# Patient Record
Sex: FEMALE | Race: OTHER | HISPANIC OR LATINO | Employment: OTHER | URBAN - METROPOLITAN AREA
[De-identification: names, ages, dates, MRNs, and addresses within clinical notes are randomized per-mention and may not be internally consistent; named-entity substitution may affect disease eponyms.]

---

## 2018-01-03 ENCOUNTER — HOSPITAL ENCOUNTER (INPATIENT)
Facility: MEDICAL CENTER | Age: 53
LOS: 7 days | DRG: 091 | End: 2018-01-11
Attending: EMERGENCY MEDICINE | Admitting: INTERNAL MEDICINE
Payer: COMMERCIAL

## 2018-01-03 ENCOUNTER — RESOLUTE PROFESSIONAL BILLING HOSPITAL PROF FEE (OUTPATIENT)
Dept: HOSPITALIST | Facility: MEDICAL CENTER | Age: 53
End: 2018-01-03
Payer: COMMERCIAL

## 2018-01-03 DIAGNOSIS — E86.0 DEHYDRATION: ICD-10-CM

## 2018-01-03 DIAGNOSIS — R74.8 ELEVATED CK: ICD-10-CM

## 2018-01-03 DIAGNOSIS — G25.82 STIFF PERSON SYNDROME WITH POSITIVE GLUTAMIC ACID DECARBOXYLASE (GAD) ANTIBODY: ICD-10-CM

## 2018-01-03 DIAGNOSIS — R76.0 STIFF PERSON SYNDROME WITH POSITIVE GLUTAMIC ACID DECARBOXYLASE (GAD) ANTIBODY: ICD-10-CM

## 2018-01-03 DIAGNOSIS — R53.81 DEBILITY: ICD-10-CM

## 2018-01-03 DIAGNOSIS — M79.604 PAIN OF RIGHT LOWER EXTREMITY: ICD-10-CM

## 2018-01-03 DIAGNOSIS — E87.6 HYPOKALEMIA: ICD-10-CM

## 2018-01-03 PROBLEM — G24.8 DYSTONIA OF EXTREMITY: Status: ACTIVE | Noted: 2018-01-03

## 2018-01-03 PROBLEM — D72.829 LEUKOCYTOSIS: Status: ACTIVE | Noted: 2018-01-03

## 2018-01-03 LAB
ALBUMIN SERPL BCP-MCNC: 4 G/DL (ref 3.2–4.9)
ALBUMIN/GLOB SERPL: 1.3 G/DL
ALP SERPL-CCNC: 162 U/L (ref 30–99)
ALT SERPL-CCNC: 112 U/L (ref 2–50)
AMPHET UR QL SCN: NEGATIVE
ANION GAP SERPL CALC-SCNC: 13 MMOL/L (ref 0–11.9)
APPEARANCE UR: CLEAR
APTT PPP: 27.5 SEC (ref 24.7–36)
AST SERPL-CCNC: 35 U/L (ref 12–45)
BACTERIA #/AREA URNS HPF: NEGATIVE /HPF
BARBITURATES UR QL SCN: NEGATIVE
BASOPHILS # BLD AUTO: 0.4 % (ref 0–1.8)
BASOPHILS # BLD: 0.06 K/UL (ref 0–0.12)
BENZODIAZ UR QL SCN: POSITIVE
BILIRUB SERPL-MCNC: 0.7 MG/DL (ref 0.1–1.5)
BILIRUB UR QL STRIP.AUTO: NEGATIVE
BUN SERPL-MCNC: 11 MG/DL (ref 8–22)
BZE UR QL SCN: NEGATIVE
CALCIUM SERPL-MCNC: 9.4 MG/DL (ref 8.5–10.5)
CANNABINOIDS UR QL SCN: NEGATIVE
CHLORIDE SERPL-SCNC: 105 MMOL/L (ref 96–112)
CK SERPL-CCNC: 231 U/L (ref 0–154)
CO2 SERPL-SCNC: 25 MMOL/L (ref 20–33)
COLOR UR: YELLOW
CREAT SERPL-MCNC: 0.54 MG/DL (ref 0.5–1.4)
CRP SERPL HS-MCNC: 2.04 MG/DL (ref 0–0.75)
DEPRECATED D DIMER PPP IA-ACNC: 1135 NG/ML(D-DU)
EKG IMPRESSION: NORMAL
EOSINOPHIL # BLD AUTO: 0.05 K/UL (ref 0–0.51)
EOSINOPHIL NFR BLD: 0.3 % (ref 0–6.9)
EPI CELLS #/AREA URNS HPF: ABNORMAL /HPF
ERYTHROCYTE [DISTWIDTH] IN BLOOD BY AUTOMATED COUNT: 38.9 FL (ref 35.9–50)
ERYTHROCYTE [SEDIMENTATION RATE] IN BLOOD BY WESTERGREN METHOD: 41 MM/HOUR (ref 0–30)
EST. AVERAGE GLUCOSE BLD GHB EST-MCNC: 192 MG/DL
FERRITIN SERPL-MCNC: 54.3 NG/ML (ref 10–291)
FOLATE SERPL-MCNC: 17.6 NG/ML
GFR SERPL CREATININE-BSD FRML MDRD: >60 ML/MIN/1.73 M 2
GLOBULIN SER CALC-MCNC: 3.2 G/DL (ref 1.9–3.5)
GLUCOSE BLD-MCNC: 126 MG/DL (ref 65–99)
GLUCOSE BLD-MCNC: 153 MG/DL (ref 65–99)
GLUCOSE BLD-MCNC: 97 MG/DL (ref 65–99)
GLUCOSE SERPL-MCNC: 128 MG/DL (ref 65–99)
GLUCOSE UR STRIP.AUTO-MCNC: NEGATIVE MG/DL
HBA1C MFR BLD: 8.3 % (ref 0–5.6)
HCT VFR BLD AUTO: 39.4 % (ref 37–47)
HGB BLD-MCNC: 13.8 G/DL (ref 12–16)
HYALINE CASTS #/AREA URNS LPF: ABNORMAL /LPF
IMM GRANULOCYTES # BLD AUTO: 0.07 K/UL (ref 0–0.11)
IMM GRANULOCYTES NFR BLD AUTO: 0.5 % (ref 0–0.9)
INR PPP: 0.95 (ref 0.87–1.13)
KETONES UR STRIP.AUTO-MCNC: NEGATIVE MG/DL
LEUKOCYTE ESTERASE UR QL STRIP.AUTO: ABNORMAL
LYMPHOCYTES # BLD AUTO: 3.49 K/UL (ref 1–4.8)
LYMPHOCYTES NFR BLD: 23.4 % (ref 22–41)
MAGNESIUM SERPL-MCNC: 1.9 MG/DL (ref 1.5–2.5)
MCH RBC QN AUTO: 31.3 PG (ref 27–33)
MCHC RBC AUTO-ENTMCNC: 35 G/DL (ref 33.6–35)
MCV RBC AUTO: 89.3 FL (ref 81.4–97.8)
METHADONE UR QL SCN: NEGATIVE
MICRO URNS: ABNORMAL
MONOCYTES # BLD AUTO: 0.62 K/UL (ref 0–0.85)
MONOCYTES NFR BLD AUTO: 4.2 % (ref 0–13.4)
NEUTROPHILS # BLD AUTO: 10.61 K/UL (ref 2–7.15)
NEUTROPHILS NFR BLD: 71.2 % (ref 44–72)
NITRITE UR QL STRIP.AUTO: NEGATIVE
NRBC # BLD AUTO: 0 K/UL
NRBC BLD-RTO: 0 /100 WBC
OPIATES UR QL SCN: POSITIVE
OXYCODONE UR QL SCN: NEGATIVE
PCP UR QL SCN: NEGATIVE
PH UR STRIP.AUTO: 6.5 [PH]
PHOSPHATE SERPL-MCNC: 3.3 MG/DL (ref 2.5–4.5)
PLATELET # BLD AUTO: 346 K/UL (ref 164–446)
PMV BLD AUTO: 13.1 FL (ref 9–12.9)
POTASSIUM SERPL-SCNC: 3.2 MMOL/L (ref 3.6–5.5)
PROPOXYPH UR QL SCN: NEGATIVE
PROT SERPL-MCNC: 7.2 G/DL (ref 6–8.2)
PROT UR QL STRIP: NEGATIVE MG/DL
PROTHROMBIN TIME: 12.4 SEC (ref 12–14.6)
RBC # BLD AUTO: 4.41 M/UL (ref 4.2–5.4)
RBC # URNS HPF: ABNORMAL /HPF
RBC UR QL AUTO: NEGATIVE
SODIUM SERPL-SCNC: 143 MMOL/L (ref 135–145)
SP GR UR STRIP.AUTO: 1.01
T4 FREE SERPL-MCNC: 1.36 NG/DL (ref 0.53–1.43)
TSH SERPL DL<=0.005 MIU/L-ACNC: 3.65 UIU/ML (ref 0.38–5.33)
UROBILINOGEN UR STRIP.AUTO-MCNC: 1 MG/DL
VIT B12 SERPL-MCNC: >1500 PG/ML (ref 211–911)
WBC # BLD AUTO: 14.9 K/UL (ref 4.8–10.8)
WBC #/AREA URNS HPF: ABNORMAL /HPF

## 2018-01-03 PROCEDURE — 93010 ELECTROCARDIOGRAM REPORT: CPT | Performed by: INTERNAL MEDICINE

## 2018-01-03 PROCEDURE — 85730 THROMBOPLASTIN TIME PARTIAL: CPT

## 2018-01-03 PROCEDURE — 85379 FIBRIN DEGRADATION QUANT: CPT

## 2018-01-03 PROCEDURE — 85025 COMPLETE CBC W/AUTO DIFF WBC: CPT

## 2018-01-03 PROCEDURE — 85652 RBC SED RATE AUTOMATED: CPT

## 2018-01-03 PROCEDURE — 700101 HCHG RX REV CODE 250: Performed by: STUDENT IN AN ORGANIZED HEALTH CARE EDUCATION/TRAINING PROGRAM

## 2018-01-03 PROCEDURE — 84439 ASSAY OF FREE THYROXINE: CPT

## 2018-01-03 PROCEDURE — 93971 EXTREMITY STUDY: CPT

## 2018-01-03 PROCEDURE — 700105 HCHG RX REV CODE 258: Performed by: STUDENT IN AN ORGANIZED HEALTH CARE EDUCATION/TRAINING PROGRAM

## 2018-01-03 PROCEDURE — 83036 HEMOGLOBIN GLYCOSYLATED A1C: CPT

## 2018-01-03 PROCEDURE — 36415 COLL VENOUS BLD VENIPUNCTURE: CPT

## 2018-01-03 PROCEDURE — 93005 ELECTROCARDIOGRAM TRACING: CPT | Performed by: STUDENT IN AN ORGANIZED HEALTH CARE EDUCATION/TRAINING PROGRAM

## 2018-01-03 PROCEDURE — 700102 HCHG RX REV CODE 250 W/ 637 OVERRIDE(OP): Performed by: STUDENT IN AN ORGANIZED HEALTH CARE EDUCATION/TRAINING PROGRAM

## 2018-01-03 PROCEDURE — 86140 C-REACTIVE PROTEIN: CPT

## 2018-01-03 PROCEDURE — 96375 TX/PRO/DX INJ NEW DRUG ADDON: CPT

## 2018-01-03 PROCEDURE — 94760 N-INVAS EAR/PLS OXIMETRY 1: CPT

## 2018-01-03 PROCEDURE — A9270 NON-COVERED ITEM OR SERVICE: HCPCS | Performed by: EMERGENCY MEDICINE

## 2018-01-03 PROCEDURE — 83735 ASSAY OF MAGNESIUM: CPT

## 2018-01-03 PROCEDURE — 700105 HCHG RX REV CODE 258: Performed by: EMERGENCY MEDICINE

## 2018-01-03 PROCEDURE — 82746 ASSAY OF FOLIC ACID SERUM: CPT

## 2018-01-03 PROCEDURE — 84443 ASSAY THYROID STIM HORMONE: CPT

## 2018-01-03 PROCEDURE — 700102 HCHG RX REV CODE 250 W/ 637 OVERRIDE(OP): Performed by: EMERGENCY MEDICINE

## 2018-01-03 PROCEDURE — 700102 HCHG RX REV CODE 250 W/ 637 OVERRIDE(OP): Performed by: PSYCHIATRY & NEUROLOGY

## 2018-01-03 PROCEDURE — 82550 ASSAY OF CK (CPK): CPT

## 2018-01-03 PROCEDURE — 99285 EMERGENCY DEPT VISIT HI MDM: CPT

## 2018-01-03 PROCEDURE — 85610 PROTHROMBIN TIME: CPT

## 2018-01-03 PROCEDURE — 700111 HCHG RX REV CODE 636 W/ 250 OVERRIDE (IP): Performed by: STUDENT IN AN ORGANIZED HEALTH CARE EDUCATION/TRAINING PROGRAM

## 2018-01-03 PROCEDURE — 82607 VITAMIN B-12: CPT

## 2018-01-03 PROCEDURE — 82962 GLUCOSE BLOOD TEST: CPT | Mod: 91

## 2018-01-03 PROCEDURE — 80307 DRUG TEST PRSMV CHEM ANLYZR: CPT

## 2018-01-03 PROCEDURE — A9270 NON-COVERED ITEM OR SERVICE: HCPCS | Performed by: PSYCHIATRY & NEUROLOGY

## 2018-01-03 PROCEDURE — G0378 HOSPITAL OBSERVATION PER HR: HCPCS

## 2018-01-03 PROCEDURE — 96372 THER/PROPH/DIAG INJ SC/IM: CPT

## 2018-01-03 PROCEDURE — 99220 PR INITIAL OBSERVATION CARE,LEVL III: CPT | Mod: GC | Performed by: INTERNAL MEDICINE

## 2018-01-03 PROCEDURE — 700111 HCHG RX REV CODE 636 W/ 250 OVERRIDE (IP): Performed by: EMERGENCY MEDICINE

## 2018-01-03 PROCEDURE — 82728 ASSAY OF FERRITIN: CPT

## 2018-01-03 PROCEDURE — 81001 URINALYSIS AUTO W/SCOPE: CPT

## 2018-01-03 PROCEDURE — 84100 ASSAY OF PHOSPHORUS: CPT

## 2018-01-03 PROCEDURE — A9270 NON-COVERED ITEM OR SERVICE: HCPCS | Performed by: STUDENT IN AN ORGANIZED HEALTH CARE EDUCATION/TRAINING PROGRAM

## 2018-01-03 PROCEDURE — 96361 HYDRATE IV INFUSION ADD-ON: CPT

## 2018-01-03 PROCEDURE — 80053 COMPREHEN METABOLIC PANEL: CPT

## 2018-01-03 PROCEDURE — 96374 THER/PROPH/DIAG INJ IV PUSH: CPT

## 2018-01-03 PROCEDURE — 96376 TX/PRO/DX INJ SAME DRUG ADON: CPT

## 2018-01-03 RX ORDER — GABAPENTIN 300 MG/1
600 CAPSULE ORAL 3 TIMES DAILY
Status: DISCONTINUED | OUTPATIENT
Start: 2018-01-03 | End: 2018-01-06

## 2018-01-03 RX ORDER — MORPHINE SULFATE 4 MG/ML
2 INJECTION, SOLUTION INTRAMUSCULAR; INTRAVENOUS EVERY 4 HOURS PRN
Status: DISCONTINUED | OUTPATIENT
Start: 2018-01-03 | End: 2018-01-04

## 2018-01-03 RX ORDER — SODIUM CHLORIDE 9 MG/ML
1000 INJECTION, SOLUTION INTRAVENOUS ONCE
Status: COMPLETED | OUTPATIENT
Start: 2018-01-03 | End: 2018-01-03

## 2018-01-03 RX ORDER — SODIUM CHLORIDE AND POTASSIUM CHLORIDE 150; 900 MG/100ML; MG/100ML
INJECTION, SOLUTION INTRAVENOUS CONTINUOUS
Status: DISCONTINUED | OUTPATIENT
Start: 2018-01-03 | End: 2018-01-06

## 2018-01-03 RX ORDER — HYDROMORPHONE HYDROCHLORIDE 2 MG/ML
.5-1 INJECTION, SOLUTION INTRAMUSCULAR; INTRAVENOUS; SUBCUTANEOUS
Status: COMPLETED | OUTPATIENT
Start: 2018-01-03 | End: 2018-01-03

## 2018-01-03 RX ORDER — POTASSIUM CHLORIDE 20 MEQ/1
40 TABLET, EXTENDED RELEASE ORAL ONCE
Status: COMPLETED | OUTPATIENT
Start: 2018-01-03 | End: 2018-01-03

## 2018-01-03 RX ORDER — AMOXICILLIN 250 MG
2 CAPSULE ORAL 2 TIMES DAILY
Status: DISCONTINUED | OUTPATIENT
Start: 2018-01-03 | End: 2018-01-07 | Stop reason: ALTCHOICE

## 2018-01-03 RX ORDER — LORAZEPAM 2 MG/ML
1-2 INJECTION INTRAMUSCULAR
Status: DISCONTINUED | OUTPATIENT
Start: 2018-01-03 | End: 2018-01-05

## 2018-01-03 RX ORDER — GLIMEPIRIDE 4 MG/1
4 TABLET ORAL EVERY MORNING
COMMUNITY
End: 2018-01-23

## 2018-01-03 RX ORDER — GABAPENTIN 300 MG/1
600 CAPSULE ORAL 3 TIMES DAILY
Status: ON HOLD | COMMUNITY
End: 2018-01-11

## 2018-01-03 RX ORDER — METHOCARBAMOL 750 MG/1
1500 TABLET, FILM COATED ORAL EVERY 6 HOURS PRN
Status: DISCONTINUED | OUTPATIENT
Start: 2018-01-03 | End: 2018-01-04

## 2018-01-03 RX ORDER — DEXTROSE MONOHYDRATE 25 G/50ML
25 INJECTION, SOLUTION INTRAVENOUS
Status: DISCONTINUED | OUTPATIENT
Start: 2018-01-03 | End: 2018-01-11 | Stop reason: HOSPADM

## 2018-01-03 RX ORDER — DIAZEPAM 5 MG/1
10 TABLET ORAL EVERY 8 HOURS
Status: DISCONTINUED | OUTPATIENT
Start: 2018-01-03 | End: 2018-01-05

## 2018-01-03 RX ORDER — POLYETHYLENE GLYCOL 3350 17 G/17G
1 POWDER, FOR SOLUTION ORAL
Status: DISCONTINUED | OUTPATIENT
Start: 2018-01-03 | End: 2018-01-11 | Stop reason: HOSPADM

## 2018-01-03 RX ORDER — INSULIN GLARGINE 100 [IU]/ML
5 INJECTION, SOLUTION SUBCUTANEOUS EVERY EVENING
Status: DISCONTINUED | OUTPATIENT
Start: 2018-01-03 | End: 2018-01-11 | Stop reason: HOSPADM

## 2018-01-03 RX ORDER — BISACODYL 10 MG
10 SUPPOSITORY, RECTAL RECTAL
Status: DISCONTINUED | OUTPATIENT
Start: 2018-01-03 | End: 2018-01-11 | Stop reason: HOSPADM

## 2018-01-03 RX ORDER — ACETAMINOPHEN 325 MG/1
650 TABLET ORAL EVERY 6 HOURS PRN
Status: DISCONTINUED | OUTPATIENT
Start: 2018-01-03 | End: 2018-01-11 | Stop reason: HOSPADM

## 2018-01-03 RX ORDER — PREDNISONE 10 MG/1
10 TABLET ORAL DAILY
Status: ON HOLD | COMMUNITY
Start: 2018-01-01 | End: 2018-01-11

## 2018-01-03 RX ORDER — METHOCARBAMOL 750 MG/1
1500 TABLET, FILM COATED ORAL EVERY 6 HOURS PRN
Status: ON HOLD | COMMUNITY
End: 2018-01-11

## 2018-01-03 RX ORDER — HYDROXYZINE 50 MG/1
50 TABLET, FILM COATED ORAL NIGHTLY PRN
Status: DISCONTINUED | OUTPATIENT
Start: 2018-01-03 | End: 2018-01-11 | Stop reason: HOSPADM

## 2018-01-03 RX ADMIN — MORPHINE SULFATE 2 MG: 4 INJECTION INTRAVENOUS at 19:20

## 2018-01-03 RX ADMIN — GABAPENTIN 600 MG: 300 CAPSULE ORAL at 21:34

## 2018-01-03 RX ADMIN — POTASSIUM CHLORIDE 40 MEQ: 1500 TABLET, EXTENDED RELEASE ORAL at 11:15

## 2018-01-03 RX ADMIN — GABAPENTIN 600 MG: 300 CAPSULE ORAL at 15:12

## 2018-01-03 RX ADMIN — SODIUM CHLORIDE 1000 ML: 9 INJECTION, SOLUTION INTRAVENOUS at 12:45

## 2018-01-03 RX ADMIN — HYDROMORPHONE HYDROCHLORIDE 1 MG: 2 INJECTION INTRAMUSCULAR; INTRAVENOUS; SUBCUTANEOUS at 15:09

## 2018-01-03 RX ADMIN — METHOCARBAMOL 1500 MG: 750 TABLET ORAL at 15:09

## 2018-01-03 RX ADMIN — DIAZEPAM 10 MG: 5 TABLET ORAL at 21:34

## 2018-01-03 RX ADMIN — POTASSIUM CHLORIDE AND SODIUM CHLORIDE: 900; 150 INJECTION, SOLUTION INTRAVENOUS at 19:05

## 2018-01-03 RX ADMIN — LORAZEPAM 2 MG: 2 INJECTION INTRAMUSCULAR; INTRAVENOUS at 19:42

## 2018-01-03 RX ADMIN — LORAZEPAM 2 MG: 2 INJECTION INTRAMUSCULAR; INTRAVENOUS at 10:40

## 2018-01-03 RX ADMIN — SODIUM CHLORIDE 1000 ML: 9 INJECTION, SOLUTION INTRAVENOUS at 10:00

## 2018-01-03 RX ADMIN — HYDROMORPHONE HYDROCHLORIDE 1 MG: 2 INJECTION INTRAMUSCULAR; INTRAVENOUS; SUBCUTANEOUS at 10:40

## 2018-01-03 RX ADMIN — LORAZEPAM 2 MG: 2 INJECTION INTRAMUSCULAR; INTRAVENOUS at 15:12

## 2018-01-03 RX ADMIN — HYDROMORPHONE HYDROCHLORIDE 1 MG: 2 INJECTION INTRAMUSCULAR; INTRAVENOUS; SUBCUTANEOUS at 11:02

## 2018-01-03 RX ADMIN — INSULIN GLARGINE 5 UNITS: 100 INJECTION, SOLUTION SUBCUTANEOUS at 21:40

## 2018-01-03 RX ADMIN — ENOXAPARIN SODIUM 40 MG: 100 INJECTION SUBCUTANEOUS at 15:13

## 2018-01-03 ASSESSMENT — ENCOUNTER SYMPTOMS
SENSORY CHANGE: 0
FALLS: 0
FEVER: 0
CARDIOVASCULAR NEGATIVE: 1
CONSTIPATION: 1
CHILLS: 0
BLURRED VISION: 0
RESPIRATORY NEGATIVE: 1
BRUISES/BLEEDS EASILY: 0
NAUSEA: 0
TINGLING: 0
HEADACHES: 0
DIZZINESS: 0
ABDOMINAL PAIN: 0
VOMITING: 0
SORE THROAT: 0
DIARRHEA: 0
MYALGIAS: 1
COUGH: 0
EYES NEGATIVE: 1
SHORTNESS OF BREATH: 0
DOUBLE VISION: 0
HEARTBURN: 0
TREMORS: 0
WEAKNESS: 1

## 2018-01-03 ASSESSMENT — COPD QUESTIONNAIRES
COPD SCREENING SCORE: 2
DO YOU EVER COUGH UP ANY MUCUS OR PHLEGM?: YES, A FEW DAYS A WEEK OR MONTH
DURING THE PAST 4 WEEKS HOW MUCH DID YOU FEEL SHORT OF BREATH: NONE/LITTLE OF THE TIME
HAVE YOU SMOKED AT LEAST 100 CIGARETTES IN YOUR ENTIRE LIFE: NO/DON'T KNOW

## 2018-01-03 ASSESSMENT — LIFESTYLE VARIABLES
DO YOU DRINK ALCOHOL: NO
EVER_SMOKED: NEVER

## 2018-01-03 ASSESSMENT — PAIN SCALES - GENERAL
PAINLEVEL_OUTOF10: 2
PAINLEVEL_OUTOF10: 0
PAINLEVEL_OUTOF10: 5
PAINLEVEL_OUTOF10: 10
PAINLEVEL_OUTOF10: 5
PAINLEVEL_OUTOF10: 8
PAINLEVEL_OUTOF10: 10

## 2018-01-03 NOTE — H&P
Internal Medicine Admitting History and Physical    Note Author: Paola Parish M.D.       Name Linden-Depintor, Vicky Ca     1965   Age/Sex 52 y.o. female   MRN [id number]   Code Status Full      After 5PM or if no immediate response to page, please call for cross-coverage  Attending/Team:Bahman /Constantino  See Patient List for primary contact information  Call [phone] to page    1st Call - Day Intern (R1):   Jem 2nd Call - Day Sr. Resident (R2/R3):   Loyda       Chief Complaint:  Acute on chronic right leg pain    HPI:  Patient is a 52-year-old female with a past medical history of diabetes and chronic right leg pain, comes in for acute on chronic right leg pain and contraction. Patient states symptoms began about 3 years ago with pain in the right hip and contraction of the entire right leg, symptoms have been intermittent since then but have worsened around 10/2017. States she was admitted to [place] for 3 weeks and had a full neurological workup but was told they do not know what caused her symptoms. She followed up with neurology as an outpatient after discharge and was not diagnosed with anything specific.   Patient states the right leg is not always contracted and usually becomes contracted when the pain is more severe than usual, it stays contracted after the pain resolves for a few hours. States the pain is more severe than childbirth when it's at its worse but can't specify it's quality. She denies any back pain, hip pain or gluteal pain and states pain begins from the foot up to the upper thigh. Patient states for the past year she is been needing to use a walker, she mostly lays in bed and is unable to even sit down. States she has a small ulcer in the gluteal cleft which is being followed by her primary. Most recent travel was to [place] 2017, she had similar like contractions twice.     Current symptoms started , patient went to [place] and was and was admitted for further  workup, x-ray of the right foot was done to rule out charcot foot as she is a diabetic, results negative and she was discharged on  with a diagnosis of myofacial pain syndrome and no resolution of symptoms. She denies bowel or urinary incontinence, last bowel movement was 4 days ago, last urinated today with no problems.    Review of Systems   Constitutional: Negative for chills and fever.   HENT: Negative.  Negative for hearing loss and sore throat.    Eyes: Negative.  Negative for blurred vision and double vision.   Respiratory: Negative.  Negative for cough and shortness of breath.    Cardiovascular: Negative.  Negative for chest pain and leg swelling.   Gastrointestinal: Positive for constipation. Negative for abdominal pain, diarrhea, heartburn, nausea and vomiting.   Genitourinary: Negative.  Negative for dysuria, frequency, hematuria and urgency.   Musculoskeletal: Positive for joint pain and myalgias. Negative for falls.   Skin: Negative.  Negative for rash.   Neurological: Positive for weakness. Negative for dizziness, tingling, tremors, sensory change and headaches.   Endo/Heme/Allergies: Negative.  Does not bruise/bleed easily.             Past Medical History:   Past Medical History:   Diagnosis Date   • Diabetes (CMS-HCC)        Past Surgical History:  Past Surgical History:   Procedure Laterality Date   • APPENDECTOMY     • CHOLECYSTECTOMY     • PB  DELIVERY ONLY         Current Outpatient Medications:  Home Medications     Reviewed by Miguelito Avendaño (Pharmacy Tech) on 18 at 1222  Med List Status: Complete   Medication Last Dose Status   gabapentin (NEURONTIN) 300 MG Cap 2018 Active   glimepiride (AMARYL) 4 MG Tab 2018 Active   hydrOXYzine (ATARAX) 50 MG Tab 2018 Active   insulin glargine (BASAGLAR KWIKPEN) 100 UNIT/ML Solution Pen-injector injection Unknown Active   methocarbamol (ROBAXIN) 750 MG Tab 1/3/2018 Active   predniSONE (DELTASONE) 10 MG Tab 2018  "Active                Medication Allergy/Sensitivities:  No Known Allergies      Family History:  History reviewed. No pertinent family history.    Social History:  Social History     Social History   • Marital status:      Spouse name: N/A   • Number of children: N/A   • Years of education: N/A     Occupational History   • Not on file.     Social History Main Topics   • Smoking status: Never Smoker   • Smokeless tobacco: Never Used   • Alcohol use No   • Drug use: No   • Sexual activity: Not on file     Other Topics Concern   • Not on file     Social History Narrative    ** Merged History Encounter **          Living situation: At home with family  PCP : Pcp Unknown      Physical Exam     Vitals:    01/03/18 1130 01/03/18 1230 01/03/18 1329 01/03/18 1330   BP:       Pulse: 72 73  60   Resp:   18    Temp:       SpO2: 98% 98%  98%   Weight:       Height:         Body mass index is 20.8 kg/m².  /80   Pulse 60   Temp 36.9 °C (98.4 °F)   Resp 18   Ht 1.651 m (5' 5\")   Wt 56.7 kg (125 lb)   SpO2 98%   BMI 20.80 kg/m²   O2 therapy: Pulse Oximetry: 98 %, O2 (LPM): 2    Physical Exam   Constitutional: She is oriented to person, place, and time and well-developed, well-nourished, and in no distress. No distress.   HENT:   Head: Normocephalic and atraumatic.   Mouth/Throat: Mucous membranes are dry.   Eyes: Conjunctivae and EOM are normal. Pupils are equal, round, and reactive to light.   Neck: Normal range of motion. Neck supple.   Cardiovascular: Normal rate, regular rhythm and normal heart sounds.    No murmur heard.  Pulmonary/Chest: Effort normal and breath sounds normal. No respiratory distress. She has no wheezes. She has no rales.   Abdominal: Soft. Bowel sounds are normal. She exhibits no distension. There is no tenderness. There is no rebound and no guarding.   Genitourinary: Rectal exam shows no external hemorrhoid, no internal hemorrhoid, no fissure, no laceration, no mass, no tenderness and " guaiac negative stool.   Genitourinary Comments: Good rectal tone, no ulcer seen   Musculoskeletal: She exhibits no edema or tenderness.   Lymphadenopathy:     She has no cervical adenopathy.   Neurological: She is alert and oriented to person, place, and time. She has normal sensation. No cranial nerve deficit.   Strength 5/5 in the upper extremities, 2/5 in the right leg, left leg 5/5 extension, 4 out of 5 flexion. Feet are contracted bilaterally R>L, right foot inverted. Dystonia of entire right leg and left leg below the knee. Patellar reflexes 2 out of 4 on the left, unable to examine on the right. Babinski negative bilaterally   Skin: Skin is warm and dry. She is not diaphoretic. No erythema.             Data Review       Old Records Request:   Completed  Current Records review and summary: Completed    Lab Data Review:  Recent Results (from the past 24 hour(s))   CBC WITH DIFFERENTIAL    Collection Time: 01/03/18  9:08 AM   Result Value Ref Range    WBC 14.9 (H) 4.8 - 10.8 K/uL    RBC 4.41 4.20 - 5.40 M/uL    Hemoglobin 13.8 12.0 - 16.0 g/dL    Hematocrit 39.4 37.0 - 47.0 %    MCV 89.3 81.4 - 97.8 fL    MCH 31.3 27.0 - 33.0 pg    MCHC 35.0 33.6 - 35.0 g/dL    RDW 38.9 35.9 - 50.0 fL    Platelet Count 346 164 - 446 K/uL    MPV 13.1 (H) 9.0 - 12.9 fL    Neutrophils-Polys 71.20 44.00 - 72.00 %    Lymphocytes 23.40 22.00 - 41.00 %    Monocytes 4.20 0.00 - 13.40 %    Eosinophils 0.30 0.00 - 6.90 %    Basophils 0.40 0.00 - 1.80 %    Immature Granulocytes 0.50 0.00 - 0.90 %    Nucleated RBC 0.00 /100 WBC    Neutrophils (Absolute) 10.61 (H) 2.00 - 7.15 K/uL    Lymphs (Absolute) 3.49 1.00 - 4.80 K/uL    Monos (Absolute) 0.62 0.00 - 0.85 K/uL    Eos (Absolute) 0.05 0.00 - 0.51 K/uL    Baso (Absolute) 0.06 0.00 - 0.12 K/uL    Immature Granulocytes (abs) 0.07 0.00 - 0.11 K/uL    NRBC (Absolute) 0.00 K/uL   COMP METABOLIC PANEL    Collection Time: 01/03/18  9:08 AM   Result Value Ref Range    Sodium 143 135 - 145 mmol/L       Potassium 3.2 (L) 3.6 - 5.5 mmol/L    Chloride 105 96 - 112 mmol/L    Co2 25 20 - 33 mmol/L    Anion Gap 13.0 (H) 0.0 - 11.9    Glucose 128 (H) 65 - 99 mg/dL    Bun 11 8 - 22 mg/dL    Creatinine 0.54 0.50 - 1.40 mg/dL    Calcium 9.4 8.5 - 10.5 mg/dL    AST(SGOT) 35 12 - 45 U/L    ALT(SGPT) 112 (H) 2 - 50 U/L    Alkaline Phosphatase 162 (H) 30 - 99 U/L    Total Bilirubin 0.7 0.1 - 1.5 mg/dL    Albumin 4.0 3.2 - 4.9 g/dL    Total Protein 7.2 6.0 - 8.2 g/dL    Globulin 3.2 1.9 - 3.5 g/dL    A-G Ratio 1.3 g/dL   APTT    Collection Time: 01/03/18  9:08 AM   Result Value Ref Range    APTT 27.5 24.7 - 36.0 sec   PROTHROMBIN TIME    Collection Time: 01/03/18  9:08 AM   Result Value Ref Range    PT 12.4 12.0 - 14.6 sec    INR 0.95 0.87 - 1.13   CREATINE KINASE    Collection Time: 01/03/18  9:08 AM   Result Value Ref Range    CPK Total 231 (H) 0 - 154 U/L   TSH    Collection Time: 01/03/18  9:08 AM   Result Value Ref Range    TSH 3.650 0.380 - 5.330 uIU/mL   FREE THYROXINE    Collection Time: 01/03/18  9:08 AM   Result Value Ref Range    Free T-4 1.36 0.53 - 1.43 ng/dL   ESTIMATED GFR    Collection Time: 01/03/18  9:08 AM   Result Value Ref Range    GFR If African American >60 >60 mL/min/1.73 m 2    GFR If Non African American >60 >60 mL/min/1.73 m 2   MAGNESIUM    Collection Time: 01/03/18  9:08 AM   Result Value Ref Range    Magnesium 1.9 1.5 - 2.5 mg/dL   PHOSPHORUS    Collection Time: 01/03/18  9:08 AM   Result Value Ref Range    Phosphorus 3.3 2.5 - 4.5 mg/dL   CRP QUANTITIVE (NON-CARDIAC)    Collection Time: 01/03/18  9:08 AM   Result Value Ref Range    Stat C-Reactive Protein 2.04 (H) 0.00 - 0.75 mg/dL   D-DIMER    Collection Time: 01/03/18  9:08 AM   Result Value Ref Range    D-Dimer Screen 1135 (H) <250 ng/mL(D-DU)   ACCU-CHEK GLUCOSE    Collection Time: 01/03/18 12:24 PM   Result Value Ref Range    Glucose - Accu-Ck 97 65 - 99 mg/dL       Imaging/Procedures Review:    ndependant Imaging Review: Completed  No  "orders to display       EKG:   Ordered will review when done.           Assessment/Plan     * Dystonia of extremity- (present on admission)   Assessment & Plan    - Acute on chronic right leg pain and dystonia. Symptoms have been intermittent for the past 3 years and has been this severe in the past. Has had extensive neurologic workup at outside facility  - Dystonia is in the right and left legs and feet, R>L  - will consult neurology and manage pain        Right leg pain- (present on admission)   Assessment & Plan    See \"dystonia of extremity\"        Leukocytosis- (present on admission)   Assessment & Plan    - WBC of 14.9, no bands. Afebrile, hemodynamically stable, nontoxic appearing  - Very dehydrated could be secondary to concentration  - will continue to monitor        Type 2 diabetes mellitus untreated, with ketoacidosis - (present on admission)   Assessment & Plan    - Continue home medications, sliding scale insulin, hypoglycemia protocol  - HbA1c ordered            Anticipated Hospital stay:  >2 midnights        Quality Measures    Reviewed items::  EKG reviewed, Labs reviewed, Radiology images reviewed and Medications reviewed  Olguin catheter::  No Olguin  DVT prophylaxis pharmacological::  Enoxaparin (Lovenox)  Ulcer Prophylaxis::  No    "

## 2018-01-03 NOTE — ASSESSMENT & PLAN NOTE
- on admission WBC of 14.9, no bands. Afebrile, hemodynamically stable, nontoxic appearing.   - Very dehydrated could be secondary to concentration vs reactive to pain  - resolved

## 2018-01-03 NOTE — ED PROVIDER NOTES
ED Provider Note    Scribed for William Rojas M.D. by Gay Hooper. 1/3/2018  9:14 AM    Primary care provider: Pcp Pt states none  Means of arrival: Ambulance  History obtained from: Patient  History limited by: None    CHIEF COMPLAINT  Chief Complaint   Patient presents with   • Leg Pain       HPI  Vicky Cheatham-Lindsey is a 52 y.o. female who presents to the Emergency Department after being brought in by ambulance from home for right lower extremity onset 6 days ago. The patient reports chronic bilateral lower extremity pain and cramping for the last 4 years, but states the pain significantly worsened in the last 6 days, rating the pain 10/10 in severity. She does not take any medications for pain relief. She reports associated minimal back pain, but denies any fever, nausea, vomiting, or chest pain. She received Fentanyl 200, Ketamine, and Versed by EMS prior to arrival because she was crying and screaming in pain. Per nurse's notes, the patient has had similar symptoms previously and is normally treated at [facility], but could not receive treatment there this morning due to [facility] divert. The patient has past history of Diabetes.     REVIEW OF SYSTEMS  Pertinent positives include right lower extremity pain and back pain. Pertinent negatives include no fever, nausea, vomiting, or chest pain.  All other systems reviewed and negative.  C.     PAST MEDICAL HISTORY   has a past medical history of Diabetes (CMS-HCC).    SURGICAL HISTORY   has a past surgical history that includes cholecystectomy; appendectomy; and  delivery only.    SOCIAL HISTORY  Social History   Substance Use Topics   • Smoking status: Never Smoker   • Smokeless tobacco: Never Used   • Alcohol use No      History   Drug Use No       FAMILY HISTORY  History reviewed. No pertinent family history.    CURRENT MEDICATIONS    Current Facility-Administered Medications:   •  lorazepam (ATIVAN) injection 1-2 mg, 1-2 mg, Intravenous, Q30 MIN  "PRN, William Rojas M.D., 2 mg at 01/03/18 1512  •  morphine (pf) 4 mg/ml injection 2 mg, 2 mg, Intravenous, Q4HRS PRN, Paola Parish M.D.  •  senna-docusate (PERICOLACE or SENOKOT S) 8.6-50 MG per tablet 2 Tab, 2 Tab, Oral, BID **AND** polyethylene glycol/lytes (MIRALAX) PACKET 1 Packet, 1 Packet, Oral, QDAY PRN **AND** magnesium hydroxide (MILK OF MAGNESIA) suspension 30 mL, 30 mL, Oral, QDAY PRN **AND** bisacodyl (DULCOLAX) suppository 10 mg, 10 mg, Rectal, QDAY PRN, Madhavi Scales M.D.  •  Respiratory Care per Protocol, , Nebulization, Continuous RT, Madhavi Scales M.D.  •  0.9 % NaCl with KCl 20 mEq infusion, , Intravenous, Continuous, Madhavi Scales M.D.  •  enoxaparin (LOVENOX) inj 40 mg, 40 mg, Subcutaneous, DAILY, Madhavi Scales M.D., 40 mg at 01/03/18 1513  •  acetaminophen (TYLENOL) tablet 650 mg, 650 mg, Oral, Q6HRS PRN, Madhavi Scales M.D.  •  gabapentin (NEURONTIN) capsule 600 mg, 600 mg, Oral, TID, Madhavi Scales M.D., 600 mg at 01/03/18 1512  •  hydrOXYzine HCl (ATARAX) tablet 50 mg, 50 mg, Oral, HS PRN, Madhavi Scales M.D.  •  methocarbamol (ROBAXIN) tablet 1,500 mg, 1,500 mg, Oral, Q6HRS PRN, Madhavi Scales M.D., 1,500 mg at 01/03/18 1509  •  insulin regular (HUMULIN R) injection 2-9 Units, 2-9 Units, Subcutaneous, 4X/DAY ACHS **AND** Accu-Chek ACHS, , , Q AC AND BEDTIME(S) **AND** NOTIFY MD and PharmD, , , Once **AND** glucose 4 g chewable tablet 16 g, 16 g, Oral, Q15 MIN PRN **AND** dextrose 50% (D50W) injection 25 mL, 25 mL, Intravenous, Q15 MIN PRN, jad Scales M.D.  •  insulin glargine (LANTUS) injection 5 Units, 5 Units, Subcutaneous, Q EVENING, we COLE Scales M.D.     ALLERGIES  No Known Allergies    PHYSICAL EXAM  VITAL SIGNS: /80   Pulse (!) 112   Temp 36.9 °C (98.4 °F)   Resp 20   Ht 1.651 m (5' 5\")   Wt 63.5 kg (140 lb)   SpO2 95%   BMI 23.30 kg/m²     Constitutional: Well developed, Well nourished, moderate to severe distress, Non-toxic appearance.   HENT: Poor dentition. Normocephalic, " Atraumatic, Bilateral external ears normal, Oropharynx severely dry, No oral exudates.   Eyes: PERRLA, EOMI, Conjunctiva normal, No discharge.   Neck: No tenderness, Supple, No stridor.   Lymphatic: No lymphadenopathy noted.   Cardiovascular: Tachycardic, Normal rhythm.   Thorax & Lungs: Clear to auscultation bilaterally, No respiratory distress, No wheezing, No crackles.   Abdomen: Soft, No tenderness, No masses, No pulsatile masses.   Skin: Warm, Dry, No erythema, No rash.   Extremities: Complete flexion and rigidity of the right lower leg with foot inversion that seems to be intentional. Unable to bend or range right lower extremity.    Musculoskeletal: Intact distal pulses  Neurologic: Awake, alert. Moves all other extremities spontaneously. Sensation normal in right lower extremity.   Psychiatric: Anxious and hyperventilating.     LABS  Labs Reviewed   CBC WITH DIFFERENTIAL - Abnormal; Notable for the following:        Result Value    WBC 14.9 (*)     MPV 13.1 (*)     Neutrophils (Absolute) 10.61 (*)     All other components within normal limits    Narrative:     Indicate which anticoagulants the patient is on:->NONE   COMP METABOLIC PANEL - Abnormal; Notable for the following:     Potassium 3.2 (*)     Anion Gap 13.0 (*)     Glucose 128 (*)     ALT(SGPT) 112 (*)     Alkaline Phosphatase 162 (*)     All other components within normal limits    Narrative:     Indicate which anticoagulants the patient is on:->NONE   CREATINE KINASE - Abnormal; Notable for the following:     CPK Total 231 (*)     All other components within normal limits    Narrative:     Indicate which anticoagulants the patient is on:->NONE   WESTERGREN SED RATE - Abnormal; Notable for the following:     Sed Rate Westergren 41 (*)     All other components within normal limits   CRP QUANTITIVE (NON-CARDIAC) - Abnormal; Notable for the following:     Stat C-Reactive Protein 2.04 (*)     All other components within normal limits   VITAMIN B12 -  Abnormal; Notable for the following:     Vitamin B12 -True Cobalamin >1500 (*)     All other components within normal limits   D-DIMER - Abnormal; Notable for the following:     D-Dimer Screen 1135 (*)     All other components within normal limits   APTT    Narrative:     Indicate which anticoagulants the patient is on:->NONE   PROTHROMBIN TIME    Narrative:     Indicate which anticoagulants the patient is on:->NONE   TSH    Narrative:     Indicate which anticoagulants the patient is on:->NONE   FREE THYROXINE    Narrative:     Indicate which anticoagulants the patient is on:->NONE   ESTIMATED GFR    Narrative:     Indicate which anticoagulants the patient is on:->NONE   MAGNESIUM   PHOSPHORUS   FERRITIN   FOLATE   HEMOGLOBIN A1C   URINE DRUG SCREEN   URINALYSIS   DIAGNOSTIC ALCOHOL   ACCU-CHEK GLUCOSE   All labs reviewed by me.    COURSE & MEDICAL DECISION MAKING  Pertinent Labs & Imaging studies reviewed. 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 Diaz. The patient was admitted to [facility] ED on March 2016 for sepsis and pyelonephritis. She had a neurologic consult for bilateral lower extremity weakness and had a fairly benign examination by Dr. Pena.     9:23 AM - Patient seen and examined at bedside. Patient will be treated with Dilaudid 0.5-1mg, Ativan 1-2mg, and normal saline 1L infusion for dehydration indicated by very dry mucous membranes. Ordered TSH, free thyroxine, prothrombin time, creatinine kinase, CBC, CMP, and APTT to evaluate her symptoms. The differential diagnoses include but are not limited to:Muscle spasms, lumbar radiculopathy, conversion disorder    10:41 AM Patient was reevaluated at bedside. She is now calm and quiet, but continues to have foot inversion.     10:46 AM Patient was reevaluated  at bedside. Patient's family member was at bedside and was able to provide more history regarding the patient: She was admitted for almost 3 weeks in October 2017 for similar symptoms at [place] and received pain management before being discharged home. She was seen again in November 2017 and discharged after pain management. She was admitted again on 12/9/2017-1/1/2018 for similar symptoms and wad discharged home after pain management. She was given referral for Neurology consult, which she attended, and was told nothing was wrong. Discussed lab results with the family memebrs and informed them of the results that are shown above. She will be admitted for further medical interventions, which the family members and patient understand and agree with.     10:48 AM Paged [facility] Internal medicine.     10:52 PM Reviewed the patient's lab results, as shown above. She will be treated with Kdur 40mEq tablet.     11:05 AM I discussed the patient's case and the above findings with Dr. Scales ([facility] medicine) who agreed to admit the patient.     Decision Making:  Patient is coming in a sharp severe pain in her right lower extremity with diffuse muscle cramping and spasm. The patient does not appear to have much atrophy. The patient will not relax her right lower extremity. I tried multiple medicines however the patient continues to have spasm in the right lower leg, discussed the case with the residents for admission    DISPOSITION:  Patient will be admitted to [facility] in guarded condition.     FINAL IMPRESSION  1. Pain of right lower extremity    2. Dehydration    3. Hypokalemia    4. Elevated CK       Gay BECERRA (Siobhan), am scribing for, and in the presence of, William Rojas M.D..    Electronically signed by: Gay Hooper (Siobhan), 1/3/2018    IWilliam M.D. personally performed the services described in this documentation, as scribed by Gay Hooper in my presence, and it is both accurate  and complete.    The note accurately reflects work and decisions made by me.  William Rojas  1/3/2018  3:35 PM

## 2018-01-03 NOTE — ED NOTES
Pt  Arrived from home via EMS screaming and crying in pain due to bilateral lower extremity pain 10/10.  Pt has had previous episodes and treated at [facility]

## 2018-01-03 NOTE — ED NOTES
Pt with admitting physician at this time, pt AAOx4 appears sleepy, denies pain. Pt follows commands

## 2018-01-03 NOTE — ASSESSMENT & PLAN NOTE
- Acute on chronic right leg pain and dystonia. Symptoms have been intermittent for the past 3 years and has been this severe in the past. Has had extensive neurologic workup at outside facility  - Dystonia is in the right and left legs and feet, R>>L.   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 Also quick response to valium & baclofen.   - TSH, free T4, Ferrtin normal  - CRP 2.04, B12>1500.  - Continue Baclofen, Valium, IVIG (through 1/11)  - follow neuro recom.   - PT/OT following; recs for Home Health with PT 2x/week

## 2018-01-03 NOTE — ED NOTES
Med rec updated and complete  Allergies reviewed  Pt not able to tell me her medications.  Called [facility] @ [phone] and [facility] @ [phone].  No antibiotics in the last 30 days.  Went back asked family if these medications were right.  Pts family was not sure when she used her BASAGLAR last.  Per pharmacy has pt take BASALGLAR 5 units every evening and increase 1 unit if needed.

## 2018-01-03 NOTE — ED NOTES
Pt screaming on gurney, eyes closed, family at bedside, pt medicated for continued pain, primary RN updated.

## 2018-01-03 NOTE — ASSESSMENT & PLAN NOTE
- Continue home medications, sliding scale insulin, hypoglycemia protocol  - HbA1c 8.3%.  - C-peptide normal

## 2018-01-04 LAB
ALBUMIN SERPL BCP-MCNC: 3.4 G/DL (ref 3.2–4.9)
ALBUMIN/GLOB SERPL: 1.3 G/DL
ALP SERPL-CCNC: 110 U/L (ref 30–99)
ALT SERPL-CCNC: 68 U/L (ref 2–50)
ANION GAP SERPL CALC-SCNC: 6 MMOL/L (ref 0–11.9)
AST SERPL-CCNC: 18 U/L (ref 12–45)
BASOPHILS # BLD AUTO: 0.4 % (ref 0–1.8)
BASOPHILS # BLD: 0.04 K/UL (ref 0–0.12)
BILIRUB SERPL-MCNC: 0.4 MG/DL (ref 0.1–1.5)
BUN SERPL-MCNC: 9 MG/DL (ref 8–22)
CALCIUM SERPL-MCNC: 8.4 MG/DL (ref 8.5–10.5)
CHLORIDE SERPL-SCNC: 107 MMOL/L (ref 96–112)
CO2 SERPL-SCNC: 25 MMOL/L (ref 20–33)
CREAT SERPL-MCNC: 0.46 MG/DL (ref 0.5–1.4)
EOSINOPHIL # BLD AUTO: 0.06 K/UL (ref 0–0.51)
EOSINOPHIL NFR BLD: 0.6 % (ref 0–6.9)
ERYTHROCYTE [DISTWIDTH] IN BLOOD BY AUTOMATED COUNT: 41.6 FL (ref 35.9–50)
ETHANOL BLD-MCNC: 0.01 G/DL
GFR SERPL CREATININE-BSD FRML MDRD: >60 ML/MIN/1.73 M 2
GLOBULIN SER CALC-MCNC: 2.6 G/DL (ref 1.9–3.5)
GLUCOSE BLD-MCNC: 137 MG/DL (ref 65–99)
GLUCOSE BLD-MCNC: 147 MG/DL (ref 65–99)
GLUCOSE BLD-MCNC: 154 MG/DL (ref 65–99)
GLUCOSE BLD-MCNC: 87 MG/DL (ref 65–99)
GLUCOSE SERPL-MCNC: 92 MG/DL (ref 65–99)
HCT VFR BLD AUTO: 33.9 % (ref 37–47)
HGB BLD-MCNC: 11.4 G/DL (ref 12–16)
IMM GRANULOCYTES # BLD AUTO: 0.03 K/UL (ref 0–0.11)
IMM GRANULOCYTES NFR BLD AUTO: 0.3 % (ref 0–0.9)
LYMPHOCYTES # BLD AUTO: 1.98 K/UL (ref 1–4.8)
LYMPHOCYTES NFR BLD: 19 % (ref 22–41)
MAGNESIUM SERPL-MCNC: 1.6 MG/DL (ref 1.5–2.5)
MCH RBC QN AUTO: 31.1 PG (ref 27–33)
MCHC RBC AUTO-ENTMCNC: 33.6 G/DL (ref 33.6–35)
MCV RBC AUTO: 92.6 FL (ref 81.4–97.8)
MONOCYTES # BLD AUTO: 0.4 K/UL (ref 0–0.85)
MONOCYTES NFR BLD AUTO: 3.8 % (ref 0–13.4)
NEUTROPHILS # BLD AUTO: 7.92 K/UL (ref 2–7.15)
NEUTROPHILS NFR BLD: 75.9 % (ref 44–72)
NRBC # BLD AUTO: 0 K/UL
NRBC BLD-RTO: 0 /100 WBC
PLATELET # BLD AUTO: 252 K/UL (ref 164–446)
PMV BLD AUTO: 12.3 FL (ref 9–12.9)
POTASSIUM SERPL-SCNC: 4 MMOL/L (ref 3.6–5.5)
PROT SERPL-MCNC: 6 G/DL (ref 6–8.2)
RBC # BLD AUTO: 3.66 M/UL (ref 4.2–5.4)
SODIUM SERPL-SCNC: 138 MMOL/L (ref 135–145)
WBC # BLD AUTO: 10.4 K/UL (ref 4.8–10.8)

## 2018-01-04 PROCEDURE — 700102 HCHG RX REV CODE 250 W/ 637 OVERRIDE(OP): Performed by: PSYCHIATRY & NEUROLOGY

## 2018-01-04 PROCEDURE — A9270 NON-COVERED ITEM OR SERVICE: HCPCS | Performed by: PSYCHIATRY & NEUROLOGY

## 2018-01-04 PROCEDURE — 83516 IMMUNOASSAY NONANTIBODY: CPT

## 2018-01-04 PROCEDURE — 99232 SBSQ HOSP IP/OBS MODERATE 35: CPT | Mod: GC | Performed by: INTERNAL MEDICINE

## 2018-01-04 PROCEDURE — G0378 HOSPITAL OBSERVATION PER HR: HCPCS

## 2018-01-04 PROCEDURE — 302152 K-PAD 12X17: Performed by: INTERNAL MEDICINE

## 2018-01-04 PROCEDURE — 82962 GLUCOSE BLOOD TEST: CPT | Mod: 91

## 2018-01-04 PROCEDURE — 85025 COMPLETE CBC W/AUTO DIFF WBC: CPT

## 2018-01-04 PROCEDURE — 83735 ASSAY OF MAGNESIUM: CPT

## 2018-01-04 PROCEDURE — 302131 K PAD MOTOR: Performed by: INTERNAL MEDICINE

## 2018-01-04 PROCEDURE — 770006 HCHG ROOM/CARE - MED/SURG/GYN SEMI*

## 2018-01-04 PROCEDURE — 700101 HCHG RX REV CODE 250: Performed by: STUDENT IN AN ORGANIZED HEALTH CARE EDUCATION/TRAINING PROGRAM

## 2018-01-04 PROCEDURE — 96372 THER/PROPH/DIAG INJ SC/IM: CPT

## 2018-01-04 PROCEDURE — 700111 HCHG RX REV CODE 636 W/ 250 OVERRIDE (IP): Performed by: STUDENT IN AN ORGANIZED HEALTH CARE EDUCATION/TRAINING PROGRAM

## 2018-01-04 PROCEDURE — 96366 THER/PROPH/DIAG IV INF ADDON: CPT

## 2018-01-04 PROCEDURE — 96376 TX/PRO/DX INJ SAME DRUG ADON: CPT

## 2018-01-04 PROCEDURE — 700102 HCHG RX REV CODE 250 W/ 637 OVERRIDE(OP): Performed by: STUDENT IN AN ORGANIZED HEALTH CARE EDUCATION/TRAINING PROGRAM

## 2018-01-04 PROCEDURE — 80307 DRUG TEST PRSMV CHEM ANLYZR: CPT

## 2018-01-04 PROCEDURE — 96365 THER/PROPH/DIAG IV INF INIT: CPT

## 2018-01-04 PROCEDURE — A9270 NON-COVERED ITEM OR SERVICE: HCPCS | Performed by: STUDENT IN AN ORGANIZED HEALTH CARE EDUCATION/TRAINING PROGRAM

## 2018-01-04 PROCEDURE — 80053 COMPREHEN METABOLIC PANEL: CPT

## 2018-01-04 RX ORDER — THIAMINE MONONITRATE (VIT B1) 100 MG
100 TABLET ORAL DAILY
Status: DISCONTINUED | OUTPATIENT
Start: 2018-01-04 | End: 2018-01-11 | Stop reason: HOSPADM

## 2018-01-04 RX ORDER — BACLOFEN 10 MG/1
10 TABLET ORAL EVERY 8 HOURS
Status: DISCONTINUED | OUTPATIENT
Start: 2018-01-04 | End: 2018-01-05

## 2018-01-04 RX ORDER — BACLOFEN 10 MG/1
10 TABLET ORAL 3 TIMES DAILY
Status: DISCONTINUED | OUTPATIENT
Start: 2018-01-04 | End: 2018-01-04

## 2018-01-04 RX ORDER — MAGNESIUM SULFATE HEPTAHYDRATE 40 MG/ML
4 INJECTION, SOLUTION INTRAVENOUS ONCE
Status: COMPLETED | OUTPATIENT
Start: 2018-01-04 | End: 2018-01-04

## 2018-01-04 RX ADMIN — GABAPENTIN 600 MG: 300 CAPSULE ORAL at 09:56

## 2018-01-04 RX ADMIN — MAGNESIUM SULFATE IN WATER 4 G: 40 INJECTION, SOLUTION INTRAVENOUS at 09:56

## 2018-01-04 RX ADMIN — METHOCARBAMOL 1500 MG: 750 TABLET ORAL at 09:58

## 2018-01-04 RX ADMIN — INSULIN GLARGINE 5 UNITS: 100 INJECTION, SOLUTION SUBCUTANEOUS at 21:42

## 2018-01-04 RX ADMIN — GABAPENTIN 600 MG: 300 CAPSULE ORAL at 21:39

## 2018-01-04 RX ADMIN — GABAPENTIN 600 MG: 300 CAPSULE ORAL at 16:17

## 2018-01-04 RX ADMIN — BACLOFEN 10 MG: 10 TABLET ORAL at 21:39

## 2018-01-04 RX ADMIN — STANDARDIZED SENNA CONCENTRATE AND DOCUSATE SODIUM 2 TABLET: 8.6; 5 TABLET, FILM COATED ORAL at 09:56

## 2018-01-04 RX ADMIN — POTASSIUM CHLORIDE AND SODIUM CHLORIDE: 900; 150 INJECTION, SOLUTION INTRAVENOUS at 05:17

## 2018-01-04 RX ADMIN — ACETAMINOPHEN 650 MG: 325 TABLET, FILM COATED ORAL at 16:17

## 2018-01-04 RX ADMIN — MORPHINE SULFATE 2 MG: 4 INJECTION INTRAVENOUS at 03:51

## 2018-01-04 RX ADMIN — BACLOFEN 10 MG: 10 TABLET ORAL at 16:17

## 2018-01-04 RX ADMIN — METHOCARBAMOL 1500 MG: 750 TABLET ORAL at 03:50

## 2018-01-04 RX ADMIN — DIAZEPAM 10 MG: 5 TABLET ORAL at 06:06

## 2018-01-04 RX ADMIN — STANDARDIZED SENNA CONCENTRATE AND DOCUSATE SODIUM 2 TABLET: 8.6; 5 TABLET, FILM COATED ORAL at 21:40

## 2018-01-04 RX ADMIN — Medication 100 MG: at 09:56

## 2018-01-04 RX ADMIN — DIAZEPAM 10 MG: 5 TABLET ORAL at 13:37

## 2018-01-04 RX ADMIN — ENOXAPARIN SODIUM 40 MG: 100 INJECTION SUBCUTANEOUS at 09:56

## 2018-01-04 RX ADMIN — DIAZEPAM 10 MG: 5 TABLET ORAL at 21:39

## 2018-01-04 ASSESSMENT — ENCOUNTER SYMPTOMS
PALPITATIONS: 0
DOUBLE VISION: 0
ABDOMINAL PAIN: 0
PHOTOPHOBIA: 0
NAUSEA: 0
DIARRHEA: 0
GASTROINTESTINAL NEGATIVE: 1
FEVER: 0
COUGH: 0
CONSTIPATION: 0
WEAKNESS: 1
RESPIRATORY NEGATIVE: 1
BLURRED VISION: 1
EYE PAIN: 0
SHORTNESS OF BREATH: 0
CARDIOVASCULAR NEGATIVE: 1
BLURRED VISION: 0
CLAUDICATION: 0
EYES NEGATIVE: 1
ORTHOPNEA: 0
BACK PAIN: 1
DIZZINESS: 0
SEIZURES: 0
HEMOPTYSIS: 0
COUGH: 1
TINGLING: 0
WEAKNESS: 0
HEARTBURN: 0
CONSTIPATION: 1
WEIGHT LOSS: 0
SORE THROAT: 1
TREMORS: 0
CHILLS: 0
BRUISES/BLEEDS EASILY: 0
HEADACHES: 0
DIAPHORESIS: 0
SORE THROAT: 0
SPUTUM PRODUCTION: 0
VOMITING: 0
MYALGIAS: 1

## 2018-01-04 ASSESSMENT — PATIENT HEALTH QUESTIONNAIRE - PHQ9
SUM OF ALL RESPONSES TO PHQ9 QUESTIONS 1 AND 2: 2
2. FEELING DOWN, DEPRESSED, IRRITABLE, OR HOPELESS: SEVERAL DAYS
SUM OF ALL RESPONSES TO PHQ QUESTIONS 1-9: 9
8. MOVING OR SPEAKING SO SLOWLY THAT OTHER PEOPLE COULD HAVE NOTICED. OR THE OPPOSITE, BEING SO FIGETY OR RESTLESS THAT YOU HAVE BEEN MOVING AROUND A LOT MORE THAN USUAL: NOT AT ALL
5. POOR APPETITE OR OVEREATING: MORE THAN HALF THE DAYS
1. LITTLE INTEREST OR PLEASURE IN DOING THINGS: SEVERAL DAYS
7. TROUBLE CONCENTRATING ON THINGS, SUCH AS READING THE NEWSPAPER OR WATCHING TELEVISION: SEVERAL DAYS
4. FEELING TIRED OR HAVING LITTLE ENERGY: MORE THAN HALF THE DAYS
6. FEELING BAD ABOUT YOURSELF - OR THAT YOU ARE A FAILURE OR HAVE LET YOURSELF OR YOUR FAMILY DOWN: SEVERAL DAYS
3. TROUBLE FALLING OR STAYING ASLEEP OR SLEEPING TOO MUCH: SEVERAL DAYS
9. THOUGHTS THAT YOU WOULD BE BETTER OFF DEAD, OR OF HURTING YOURSELF: NOT AT ALL

## 2018-01-04 ASSESSMENT — LIFESTYLE VARIABLES
EVER_SMOKED: NEVER
DO YOU DRINK ALCOHOL: NO
ALCOHOL_USE: NO

## 2018-01-04 NOTE — NON-PROVIDER
Internal Medicine Interval Note  Note Author: Michael V. Padua, Student     Name Linden-Depintor, Vicky Ca     1965   Age/Sex 52 y.o. female   MRN [id number]   Code Status FULL     After 5PM or if no immediate response to page, please call for cross-coverage  Attending/Team: Bahman/Hector See Patient List for primary contact information  Call [phone] to page    1st Call - Day Intern (R1):   Jem 2nd Call - Day Sr. Resident (R2/R3):   Loyda         Reason for interval visit  (Principal Problem)   Dystonia of extremity    Interval Problem Daily Status Update  (24 hours)   Consulted neurology and will follow recommendations.  Ddx include stiff person syndrome vs dystonia vs tetanus.  Patient was given diazepam 10 mg.  Per the patient, diazepam helped with the pain and stiffness of right leg.  Patient's pain and dystonia improving.  Pain was at 1/10 this morning vs 10/10 on admission.  Patient slept well last night.  Patient complains of right leg being tired because she is unable to flex her leg.   D-dimer of 1135 - duplex scan was negative for DVT      Review of Systems   Constitutional: Negative for chills, diaphoresis, fever, malaise/fatigue and weight loss.   HENT: Positive for sore throat. Negative for congestion.    Eyes: Positive for blurred vision (left eye). Negative for double vision, photophobia and pain.   Respiratory: Positive for cough. Negative for hemoptysis and shortness of breath.    Cardiovascular: Negative for chest pain, palpitations and leg swelling.   Gastrointestinal: Positive for constipation. Negative for abdominal pain, diarrhea and vomiting.   Genitourinary: Negative for dysuria, frequency, hematuria and urgency.   Musculoskeletal: Positive for back pain, joint pain and myalgias.   Skin: Negative for itching and rash.   Neurological: Negative for dizziness, seizures, weakness and headaches.       Consultants/Specialty  Neurology    Disposition  Remain inpatient for severe  right leg pain and dystonia.    Quality Measures    Olguin catheter::  No Olguin  DVT prophylaxis pharmacological::  Enoxaparin (Lovenox)  DVT prophylaxis - mechanical:  SCDs          Physical Exam       Vitals:    01/04/18 0004 01/04/18 0406 01/04/18 0721 01/04/18 1201   BP: 100/64 126/73 109/72 106/66   Pulse: 61 75 73 87   Resp: 18 16 18 18   Temp: 36.2 °C (97.1 °F) 36.8 °C (98.3 °F) 36.7 °C (98 °F) 37.5 °C (99.5 °F)   SpO2: 99% 98% 97% 92%   Weight:       Height:         Body mass index is 20.8 kg/m².    Oxygen Therapy:  Pulse Oximetry: 92 %, O2 (LPM): 2, O2 Delivery: None (Room Air)    Physical Exam   Constitutional: She is oriented to person, place, and time and well-developed, well-nourished, and in no distress. No distress.   HENT:   Head: Normocephalic and atraumatic.   Mouth/Throat: No oropharyngeal exudate.   Oropharynx dry   Eyes: Conjunctivae and EOM are normal. Pupils are equal, round, and reactive to light. Right eye exhibits no discharge. Left eye exhibits no discharge. No scleral icterus.   Neck: Normal range of motion. No tracheal deviation present. No thyromegaly present.   Cardiovascular: Normal rate, regular rhythm and normal heart sounds.  Exam reveals no gallop and no friction rub.    No murmur heard.  Pulmonary/Chest: Effort normal and breath sounds normal. No respiratory distress. She has no wheezes. She has no rales.   Abdominal: Soft. Bowel sounds are normal. She exhibits no distension and no mass. There is tenderness (lower abdomen ). There is no rebound and no guarding.   Musculoskeletal:   Right leg: strength improved since admission to 3/5 from 2/5. Right foot remains inverted and dorsiflexed, but is less rigid today than admission.   Left leg: strength 4/5.  Patient able to flex at the knee. Left foot remains inverted and dorsiflexed.    Upper extremities 5/5 strength.    Lymphadenopathy:     She has no cervical adenopathy.   Neurological: She is alert and oriented to person, place, and  time. No cranial nerve deficit.   Right leg: Unable to elicit patellar reflex due to rigidity of leg  Left leg: Patellar reflex normal.  Bicep reflex intact bilaterally.   Increased tone in right leg   Skin: Skin is warm and dry. No rash noted. She is not diaphoretic. No erythema.       Lab Data Review:   Sed rate: 41  CRP: 2.04  D-dimer: 1135  CPK: 231  WBC: 10.4  Duplex scan negative for DVT      1/4/2018  1:10 PM    Recent Labs      01/03/18   0908  01/04/18   0523   SODIUM  143  138   POTASSIUM  3.2*  4.0   CHLORIDE  105  107   CO2  25  25   BUN  11  9   CREATININE  0.54  0.46*   MAGNESIUM  1.9  1.6   PHOSPHORUS  3.3   --    CALCIUM  9.4  8.4*       Recent Labs      01/03/18   0908  01/04/18   0523   ALTSGPT  112*  68*   ASTSGOT  35  18   ALKPHOSPHAT  162*  110*   TBILIRUBIN  0.7  0.4   GLUCOSE  128*  92       Recent Labs      01/03/18   0908  01/04/18   0523   RBC  4.41  3.66*   HEMOGLOBIN  13.8  11.4*   HEMATOCRIT  39.4  33.9*   PLATELETCT  346  252   PROTHROMBTM  12.4   --    APTT  27.5   --    INR  0.95   --    FERRITIN  54.3   --        Recent Labs      01/03/18   0908  01/04/18   0523   WBC  14.9*  10.4   NEUTSPOLYS  71.20  75.90*   LYMPHOCYTES  23.40  19.00*   MONOCYTES  4.20  3.80   EOSINOPHILS  0.30  0.60   BASOPHILS  0.40  0.40   ASTSGOT  35  18   ALTSGPT  112*  68*   ALKPHOSPHAT  162*  110*   TBILIRUBIN  0.7  0.4           Assessment/Plan   #Right leg pain and dystonia  Acute on chronic right leg pain and dystonia.   Per Neurology: ddx includes stiff person syndrome vs primary dystonia vs tetanus.  A trial of diazepam 10 mg was started.  Per the patient, treatment helped with the pain and stiffness.  Will continue with neuro's recommendation.   Symptoms began 3 years ago and was localized to her right hip.  Pain has been intermittent, but has progressively worsened within the past year.    Dystonia associated with pain began about a year ago  Previous inpatient and outpatient neurologic workup was  negative  Patient was hospitalized previously for the same symptoms  Patellar reflex: 2/4 on left, none on right due to contraction.   Contraction of right leg from hip to toe, and left leg below the knee.   Negative Babinski bilaterally.  CPK of 231   Sed Rate of 41  CRP of 2.04  D-Dimer of 1135 - duplex scan negative for DVT     Plan:   Follow neurology's recommendation - continue diazepam 10 mg q8  Pain management   Consider PT/OT     #Leukocytosis - resolved  (1/4/18) WBC of 10.4   (1/3/18) WBC of 14.9   Remains afebrile - unlikely infectious   Hemodynamically stable  Oral mucosa still mildly dry - patient dehydrated  Plan: Continue to monitor     #DM2 with ketoacidosis  POC glucose checks:   Glucose of 128 (1/3/18)  Anion gap of 13.0  Continue home medications, sliding scale insulin, hypoglycemia protocol  Assessment & plan notes cannot be loaded without a specified hospital service.

## 2018-01-04 NOTE — PROGRESS NOTES
Report received, assumed patient care.  Pt A&OX4.  Kinyarwanda speaking only.  Upon arrival pt screaming in pain.  Pain medication given, see MAR.  Assessment completed.  Call light within reach, personal belongings available, bed in lowest position, pt calling for assistance.  Pt reports pain, will reassess soon.  Communication board updated, POC discussed. VSS.  No additional needs at this time.

## 2018-01-04 NOTE — PROGRESS NOTES
Neurology Progress Note        Subjective:  The patient was seen in conjunction with her daughter today who provided interpretation.  Patient is feeling slightly better.  She thinks the diazepam may be making things better.  Her right ankle has some slight movement in it today which is an improvement.    Objective:  Vitals:  Vitals:    01/04/18 0004 01/04/18 0406 01/04/18 0721 01/04/18 1201   BP: 100/64 126/73 109/72 106/66   Pulse: 61 75 73 87   Resp: 18 16 18 18   Temp: 36.2 °C (97.1 °F) 36.8 °C (98.3 °F) 36.7 °C (98 °F) 37.5 °C (99.5 °F)   SpO2: 99% 98% 97% 92%   Weight:       Height:         GENERAL:  Lying in the hospital bed moaning and cyring in discomfort.  MENTAL STATUS:  Awake, alert, oriented times 3.  Speech is fluent, comprehension is intact.    CRANIAL NERVES:  PERRL, EOMI with no nystagmus, face is symmetric, facial sensation is intact, tongue is in the midline, palate is symmetric.  MOTOR:  5/5 in the upper extremities.  The right lower extremity is extremely rigid, unable to passively flex the knee, the foot is fixed in a dorsiflexed and inverted position.  The toes are also essentially fixed by the rigidity.  The left foot is similar but able to get some passive range of motion, moving the foot seems to trigger more of a spasm.  There is movement in the proximal left leg and her left knee is able to be passively flexed.  REFLEXES:  3+ in the arms, unable to illicit in the legs due to the severe rigidity.  Toes are mute.  SENSATION:  Intact to light touch and vibration in the feet.  COORDINATION:  Normal finger to nose bilaterally  GAIT:  Deferred    Recent Labs      01/03/18   0908  01/04/18   0523   WBC  14.9*  10.4   RBC  4.41  3.66*   HEMOGLOBIN  13.8  11.4*   HEMATOCRIT  39.4  33.9*   MCV  89.3  92.6   MCH  31.3  31.1   RDW  38.9  41.6   PLATELETCT  346  252   MPV  13.1*  12.3   NEUTSPOLYS  71.20  75.90*   LYMPHOCYTES  23.40  19.00*   MONOCYTES  4.20  3.80   EOSINOPHILS  0.30  0.60   BASOPHILS   0.40  0.40     Recent Labs      01/03/18   0908  01/04/18   0523   SODIUM  143  138   POTASSIUM  3.2*  4.0   CHLORIDE  105  107   CO2  25  25   GLUCOSE  128*  92   BUN  11  9   CPKTOTAL  231*   --          Impression:  Mrs. Crooks is a 52 year old woman who has had progressive leg weakness and spasm/rigidity develop over 1-2 years.  Her exam does not fit into a classic description of any diagnosis.  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  Stiff-person syndrome usually has more axial involvement although she does state it started in her proximal legs and I have not seen it be this painful in the past. Some type of dystonia is certainly possible but usually they do not cause so much pain or such fixed deficits. Functional neurological disorder could be considered as well, however, the degree of rigidity and fixed posture of her feet that she maintains would be difficult to ascribe to a functional disorder.    1.  Continue diazepam 10mg tid, consider addition of baclofen  2.  Await Anti-JACKIE antibodies.

## 2018-01-04 NOTE — CONSULTS
Neurology Consultation        Referring Physician:  Dr. Madhavi Scales    Referral Reason:  Leg pain, spasms    HPI:  Mrs. Crooks is a 52 year old woman who is hard to get a history from due to her pain and moaning.  She was recently hospitalized for 2 weeks at [facility] without a definite diagnosis.  She has been having leg spasms and pain for the last 2-3 years initially starting in the right leg and now spreading to the left leg.  This is very painful for her.  Her feet have become fixed and contracted.  She is unable to walk due to the rigidity in her legs.  She denies any difficulty in her arms and denies any difficulty with bowel or bladder control.  She reports it started in her hips or butt muscles.  She has a neuro consult from 2/2016 in our system which describes her neuro exam as normal except for brisk reflexes.  No family history of neurological disorders.    Past Medical History:  Active Ambulatory Problems     Diagnosis Date Noted   • Sepsis secondary to UTI (CMS-HCC) 02/06/2015   • Bacteremia due to Escherichia coli 02/09/2015   • Hypokalemia 02/09/2015   • Hypophosphatemia 02/09/2015   • Hyperglycemia 02/09/2015   • Pyelonephritis 02/29/2016   • Pain in lower back 02/29/2016   • Severe sepsis with septic shock (CMS-HCC) 02/29/2016   • Lower extremity weakness 02/29/2016   • Type 2 diabetes mellitus untreated, with ketoacidosis  02/29/2016   • GERD (gastroesophageal reflux disease) 02/29/2016     Resolved Ambulatory Problems     Diagnosis Date Noted   • No Resolved Ambulatory Problems     Past Medical History:   Diagnosis Date   • Diabetes (CMS-HCC)        Medications:  See MAR    Allergies:  No Known Allergies    Social History:  Social History     Social History   • Marital status:      Spouse name: N/A   • Number of children: N/A   • Years of education: N/A     Occupational History   • Not on file.     Social History Main Topics   • Smoking status: Never Smoker   • Smokeless  tobacco: Never Used   • Alcohol use No   • Drug use: No   • Sexual activity: Not on file     Other Topics Concern   • Not on file     Social History Narrative    ** Merged History Encounter **            Family History:  History reviewed. No pertinent family history.    ROS:  All others negative except for what was mentioned in the HPI.    Physical Exam:  Vitals:   Vitals:    01/03/18 1230 01/03/18 1329 01/03/18 1330 01/03/18 1604   BP:       Pulse: 73  60 64   Resp:  18  14   Temp:    36.5 °C (97.7 °F)   SpO2: 98%  98% 97%   Weight:       Height:         GENERAL:  Lying in the hospital bed moaning and cyring in discomfort.  MENTAL STATUS:  Awake, alert, oriented times 3.  Speech is fluent, comprehension is intact.  Swedish interpretation was provided by nursing.  CRANIAL NERVES:  PERRL, EOMI with no nystagmus, face is symmetric, facial sensation is intact, tongue is in the midline, palate is symmetric.  MOTOR:  5/5 in the upper extremities.  The right lower extremity is extremely rigid, unable to passively flex the knee, the foot is fixed in a dorsiflexed and inverted position.  The toes are also essentially fixed by the rigidity.  The left foot is similar.  There is movement in the proximal left leg and her left knee is able to be passively flexed.  REFLEXES:  3+ in the arms, unable to illicit in the legs due to the severe rigidity.  Toes are mute.  SENSATION:  Intact to light touch and vibration in the feet.  COORDINATION:  Normal finger to nose bilaterally  GAIT:  Deferred    Sed rate and CRP mildly elevated, WBC elevated.     Impression:  Mrs. Crooks is a 52 year old woman who has had progressive leg weakness and spasm/rigidity develop over 1-2 years.  Sever al possibilities come to mind.  Stiff-person syndrome is in the differential, although there is usually more axial involvement although she does state it started in her proximal legs.  Some type of dystonia is certainly possible but usually they do  not cause so much pain or such fixed deficits.  Tetanus would seem highly unlikely given the time course of symptoms.      Recommendations:  1.  Trial of diazepam 10mg tid which can be diagnostic in relieving the symptoms of stiff person syndrome.  2.  Get complete records from [facility]  3.  Send anti-JACKIE antibody  4.  Monitor for therapeutic response to diazepam, if no response then consider medication trial aimed at treating dystonia.

## 2018-01-04 NOTE — PROGRESS NOTES
Received report from Mounika DICK. Assume Pt care. Pt in pain crying, pain on BLE. Medicated per MAR. Will monitor

## 2018-01-04 NOTE — SENIOR ADMIT NOTE
Cc: Acute on chronic B/L LE with stiffness    HPI:   52 year old female with history of chronic B/L LE weakness, stiffness, difficulty walking mostly bed-bound or occasionally walk with walker, type 2 DM who usually goes to [facility] for this recurrent issue, was sent to [facility] ED today from [facility] as a diversion due to no available bed. Patient speaks only Luxembourger. History was taken through Dr. Parish who speaks Luxembourger and patient's daughter.      Patient has been having this issue for the past couple of years. Admitted to [facility] in October for 3 weeks, had an extensive work up including MRI, without a definitive diagnosis. She again presented to [facility] in November for the recurrent issues. Last time hospitalized in [facility] in 12/29/17 - 1/1/18. Her pain never get better, despite worsen up to the point that she is screaming in pain today which prompted this visit.  Her pain are from the groin to foot R > L, worsen with movement, relieve by rest, her right LE is very stiff like a wood, left LE less stiff, can bend somewhat. She was given IV fentanyl by EMS, IV dilaudid 3 mg along with ativan in ED.       PE:   General: discomfort appearance, crying in pain.  Resp: CTAB.  CVS: regular S1S2, no MRG.  Abd: soft, NTND, BS+.  Neuro: RLE stiff like a wood, severe painful with any movement. Not able to assess motor. But intact sensory.  LLE: less stiff, 5/5 motor, intact sensation. UE: 5/5 B/L UE, intact sensation.   CN I-XII grossly intact.       Assessment and Plan:     - Dystonia B/L LE R > L of unclear etiology   - B/L LE pain secondary to muscle spasm / stiffness  - Type 2 DM  - Leukocytosis       - consult neuro, follow neuro recom.   - ESR, CRP, B12, folate, TSH.   - UA, Urine drug screen.  - monitor electrolytes and replete as needed.  - request full [facility] record.        Code status : Full  DVT prophylaxis: sc lovenox.   DVT prophylaxis :

## 2018-01-04 NOTE — THERAPY
"OT order received. Inadvertently \"completed\" order. Per RN, pt presenting with high pain levels, not appropriate to assess at this time. Pt also undergoing further neuro work-up. Will continue to monitor and complete OT eval as indicated.  "

## 2018-01-04 NOTE — PROGRESS NOTES
Report received, assumed patient care.  Pt A&OX4.  Family at bedside.  Pt is German speaking only.  Assessment completed.  Call light within reach, personal belongings available, bed in lowest position, pt calling for assistance.  Pt reports no pain at this time.  Communication board updated, POC discussed.   VSS.  No additional needs at this time.

## 2018-01-04 NOTE — PROGRESS NOTES
Seen Pt, AOx 4. On good mood now. Pain level is 2/10 on her Rt leg; Pt last  BM is 01/01, BM sounds hypoactive refuse BM protocol for now, pt is tired and want to try tomorrow. Fluids NS w 20  ml/hr. Plan of care discussed includes safety, pain control, labs, PT/OT ordered and pt understands. Will continue to monitor

## 2018-01-05 ENCOUNTER — APPOINTMENT (OUTPATIENT)
Dept: RADIOLOGY | Facility: MEDICAL CENTER | Age: 53
DRG: 091 | End: 2018-01-05
Attending: STUDENT IN AN ORGANIZED HEALTH CARE EDUCATION/TRAINING PROGRAM
Payer: COMMERCIAL

## 2018-01-05 LAB
GAD65 AB SER IA-ACNC: >250 IU/ML (ref 0–5)
GLUCOSE BLD-MCNC: 112 MG/DL (ref 65–99)
GLUCOSE BLD-MCNC: 116 MG/DL (ref 65–99)
GLUCOSE BLD-MCNC: 134 MG/DL (ref 65–99)
GLUCOSE BLD-MCNC: 141 MG/DL (ref 65–99)

## 2018-01-05 PROCEDURE — 700102 HCHG RX REV CODE 250 W/ 637 OVERRIDE(OP): Performed by: STUDENT IN AN ORGANIZED HEALTH CARE EDUCATION/TRAINING PROGRAM

## 2018-01-05 PROCEDURE — 700102 HCHG RX REV CODE 250 W/ 637 OVERRIDE(OP): Performed by: PSYCHIATRY & NEUROLOGY

## 2018-01-05 PROCEDURE — A9270 NON-COVERED ITEM OR SERVICE: HCPCS | Performed by: STUDENT IN AN ORGANIZED HEALTH CARE EDUCATION/TRAINING PROGRAM

## 2018-01-05 PROCEDURE — 770006 HCHG ROOM/CARE - MED/SURG/GYN SEMI*

## 2018-01-05 PROCEDURE — 82962 GLUCOSE BLOOD TEST: CPT | Mod: 91

## 2018-01-05 PROCEDURE — 74019 RADEX ABDOMEN 2 VIEWS: CPT

## 2018-01-05 PROCEDURE — 96372 THER/PROPH/DIAG INJ SC/IM: CPT

## 2018-01-05 PROCEDURE — 700111 HCHG RX REV CODE 636 W/ 250 OVERRIDE (IP): Performed by: EMERGENCY MEDICINE

## 2018-01-05 PROCEDURE — A9270 NON-COVERED ITEM OR SERVICE: HCPCS | Performed by: PSYCHIATRY & NEUROLOGY

## 2018-01-05 PROCEDURE — 700101 HCHG RX REV CODE 250: Performed by: STUDENT IN AN ORGANIZED HEALTH CARE EDUCATION/TRAINING PROGRAM

## 2018-01-05 PROCEDURE — 96376 TX/PRO/DX INJ SAME DRUG ADON: CPT

## 2018-01-05 PROCEDURE — 302151 K-PAD 14X20: Performed by: INTERNAL MEDICINE

## 2018-01-05 PROCEDURE — 700111 HCHG RX REV CODE 636 W/ 250 OVERRIDE (IP): Performed by: STUDENT IN AN ORGANIZED HEALTH CARE EDUCATION/TRAINING PROGRAM

## 2018-01-05 PROCEDURE — 99232 SBSQ HOSP IP/OBS MODERATE 35: CPT | Mod: GC | Performed by: INTERNAL MEDICINE

## 2018-01-05 RX ORDER — NAPROXEN 500 MG/1
500 TABLET ORAL 2 TIMES DAILY
Status: DISCONTINUED | OUTPATIENT
Start: 2018-01-05 | End: 2018-01-05

## 2018-01-05 RX ORDER — FAMOTIDINE 20 MG/1
20 TABLET, FILM COATED ORAL 2 TIMES DAILY
Status: DISCONTINUED | OUTPATIENT
Start: 2018-01-05 | End: 2018-01-11 | Stop reason: HOSPADM

## 2018-01-05 RX ORDER — LACTULOSE 20 G/30ML
30 SOLUTION ORAL 2 TIMES DAILY
Status: DISCONTINUED | OUTPATIENT
Start: 2018-01-05 | End: 2018-01-06

## 2018-01-05 RX ORDER — FAMOTIDINE 20 MG/1
20 TABLET, FILM COATED ORAL 2 TIMES DAILY
Status: DISCONTINUED | OUTPATIENT
Start: 2018-01-05 | End: 2018-01-05

## 2018-01-05 RX ORDER — NAPROXEN 500 MG/1
375 TABLET ORAL 2 TIMES DAILY
Status: DISCONTINUED | OUTPATIENT
Start: 2018-01-05 | End: 2018-01-11 | Stop reason: HOSPADM

## 2018-01-05 RX ADMIN — POTASSIUM CHLORIDE AND SODIUM CHLORIDE: 900; 150 INJECTION, SOLUTION INTRAVENOUS at 11:37

## 2018-01-05 RX ADMIN — ACETAMINOPHEN 650 MG: 325 TABLET, FILM COATED ORAL at 20:15

## 2018-01-05 RX ADMIN — DIAZEPAM 10 MG: 5 TABLET ORAL at 14:13

## 2018-01-05 RX ADMIN — FAMOTIDINE 20 MG: 20 TABLET, FILM COATED ORAL at 20:15

## 2018-01-05 RX ADMIN — GABAPENTIN 600 MG: 300 CAPSULE ORAL at 08:03

## 2018-01-05 RX ADMIN — GABAPENTIN 600 MG: 300 CAPSULE ORAL at 14:13

## 2018-01-05 RX ADMIN — POTASSIUM CHLORIDE AND SODIUM CHLORIDE: 900; 150 INJECTION, SOLUTION INTRAVENOUS at 00:11

## 2018-01-05 RX ADMIN — BACLOFEN 10 MG: 10 TABLET ORAL at 14:15

## 2018-01-05 RX ADMIN — BACLOFEN 10 MG: 10 TABLET ORAL at 06:17

## 2018-01-05 RX ADMIN — ENOXAPARIN SODIUM 40 MG: 100 INJECTION SUBCUTANEOUS at 08:12

## 2018-01-05 RX ADMIN — NAPROXEN 375 MG: 375 TABLET ORAL at 18:28

## 2018-01-05 RX ADMIN — POTASSIUM CHLORIDE AND SODIUM CHLORIDE: 900; 150 INJECTION, SOLUTION INTRAVENOUS at 20:13

## 2018-01-05 RX ADMIN — DIAZEPAM 10 MG: 5 TABLET ORAL at 06:17

## 2018-01-05 RX ADMIN — HYDROXYZINE HYDROCHLORIDE 50 MG: 50 TABLET, FILM COATED ORAL at 18:29

## 2018-01-05 RX ADMIN — LORAZEPAM 2 MG: 2 INJECTION INTRAMUSCULAR; INTRAVENOUS at 15:39

## 2018-01-05 RX ADMIN — LORAZEPAM 2 MG: 2 INJECTION INTRAMUSCULAR; INTRAVENOUS at 08:03

## 2018-01-05 RX ADMIN — GABAPENTIN 600 MG: 300 CAPSULE ORAL at 20:15

## 2018-01-05 RX ADMIN — LORAZEPAM 2 MG: 2 INJECTION INTRAMUSCULAR; INTRAVENOUS at 11:35

## 2018-01-05 RX ADMIN — LORAZEPAM 2 MG: 2 INJECTION INTRAMUSCULAR; INTRAVENOUS at 04:14

## 2018-01-05 RX ADMIN — FAMOTIDINE 20 MG: 20 TABLET, FILM COATED ORAL at 18:29

## 2018-01-05 RX ADMIN — Medication 100 MG: at 08:12

## 2018-01-05 ASSESSMENT — ENCOUNTER SYMPTOMS
SHORTNESS OF BREATH: 0
HEARTBURN: 0
FEVER: 0
ABDOMINAL PAIN: 0
BRUISES/BLEEDS EASILY: 0
HEMOPTYSIS: 0
EYES NEGATIVE: 1
CHILLS: 0
NAUSEA: 0
DIARRHEA: 0
CLAUDICATION: 0
SORE THROAT: 0
WEAKNESS: 1
CONSTIPATION: 0
SPUTUM PRODUCTION: 0
CARDIOVASCULAR NEGATIVE: 1
TINGLING: 0
GASTROINTESTINAL NEGATIVE: 1
VOMITING: 0
TREMORS: 0
ORTHOPNEA: 0
COUGH: 0
PALPITATIONS: 0
DIZZINESS: 0
MYALGIAS: 1
BLURRED VISION: 0
RESPIRATORY NEGATIVE: 1
HEADACHES: 0

## 2018-01-05 ASSESSMENT — PAIN SCALES - GENERAL
PAINLEVEL_OUTOF10: 10
PAINLEVEL_OUTOF10: 9

## 2018-01-05 NOTE — PROGRESS NOTES
Internal Medicine Interval Note  Note Author: Paola Parish M.D.     Name Linden-Depintor, Vicky Ca     1965   Age/Sex 52 y.o. female   MRN [id number]   Code Status Full     After 5PM or if no immediate response to page, please call for cross-coverage  Attending/Team: Bahman/aleks See Patient List for primary contact information  Call [phone] to page    1st Call - Day Intern (R1):   Jem 2nd Call - Day Sr. 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 Will add baclofen.      Review of Systems   Constitutional: Negative for chills and fever.   HENT: Negative.  Negative for hearing loss and sore throat.    Eyes: Negative.  Negative for blurred vision.   Respiratory: Negative.  Negative for cough, hemoptysis, sputum production and shortness of breath.    Cardiovascular: Negative.  Negative for chest pain, palpitations, orthopnea, claudication and leg swelling.   Gastrointestinal: Negative.  Negative for abdominal pain, constipation, diarrhea, heartburn, nausea and vomiting.   Genitourinary: Negative.  Negative for dysuria, hematuria and urgency.   Musculoskeletal: Positive for myalgias.        R leg pain   Skin: Negative.  Negative for rash.   Neurological: Positive for weakness. Negative for dizziness, tingling, tremors and headaches.   Endo/Heme/Allergies: Negative.  Does not bruise/bleed easily.       Consultants/Specialty  Neurolgoy    Disposition  inpt    Quality Measures    Reviewed items::  EKG reviewed, Labs reviewed, Medications reviewed and Radiology images reviewed  Olguin catheter::  No Olguin  DVT prophylaxis pharmacological::  Enoxaparin (Lovenox)  Ulcer Prophylaxis::  No          Physical Exam       Vitals:    18 0406 18 0721 18 1201 18 1650   BP: 126/73  109/72 106/66 (!) 99/70   Pulse: 75 73 87 (!) 104   Resp: 16 18 18 16   Temp: 36.8 °C (98.3 °F) 36.7 °C (98 °F) 37.5 °C (99.5 °F) 37.3 °C (99.1 °F)   SpO2: 98% 97% 92% 93%   Weight:       Height:         Body mass index is 20.8 kg/m².    Oxygen Therapy:  Pulse Oximetry: 93 %, O2 (LPM): 0, O2 Delivery: None (Room Air)    Physical Exam   Constitutional: She is oriented to person, place, and time. She appears distressed.   HENT:   Head: Normocephalic and atraumatic.   Mouth/Throat: Oropharynx is clear and moist. No oropharyngeal exudate.   Eyes: Conjunctivae and EOM are normal. Pupils are equal, round, and reactive to light.   Neck: Normal range of motion. Neck supple.   Cardiovascular: Normal rate, regular rhythm and normal heart sounds.    No murmur heard.  Pulmonary/Chest: Effort normal and breath sounds normal. No respiratory distress. She has no wheezes.   Abdominal: Soft. Bowel sounds are normal. She exhibits no distension. There is no tenderness. There is no rebound and no guarding.   Musculoskeletal: Normal range of motion. She exhibits no edema or tenderness.   Lymphadenopathy:     She has no cervical adenopathy.   Neurological: She is alert and oriented to person, place, and time. No cranial nerve deficit.   Strength 5/5 in the upper extremities, 3/5 in the right leg, left leg 5/5 extension, 4/5 flexion. Feet are contracted bilaterally R>L, right foot inverted (improved). Dystonia of entire right leg and left leg below the knee. Patellar reflexes 2/4 on the left, unable to examine on the right. Babinski negative bilaterally.   Skin: Skin is warm. She is not diaphoretic. No erythema.         Lab Data Review:         1/4/2018  5:07 PM    Recent Labs      01/03/18   0908  01/04/18   0523   SODIUM  143  138   POTASSIUM  3.2*  4.0   CHLORIDE  105  107   CO2  25  25   BUN  11  9   CREATININE  0.54  0.46*   MAGNESIUM  1.9  1.6   PHOSPHORUS  3.3   --    CALCIUM  9.4  8.4*       Recent Labs      01/03/18   0908   "01/04/18   0523   ALTSGPT  112*  68*   ASTSGOT  35  18   ALKPHOSPHAT  162*  110*   TBILIRUBIN  0.7  0.4   GLUCOSE  128*  92       Recent Labs      01/03/18   0908  01/04/18   0523   RBC  4.41  3.66*   HEMOGLOBIN  13.8  11.4*   HEMATOCRIT  39.4  33.9*   PLATELETCT  346  252   PROTHROMBTM  12.4   --    APTT  27.5   --    INR  0.95   --    FERRITIN  54.3   --        Recent Labs      01/03/18   0908  01/04/18   0523   WBC  14.9*  10.4   NEUTSPOLYS  71.20  75.90*   LYMPHOCYTES  23.40  19.00*   MONOCYTES  4.20  3.80   EOSINOPHILS  0.30  0.60   BASOPHILS  0.40  0.40   ASTSGOT  35  18   ALTSGPT  112*  68*   ALKPHOSPHAT  162*  110*   TBILIRUBIN  0.7  0.4           Assessment/Plan     * Dystonia of extremity- (present on admission)   Assessment & Plan    - Acute on chronic right leg pain and dystonia. Symptoms have been intermittent for the past 3 years and has been this severe in the past. 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 Symptoms improved, pendign anti-JACKIE ab  - will start on baclofen         Right leg pain- (present on admission)   Assessment & Plan    See \"dystonia of extremity\"        Leukocytosis- (present on admission)   Assessment & Plan    - WBC of 14.9, no bands. Afebrile, hemodynamically stable, nontoxic appearing  - Very dehydrated could be secondary to concentration  - will continue to monitor        Type 2 diabetes mellitus untreated, with ketoacidosis - (present on admission)   Assessment & Plan    - Continue home medications, sliding scale insulin, hypoglycemia protocol  - HbA1c 8.3%  - will need to start on DM meds on d/c            "

## 2018-01-05 NOTE — PROGRESS NOTES
Pt c/o pain  And muscle spasms in right leg, medicated per MAR. Pt requesting to see / .  contacted.

## 2018-01-05 NOTE — PROGRESS NOTES
Patient is resting with eyes closed, rise and fall of chest observed. No signs of distress noted. IV fluids running per orders.

## 2018-01-05 NOTE — NON-PROVIDER
Internal Medicine Interval Note  Note Author: Michael V. Padua, Student     Name Linden-Depintor, Vicky Ca     1965   Age/Sex 52 y.o. female   MRN [id number]   Code Status FULL     After 5PM or if no immediate response to page, please call for cross-coverage  Attending/Team: Bahman/Constantino See Patient List for primary contact information  Call [phone] to page    1st Call - Day Intern (R1):   Jem 2nd Call - Day Sr. Resident (R2/R3):   Loyda         Reason for interval visit  (Principal Problem)   Dystonia of extremity  Severe right leg pain   Constipation    Interval Problem Daily Status Update  (24 hours)   Continue following neurology's recommendations  Patient was in significant pain this AM.  Rated pain at 8/10.  Patient is complaining of fatigue in her right leg due to inability to move it in different positions.  Patient states that it takes about 5 hours for the medication to reduce her pain and stiffness in her right leg     Patient's pain is unchanged, but dystonia seems to be improving with medication  Records from 2017 [facility] visit were obtained.  MRI at that time showed L5-S1 broad based disk protrusion with no central canal stenosis, as well as a 1 cm lesion in L1 vertebral body which is believed to be a hemangioma.  Patient was given gabapentin, baclofen, dilaudid, and morphine for pain control  Awaiting Anti-JACKIE antibody test  Patient still has not had a BM     ROS  Constitutional: Negative for chills, diaphoresis, fever, malaise/fatigue and weight loss.   HENT: Positive for sore throat. Negative for congestion.    Eyes: Positive for blurred vision (left eye). Negative for double vision, photophobia and pain.   Respiratory: Negative for cough, hemoptysis and shortness of breath.    Cardiovascular: Negative for chest pain, palpitations and leg swelling.   Gastrointestinal: Positive for constipation. Negative for abdominal pain, diarrhea and vomiting.   Genitourinary:  Negative for dysuria, frequency, hematuria and urgency.   Musculoskeletal: Positive for back pain, joint pain and myalgias.   Skin: Negative for itching and rash.   Neurological: Negative for dizziness, seizures, weakness and headaches.     Consultants/Specialty  Neurology    Disposition  Remain inpatient for severe right leg pain and dystonia.    Quality Measures    Reviewed items::  Labs reviewed and Medications reviewed  Olguin catheter::  No Olguin  DVT prophylaxis pharmacological::  Enoxaparin (Lovenox)  DVT prophylaxis - mechanical:  SCDs  Ulcer Prophylaxis::  No          Physical Exam       Vitals:    01/05/18 0356 01/05/18 0414 01/05/18 0829 01/05/18 1310   BP: 105/67 105/67 110/78 112/68   Pulse: 92  89 84   Resp: 18  16 16   Temp: 36.4 °C (97.5 °F)  37.1 °C (98.7 °F) 36.7 °C (98.1 °F)   SpO2: 95%  95% 96%   Weight:       Height:         Body mass index is 20.8 kg/m².    Oxygen Therapy:  Pulse Oximetry: 96 %, O2 (LPM): 0, O2 Delivery: None (Room Air)    Physical Exam  Constitutional: She is oriented to person, place, and time and well-developed, well-nourished, and in distress.  Patient was crying and grimacing at times.   HENT:   Head: Normocephalic and atraumatic.   Mouth/Throat: No oropharyngeal exudate.   Oropharynx dry   Eyes: Conjunctivae and EOM are normal. Pupils are equal, round, and reactive to light. Right eye exhibits no discharge. Left eye exhibits no discharge. No scleral icterus.   Neck: Normal range of motion. No tracheal deviation present. No thyromegaly present.   Cardiovascular: Normal rate, regular rhythm and normal heart sounds.  Exam reveals no gallop and no friction rub.    No murmur heard.  Pulmonary/Chest: Effort normal and breath sounds normal. No respiratory distress. She has no wheezes. She has no rales.   Abdominal: Soft. Bowel sounds are normal. She exhibits no distension and no mass. There is no rebound and no guarding.   Musculoskeletal:   Right leg: strength 3/5. Right foot  remains inverted and dorsiflexed, but is less rigid. Tender to touch and movement.  Minimal flexion at the knee with severe pain with movement.  Left leg: strength 4/5.  Patient able to flex at the knee. Left foot remains inverted and dorsiflexed.    Upper extremities 5/5 strength.    Lymphadenopathy:     She has no cervical adenopathy.   Neurological: She is alert and oriented to person, place, and time. No cranial nerve deficit.   Right leg: Unable to elicit patellar reflex due to rigidity of leg  Left leg: Patellar reflex normal.  Increased tone in right leg, but improving.   Skin: Skin is warm and dry. No rash noted. She is not diaphoretic. No erythema.     Lab Data Review:         1/5/2018  2:04 PM    Recent Labs      01/03/18   0908  01/04/18   0523   SODIUM  143  138   POTASSIUM  3.2*  4.0   CHLORIDE  105  107   CO2  25  25   BUN  11  9   CREATININE  0.54  0.46*   MAGNESIUM  1.9  1.6   PHOSPHORUS  3.3   --    CALCIUM  9.4  8.4*       Recent Labs      01/03/18   0908  01/04/18   0523   ALTSGPT  112*  68*   ASTSGOT  35  18   ALKPHOSPHAT  162*  110*   TBILIRUBIN  0.7  0.4   GLUCOSE  128*  92       Recent Labs      01/03/18   0908  01/04/18   0523   RBC  4.41  3.66*   HEMOGLOBIN  13.8  11.4*   HEMATOCRIT  39.4  33.9*   PLATELETCT  346  252   PROTHROMBTM  12.4   --    APTT  27.5   --    INR  0.95   --    FERRITIN  54.3   --        Recent Labs      01/03/18   0908  01/04/18   0523   WBC  14.9*  10.4   NEUTSPOLYS  71.20  75.90*   LYMPHOCYTES  23.40  19.00*   MONOCYTES  4.20  3.80   EOSINOPHILS  0.30  0.60   BASOPHILS  0.40  0.40   ASTSGOT  35  18   ALTSGPT  112*  68*   ALKPHOSPHAT  162*  110*   TBILIRUBIN  0.7  0.4           Assessment/Plan   #Right leg pain and dystonia  Acute on chronic right leg pain and dystonia.   Per Neurology: ddx includes stiff person syndrome vs primary dystonia vs tetanus. Patient was placed on diazepam 10 mg and baclofen 10 mg.     Will continue with neuro's recommendation.     Symptoms  began 3 years ago and was localized to her right hip.  Pain has been intermittent, but has progressively worsened within the past year.    Dystonia associated with pain began about a year ago  Patient was hospitalized previously for the same symptoms  Previous inpatient and outpatient neurologic workup was negative  Records from October 2017 at [place]: MRI showed L5-S1 broad based disk protrusion with no central canal stenosis, as well as a 1 cm lesion in L1 vertebral body which is believed to be a hemangioma.  Patient was given gabapentin, baclofen, dilaudid, and morphine for pain control  Patellar reflex: 2/4 on left, none on right due to contraction.   Contraction of right leg from hip to toe, and left leg below the knee.   Negative Babinski bilaterally.  CPK of 231   Sed Rate of 41  CRP of 2.04  D-Dimer of 1135 - duplex scan negative for DVT     Plan:   Start Naproxen 500 mg bid.  Follow neurology's recommendation - continue diazepam 10 mg q8h, baclofen 10 mg q8h.  Consider PT/OT  Consider anticonvulsant, IVIG, and rituximab  Waiting on Anti-JACKIE antibody test     #Constipation  Patient has not had a BM since admission  Patient denies abdominal pain, nausea, vomiting  Polypharmacy may be contributing to constipation    Plan:  Bladder scan   Start lactulose   KUB to look for stool impaction    #DM2 - poorly controlled  POC glucose checks:   Glucose of 128 (1/3/18)  HbA1C: 8.3%  Anion gap of 13.0    Plan:   Continue home medications, sliding scale insulin, hypoglycemia protocol  Continue gabapentin    #Leukocytosis - resolved  (1/4/18) WBC of 10.4   (1/3/18) WBC of 14.9   Remains afebrile - unlikely infectious   Hemodynamically stable  Oral mucosa still mildly dry - patient dehydrated  Plan: Continue to monitor

## 2018-01-05 NOTE — PROGRESS NOTES
Patient VS stable. States she is starting to feel the pain come back observed grimacing. Medicated with ativan per orders. IV fluids running per MAR.

## 2018-01-05 NOTE — PROGRESS NOTES
Assumed care at 1900.  Report received from Mounika DICK. Assessment complete, plan of care discussed and  understood.  Pt reports 7/10 pain to right leg. IV fluids running per orders. Used bed pan.  Pt educated on call light pt verbalizes understanding.  Pt denies any additional needs at this time.  Call light and phone within reach. Will continue to monitor.

## 2018-01-05 NOTE — NON-PROVIDER
ID: Vicky Crooks is a 53 YO female     CC:  Chronic right lower extremity spasms and pain worsening over past 3 years.     HPI:   Approximately three years ago, the patient experienced pain and sustained contraction of the right hip and lower extremity. Since that time, her symptoms have been intermittent but began worsening in October of 2017. She spent three weeks at [place] where she had a neurological workup but received no specific diagnosis. She was discharged, and then followed up with neurology outpatient.     Ms Crooks states that the LE is not always contracted; rather, it contracts when the pain becomes more severe than usual ans stays contracted for several hours after resolution of pain. She states that the pain usually originates in the hip and progresses to the upper thigh. Her worsening symptoms have required her to ambulate with a walker for the past year, and to spend most of her time in bed. She states she is unable to sit in a chair.     This current worsening of symptoms started 12/29/17, which prompted the patient to present to [place] where she was admitted for workup. X-ray ruled out charcot foot, as she is diabetic. Patient was d/c'ed on 01/01/18 with a dx of myofacial pain syndrome and no resolution of symptoms. She states she has been unable to bend her knee for about a month.    Patient states that the pain precedes the spasms, and that the spasms remain constant once they begin.      (+) weakness, travel to [place] Jan 2017, constipation (now resolved)  (-) bowel/ urinary incontinence; back, hip, other limb or gluteal pain     Past 24:   Patient states that she was able to sleep through the night. However, per patient and daughter, her pain and spasms resumed around 4:00 this morning. She received ativan at that time. At 6:00, she received ativan and baclofen. She was receiving ativan and gabapentin as of the time of the encounter. She states that the  medications take the pain away.         PMH:  DM II  • Sepsis secondary to UTI (CMS-HCC) 2015   • Bacteremia due to Escherichia coli 2015   • Hypokalemia 2015   • Hypophosphatemia 2015   • Hyperglycemia 2015   • Pyelonephritis 2016   • Pain in lower back 2016   • Severe sepsis with septic shock (CMS-HCC) 2016   • Lower extremity weakness 2016   • Type 2 diabetes mellitus untreated, with ketoacidosis  2016   • GERD (gastroesophageal reflux disease) 2016     Appendectomy  Cholecystectomy    delivery    Home meds:  gabapentin (NEURONTIN) 300 MG Cap 2018 Active   glimepiride (AMARYL) 4 MG Tab 2018 Active   hydrOXYzine (ATARAX) 50 MG Tab 2018 Active   insulin glargine (BASAGLAR KWIKPEN) 100 UNIT/ML Solution Pen-injector injection Unknown Active   methocarbamol (ROBAXIN) 750 MG Tab 1/3/2018 Active   predniSONE (DELTASONE) 10 MG Tab 2018 Active     NKDA       FMH:  Non contributory      Soc Hx:   Lives at home with family.      ROS:  Constitutional: Negative for chills and fever.   HENT: Negative.  Negative for hearing loss and sore throat.    Eyes: Negative.  Negative for blurred vision and double vision.   Respiratory: Negative.  Negative for cough and shortness of breath.    Cardiovascular: Negative.  Negative for chest pain and leg swelling.   Gastrointestinal: Positive for constipation. Negative for abdominal pain, diarrhea, heartburn, nausea and vomiting.   Genitourinary: Negative.  Negative for dysuria, frequency, hematuria and urgency.   Musculoskeletal: Positive for joint pain and myalgias. Negative for falls.   Skin: Negative.  Negative for rash.   Neurological: Positive for weakness. Negative for dizziness, tingling, tremors, sensory change and headaches.   Endo/Heme/Allergies: Negative.  Does not bruise/bleed easily.      Physical Exam     Vitals:  T 36.4 (36.2-37.5)  HR 92  RR 18  /67 (92//72)  O2 95%  ORA      EXAM    MSE:  AA&Ox3     CN:  PERRL b/l, EOMI, no nystagmus  Facial sensation and strength intact b/l  Hearing intact b/l  Palatal elevation intact and equal b/l  Sternocleidomastoid strength and shoulder shrug intact b/l  Tongue protrudes equally b/l     Sensation:  Intact on UE, LE b/l     Strength:  5/5 in UE and LLE; unable to cooperate d/t pain in RLE     Reflexes:  2+ UE and LE b/l      Cerebellum:  Performed finger-to-nose test without problems B/L     Gait:  Patient unable to cooperate    LABS  Comp Metabolic Panel (CMP) [[id number]] (Abnormal) Collected: 01/04/18 0523   Order Status: Completed Specimen: Blood Updated: 01/04/18 0642    Sodium 138 mmol/L     Potassium 4.0 mmol/L     Chloride 107 mmol/L     Co2 25 mmol/L     Anion Gap 6.0    Glucose 92 mg/dL     Bun 9 mg/dL     Creatinine 0.46 (L) mg/dL     Calcium 8.4 (L) mg/dL     AST(SGOT) 18 U/L     ALT(SGPT) 68 (H) U/L     Alkaline Phosphatase 110 (H) U/L     Total Bilirubin 0.4 mg/dL     Albumin 3.4 g/dL     Total Protein 6.0 g/dL     Globulin 2.6 g/dL     A-G Ratio 1.3 g/dL    MAGNESIUM [[id number]] Collected: 01/04/18 0523   Order Status: Completed Specimen: Blood Updated: 01/04/18 0642    Magnesium 1.6 mg/dL    DIAGNOSTIC ALCOHOL [[id number]] (Abnormal) Collected: 01/04/18 0523   Order Status: Completed Updated: 01/04/18 0642    Diagnostic Alcohol 0.01 (H) g/dL    ESTIMATED GFR [[id number]] Collected: 01/04/18 0523   Order Status: Completed Updated: 01/04/18 0642    GFR If African American >60 mL/min/1.73 m 2     GFR If Non African American >60 mL/min/1.73 m 2    CBC with Differential [[id number]] (Abnormal) Collected: 01/04/18 0523   Order Status: Completed Specimen: Blood Updated: 01/04/18 0543    WBC 10.4 K/uL     RBC 3.66 (L) M/uL     Hemoglobin 11.4 (L) g/dL     Hematocrit 33.9 (L) %     MCV 92.6 fL     MCH 31.1 pg     MCHC 33.6 g/dL     RDW 41.6 fL     Platelet Count 252 K/uL     MPV 12.3 fL     Neutrophils-Polys 75.90 (H) %     Lymphocytes  19.00 (L) %     Monocytes 3.80 %     Eosinophils 0.60 %     Basophils 0.40 %     Immature Granulocytes 0.30 %     Nucleated RBC 0.00 /100 WBC     Neutrophils (Absolute) 7.92 (H) K/uL     Lymphs (Absolute) 1.98 K/uL     Monos (Absolute) 0.40 K/uL     Eos (Absolute) 0.06 K/uL     Baso (Absolute) 0.04 K/uL     Immature Granulocytes (abs) 0.03 K/uL     NRBC (Absolute) 0.00 K/uL    JACKIE-65 [[id number]] Collected: 01/04/18 0523   Order Status: Completed Updated: 01/04/18 0534   Narrative:     Please list the order exactly as written by  physician:->Glutamic acid decarboxylase antibody (Anti-JACKIE  antibody)     Hgb A1c 8.3    IMAGING/ RESULTS  No results this visit    Assessment/ Plan:  Ms Cheatham-Depintor is a 51 YO woman with progressive right LE pain and spasm X3 years concerning for stiff limb syndrome vs stiff person syndrome vs tetanus vs dystonia of other etiology (e.g. Paraneoplastic hypertonic syndrome).     #Leg spasm  - Trial of diazepam 10 mg TID seems to have helped minimally, if at all  - Awaiting anti-JACKIE Ab results; if positive, consider more complete autoimmune workup to evaluate for concurrent e.g. Thyroiditis (TSH),    - If patient continues to improve on diazepam and/ or anti-JACKIE Ab results are positive, consider intra-thecal baclofen, as well as possibly IVIG, plasmapheresis   - Refer to PT/ OT with muscular biofeedback, exercise and stretching.   - Current medications: Baclofen 10 mg q8 hours PO, diazepam 10 mg q 8 PO, gabapentin 600  TID PO, thiamine 100 qD PO, tylenol 650 mg q6 PRN, lorazepam (ativan) 1-2 mg q 30 min IV PRN  - Consider psych consult

## 2018-01-05 NOTE — PROGRESS NOTES
Bedside report received 0650. POC discussed with pt and daughter at bedside; Refused use of , mother wants daughter to provide translation; Pt crying, c/o RLE pain, medicated per MAR; Pt crying and anxious, difficult to communicate with due to crying; Heat applied; all questions answered at this time.

## 2018-01-05 NOTE — PROGRESS NOTES
Internal Medicine Interval Note  Note Author: Paola Parish M.D.     Name Linden-Depintor, Vicky Ca     1965   Age/Sex 52 y.o. female   MRN [id number]   Code Status Full     After 5PM or if no immediate response to page, please call for cross-coverage  Attending/Team: Bahman/aleks See Patient List for primary contact information  Call [phone] to page    1st Call - Day Intern (R1):   Jem 2nd Call - Day Sr. Resident (R2/R3):   Loyda         Reason for interval visit  (Principal Problem)   Dystonia of extremity    Interval Problem Daily Status Update  (24 hours)   Dystonia continues to improve in both legs and feet, however patient is complaining of more pain now. She has uncontrolled diabetes, pain may be neuropathic in origin, she is already on gabapentin. Will start patient on naproxen.      Review of Systems   Constitutional: Negative for chills and fever.   HENT: Negative.  Negative for hearing loss and sore throat.    Eyes: Negative.  Negative for blurred vision.   Respiratory: Negative.  Negative for cough, hemoptysis, sputum production and shortness of breath.    Cardiovascular: Negative.  Negative for chest pain, palpitations, orthopnea, claudication and leg swelling.   Gastrointestinal: Negative.  Negative for abdominal pain, constipation, diarrhea, heartburn, nausea and vomiting.   Genitourinary: Negative.  Negative for dysuria, hematuria and urgency.   Musculoskeletal: Positive for myalgias.        R leg pain   Skin: Negative.  Negative for rash.   Neurological: Positive for weakness. Negative for dizziness, tingling, tremors and headaches.   Endo/Heme/Allergies: Negative.  Does not bruise/bleed easily.       Consultants/Specialty  Neurolgoy    Disposition  inpt    Quality Measures    Reviewed items::  EKG reviewed, Labs reviewed, Medications reviewed and Radiology images reviewed  Olguin catheter::  No Olguin  DVT prophylaxis pharmacological::  Enoxaparin (Lovenox)  Ulcer Prophylaxis::   No          Physical Exam       Vitals:    01/05/18 0356 01/05/18 0414 01/05/18 0829 01/05/18 1310   BP: 105/67 105/67 110/78 112/68   Pulse: 92  89 84   Resp: 18  16 16   Temp: 36.4 °C (97.5 °F)  37.1 °C (98.7 °F) 36.7 °C (98.1 °F)   SpO2: 95%  95% 96%   Weight:       Height:         Body mass index is 20.8 kg/m².    Oxygen Therapy:  Pulse Oximetry: 96 %, O2 (LPM): 0, O2 Delivery: None (Room Air)    Physical Exam   Constitutional: She is oriented to person, place, and time. She appears distressed.   HENT:   Head: Normocephalic and atraumatic.   Mouth/Throat: Oropharynx is clear and moist. No oropharyngeal exudate.   Eyes: Conjunctivae and EOM are normal. Pupils are equal, round, and reactive to light.   Neck: Normal range of motion. Neck supple.   Cardiovascular: Normal rate, regular rhythm and normal heart sounds.    No murmur heard.  Pulmonary/Chest: Effort normal and breath sounds normal. No respiratory distress. She has no wheezes.   Abdominal: Soft. Bowel sounds are normal. She exhibits no distension. There is no tenderness. There is no rebound and no guarding.   Musculoskeletal: Normal range of motion. She exhibits no edema or tenderness.   Lymphadenopathy:     She has no cervical adenopathy.   Neurological: She is alert and oriented to person, place, and time. No cranial nerve deficit.   Strength 5/5 in the upper extremities, 3/5 in the right leg, left leg 5/5. Feet are contracted bilaterally R>L, right foot inverted (improving). Dystonia of entire right leg and left leg below the knee. Patellar reflexes 2/4 on the left, unable to examine on the right. Babinski negative bilaterally.   Skin: Skin is warm. She is not diaphoretic. No erythema.         Lab Data Review:         1/4/2018  5:07 PM    Recent Labs      01/03/18   0908  01/04/18   0523   SODIUM  143  138   POTASSIUM  3.2*  4.0   CHLORIDE  105  107   CO2  25  25   BUN  11  9   CREATININE  0.54  0.46*   MAGNESIUM  1.9  1.6   PHOSPHORUS  3.3   --   "  CALCIUM  9.4  8.4*       Recent Labs      01/03/18   0908  01/04/18   0523   ALTSGPT  112*  68*   ASTSGOT  35  18   ALKPHOSPHAT  162*  110*   TBILIRUBIN  0.7  0.4   GLUCOSE  128*  92       Recent Labs      01/03/18   0908  01/04/18   0523   RBC  4.41  3.66*   HEMOGLOBIN  13.8  11.4*   HEMATOCRIT  39.4  33.9*   PLATELETCT  346  252   PROTHROMBTM  12.4   --    APTT  27.5   --    INR  0.95   --    FERRITIN  54.3   --        Recent Labs      01/03/18   0908  01/04/18   0523   WBC  14.9*  10.4   NEUTSPOLYS  71.20  75.90*   LYMPHOCYTES  23.40  19.00*   MONOCYTES  4.20  3.80   EOSINOPHILS  0.30  0.60   BASOPHILS  0.40  0.40   ASTSGOT  35  18   ALTSGPT  112*  68*   ALKPHOSPHAT  162*  110*   TBILIRUBIN  0.7  0.4           Assessment/Plan     * Dystonia of extremity- (present on admission)   Assessment & Plan    - Acute on chronic right leg pain and dystonia. Symptoms have been intermittent for the past 3 years and has been this severe in the past. 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 Symptoms improved, pendign anti-JACKIE ab  - will start on baclofen         Right leg pain- (present on admission)   Assessment & Plan    - See \"dystonia of extremity\"  - She continues to have pain will start on naproxen, CRP elevated indicating possible anti-inflammatory component        Leukocytosis- (present on admission)   Assessment & Plan    - WBC of 14.9, no bands. Afebrile, hemodynamically stable, nontoxic appearing  - Very dehydrated could be secondary to concentration  - will continue to monitor        Type 2 diabetes mellitus untreated, with ketoacidosis - (present on admission)   Assessment & Plan    - Continue home medications, sliding scale insulin, hypoglycemia protocol  - HbA1c 8.3%  - will need to increase DM meds outpt            "

## 2018-01-06 PROBLEM — E83.42 HYPOMAGNESEMIA: Status: ACTIVE | Noted: 2018-01-06

## 2018-01-06 LAB
ALBUMIN SERPL BCP-MCNC: 3.3 G/DL (ref 3.2–4.9)
ALBUMIN/GLOB SERPL: 1.1 G/DL
ALP SERPL-CCNC: 100 U/L (ref 30–99)
ALT SERPL-CCNC: 31 U/L (ref 2–50)
ANION GAP SERPL CALC-SCNC: 10 MMOL/L (ref 0–11.9)
AST SERPL-CCNC: 12 U/L (ref 12–45)
BILIRUB SERPL-MCNC: 0.5 MG/DL (ref 0.1–1.5)
BUN SERPL-MCNC: 10 MG/DL (ref 8–22)
CALCIUM SERPL-MCNC: 8.9 MG/DL (ref 8.5–10.5)
CHLORIDE SERPL-SCNC: 114 MMOL/L (ref 96–112)
CK SERPL-CCNC: 50 U/L (ref 0–154)
CO2 SERPL-SCNC: 20 MMOL/L (ref 20–33)
CREAT SERPL-MCNC: 0.53 MG/DL (ref 0.5–1.4)
GFR SERPL CREATININE-BSD FRML MDRD: >60 ML/MIN/1.73 M 2
GLOBULIN SER CALC-MCNC: 2.9 G/DL (ref 1.9–3.5)
GLUCOSE BLD-MCNC: 100 MG/DL (ref 65–99)
GLUCOSE BLD-MCNC: 113 MG/DL (ref 65–99)
GLUCOSE BLD-MCNC: 119 MG/DL (ref 65–99)
GLUCOSE BLD-MCNC: 123 MG/DL (ref 65–99)
GLUCOSE SERPL-MCNC: 101 MG/DL (ref 65–99)
MAGNESIUM SERPL-MCNC: 1.9 MG/DL (ref 1.5–2.5)
POTASSIUM SERPL-SCNC: 4.4 MMOL/L (ref 3.6–5.5)
PROT SERPL-MCNC: 6.2 G/DL (ref 6–8.2)
SODIUM SERPL-SCNC: 144 MMOL/L (ref 135–145)

## 2018-01-06 PROCEDURE — 36415 COLL VENOUS BLD VENIPUNCTURE: CPT

## 2018-01-06 PROCEDURE — 700102 HCHG RX REV CODE 250 W/ 637 OVERRIDE(OP): Performed by: STUDENT IN AN ORGANIZED HEALTH CARE EDUCATION/TRAINING PROGRAM

## 2018-01-06 PROCEDURE — 80053 COMPREHEN METABOLIC PANEL: CPT

## 2018-01-06 PROCEDURE — 83735 ASSAY OF MAGNESIUM: CPT

## 2018-01-06 PROCEDURE — 770006 HCHG ROOM/CARE - MED/SURG/GYN SEMI*

## 2018-01-06 PROCEDURE — 99232 SBSQ HOSP IP/OBS MODERATE 35: CPT | Mod: GC | Performed by: INTERNAL MEDICINE

## 2018-01-06 PROCEDURE — 96372 THER/PROPH/DIAG INJ SC/IM: CPT

## 2018-01-06 PROCEDURE — A9270 NON-COVERED ITEM OR SERVICE: HCPCS | Performed by: STUDENT IN AN ORGANIZED HEALTH CARE EDUCATION/TRAINING PROGRAM

## 2018-01-06 PROCEDURE — 84681 ASSAY OF C-PEPTIDE: CPT

## 2018-01-06 PROCEDURE — 82550 ASSAY OF CK (CPK): CPT

## 2018-01-06 PROCEDURE — 700101 HCHG RX REV CODE 250: Performed by: STUDENT IN AN ORGANIZED HEALTH CARE EDUCATION/TRAINING PROGRAM

## 2018-01-06 PROCEDURE — 700111 HCHG RX REV CODE 636 W/ 250 OVERRIDE (IP): Performed by: STUDENT IN AN ORGANIZED HEALTH CARE EDUCATION/TRAINING PROGRAM

## 2018-01-06 PROCEDURE — 82962 GLUCOSE BLOOD TEST: CPT | Mod: 91

## 2018-01-06 RX ORDER — BACLOFEN 10 MG/1
5 TABLET ORAL EVERY 8 HOURS
Status: DISCONTINUED | OUTPATIENT
Start: 2018-01-06 | End: 2018-01-06

## 2018-01-06 RX ORDER — BACLOFEN 10 MG/1
10 TABLET ORAL EVERY 8 HOURS
Status: DISCONTINUED | OUTPATIENT
Start: 2018-01-06 | End: 2018-01-11 | Stop reason: HOSPADM

## 2018-01-06 RX ORDER — DIAZEPAM 5 MG/1
10 TABLET ORAL EVERY 8 HOURS
Status: DISCONTINUED | OUTPATIENT
Start: 2018-01-06 | End: 2018-01-11 | Stop reason: HOSPADM

## 2018-01-06 RX ORDER — DIAZEPAM 5 MG/1
5 TABLET ORAL EVERY 8 HOURS
Status: DISCONTINUED | OUTPATIENT
Start: 2018-01-06 | End: 2018-01-06

## 2018-01-06 RX ORDER — LACTULOSE 20 G/30ML
30 SOLUTION ORAL 2 TIMES DAILY PRN
Status: DISCONTINUED | OUTPATIENT
Start: 2018-01-06 | End: 2018-01-11 | Stop reason: HOSPADM

## 2018-01-06 RX ADMIN — DIAZEPAM 5 MG: 5 TABLET ORAL at 08:00

## 2018-01-06 RX ADMIN — NAPROXEN 375 MG: 375 TABLET ORAL at 09:53

## 2018-01-06 RX ADMIN — Medication 100 MG: at 08:00

## 2018-01-06 RX ADMIN — BACLOFEN 10 MG: 10 TABLET ORAL at 16:53

## 2018-01-06 RX ADMIN — ENOXAPARIN SODIUM 40 MG: 100 INJECTION SUBCUTANEOUS at 09:57

## 2018-01-06 RX ADMIN — FAMOTIDINE 20 MG: 20 TABLET, FILM COATED ORAL at 19:32

## 2018-01-06 RX ADMIN — ACETAMINOPHEN 650 MG: 325 TABLET, FILM COATED ORAL at 19:31

## 2018-01-06 RX ADMIN — DIAZEPAM 10 MG: 5 TABLET ORAL at 22:19

## 2018-01-06 RX ADMIN — BACLOFEN 5 MG: 10 TABLET ORAL at 09:53

## 2018-01-06 RX ADMIN — STANDARDIZED SENNA CONCENTRATE AND DOCUSATE SODIUM 2 TABLET: 8.6; 5 TABLET, FILM COATED ORAL at 19:40

## 2018-01-06 RX ADMIN — NAPROXEN 375 MG: 375 TABLET ORAL at 19:32

## 2018-01-06 RX ADMIN — POTASSIUM CHLORIDE AND SODIUM CHLORIDE: 900; 150 INJECTION, SOLUTION INTRAVENOUS at 06:05

## 2018-01-06 RX ADMIN — FAMOTIDINE 20 MG: 20 TABLET, FILM COATED ORAL at 08:00

## 2018-01-06 RX ADMIN — INSULIN GLARGINE 5 UNITS: 100 INJECTION, SOLUTION SUBCUTANEOUS at 20:26

## 2018-01-06 RX ADMIN — ACETAMINOPHEN 650 MG: 325 TABLET, FILM COATED ORAL at 06:06

## 2018-01-06 RX ADMIN — DIAZEPAM 10 MG: 5 TABLET ORAL at 16:53

## 2018-01-06 ASSESSMENT — ENCOUNTER SYMPTOMS
DIAPHORESIS: 0
COUGH: 0
DIARRHEA: 0
HEADACHES: 0
CONSTIPATION: 0
SENSORY CHANGE: 0
VOMITING: 0
SHORTNESS OF BREATH: 0
MYALGIAS: 1
FEVER: 0
PALPITATIONS: 0
BLURRED VISION: 0
FOCAL WEAKNESS: 1
SORE THROAT: 0
NAUSEA: 0
WEAKNESS: 0
EYE REDNESS: 0
DEPRESSION: 0
CHILLS: 0
EYE PAIN: 0
DIZZINESS: 0
BRUISES/BLEEDS EASILY: 0
ABDOMINAL PAIN: 0

## 2018-01-06 ASSESSMENT — LIFESTYLE VARIABLES: DO YOU DRINK ALCOHOL: NO

## 2018-01-06 ASSESSMENT — PAIN SCALES - GENERAL
PAINLEVEL_OUTOF10: 2
PAINLEVEL_OUTOF10: 4
PAINLEVEL_OUTOF10: 2

## 2018-01-06 NOTE — PROGRESS NOTES
Pt observed sleeping at this time, respirations are easy/even. Pt open eyes to strong touch/shake and is able to simple follow commands, continues to be drowsy-MD aware. Will continue to monitor.

## 2018-01-06 NOTE — NON-PROVIDER
Internal Medicine Interval Note  Note Author: Michael V. Padua, Student     Name Linden-Depintor, Vicky Ca     1965   Age/Sex 52 y.o. female   MRN [id number]   Code Status FULL     After 5PM or if no immediate response to page, please call for cross-coverage  Attending/Team: Bahman/Constantino See Patient List for primary contact information  Call [phone] to page    1st Call - Day Intern (R1):   Jem 2nd Call - Day Sr. 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  Will continue to follow neurology's recommendations.    Patient's pain is currently at 6/10.  Patient states her pain and stiffness are improving.  Patient states she is now able to flex her right leg and is less rigid.  Patient can move her toes and flex at the knee and hip with the right leg.  Patient denies any abdominal pain or any new pain.  Patient states she had a BM this AM.  Patient slept well last night    KUB showed increased small bowel and colonic gas content.        Review of Systems   Constitutional: Negative for chills, diaphoresis and fever.   HENT: Negative for congestion and sore throat.    Eyes: Negative for blurred vision, pain and redness.   Respiratory: Negative for cough and shortness of breath.    Cardiovascular: Negative for chest pain, palpitations and leg swelling.   Gastrointestinal: Negative for abdominal pain, constipation, diarrhea, nausea and vomiting.   Genitourinary: Negative for dysuria, frequency, hematuria and urgency.   Musculoskeletal: Positive for joint pain (right leg pain) and myalgias.   Neurological: Positive for focal weakness (lower extremities (R>L)). Negative for dizziness, sensory change, weakness and headaches.       Consultants/Specialty  Neurology    Disposition  Remain inpatient  for severe right leg pain and dystonia    Quality Measures    Reviewed items::  Labs reviewed, Medications reviewed and Radiology images reviewed  Olguin catheter::  No Olguin  DVT prophylaxis pharmacological::  Enoxaparin (Lovenox)      Physical Exam       Vitals:    01/05/18 2357 01/06/18 0052 01/06/18 0324 01/06/18 0752   BP: (!) 91/53 104/62 (!) 96/60 (!) 99/68   Pulse:   69 82   Resp:   16 15   Temp:   36.1 °C (97 °F) 36.4 °C (97.6 °F)   SpO2:   99% 97%   Weight:       Height:         Body mass index is 20.8 kg/m².    Oxygen Therapy:  Pulse Oximetry: 97 %, O2 (LPM): 0, O2 Delivery: None (Room Air)    Physical Exam   Constitutional: She is oriented to person, place, and time and well-developed, well-nourished, and in no distress. No distress.   HENT:   Head: Normocephalic and atraumatic.   Mouth/Throat: No oropharyngeal exudate.   Eyes: EOM are normal. Pupils are equal, round, and reactive to light. Right eye exhibits no discharge. Left eye exhibits no discharge. No scleral icterus.   Neck: Normal range of motion. Neck supple. No tracheal deviation present. No thyromegaly present.   Cardiovascular: Normal rate, regular rhythm and normal heart sounds.  Exam reveals no gallop and no friction rub.    No murmur heard.  Pulmonary/Chest: Effort normal and breath sounds normal. No respiratory distress. She has no wheezes. She has no rales.   Abdominal: Soft. Bowel sounds are normal. She exhibits no distension and no mass. There is no tenderness. There is no rebound and no guarding.   Musculoskeletal: She exhibits no edema.   Right leg: dystonia is improved - patient able to flex right leg at the knee and move her toes.  Right leg remains tender to touch, worse near the hip today.  Strength is 3/5.  Left leg: unchanged - patient able to flex left leg and move toes.  Strength is 4/5.     Lymphadenopathy:     She has no cervical adenopathy.   Neurological: She is alert and oriented to person, place, and time. No cranial  nerve deficit.   Right leg: tone is improved - decreasing tone.  Left leg: tone unchanged from previous.       Skin: Skin is warm and dry. No rash noted. She is not diaphoretic. No erythema.          Lab Data Review:         1/6/2018  8:36 AM    Recent Labs      01/03/18 0908 01/04/18 0523 01/06/18   0320   SODIUM  143  138  144   POTASSIUM  3.2*  4.0  4.4   CHLORIDE  105  107  114*   CO2  25  25  20   BUN  11  9  10   CREATININE  0.54  0.46*  0.53   MAGNESIUM  1.9  1.6  1.9   PHOSPHORUS  3.3   --    --    CALCIUM  9.4  8.4*  8.9       Recent Labs      01/03/18 0908 01/04/18   0523 01/06/18   0320   ALTSGPT  112*  68*  31   ASTSGOT  35  18  12   ALKPHOSPHAT  162*  110*  100*   TBILIRUBIN  0.7  0.4  0.5   GLUCOSE  128*  92  101*       Recent Labs      01/03/18 0908 01/04/18 0523   RBC  4.41  3.66*   HEMOGLOBIN  13.8  11.4*   HEMATOCRIT  39.4  33.9*   PLATELETCT  346  252   PROTHROMBTM  12.4   --    APTT  27.5   --    INR  0.95   --    FERRITIN  54.3   --        Recent Labs      01/03/18 0908 01/04/18 0523 01/06/18   0320   WBC  14.9*  10.4   --    NEUTSPOLYS  71.20  75.90*   --    LYMPHOCYTES  23.40  19.00*   --    MONOCYTES  4.20  3.80   --    EOSINOPHILS  0.30  0.60   --    BASOPHILS  0.40  0.40   --    ASTSGOT  35  18  12   ALTSGPT  112*  68*  31   ALKPHOSPHAT  162*  110*  100*   TBILIRUBIN  0.7  0.4  0.5           Assessment/Plan   #Right leg pain and dystonia secondary to Stiff Person Syndrome  Acute on chronic right leg pain and dystonia.   Anti-JACKIE: 250 supports dx of stiff person syndrome  Patient was placed on diazepam 10 mg, baclofen 10 mg, and naproxen 375mg bid.     Will continue with neuro's recommendation.      Symptoms began 3 years ago and was localized to her right hip.  Pain has been intermittent, but has progressively worsened within the past year.    Dystonia associated with pain began about a year ago  Patient was hospitalized previously for the same symptoms  Previous  inpatient and outpatient neurologic workup was negative.    Records from October 2017 at [facility]: MRI showed L5-S1 broad based disk protrusion with no central canal stenosis, as well as a 1 cm lesion in L1 vertebral body which is believed to be a hemangioma.  Patient was given gabapentin, baclofen, dilaudid, and morphine for pain control  Patellar reflex: 2/4 on left, none on right due to contraction.   Contraction of right leg from hip to toe, and left leg below the knee.   Negative Babinski bilaterally.  CPK of 231   Sed Rate of 41  CRP of 2.04  D-Dimer of 1135 - duplex scan negative for DVT     Plan:   Follow neurology's recommendation    Continue diazepam 10 mg q8h, baclofen 10 mg q8h, naproxen 375 bid.  Consider PT/OT  Consider anticonvulsant, IVIG, and rituximab  IS     #Constipation - resolved  KUB showed mild increase of small bowel and colonic gas and degenerative disck change of the lumbar.   Patient had a BM in the AM  Patient denies abdominal pain, nausea, vomiting  Polypharmacy may be contributing to constipation     Plan:  Bladder scan   Continue lactulose        #DM2 - poorly controlled  POC glucose checks:   HbA1C: 8.3%  Anion gap of 13.0     Plan:   Continue home medications, sliding scale insulin, hypoglycemia protocol  Continue gabapentin     #Leukocytosis - resolved  (1/4/18) WBC of 10.4   (1/3/18) WBC of 14.9   Remains afebrile - unlikely infectious   Hemodynamically stable  Oral mucosa still mildly dry - patient dehydrated

## 2018-01-06 NOTE — PROGRESS NOTES
Neurology Progress Note        Subjective:  Patient continues to be quite impacted by the spasms and pain in her legs. She continues to be tearful and crying. Requiring as needed medications on top of the baclofen and diazepam.    Objective:  Vitals:  Vitals:    01/05/18 0356 01/05/18 0414 01/05/18 0829 01/05/18 1310   BP: 105/67 105/67 110/78 112/68   Pulse: 92  89 84   Resp: 18  16 16   Temp: 36.4 °C (97.5 °F)  37.1 °C (98.7 °F) 36.7 °C (98.1 °F)   SpO2: 95%  95% 96%   Weight:       Height:         GENERAL:  Lying in the hospital bed moaning and cyring in discomfort.  MENTAL STATUS:  Awake, alert, oriented times 3.  Speech is fluent, comprehension is intact.    CRANIAL NERVES:  PERRL, EOMI with no nystagmus, face is symmetric, facial sensation is intact, tongue is in the midline, palate is symmetric.  MOTOR:  5/5 in the upper extremities.  The right lower extremity is extremely rigid, unable to passively flex the knee, the foot is fixed in a dorsiflexed and inverted position.  The toes are also essentially fixed by the rigidity.  The left foot is similar but able to get some passive range of motion, moving the foot seems to trigger more of a spasm.  There is movement in the proximal left leg and her left knee is able to be passively flexed.  She is to be more inconsistency in the fixed posture of her feet today. Touching her feet seem to lead to spasms.  GAIT:  Deferred    Recent Labs      01/03/18   0908  01/04/18   0523   WBC  14.9*  10.4   RBC  4.41  3.66*   HEMOGLOBIN  13.8  11.4*   HEMATOCRIT  39.4  33.9*   MCV  89.3  92.6   MCH  31.3  31.1   RDW  38.9  41.6   PLATELETCT  346  252   MPV  13.1*  12.3   NEUTSPOLYS  71.20  75.90*   LYMPHOCYTES  23.40  19.00*   MONOCYTES  4.20  3.80   EOSINOPHILS  0.30  0.60   BASOPHILS  0.40  0.40     Recent Labs      01/03/18   0908  01/04/18   0523   SODIUM  143  138   POTASSIUM  3.2*  4.0   CHLORIDE  105  107   CO2  25  25   GLUCOSE  128*  92   BUN  11  9   CPKTOTAL  231*    --          Impression:  Mrs. Crooks is a 52 year old woman who has had progressive leg weakness and spasm/rigidity develop over 1-2 years.  Her exam does not fit into a classic description of any diagnosis.  However, I think Stiff-person syndrome is the most likely diagnosis at this point.  Stiff-person syndrome usually has more axial involvement although she does state it started in her proximal legs. Some type of dystonia is certainly possible but usually they do not cause so much pain or such fixed deficits. Functional neurological disorder could be considered as well, however, the degree of rigidity and fixed posture of her feet that she maintains would be difficult to ascribe to a functional disorder.     1.  Continue diazepam 10mg tid and baclofen 10 mg 3 times a day  2.  Await Anti-JACKIE antibodies.  3.  If this is indeed stiff person syndrome sometimes he may require super high doses of benzodiazepines to control their symptoms. I would be hesitant to titrate up further on her benzodiazepines without positive antibody however.  4. I will continue to follow.

## 2018-01-06 NOTE — PROGRESS NOTES
Internal Medicine Interval Note  Note Author: Madhavi Scales M.D.     Name Linden-Depintor, Vicky Ca     1965   Age/Sex 52 y.o. female   MRN [id number]   Code Status Full      After 5PM or if no immediate response to page, please call for cross-coverage  Attending/Team: Dr. Ruggiero / Constantino  See Patient List for primary contact information  Call [phone] to page    1st Call - Day Intern (R1):   Dr. Parish  2nd Call - Day Sr. Resident (R2/R3):   Dr. Scales          Reason for interval visit  (Principal Problem)   Dystonia of extremity    Interval Problem Daily Status Update  (24 hours)   Patient's pain and stiffness improve significantly.   Valium & Baclofen was discontinued last night given that patient was drowsy. Restarted them this morning as he anti-JACKIE is significantly high which reflects stiff-person syndrome and she will probably need to titrate up the dosage in that case. Will await for neuro recom on this.  D/C gabapentin which can give patient drowsiness too.       Review of Systems   Constitutional: Negative for chills and fever.   HENT: Negative for sore throat.    Eyes: Negative for blurred vision.   Respiratory: Negative for cough.    Cardiovascular: Negative for chest pain.   Gastrointestinal: Negative for abdominal pain, constipation, nausea and vomiting.   Genitourinary: Negative for dysuria.   Musculoskeletal:        Pain of right LE muscle improving compare to yesterday.    Skin: Negative for rash.   Neurological: Negative for dizziness.   Endo/Heme/Allergies: Does not bruise/bleed easily.   Psychiatric/Behavioral: Negative for depression.       Consultants/Specialty  Neuro - Dr. Dover       Disposition  Inpatient       Quality Measures    Reviewed items::  Labs reviewed and Medications reviewed  Olguin catheter::  No Olguin  DVT prophylaxis pharmacological::  Enoxaparin (Lovenox)  Ulcer Prophylaxis::  Not indicated          Physical Exam       Vitals:    18 0052 18 0324 18  0752 01/06/18 1158   BP: 104/62 (!) 96/60 (!) 99/68 101/65   Pulse:  69 82 73   Resp:  16 15 18   Temp:  36.1 °C (97 °F) 36.4 °C (97.6 °F) 36.3 °C (97.4 °F)   SpO2:  99% 97% 96%   Weight:       Height:         Body mass index is 20.8 kg/m².    Oxygen Therapy:  Pulse Oximetry: 96 %, O2 (LPM): 0, O2 Delivery: None (Room Air)    Physical Exam   Constitutional: She is oriented to person, place, and time. No distress.   HENT:   Head: Normocephalic and atraumatic.   Eyes: EOM are normal. Pupils are equal, round, and reactive to light.   Neck: Neck supple.   Cardiovascular: Normal rate and regular rhythm.  Exam reveals no gallop and no friction rub.    No murmur heard.  Pulmonary/Chest: Effort normal and breath sounds normal. No respiratory distress.   Abdominal: Soft. Bowel sounds are normal. She exhibits no distension. There is no tenderness.   Musculoskeletal: She exhibits no edema.   Restricted ROM of right LE but significantly improve compared to yesterday. Hypertonia improving.    Neurological: She is alert and oriented to person, place, and time.   Skin: She is not diaphoretic.   Nursing note and vitals reviewed.        Lab Data Review:       Recent Labs      01/04/18 0523 01/06/18   0320   SODIUM  138  144   POTASSIUM  4.0  4.4   CHLORIDE  107  114*   CO2  25  20   BUN  9  10   CREATININE  0.46*  0.53   MAGNESIUM  1.6  1.9   CALCIUM  8.4*  8.9       Recent Labs      01/04/18   0523  01/06/18   0320   ALTSGPT  68*  31   ASTSGOT  18  12   ALKPHOSPHAT  110*  100*   TBILIRUBIN  0.4  0.5   GLUCOSE  92  101*       Recent Labs      01/04/18   0523   RBC  3.66*   HEMOGLOBIN  11.4*   HEMATOCRIT  33.9*   PLATELETCT  252       Recent Labs      01/04/18   0523  01/06/18   0320   WBC  10.4   --    NEUTSPOLYS  75.90*   --    LYMPHOCYTES  19.00*   --    MONOCYTES  3.80   --    EOSINOPHILS  0.60   --    BASOPHILS  0.40   --    ASTSGOT  18  12   ALTSGPT  68*  31   ALKPHOSPHAT  110*  100*   TBILIRUBIN  0.4  0.5                      Assessment/Plan     * Dystonia of extremity- (present on admission)   Assessment & Plan    - Acute on chronic right leg pain and dystonia. Symptoms have been intermittent for the past 3 years and has been this severe in the past. Has had extensive neurologic workup at outside facility  - Dystonia is in the right and left legs and feet, R>L.   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 Also quick response to valium & baclofen.   - TSH, free T4, Ferrtin normal  - CRP 2.04, B12>1500.  - continue valium 10 mg tid and baclofen 10 mg tid.  - follow neuro recom.   - re-consult PT/OT.         Right leg pain- (present on admission)   Assessment & Plan    - from dystonia, intermittent.   - responding to valium & baclofen.   - continue same treatment.         Leukocytosis- (present on admission)   Assessment & Plan    - WBC of 14.9, no bands. Afebrile, hemodynamically stable, nontoxic appearing.   - Very dehydrated could be secondary to concentration vs reactive to pain.   - trended down to wnl.   - CTM.         Hypomagnesemia- (present on admission)   Assessment & Plan    - s/p repletion with IV magnesium sulfate.   - repeat labs and replete as needed.         Type 2 diabetes mellitus untreated, with ketoacidosis - (present on admission)   Assessment & Plan    - Continue home medications, sliding scale insulin, hypoglycemia protocol  - HbA1c 8.3%.  - c-peptide ordered to check for GUERRERO.

## 2018-01-06 NOTE — PROGRESS NOTES
Bedside RN-RN report done at 1920. Incorrect fluids running noted by this RN. fluids stopped, MD notified with no new orders received, midas form completed and IV fluids administered per order.

## 2018-01-07 PROBLEM — G25.82 STIFF PERSON SYNDROME WITH POSITIVE GLUTAMIC ACID DECARBOXYLASE (GAD) ANTIBODY: Status: ACTIVE | Noted: 2018-01-03

## 2018-01-07 PROBLEM — E55.9 VITAMIN D DEFICIENCY: Status: ACTIVE | Noted: 2018-01-07

## 2018-01-07 PROBLEM — R76.0 STIFF PERSON SYNDROME WITH POSITIVE GLUTAMIC ACID DECARBOXYLASE (GAD) ANTIBODY: Status: ACTIVE | Noted: 2018-01-03

## 2018-01-07 LAB
25(OH)D3 SERPL-MCNC: 9 NG/ML (ref 30–100)
ALBUMIN SERPL BCP-MCNC: 3.5 G/DL (ref 3.2–4.9)
ALBUMIN/GLOB SERPL: 1.1 G/DL
ALP SERPL-CCNC: 100 U/L (ref 30–99)
ALT SERPL-CCNC: 27 U/L (ref 2–50)
ANION GAP SERPL CALC-SCNC: 11 MMOL/L (ref 0–11.9)
AST SERPL-CCNC: 12 U/L (ref 12–45)
BASOPHILS # BLD AUTO: 0.6 % (ref 0–1.8)
BASOPHILS # BLD: 0.05 K/UL (ref 0–0.12)
BILIRUB SERPL-MCNC: 0.4 MG/DL (ref 0.1–1.5)
BUN SERPL-MCNC: 13 MG/DL (ref 8–22)
CALCIUM SERPL-MCNC: 9.3 MG/DL (ref 8.5–10.5)
CHLORIDE SERPL-SCNC: 108 MMOL/L (ref 96–112)
CO2 SERPL-SCNC: 22 MMOL/L (ref 20–33)
CREAT SERPL-MCNC: 0.39 MG/DL (ref 0.5–1.4)
EOSINOPHIL # BLD AUTO: 0.14 K/UL (ref 0–0.51)
EOSINOPHIL NFR BLD: 1.8 % (ref 0–6.9)
ERYTHROCYTE [DISTWIDTH] IN BLOOD BY AUTOMATED COUNT: 41.5 FL (ref 35.9–50)
GFR SERPL CREATININE-BSD FRML MDRD: >60 ML/MIN/1.73 M 2
GLOBULIN SER CALC-MCNC: 3.2 G/DL (ref 1.9–3.5)
GLUCOSE BLD-MCNC: 106 MG/DL (ref 65–99)
GLUCOSE BLD-MCNC: 108 MG/DL (ref 65–99)
GLUCOSE BLD-MCNC: 141 MG/DL (ref 65–99)
GLUCOSE BLD-MCNC: 87 MG/DL (ref 65–99)
GLUCOSE SERPL-MCNC: 113 MG/DL (ref 65–99)
HCT VFR BLD AUTO: 36.3 % (ref 37–47)
HGB BLD-MCNC: 12.5 G/DL (ref 12–16)
IMM GRANULOCYTES # BLD AUTO: 0.02 K/UL (ref 0–0.11)
IMM GRANULOCYTES NFR BLD AUTO: 0.3 % (ref 0–0.9)
LYMPHOCYTES # BLD AUTO: 2.05 K/UL (ref 1–4.8)
LYMPHOCYTES NFR BLD: 26.3 % (ref 22–41)
MAGNESIUM SERPL-MCNC: 1.7 MG/DL (ref 1.5–2.5)
MCH RBC QN AUTO: 31.6 PG (ref 27–33)
MCHC RBC AUTO-ENTMCNC: 34.4 G/DL (ref 33.6–35)
MCV RBC AUTO: 91.7 FL (ref 81.4–97.8)
MONOCYTES # BLD AUTO: 0.41 K/UL (ref 0–0.85)
MONOCYTES NFR BLD AUTO: 5.3 % (ref 0–13.4)
NEUTROPHILS # BLD AUTO: 5.13 K/UL (ref 2–7.15)
NEUTROPHILS NFR BLD: 65.7 % (ref 44–72)
NRBC # BLD AUTO: 0 K/UL
NRBC BLD-RTO: 0 /100 WBC
PLATELET # BLD AUTO: 343 K/UL (ref 164–446)
PMV BLD AUTO: 11.6 FL (ref 9–12.9)
POTASSIUM SERPL-SCNC: 3.7 MMOL/L (ref 3.6–5.5)
PROT SERPL-MCNC: 6.7 G/DL (ref 6–8.2)
RBC # BLD AUTO: 3.96 M/UL (ref 4.2–5.4)
SODIUM SERPL-SCNC: 141 MMOL/L (ref 135–145)
WBC # BLD AUTO: 7.8 K/UL (ref 4.8–10.8)

## 2018-01-07 PROCEDURE — 82306 VITAMIN D 25 HYDROXY: CPT

## 2018-01-07 PROCEDURE — 30233S1 TRANSFUSION OF NONAUTOLOGOUS GLOBULIN INTO PERIPHERAL VEIN, PERCUTANEOUS APPROACH: ICD-10-PCS | Performed by: PSYCHIATRY & NEUROLOGY

## 2018-01-07 PROCEDURE — 700111 HCHG RX REV CODE 636 W/ 250 OVERRIDE (IP): Performed by: STUDENT IN AN ORGANIZED HEALTH CARE EDUCATION/TRAINING PROGRAM

## 2018-01-07 PROCEDURE — 99233 SBSQ HOSP IP/OBS HIGH 50: CPT | Mod: GC | Performed by: INTERNAL MEDICINE

## 2018-01-07 PROCEDURE — 80053 COMPREHEN METABOLIC PANEL: CPT

## 2018-01-07 PROCEDURE — 85025 COMPLETE CBC W/AUTO DIFF WBC: CPT

## 2018-01-07 PROCEDURE — 770006 HCHG ROOM/CARE - MED/SURG/GYN SEMI*

## 2018-01-07 PROCEDURE — 36415 COLL VENOUS BLD VENIPUNCTURE: CPT

## 2018-01-07 PROCEDURE — 700102 HCHG RX REV CODE 250 W/ 637 OVERRIDE(OP): Performed by: STUDENT IN AN ORGANIZED HEALTH CARE EDUCATION/TRAINING PROGRAM

## 2018-01-07 PROCEDURE — 83735 ASSAY OF MAGNESIUM: CPT

## 2018-01-07 PROCEDURE — 700111 HCHG RX REV CODE 636 W/ 250 OVERRIDE (IP): Performed by: PSYCHIATRY & NEUROLOGY

## 2018-01-07 PROCEDURE — A9270 NON-COVERED ITEM OR SERVICE: HCPCS | Performed by: STUDENT IN AN ORGANIZED HEALTH CARE EDUCATION/TRAINING PROGRAM

## 2018-01-07 PROCEDURE — 82962 GLUCOSE BLOOD TEST: CPT | Mod: 91

## 2018-01-07 RX ORDER — MAGNESIUM SULFATE HEPTAHYDRATE 40 MG/ML
2 INJECTION, SOLUTION INTRAVENOUS ONCE
Status: ACTIVE | OUTPATIENT
Start: 2018-01-07 | End: 2018-01-08

## 2018-01-07 RX ORDER — POLYVINYL ALCOHOL 14 MG/ML
2 SOLUTION/ DROPS OPHTHALMIC 4 TIMES DAILY
Status: DISCONTINUED | OUTPATIENT
Start: 2018-01-07 | End: 2018-01-11 | Stop reason: HOSPADM

## 2018-01-07 RX ORDER — IMMUNE GLOBULIN 100 MG/ML
20 SOLUTION INTRAVENOUS DAILY
Status: COMPLETED | OUTPATIENT
Start: 2018-01-07 | End: 2018-01-11

## 2018-01-07 RX ORDER — ERGOCALCIFEROL 1.25 MG/1
50000 CAPSULE ORAL
Status: DISCONTINUED | OUTPATIENT
Start: 2018-01-07 | End: 2018-01-11 | Stop reason: HOSPADM

## 2018-01-07 RX ADMIN — INSULIN GLARGINE 5 UNITS: 100 INJECTION, SOLUTION SUBCUTANEOUS at 21:00

## 2018-01-07 RX ADMIN — NAPROXEN 375 MG: 375 TABLET ORAL at 22:05

## 2018-01-07 RX ADMIN — FAMOTIDINE 20 MG: 20 TABLET, FILM COATED ORAL at 08:48

## 2018-01-07 RX ADMIN — Medication 100 MG: at 08:45

## 2018-01-07 RX ADMIN — BACLOFEN 10 MG: 10 TABLET ORAL at 18:01

## 2018-01-07 RX ADMIN — NAPROXEN 375 MG: 375 TABLET ORAL at 08:47

## 2018-01-07 RX ADMIN — FAMOTIDINE 20 MG: 20 TABLET, FILM COATED ORAL at 22:05

## 2018-01-07 RX ADMIN — ERGOCALCIFEROL 50000 UNITS: 1.25 CAPSULE ORAL at 08:45

## 2018-01-07 RX ADMIN — DIAZEPAM 10 MG: 5 TABLET ORAL at 22:04

## 2018-01-07 RX ADMIN — STANDARDIZED SENNA CONCENTRATE AND DOCUSATE SODIUM 2 TABLET: 8.6; 5 TABLET, FILM COATED ORAL at 08:46

## 2018-01-07 RX ADMIN — BACLOFEN 10 MG: 10 TABLET ORAL at 01:01

## 2018-01-07 RX ADMIN — POLYVINYL ALCOHOL 2 DROP: 14 SOLUTION/ DROPS OPHTHALMIC at 17:32

## 2018-01-07 RX ADMIN — DIAZEPAM 10 MG: 5 TABLET ORAL at 06:19

## 2018-01-07 RX ADMIN — ENOXAPARIN SODIUM 40 MG: 100 INJECTION SUBCUTANEOUS at 08:52

## 2018-01-07 RX ADMIN — POLYVINYL ALCOHOL 2 DROP: 14 SOLUTION/ DROPS OPHTHALMIC at 21:54

## 2018-01-07 RX ADMIN — IMMUNE GLOBULIN 20 G: 100 SOLUTION INTRAVENOUS at 18:01

## 2018-01-07 RX ADMIN — BACLOFEN 10 MG: 10 TABLET ORAL at 11:14

## 2018-01-07 RX ADMIN — ACETAMINOPHEN 650 MG: 325 TABLET, FILM COATED ORAL at 01:01

## 2018-01-07 RX ADMIN — DIAZEPAM 10 MG: 5 TABLET ORAL at 14:43

## 2018-01-07 RX ADMIN — POLYVINYL ALCOHOL 2 DROP: 14 SOLUTION/ DROPS OPHTHALMIC at 14:45

## 2018-01-07 ASSESSMENT — ENCOUNTER SYMPTOMS
SORE THROAT: 0
COUGH: 0
VOMITING: 0
BRUISES/BLEEDS EASILY: 0
FEVER: 0
DIZZINESS: 0
NAUSEA: 0
CONSTIPATION: 0
BLURRED VISION: 0
DEPRESSION: 0
ABDOMINAL PAIN: 0
CHILLS: 0

## 2018-01-07 ASSESSMENT — PAIN SCALES - GENERAL
PAINLEVEL_OUTOF10: 2

## 2018-01-07 NOTE — PROGRESS NOTES
Pt transferred to [facility]-   Hungarian speaking only but daughter is here to translate   Skin intact   Right leg stiff with minimal discomfort at this time   Oriented to room  Call light within reach and pt using appropriately

## 2018-01-07 NOTE — PROGRESS NOTES
I have personally evaluated and examined this patient and agree with the findings as documented in the resident note except as documented in this attending note.  I am actively involved in the patient's care.       Complex medicine case  Appreciated Neuro help,FU rec  continuous pulse ox  Low threshold for ABG,EKG,Trop and cxr  Discussed with Pt and  family and answered all questions of Pt and family at bed side  Discussed with RN    Resident note to follow

## 2018-01-07 NOTE — PROGRESS NOTES
Received report from night shift nurse, patient continue c/o legs spasm, medicated with routine meds. Iv infiltrated unable to hang mag iv this am.

## 2018-01-07 NOTE — PROGRESS NOTES
Neurology Progress Note        Subjective:  Patient appears much more comfortable this morning. She is seen with her daughter at the bedside. Her medications had to be held last night due to oversedation have been resumed this morning. She reports the pain is much less intense. Her daughter states it has been at least 4 years since she has walked normally.    Objective:  Vitals:  Vitals:    01/06/18 0324 01/06/18 0752 01/06/18 1158 01/06/18 1625   BP: (!) 96/60 (!) 99/68 101/65 114/67   Pulse: 69 82 73 69   Resp: 16 15 18 16   Temp: 36.1 °C (97 °F) 36.4 °C (97.6 °F) 36.3 °C (97.4 °F) 36.5 °C (97.7 °F)   SpO2: 99% 97% 96% 98%   Weight:       Height:         GENERAL:Asleep but arousable  MENTAL STATUS:  Awake, alert, oriented times 3.  Speech is fluent, comprehension is intact.    CRANIAL NERVES:  PERRL, EOMI with no nystagmus, face is symmetric  MOTOR:  5/5 in the upper extremities.  The right lower extremity is extremely rigid, was able to get some very minor passive flexion at the knee today, the foot is does have some passive movement today as well but sudden movements will trigger a spasm. The toes are also essentially fixed by the rigidity.  The left foot is similar but able to get some passive range of motion, moving the foot seems to trigger more of a spasm.  There is movement in the proximal left leg and her left knee is able to be passively flexed.  GAIT:  Deferred    Recent Labs      01/04/18   0523   WBC  10.4   RBC  3.66*   HEMOGLOBIN  11.4*   HEMATOCRIT  33.9*   MCV  92.6   MCH  31.1   RDW  41.6   PLATELETCT  252   MPV  12.3   NEUTSPOLYS  75.90*   LYMPHOCYTES  19.00*   MONOCYTES  3.80   EOSINOPHILS  0.60   BASOPHILS  0.40     Recent Labs      01/04/18   0523  01/06/18   0320   SODIUM  138  144   POTASSIUM  4.0  4.4   CHLORIDE  107  114*   CO2  25  20   GLUCOSE  92  101*   BUN  9  10   CPKTOTAL   --   50         Impression: Mrs. Cheatham-Depintor is a 52 year old woman who has had progressive leg weakness and  spasm/rigidity develop over several years.  Her exam does not fit into a classic description, however, I think Stiff-person syndrome is the most likely diagnosis at this point especially later than elevated anti-cleo antibodies, would like stiffness in the right leg, and touch motions increasing spasms.  Stiff-person syndrome usually has more axial involvement although she does state it started in her proximal legs.        Recommendations:  1. I will continue her doses at 10 mg 3 times a day of both Valium and baclofen given the sedation last night. This will be titrated up slowly for relief. There are some studies to suggest benefit of IVIG in stiff person syndrome. This certainly could be considered in her case given the severe nature of her rigidity. I would favor giving her 24 hours on the symptomatic medications first.  2. I will follow-up tomorrow.

## 2018-01-07 NOTE — PROGRESS NOTES
Neurology Progress Note        Subjective:  Mrs. Crooks continues to have slow improvement in her symptoms, she is having some irritation and itching of her left eye.  Her legs are still stiff, but improving.    Objective:    Vitals:    01/06/18 1925 01/06/18 2116 01/07/18 0500 01/07/18 0710   BP: 106/70 107/71 106/69 113/68   Pulse: 69 67 69 76   Resp: 20 18 18 16   Temp: 36.5 °C (97.7 °F) 36.8 °C (98.3 °F) 36.7 °C (98 °F) 36.1 °C (96.9 °F)   SpO2: 97% 97% 96% 98%   Weight:       Height:            GENERAL:Asleep but arousable  MENTAL STATUS:  Awake, alert, oriented times 3.  Speech is fluent, comprehension is intact.    CRANIAL NERVES:  PERRL, EOMI with no nystagmus, face is symmetric  MOTOR:  5/5 in the upper extremities.  The right lower extremity is extremely rigid, was able to get some more passive flexion at the knee today compared to yesterday, the foot is does have some passive movement today as well but sudden movements will trigger a spasm. The toes are also essentially fixed by the rigidity.  The left foot is similar but able to get some passive range of motion, moving the foot seems to trigger more of a spasm.  There is movement in the proximal left leg and her left knee is able to be passively flexed.  Achilles are very tight bilaterally preventing foot dorsiflexion.  GAIT:  Deferred    Recent Labs      01/07/18   0305   WBC  7.8   RBC  3.96*   HEMOGLOBIN  12.5   HEMATOCRIT  36.3*   MCV  91.7   MCH  31.6   RDW  41.5   PLATELETCT  343   MPV  11.6   NEUTSPOLYS  65.70   LYMPHOCYTES  26.30   MONOCYTES  5.30   EOSINOPHILS  1.80   BASOPHILS  0.60     Recent Labs      01/06/18   0320  01/07/18   0304   SODIUM  144  141   POTASSIUM  4.4  3.7   CHLORIDE  114*  108   CO2  20  22   GLUCOSE  101*  113*   BUN  10  13   CPKTOTAL  50   --        Impression: Mrs. Crooks is a 52 year old woman who has had progressive leg weakness and spasm/rigidity develop over several years.  Her exam does not fit into a  classic description, however, I think Stiff-person syndrome is the most likely diagnosis at this point especially now that she has extremely elevated anti-cleo antibodies, wood-like stiffness in the right leg, and touch and motion increase the spasms.  Stiff-person syndrome usually has more axial involvement although she does state it started in her proximal legs.     Recommendations:  1. I will continue her doses at 10 mg 3 times a day of both Valium and baclofen, it may take her awhile to develop tolerance to the sedating side effects.  2.  Given the severity of her syndrome I would favor treating her with IVIG as well.  I discussed this with the patient and the daughter and they seem agreeable to this.  I would dose it at 0.4 grams/kg daily for 5 days.  3.  Dr. Frausto takes over the neurology consult service tomorrow.

## 2018-01-07 NOTE — PROGRESS NOTES
Pt stood at bedside with one assist with walker. Pt c/o pain to RLE, rx given per MAR. Pt c/o dry eye to left eye, saline drop applied. Pt denies any SOB/CP, incentive spirometer exercises done at this time. Oral cares provided, bed assignment obtained, pt to transfer to [facility]. Pt and daughter updated on POC.

## 2018-01-07 NOTE — PROGRESS NOTES
Pt transferred to [facility] by wheelchair with transport tech and pt's daughter. All belongings sent with patient.

## 2018-01-08 LAB
C PEPTIDE SERPL-MCNC: 1 NG/ML (ref 0.8–3.5)
GLUCOSE BLD-MCNC: 115 MG/DL (ref 65–99)
GLUCOSE BLD-MCNC: 124 MG/DL (ref 65–99)
GLUCOSE BLD-MCNC: 168 MG/DL (ref 65–99)
GLUCOSE BLD-MCNC: 96 MG/DL (ref 65–99)

## 2018-01-08 PROCEDURE — 700102 HCHG RX REV CODE 250 W/ 637 OVERRIDE(OP): Performed by: STUDENT IN AN ORGANIZED HEALTH CARE EDUCATION/TRAINING PROGRAM

## 2018-01-08 PROCEDURE — G8978 MOBILITY CURRENT STATUS: HCPCS | Mod: CJ

## 2018-01-08 PROCEDURE — A9270 NON-COVERED ITEM OR SERVICE: HCPCS | Performed by: STUDENT IN AN ORGANIZED HEALTH CARE EDUCATION/TRAINING PROGRAM

## 2018-01-08 PROCEDURE — 82962 GLUCOSE BLOOD TEST: CPT | Mod: 91

## 2018-01-08 PROCEDURE — 97162 PT EVAL MOD COMPLEX 30 MIN: CPT

## 2018-01-08 PROCEDURE — 99232 SBSQ HOSP IP/OBS MODERATE 35: CPT | Mod: GC | Performed by: INTERNAL MEDICINE

## 2018-01-08 PROCEDURE — 700111 HCHG RX REV CODE 636 W/ 250 OVERRIDE (IP): Performed by: STUDENT IN AN ORGANIZED HEALTH CARE EDUCATION/TRAINING PROGRAM

## 2018-01-08 PROCEDURE — G8979 MOBILITY GOAL STATUS: HCPCS | Mod: CI

## 2018-01-08 PROCEDURE — 770006 HCHG ROOM/CARE - MED/SURG/GYN SEMI*

## 2018-01-08 PROCEDURE — 700111 HCHG RX REV CODE 636 W/ 250 OVERRIDE (IP): Performed by: PSYCHIATRY & NEUROLOGY

## 2018-01-08 RX ADMIN — DIAZEPAM 10 MG: 5 TABLET ORAL at 13:00

## 2018-01-08 RX ADMIN — FAMOTIDINE 20 MG: 20 TABLET, FILM COATED ORAL at 08:38

## 2018-01-08 RX ADMIN — BACLOFEN 10 MG: 10 TABLET ORAL at 08:37

## 2018-01-08 RX ADMIN — INSULIN HUMAN 3 UNITS: 100 INJECTION, SOLUTION PARENTERAL at 20:02

## 2018-01-08 RX ADMIN — INSULIN HUMAN 2 UNITS: 100 INJECTION, SOLUTION PARENTERAL at 12:56

## 2018-01-08 RX ADMIN — POLYVINYL ALCOHOL 2 DROP: 14 SOLUTION/ DROPS OPHTHALMIC at 17:17

## 2018-01-08 RX ADMIN — GUAIFENESIN 100 MG: 100 SOLUTION ORAL at 02:04

## 2018-01-08 RX ADMIN — INSULIN GLARGINE 5 UNITS: 100 INJECTION, SOLUTION SUBCUTANEOUS at 20:02

## 2018-01-08 RX ADMIN — NAPROXEN 375 MG: 375 TABLET ORAL at 08:38

## 2018-01-08 RX ADMIN — DIAZEPAM 10 MG: 5 TABLET ORAL at 20:01

## 2018-01-08 RX ADMIN — NAPROXEN 375 MG: 375 TABLET ORAL at 19:57

## 2018-01-08 RX ADMIN — BACLOFEN 10 MG: 10 TABLET ORAL at 01:39

## 2018-01-08 RX ADMIN — Medication 100 MG: at 08:38

## 2018-01-08 RX ADMIN — POLYVINYL ALCOHOL 2 DROP: 14 SOLUTION/ DROPS OPHTHALMIC at 08:39

## 2018-01-08 RX ADMIN — ENOXAPARIN SODIUM 40 MG: 100 INJECTION SUBCUTANEOUS at 08:38

## 2018-01-08 RX ADMIN — POLYVINYL ALCOHOL 2 DROP: 14 SOLUTION/ DROPS OPHTHALMIC at 12:59

## 2018-01-08 RX ADMIN — IMMUNE GLOBULIN 20 G: 100 SOLUTION INTRAVENOUS at 08:37

## 2018-01-08 RX ADMIN — FAMOTIDINE 20 MG: 20 TABLET, FILM COATED ORAL at 20:00

## 2018-01-08 RX ADMIN — DIAZEPAM 10 MG: 5 TABLET ORAL at 05:44

## 2018-01-08 RX ADMIN — BACLOFEN 10 MG: 10 TABLET ORAL at 17:17

## 2018-01-08 ASSESSMENT — PAIN SCALES - GENERAL
PAINLEVEL_OUTOF10: 0
PAINLEVEL_OUTOF10: 4

## 2018-01-08 ASSESSMENT — COGNITIVE AND FUNCTIONAL STATUS - GENERAL
MOBILITY SCORE: 15
MOVING TO AND FROM BED TO CHAIR: A LOT
MOVING FROM LYING ON BACK TO SITTING ON SIDE OF FLAT BED: A LITTLE
CLIMB 3 TO 5 STEPS WITH RAILING: A LOT
TURNING FROM BACK TO SIDE WHILE IN FLAT BAD: A LITTLE
SUGGESTED CMS G CODE MODIFIER MOBILITY: CK
STANDING UP FROM CHAIR USING ARMS: A LOT
WALKING IN HOSPITAL ROOM: A LITTLE

## 2018-01-08 ASSESSMENT — ENCOUNTER SYMPTOMS
DIZZINESS: 0
CONSTIPATION: 0
ABDOMINAL PAIN: 0
BRUISES/BLEEDS EASILY: 0
FEVER: 0
NAUSEA: 0
VOMITING: 0
COUGH: 0
DEPRESSION: 0
BLURRED VISION: 0
SORE THROAT: 0
CHILLS: 0

## 2018-01-08 ASSESSMENT — GAIT ASSESSMENTS
ASSISTIVE DEVICE: FRONT WHEEL WALKER
DISTANCE (FEET): 30
GAIT LEVEL OF ASSIST: CONTACT GUARD ASSIST

## 2018-01-08 NOTE — NON-PROVIDER
Internal Medicine Interval Note  Note Author: Michael V. Padua, Student     Name Linden-Depintor, Vicky Ca     1965   Age/Sex 52 y.o. female   MRN [id number]   Code Status FULL     After 5PM or if no immediate response to page, please call for cross-coverage  Attending/Team: Bahman/Constantino See Patient List for primary contact information  Call [phone] to page    1st Call - Day Intern (R1):   Mark  2nd Call - Day Sr. 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  Patient states her pain and stiffness has improved.  Patient is able to flex her right leg at the hip and knee, and is now able to ambulate.  She states her pain is still at about 6/10.  She is tolerating IVIG treatment and denies any adverse reactions to the medication.  Patient is having regular BMs and is sleeping well through the night.      ROS  Constitutional: Negative for chills, diaphoresis and fever.   HENT: Negative for congestion and sore throat.    Eyes: Negative for blurred vision, pain and redness.   Respiratory: Negative for cough and shortness of breath.    Cardiovascular: Negative for chest pain, palpitations and leg swelling.   Gastrointestinal: Negative for abdominal pain, constipation, diarrhea, nausea and vomiting.   Genitourinary: Negative for dysuria, frequency, hematuria and urgency.   Musculoskeletal: Positive for joint pain (right leg pain) and myalgias.  Symptoms are improving.   Neurological: Positive for focal weakness (lower extremities (R>L)). Negative for dizziness, sensory change, weakness and headaches.    Consultants/Specialty  Neurology    Disposition  Inpatient for IVIG treatment     Quality Measures    Reviewed items::  Labs reviewed  Olguin catheter::  No Olguin  DVT prophylaxis  pharmacological::  Enoxaparin (Lovenox)  DVT prophylaxis - mechanical:  SCDs  Ulcer Prophylaxis::  No          Physical Exam       Vitals:    01/07/18 1700 01/07/18 1900 01/08/18 0218 01/08/18 0727   BP: 102/61 112/67 100/65 (!) 96/66   Pulse: 69 (!) 55 65 63   Resp: 16 18 14 14   Temp: 36.4 °C (97.5 °F) 36.8 °C (98.2 °F) 36.4 °C (97.5 °F) 36.4 °C (97.6 °F)   SpO2: 98% 94% 95% 97%   Weight:       Height:         Body mass index is 20.8 kg/m².    Oxygen Therapy:  Pulse Oximetry: 97 %, O2 (LPM): 0, O2 Delivery: None (Room Air)    Physical Exam  Constitutional: She is oriented to person, place, and time and well-developed, well-nourished, and in no distress.   HENT:   Head: Normocephalic and atraumatic.   Mouth/Throat: No oropharyngeal exudate.   Eyes: EOM are normal.  Right eye exhibits no discharge. Left eye exhibits no discharge. No scleral icterus.   Neck: Normal range of motion. Neck supple. No tracheal deviation present. No thyromegaly present.   Cardiovascular: Normal rate, regular rhythm and normal heart sounds.  Exam reveals no gallop and no friction rub.    No murmur heard.  Pulmonary/Chest: Effort normal and breath sounds normal. No respiratory distress. She has no wheezes. She has no rales.   Abdominal: Soft. Bowel sounds are normal. She exhibits no distension and no mass. There is no tenderness. There is no rebound and no guarding.   Musculoskeletal: She exhibits no edema.   Right leg: dystonia and pain markedly improved - patient able to flex right leg at the hip, knee and move her toes. Strength 4/5.  Left leg: unchanged - patient able to flex left leg and move toes.  Strength 4/5.     Lymphadenopathy:   She has no cervical adenopathy.   Neurological: She is alert and oriented to person, place, and time. No cranial nerve deficit. Patellar reflex 2+ bilaterally.   Right leg: tone is improved - decreasing in tone.  Left leg: normal tone.    Skin: Skin is warm and dry. No rash noted. She is not diaphoretic.  No erythema.     Lab Data Review:         1/8/2018  1:30 PM    Recent Labs      01/06/18 0320  01/07/18   0304   SODIUM  144  141   POTASSIUM  4.4  3.7   CHLORIDE  114*  108   CO2  20  22   BUN  10  13   CREATININE  0.53  0.39*   MAGNESIUM  1.9  1.7   CALCIUM  8.9  9.3       Recent Labs      01/06/18 0320  01/07/18   0304   ALTSGPT  31  27   ASTSGOT  12  12   ALKPHOSPHAT  100*  100*   TBILIRUBIN  0.5  0.4   GLUCOSE  101*  113*       Recent Labs      01/07/18   0305   RBC  3.96*   HEMOGLOBIN  12.5   HEMATOCRIT  36.3*   PLATELETCT  343       Recent Labs      01/06/18 0320 01/07/18   0304  01/07/18   0305   WBC   --    --   7.8   NEUTSPOLYS   --    --   65.70   LYMPHOCYTES   --    --   26.30   MONOCYTES   --    --   5.30   EOSINOPHILS   --    --   1.80   BASOPHILS   --    --   0.60   ASTSGOT  12  12   --    ALTSGPT  31  27   --    ALKPHOSPHAT  100*  100*   --    TBILIRUBIN  0.5  0.4   --            Assessment/Plan   #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  Patient is able to flex her right leg at the knee and hip.    Anti-JACKIE: 250 supports dx of stiff person syndrome  Continue diazepam 10 mg and baclofen 10 mg tid.   Continue IVIG 0.4 g/kg for a total of 5 days    Will continue with neuro's recommendation.      Symptoms began 3 years ago and was localized to her right hip.  Pain has been intermittent, but has progressively worsened within the past year.    Dystonia associated with pain began about a year ago  Patient was hospitalized previously for the same symptoms  Previous inpatient and outpatient neurologic workup was negative.    Records from October 2017 at [facility]: MRI showed L5-S1 broad based disk protrusion with no central canal stenosis, as well as a 1 cm lesion in L1 vertebral body which is believed to be a hemangioma.  Patient was given gabapentin, baclofen, dilaudid, and morphine for  pain control  Patellar reflex: 2/4 on left, none on right due to contraction.   Contraction of right leg from hip to toe, and left leg below the knee.   Negative Babinski bilaterally.  CPK of 231   Sed Rate of 41  CRP of 2.04  D-Dimer of 1135 - duplex scan negative for DVT     Plan:   Follow neurology's recommendation    Continue diazepam 10 mg and baclofen 10 mg tid  Continue IVIG - monitor for any adverse reactions to IVIG  Consider PT/OT - encourage patient to walk      #Vitamin D Deficiency  Vitamin D @ 9 - given 95782 units of Vitamin D  Plan: CTM    #Constipation - resolved  KUB showed mild increase of small bowel and colonic gas and degenerative disck change of the lumbar.   Patient having regular BMs  Patient denies abdominal pain, nausea, vomiting  Polypharmacy may be contributing to constipation     Plan:  Bladder scan   Continue lactulose to achieve 2 BMs/day     #DM2 - poorly controlled  POC glucose checks:   HbA1C: 8.3%  Anion gap of 13.0     Plan:   Continue home medications, sliding scale insulin, hypoglycemia protocol  Continue gabapentin     #Leukocytosis - resolved  (1/4/18) WBC of 10.4   (1/3/18) WBC of 14.9   Remains afebrile - unlikely infectious   Hemodynamically stable

## 2018-01-08 NOTE — THERAPY
"Physical Therapy Evaluation completed.   Bed Mobility:     Transfers: Sit to Stand: Minimal Assist  Gait: Level Of Assist: Contact Guard Assist with Front-Wheel Walker       Plan of Care: Will benefit from Physical Therapy 2 times per week  Discharge Recommendations: Equipment: Will Continue to Assess for Equipment Needs. Post-acute therapy Discharge to a transitional care facility for continued skilled therapy services. and Discharge to home with outpatient or home health for additional skilled therapy services.    See \"Rehab Therapy-Acute\" Patient Summary Report for complete documentation.     Pt. presented to PT for primary risk reduction for LOB/falling. Pt. presented with imparied balance, impaired gait, and decreased activity tolerance. Pt. was able to demonstrate Mod A for bed mobility, and CGA for ambulation w/FWW. Pt. presents with baseline equinovarus gait pattern with decreased heel strike, and decreased julianne. Dtr reports pt. is near baseline with gait mechanics at this time. Pt. educated on stretching in bed and during standing against wall. Pt. will benefit from continued skilled PT while in house. Anticipate pt. to d/c home given she is provided the social support from her dtr and family. However, if pt. is unable to recieve social support will recommend post acute rehab prior to medical d/c to home given current objective findings, co-morbidites, age, PLOF, enviornmental barries and limited social support.   "

## 2018-01-08 NOTE — PROGRESS NOTES
Assessment completed. Pt reports cramping discomfort in leg worse on R side, medicated with scheduled baclofen and naproxen. Daughter at bedside translates for pt, pt does understand and speak a little English but mostly relies on translation services for education and details on POC/treatments. She is calm, cooperative and pleasant, A&Ox4, no s/s acute distress noted. Tolerating IG infusions well. Replaced IV, previous was infiltrated, pt was anxious about new insertion due to poor venous access and many needle sticks for blood and IV access. Reassured pt and explained procedure, daughter at bedside comforted pt. Tolerated well. Bed low with call light in reach. Pt is reporting improved mobility and strength, got up to BSC with 1 person and then ambulated with PT with SBA and FWW. Bed low with call light in reach. BA in use for safety monitoring. Family usually at bedside.

## 2018-01-08 NOTE — CARE PLAN
Problem: Safety  Goal: Will remain free from falls  Bed alarm on, fall precautions in place, hourly rounding in place     Problem: Pain Management  Goal: Pain level will decrease to patient's comfort goal  Assess patients pain, reposition as needed, offer as much un disturbed rest as possible

## 2018-01-08 NOTE — CARE PLAN
Problem: Knowledge Deficit  Goal: Knowledge of disease process/condition, treatment plan, diagnostic tests, and medications will improve    Intervention: Explain information regarding disease process/condition, treatment plan, diagnostic tests, and medications and document in education  Educated pt on POC for the day, medications, treatments, and prescribed regimens by MD. Verbalizes understanding and is compliant with POC.      Problem: Pain Management  Goal: Pain level will decrease to patient's comfort goal    Intervention: Follow pain managment plan developed in collaboration with patient and Interdisciplinary Team  Medicated for pain with baclofen and scheduled naproxen. Pt reports improved comfort with this regimen.

## 2018-01-08 NOTE — PROGRESS NOTES
Internal Medicine Interval Note  Note Author: Paola Parish M.D.     Name Linden-Depintor, Vicky Ca     1965   Age/Sex 52 y.o. female   MRN [id number]   Code Status Full      After 5PM or if no immediate response to page, please call for cross-coverage  Attending/Team: Dr. Ruggiero / Constantino  See Patient List for primary contact information  Call [phone] to page    1st Call - Day Intern (R1):   Dr. Parish  2nd Call - Day Sr. Resident (R2/R3):   Dr. 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  Elevated anti-JACKIE indicating likely stiff person syndrome. Per neuro, pt started on IV IG.  Vitamin D low, started on vitamin D.      Review of Systems   Constitutional: Negative for chills and fever.   HENT: Negative for sore throat.    Eyes: Negative for blurred vision.   Respiratory: Negative for cough.    Cardiovascular: Negative for chest pain.   Gastrointestinal: Negative for abdominal pain, constipation, nausea and vomiting.   Genitourinary: Negative for dysuria.   Musculoskeletal:        Pain of right LE muscle improving compare to yesterday.    Skin: Negative for rash.   Neurological: Negative for dizziness.   Endo/Heme/Allergies: Does not bruise/bleed easily.   Psychiatric/Behavioral: Negative for depression.       Consultants/Specialty  Neuro - Dr. Dover       Disposition  Inpatient       Quality Measures    Reviewed items::  Labs reviewed and Medications reviewed  Olguin catheter::  No Olguin  DVT prophylaxis pharmacological::  Enoxaparin (Lovenox)  Ulcer Prophylaxis::  Not indicated          Physical Exam       Vitals:    18 1925 18 2116 18 0500 18 0710   BP: 106/70 107/71 106/69 113/68   Pulse: 69 67 69 76   Resp:  16   Temp: 36.5 °C (97.7 °F)  36.8 °C (98.3 °F) 36.7 °C (98 °F) 36.1 °C (96.9 °F)   SpO2: 97% 97% 96% 98%   Weight:       Height:         Body mass index is 20.8 kg/m².    Oxygen Therapy:  Pulse Oximetry: 98 %, O2 (LPM): 0, O2 Delivery: None (Room Air)    Physical Exam   Constitutional: She is oriented to person, place, and time. No distress.   HENT:   Head: Normocephalic and atraumatic.   Eyes: EOM are normal. Pupils are equal, round, and reactive to light.   Neck: Normal range of motion. Neck supple.   Cardiovascular: Normal rate and regular rhythm.  Exam reveals no gallop and no friction rub.    No murmur heard.  Pulmonary/Chest: Effort normal and breath sounds normal. No respiratory distress. She has no wheezes.   Abdominal: Soft. Bowel sounds are normal. She exhibits no distension. There is no tenderness. There is no rebound and no guarding.   Musculoskeletal: She exhibits no edema.   Restricted ROM of right LE but significantly improve compared to yesterday. Hypertonia improving.    Neurological: She is alert and oriented to person, place, and time.   Skin: She is not diaphoretic.   Nursing note and vitals reviewed.        Lab Data Review:       Recent Labs      01/06/18   0320  01/07/18   0304   SODIUM  144  141   POTASSIUM  4.4  3.7   CHLORIDE  114*  108   CO2  20  22   BUN  10  13   CREATININE  0.53  0.39*   MAGNESIUM  1.9  1.7   CALCIUM  8.9  9.3       Recent Labs      01/06/18   0320  01/07/18   0304   ALTSGPT  31  27   ASTSGOT  12  12   ALKPHOSPHAT  100*  100*   TBILIRUBIN  0.5  0.4   GLUCOSE  101*  113*       Recent Labs      01/07/18   0305   RBC  3.96*   HEMOGLOBIN  12.5   HEMATOCRIT  36.3*   PLATELETCT  343       Recent Labs      01/06/18   0320  01/07/18   0304  01/07/18   0305   WBC   --    --   7.8   NEUTSPOLYS   --    --   65.70   LYMPHOCYTES   --    --   26.30   MONOCYTES   --    --   5.30   EOSINOPHILS   --    --   1.80   BASOPHILS   --    --   0.60   ASTSGOT  12  12   --    ALTSGPT  31  27   --    ALKPHOSPHAT  100*  100*    --    TBILIRUBIN  0.5  0.4   --            Assessment/Plan     * Likely 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 Symptoms have been intermittent for the past 3 years and has been this severe in the past. Has had extensive neurologic workup at outside facility  - Dystonia is in the right and left legs and feet, R>L.   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 Also quick response to valium & baclofen.   - TSH, free T4, Ferrtin normal  - CRP 2.04, B12>1500.  - continue valium 10 mg tid and baclofen 10 mg tid.  - started on IV IG per neuro recs  - follow neuro recom.   - re-consult PT/OT.         Right leg pain- (present on admission)   Assessment & Plan    - from dystonia, intermittent.   - responding to valium & baclofen.   - continue same treatment.         Leukocytosis- (present on admission)   Assessment & Plan    - resolved  - on admission WBC of 14.9, no bands. Afebrile, hemodynamically stable, nontoxic appearing.   - Very dehydrated could be secondary to concentration vs reactive to pain.         Hypomagnesemia- (present on admission)   Assessment & Plan    - s/p repletion with IV magnesium sulfate.   - repeat labs and replete as needed.         Type 2 diabetes mellitus untreated, with ketoacidosis - (present on admission)   Assessment & Plan    - Continue home medications, sliding scale insulin, hypoglycemia protocol  - HbA1c 8.3%.  - c-peptide ordered to check for GUERRERO.

## 2018-01-08 NOTE — PROGRESS NOTES
Neurology Progress Note        Subjective:  Doing better today with less spasm in her feet. She walked with physical therapy.      Objective:    Vitals:    01/07/18 1700 01/07/18 1900 01/08/18 0218 01/08/18 0727   BP: 102/61 112/67 100/65 (!) 96/66   Pulse: 69 (!) 55 65 63   Resp: 16 18 14 14   Temp: 36.4 °C (97.5 °F) 36.8 °C (98.2 °F) 36.4 °C (97.5 °F) 36.4 °C (97.6 °F)   SpO2: 98% 94% 95% 97%   Weight:       Height:            GENERAL:Asleep but arousable  MENTAL STATUS:  Awake, alert, oriented times 3.  Speech is fluent, comprehension is intact.    CRANIAL NERVES:  PERRL, EOMI with no nystagmus, face is symmetric  MOTOR:  5/5 in the upper extremities.  The right lower extremity is extremely rigid, was able to get some more passive flexion at the knee today compared to yesterday, the foot is does have some passive movement today as well but sudden movements will trigger a spasm. The toes are also essentially fixed by the rigidity.  The left foot is similar but able to get some passive range of motion, moving the foot seems to trigger more of a spasm.  There is movement in the proximal left leg and her left knee is able to be passively flexed.  Achilles are very tight bilaterally preventing foot dorsiflexion.  GAIT:  Deferred    Recent Labs      01/07/18   0305   WBC  7.8   RBC  3.96*   HEMOGLOBIN  12.5   HEMATOCRIT  36.3*   MCV  91.7   MCH  31.6   RDW  41.5   PLATELETCT  343   MPV  11.6   NEUTSPOLYS  65.70   LYMPHOCYTES  26.30   MONOCYTES  5.30   EOSINOPHILS  1.80   BASOPHILS  0.60     Recent Labs      01/06/18   0320  01/07/18   0304   SODIUM  144  141   POTASSIUM  4.4  3.7   CHLORIDE  114*  108   CO2  20  22   GLUCOSE  101*  113*   BUN  10  13   CPKTOTAL  50   --        Impression/Plan:  52 year old woman who has had progressive leg weakness and spasm/rigidity, possible, Stiff-person syndrome especially with  elevated anti-cleo antibodies, wood-like stiffness in the right leg, and touch and motion increase the  spasms.  Stiff-person syndrome usually has more axial involvement although she does state it started in her proximal legs.   Continue with both Valium and baclofen, it may take her awhile to develop tolerance to the sedating side effects.  Continue IVIG, 0.4 g per KG for 5 days.

## 2018-01-09 LAB
GLUCOSE BLD-MCNC: 104 MG/DL (ref 65–99)
GLUCOSE BLD-MCNC: 134 MG/DL (ref 65–99)
GLUCOSE BLD-MCNC: 141 MG/DL (ref 65–99)
GLUCOSE BLD-MCNC: 221 MG/DL (ref 65–99)

## 2018-01-09 PROCEDURE — 770006 HCHG ROOM/CARE - MED/SURG/GYN SEMI*

## 2018-01-09 PROCEDURE — A9270 NON-COVERED ITEM OR SERVICE: HCPCS | Performed by: STUDENT IN AN ORGANIZED HEALTH CARE EDUCATION/TRAINING PROGRAM

## 2018-01-09 PROCEDURE — 700102 HCHG RX REV CODE 250 W/ 637 OVERRIDE(OP): Performed by: STUDENT IN AN ORGANIZED HEALTH CARE EDUCATION/TRAINING PROGRAM

## 2018-01-09 PROCEDURE — 99232 SBSQ HOSP IP/OBS MODERATE 35: CPT | Mod: GC | Performed by: INTERNAL MEDICINE

## 2018-01-09 PROCEDURE — 700111 HCHG RX REV CODE 636 W/ 250 OVERRIDE (IP): Performed by: PSYCHIATRY & NEUROLOGY

## 2018-01-09 PROCEDURE — G8989 SELF CARE D/C STATUS: HCPCS | Mod: CJ

## 2018-01-09 PROCEDURE — G8987 SELF CARE CURRENT STATUS: HCPCS | Mod: CJ

## 2018-01-09 PROCEDURE — 82962 GLUCOSE BLOOD TEST: CPT | Mod: 91

## 2018-01-09 PROCEDURE — G8988 SELF CARE GOAL STATUS: HCPCS | Mod: CJ

## 2018-01-09 PROCEDURE — 97165 OT EVAL LOW COMPLEX 30 MIN: CPT

## 2018-01-09 PROCEDURE — 700111 HCHG RX REV CODE 636 W/ 250 OVERRIDE (IP): Performed by: STUDENT IN AN ORGANIZED HEALTH CARE EDUCATION/TRAINING PROGRAM

## 2018-01-09 RX ADMIN — INSULIN GLARGINE 5 UNITS: 100 INJECTION, SOLUTION SUBCUTANEOUS at 20:53

## 2018-01-09 RX ADMIN — POLYVINYL ALCOHOL 2 DROP: 14 SOLUTION/ DROPS OPHTHALMIC at 13:39

## 2018-01-09 RX ADMIN — NAPROXEN 375 MG: 375 TABLET ORAL at 20:53

## 2018-01-09 RX ADMIN — BACLOFEN 10 MG: 10 TABLET ORAL at 02:00

## 2018-01-09 RX ADMIN — NAPROXEN 375 MG: 375 TABLET ORAL at 08:38

## 2018-01-09 RX ADMIN — FAMOTIDINE 20 MG: 20 TABLET, FILM COATED ORAL at 20:53

## 2018-01-09 RX ADMIN — POLYVINYL ALCOHOL 2 DROP: 14 SOLUTION/ DROPS OPHTHALMIC at 20:52

## 2018-01-09 RX ADMIN — POLYVINYL ALCOHOL 2 DROP: 14 SOLUTION/ DROPS OPHTHALMIC at 17:24

## 2018-01-09 RX ADMIN — BACLOFEN 10 MG: 10 TABLET ORAL at 08:46

## 2018-01-09 RX ADMIN — IMMUNE GLOBULIN 20 G: 100 SOLUTION INTRAVENOUS at 08:37

## 2018-01-09 RX ADMIN — GUAIFENESIN 100 MG: 100 SOLUTION ORAL at 07:18

## 2018-01-09 RX ADMIN — FAMOTIDINE 20 MG: 20 TABLET, FILM COATED ORAL at 08:38

## 2018-01-09 RX ADMIN — ENOXAPARIN SODIUM 40 MG: 100 INJECTION SUBCUTANEOUS at 08:38

## 2018-01-09 RX ADMIN — Medication 100 MG: at 08:39

## 2018-01-09 RX ADMIN — DIAZEPAM 10 MG: 5 TABLET ORAL at 04:55

## 2018-01-09 RX ADMIN — DIAZEPAM 10 MG: 5 TABLET ORAL at 20:52

## 2018-01-09 RX ADMIN — POLYVINYL ALCOHOL 2 DROP: 14 SOLUTION/ DROPS OPHTHALMIC at 08:45

## 2018-01-09 RX ADMIN — BACLOFEN 10 MG: 10 TABLET ORAL at 17:25

## 2018-01-09 RX ADMIN — DIAZEPAM 10 MG: 5 TABLET ORAL at 13:39

## 2018-01-09 ASSESSMENT — ENCOUNTER SYMPTOMS
SORE THROAT: 1
DIZZINESS: 0
EYE DISCHARGE: 0
ABDOMINAL PAIN: 0
HEARTBURN: 0
HEADACHES: 0
BLURRED VISION: 0
DIARRHEA: 0
WEIGHT LOSS: 0
SHORTNESS OF BREATH: 0
EYE REDNESS: 0
SORE THROAT: 0
VOMITING: 0
BRUISES/BLEEDS EASILY: 0
EYE PAIN: 0
WEAKNESS: 0
CHILLS: 0
FEVER: 0
PALPITATIONS: 0
DEPRESSION: 0
NAUSEA: 1
TINGLING: 1
DIAPHORESIS: 0
HEMOPTYSIS: 0
NAUSEA: 0
COUGH: 0
COUGH: 1
CONSTIPATION: 0

## 2018-01-09 ASSESSMENT — COGNITIVE AND FUNCTIONAL STATUS - GENERAL
DAILY ACTIVITIY SCORE: 19
PERSONAL GROOMING: A LITTLE
HELP NEEDED FOR BATHING: A LITTLE
TOILETING: A LITTLE
SUGGESTED CMS G CODE MODIFIER DAILY ACTIVITY: CK
DRESSING REGULAR UPPER BODY CLOTHING: A LITTLE
DRESSING REGULAR LOWER BODY CLOTHING: A LITTLE

## 2018-01-09 ASSESSMENT — ACTIVITIES OF DAILY LIVING (ADL): TOILETING: REQUIRES ASSIST

## 2018-01-09 ASSESSMENT — PAIN SCALES - GENERAL
PAINLEVEL_OUTOF10: 0
PAINLEVEL_OUTOF10: 1

## 2018-01-09 NOTE — THERAPY
"Occupational Therapy Evaluation completed.   Functional Status:  CGA/SBA with ADLs and txfs, per DTR pt currently doing better then her baseline.  Plan of Care: Patient with no further skilled OT needs in the acute care setting at this time  Discharge Recommendations: Post-acute therapy Currently anticipate no further skilled therapy needs once patient is discharged from the inpatient setting.    See \"Rehab Therapy-Acute\" Patient Summary Report for complete documentation.    "

## 2018-01-09 NOTE — FACE TO FACE
Face to Face Supporting Documentation - Home Health    The encounter with this patient was in whole or in part the primary reason for home health admission.    Date of encounter:   Patient:                    MRN:                       YOB: 2017  Vicky Cheatham-Depintor  [id number]  1965     Home health to see patient for:  Home health aide    Skilled need for:  Exacerbation of Chronic Disease State Stiff Persons Syndrome    Skilled nursing interventions to include:  Comment: Mobility, Pain Managment    Homebound status evidenced by:  Need the aid of supportive devices such as crutches, canes, wheelchairs or walkers, Require the use of special transportation or Needs the assistance of another person in order to leave the home. Leaving home requires a considerable and taxing effort. There is a normal inability to leave the home.    Community Physician to provide follow up care: Pcp Unknown     Optional Interventions? No      I certify the face to face encounter for this home health care referral meets the CMS requirements and the encounter/clinical assessment with the patient was, in whole, or in part, for the medical condition(s) listed above, which is the primary reason for home health care. Based on my clinical findings: the service(s) are medically necessary, support the need for home health care, and the homebound criteria are met.  I certify that this patient has had a face to face encounter by myself.  Dash Flores M.D. - RAMÓNI: [phone]

## 2018-01-09 NOTE — CARE PLAN
Problem: Safety  Goal: Will remain free from injury    Intervention: Provide assistance with mobility  Pt educated on use of call light. Daughter at bedside. Alarms in place.       Problem: Mobility  Goal: Risk for activity intolerance will decrease    Intervention: Encourage patient to increase activity level in collaboration with Interdisciplinary Team  Pt assisted to the bathroom. Pt ambulated from bed to bathroom and back to bed. Alarms in place. Pt participating with therapy.

## 2018-01-09 NOTE — DISCHARGE PLANNING
Clinical Note per Chart Review:     DX: Stiff person syndrome     IVIG per Neurology- Anticipated to remain inpatient until IVIG completed (01/11)    Dc Plan: PT/OT evals- PT Anticipates pt to d/c home given she is provided the social support from her dtr and family. OT-Pt with no further skilled OT needs in the acute care setting at this time and anticipate no further skilled therapy needs once patient is discharged from the inpatient setting. Anticipate dc to home. SW to continue with DC Planning needs as appropriate.

## 2018-01-09 NOTE — NON-PROVIDER
Internal Medicine Interval Note  Note Author: Michael V. Padua, Student     Name Linden-Depintor, Vicky Ca     1965   Age/Sex 52 y.o. female   MRN [id number]   Code Status FULL     After 5PM or if no immediate response to page, please call for cross-coverage  Attending/Team: Bahman/Constantino See Patient List for primary contact information  Call [phone] to page    1st Call - Day Intern (R1):   Mark 2nd Call - Day Sr. 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  She states her right leg pain is currently at 2/10 and that she has greater flexibility of her right leg at the knee.  She is ambulating and working with PT.  She was complaining of cough, throat irritation, and nausea yesterday afternoon, but states that it is improved with robutissin.  She is complaining of burning/tingling sensation on her dorsal right foot.  Patient is tolerating IVIG treatment and denies any adverse reactions to IVIG.  Patient is having regular BMs and denies any abdominal pain. Will continue baclofen and diazepam.  POC glucose at 104-227.    Review of Systems   Constitutional: Negative for chills, diaphoresis, fever, malaise/fatigue and weight loss.   HENT: Positive for sore throat.    Eyes: Negative for blurred vision, pain, discharge and redness.   Respiratory: Positive for cough. Negative for hemoptysis and shortness of breath.    Cardiovascular: Negative for chest pain, palpitations and leg swelling.   Gastrointestinal: Positive for nausea. Negative for abdominal pain, constipation, diarrhea, heartburn and vomiting.   Genitourinary: Negative for dysuria, frequency and urgency.   Musculoskeletal: Positive for joint pain.   Skin: Negative for itching and rash.   Neurological: Positive for tingling (right foot). Negative for  dizziness, weakness and headaches.       Consultants/Specialty  Neurology    Disposition  Inpatient for IVIG treatment x 5 days    Quality Measures    Reviewed items::  Labs reviewed and Medications reviewed  Olguin catheter::  No Olguin  DVT prophylaxis pharmacological::  Enoxaparin (Lovenox)  DVT prophylaxis - mechanical:  SCDs  Ulcer Prophylaxis::  No          Physical Exam       Vitals:    01/08/18 1600 01/08/18 2000 01/09/18 0400 01/09/18 0740   BP: (!) 94/65 111/72 (!) 93/60 136/72   Pulse: 80 81 72 64   Resp: 15 17 16 16   Temp: 36.3 °C (97.4 °F) 36.5 °C (97.7 °F) 36.3 °C (97.4 °F) 36.6 °C (97.9 °F)   SpO2: 96% 97% 97% 95%   Weight:       Height:         Body mass index is 20.8 kg/m².    Oxygen Therapy:  Pulse Oximetry: 95 %, O2 (LPM): 0, O2 Delivery: None (Room Air)    Physical Exam   Constitutional: She is oriented to person, place, and time. No distress.   HENT:   Head: Normocephalic and atraumatic.   Eyes: Conjunctivae are normal. Pupils are equal, round, and reactive to light. Right eye exhibits no discharge. Left eye exhibits no discharge. No scleral icterus.   Neck: Normal range of motion.   Cardiovascular: Normal rate, regular rhythm and normal heart sounds.  Exam reveals no gallop and no friction rub.    No murmur heard.  Pulmonary/Chest: Effort normal and breath sounds normal. No respiratory distress. She has no wheezes. She has no rales.   Abdominal: Soft. Bowel sounds are normal. She exhibits no distension. There is no tenderness.   Musculoskeletal:   Improved from previous- upper extremity 5/5 strength.  RLE 4/5 strength on flexion, 3/5 strength on extension. LLE 5/5 strength.  LLE full range of motion.  RLE limited range of motion.   Lymphadenopathy:     She has no cervical adenopathy.   Neurological: She is alert and oriented to person, place, and time. No cranial nerve deficit.   2+ patellar reflex bilaterally.  Negative babinski. Sensations intact.   Skin: Skin is warm and dry. She is not  diaphoretic. No erythema.         Lab Data Review:         1/9/2018  1:08 PM    Recent Labs      01/07/18   0304   SODIUM  141   POTASSIUM  3.7   CHLORIDE  108   CO2  22   BUN  13   CREATININE  0.39*   MAGNESIUM  1.7   CALCIUM  9.3       Recent Labs      01/07/18   0304   ALTSGPT  27   ASTSGOT  12   ALKPHOSPHAT  100*   TBILIRUBIN  0.4   GLUCOSE  113*       Recent Labs      01/07/18   0305   RBC  3.96*   HEMOGLOBIN  12.5   HEMATOCRIT  36.3*   PLATELETCT  343       Recent Labs      01/07/18   0304  01/07/18   0305   WBC   --   7.8   NEUTSPOLYS   --   65.70   LYMPHOCYTES   --   26.30   MONOCYTES   --   5.30   EOSINOPHILS   --   1.80   BASOPHILS   --   0.60   ASTSGOT  12   --    ALTSGPT  27   --    ALKPHOSPHAT  100*   --    TBILIRUBIN  0.4   --            Assessment/Plan   #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  Patient is able to flex her right leg at the knee and hip.    Anti-JACKIE: 250 supports dx of stiff person syndrome  Continue diazepam 10 mg and baclofen 10 mg tid.   Continue IVIG 0.4 g/kg for a total of 5 days    Will continue with neuro's recommendation.      Symptoms began 3 years ago and was localized to her right hip.  Pain has been intermittent, but has progressively worsened within the past year.    Dystonia associated with pain began about a year ago  Patient was hospitalized previously for the same symptoms  Previous inpatient and outpatient neurologic workup was negative.    Records from October 2017 at [facility]: MRI showed L5-S1 broad based disk protrusion with no central canal stenosis, as well as a 1 cm lesion in L1 vertebral body which is believed to be a hemangioma.  Patient was given gabapentin, baclofen, dilaudid, and morphine for pain control  Patellar reflex: 2/4 on left, none on right due to contraction.   Contraction of right leg from hip to toe, and left leg below the knee.      Negative Babinski bilaterally.  CPK of 231   Sed Rate of 41  CRP of 2.04  D-Dimer of 1135 - duplex scan negative for DVT     Plan:   Follow neurology's recommendation    Continue diazepam 10 mg and baclofen 10 mg tid  Continue IVIG - on day #2 - continue to monitor for any adverse reactions to IVIG  Consider PT/OT - encourage patient to walk      #Vitamin D Deficiency  Vitamin D @ 9 - given 03123 units of Vitamin D  Plan: CTM     #Constipation - resolved  (1/5/2018) KUB showed mild increase of small bowel and colonic gas and degenerative disck change of the lumbar.   Patient having regular BMs  Patient denies abdominal pain, vomiting  Polypharmacy may be contributing to constipation     Plan:  Bladder scan   Continue lactulose to achieve 2 BMs/day     #DM2 - poorly controlled  POC glucose checks elevated  HbA1C: 8.3%     Plan:   Continue home medications, sliding scale insulin, hypoglycemia protocol  Continue gabapentin     #Leukocytosis - resolved  (1/4/18) WBC of 10.4   (1/3/18) WBC of 14.9   Remains afebrile - unlikely infectious   Hemodynamically stable

## 2018-01-09 NOTE — PROGRESS NOTES
Pt aox4. Ghanaian speaking only. PEREZ with generalized weakness. Right leg 03/5 strength. Ambulated with PT this am with FWW. Denies SOB or pain. Daughter at bedside. Call light within reach.

## 2018-01-09 NOTE — CARE PLAN
Problem: Bowel/Gastric:  Goal: Normal bowel function is maintained or improved  Frequent toileting offered. Bed pan offered     Problem: Skin Integrity  Goal: Risk for impaired skin integrity will decrease  Encourage frequent changes in body position to reduce risk of pressure ulcer.

## 2018-01-09 NOTE — PROGRESS NOTES
Neurology Progress Note        Subjective:  No new complaints, she feels much better. Day 3/5 of IVIG.      Objective:    Vitals:    01/08/18 1600 01/08/18 2000 01/09/18 0400 01/09/18 0740   BP: (!) 94/65 111/72 (!) 93/60 136/72   Pulse: 80 81 72 64   Resp: 15 17 16 16   Temp: 36.3 °C (97.4 °F) 36.5 °C (97.7 °F) 36.3 °C (97.4 °F) 36.6 °C (97.9 °F)   SpO2: 96% 97% 97% 95%   Weight:       Height:            GENERAL:Asleep but arousable  MENTAL STATUS:  Awake, alert, oriented times 3.  Speech is fluent, comprehension is intact.    CRANIAL NERVES:  PERRL, EOMI with no nystagmus, face is symmetric  MOTOR:  5/5 in the upper extremities.  The right lower extremity is extremely rigid, was able to get some more passive flexion at the knee today compared to yesterday, the foot is does have some passive movement today as well but sudden movements will trigger a spasm. The toes are also essentially fixed by the rigidity.  The left foot is similar but able to get some passive range of motion, moving the foot seems to trigger more of a spasm.  There is movement in the proximal left leg and her left knee is able to be passively flexed.  Achilles are very tight bilaterally preventing foot dorsiflexion.  GAIT:  Deferred    Recent Labs      01/07/18   0305   WBC  7.8   RBC  3.96*   HEMOGLOBIN  12.5   HEMATOCRIT  36.3*   MCV  91.7   MCH  31.6   RDW  41.5   PLATELETCT  343   MPV  11.6   NEUTSPOLYS  65.70   LYMPHOCYTES  26.30   MONOCYTES  5.30   EOSINOPHILS  1.80   BASOPHILS  0.60     Recent Labs      01/07/18   0304   SODIUM  141   POTASSIUM  3.7   CHLORIDE  108   CO2  22   GLUCOSE  113*   BUN  13       Impression/Plan:  52 year old woman who has had progressive leg weakness and spasm/rigidity, possible, Stiff-person syndrome especially with  elevated anti-cleo antibodies, wood-like stiffness in the right leg, and touch and motion increase the spasms.  Stiff-person syndrome usually has more axial involvement although she does state it  started in her proximal legs.   Continue with both Valium and baclofen.  Continue IVIG, 0.4 g per KG for 5 days. Day 3/5.

## 2018-01-09 NOTE — PROGRESS NOTES
Late entry 2000: Received report and assumed care at 1900. Pt. AAO x 4, Polish speaking only, family at bedside. Pt. states she was able to get out of bed today with assistance but tired quickly. Educated on increasing activity as tolerated. States she is feeling much better than she was at admission. Denies pain at this time. IV SL but still working properly. Fall precautions in place. Encouraged to call with any needs. Bed alarm on, bed locked and in low position, call light within reach, hourly rounding in place.

## 2018-01-09 NOTE — PROGRESS NOTES
Internal Medicine Interval Note  Note Author: Dash Flores M.D.     Name Linden-Depintor, Vicky Ca     1965   Age/Sex 52 y.o. female   MRN [id number]   Code Status Full      After 5PM or if no immediate response to page, please call for cross-coverage  Attending/Team: Dr. Ruggiero / Constantino  See Patient List for primary contact information  Call [phone] to page    1st Call - Day Intern (R1):   Dr. Flores 2nd Call - Day Sr. Resident (R2/R3):   Dr. 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   HENT: Negative for sore throat.    Eyes: Negative for blurred vision.   Respiratory: Negative for cough.    Cardiovascular: Negative for chest pain.   Gastrointestinal: Negative for abdominal pain, constipation, nausea and vomiting.   Genitourinary: Negative for dysuria.   Musculoskeletal:        Pain of right LE muscle improving compare to yesterday.    Skin: Negative for rash.   Neurological: Negative for dizziness.   Endo/Heme/Allergies: Does not bruise/bleed easily.   Psychiatric/Behavioral: Negative for depression.       Consultants/Specialty  Neurology       Disposition  Inpatient       Quality Measures    Reviewed items::  Labs reviewed and Medications reviewed  Olguin catheter::  No Olguin  DVT prophylaxis pharmacological::  Enoxaparin (Lovenox)  Ulcer Prophylaxis::  Not indicated          Physical Exam       Vitals:    18 1900 18 0218 18 0727 18 1600   BP: 112/67 100/65 (!) 96/66 (!) 94/65   Pulse: (!) 55 65 63 80   Resp: 18 14 14 15   Temp: 36.8 °C  (98.2 °F) 36.4 °C (97.5 °F) 36.4 °C (97.6 °F) 36.3 °C (97.4 °F)   SpO2: 94% 95% 97% 96%   Weight:       Height:         Body mass index is 20.8 kg/m².    Oxygen Therapy:  Pulse Oximetry: 96 %, O2 (LPM): 0, O2 Delivery: None (Room Air)    Physical Exam   Constitutional: She is oriented to person, place, and time. No distress.   HENT:   Head: Normocephalic and atraumatic.   Eyes: EOM are normal. Pupils are equal, round, and reactive to light.   Neck: Normal range of motion. Neck supple.   Cardiovascular: Normal rate and regular rhythm.  Exam reveals no gallop and no friction rub.    No murmur heard.  Pulmonary/Chest: Effort normal and breath sounds normal. No respiratory distress. She has no wheezes.   Abdominal: Soft. Bowel sounds are normal. She exhibits no distension. There is no tenderness. There is no rebound and no guarding.   Musculoskeletal: She exhibits no edema.   Restricted ROM of right LE but significantly improved compared to yesterday. Dystonia improving.    Neurological: She is alert and oriented to person, place, and time.   Skin: She is not diaphoretic.   Nursing note and vitals reviewed.        Lab Data Review:       Recent Labs      01/06/18   0320  01/07/18   0304   SODIUM  144  141   POTASSIUM  4.4  3.7   CHLORIDE  114*  108   CO2  20  22   BUN  10  13   CREATININE  0.53  0.39*   MAGNESIUM  1.9  1.7   CALCIUM  8.9  9.3       Recent Labs      01/06/18   0320  01/07/18   0304   ALTSGPT  31  27   ASTSGOT  12  12   ALKPHOSPHAT  100*  100*   TBILIRUBIN  0.5  0.4   GLUCOSE  101*  113*       Recent Labs      01/07/18   0305   RBC  3.96*   HEMOGLOBIN  12.5   HEMATOCRIT  36.3*   PLATELETCT  343       Recent Labs      01/06/18   0320  01/07/18   0304  01/07/18   0305   WBC   --    --   7.8   NEUTSPOLYS   --    --   65.70   LYMPHOCYTES   --    --   26.30   MONOCYTES   --    --   5.30   EOSINOPHILS   --    --   1.80   BASOPHILS   --    --   0.60   ASTSGOT  12  12   --    ALTSGPT  31  27   --    ALKPHOSPHAT   100*  100*   --    TBILIRUBIN  0.5  0.4   --            Assessment/Plan     * Likely 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 Symptoms have been intermittent for the past 3 years and has been this severe in the past. Has had extensive neurologic workup at outside facility  - Dystonia is in the right and left legs and feet, R>L.   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 Also quick response to valium & baclofen.   - TSH, free T4, Ferrtin normal  - CRP 2.04, B12>1500.  - continue valium 10 mg tid and baclofen 10 mg tid.  - started on IV IG per neuro recs  - follow neuro recom.   - re-consult PT/OT.         Right leg pain- (present on admission)   Assessment & Plan    - from dystonia, intermittent.   - responding to valium & baclofen  - should improve with addition of IVIG  - continue same treatment.         Leukocytosis- (present on admission)   Assessment & Plan    - resolved  - on admission WBC of 14.9, no bands. Afebrile, hemodynamically stable, nontoxic appearing.   - Very dehydrated could be secondary to concentration vs reactive to pain.         Vitamin D deficiency   Assessment & Plan    - Level of 9  - Vitamin D at 50,000 units Q7days        Hypomagnesemia- (present on admission)   Assessment & Plan    - s/p repletion with IV magnesium sulfate.   - repeat labs and replete as needed.         Type 2 diabetes mellitus untreated, with ketoacidosis - (present on admission)   Assessment & Plan    - Continue home medications, sliding scale insulin, hypoglycemia protocol  - HbA1c 8.3%.  - c-peptide ordered to check for GUERRERO.

## 2018-01-10 LAB
GLUCOSE BLD-MCNC: 120 MG/DL (ref 65–99)
GLUCOSE BLD-MCNC: 123 MG/DL (ref 65–99)
GLUCOSE BLD-MCNC: 208 MG/DL (ref 65–99)
GLUCOSE BLD-MCNC: 99 MG/DL (ref 65–99)

## 2018-01-10 PROCEDURE — 700111 HCHG RX REV CODE 636 W/ 250 OVERRIDE (IP): Performed by: PSYCHIATRY & NEUROLOGY

## 2018-01-10 PROCEDURE — 82962 GLUCOSE BLOOD TEST: CPT

## 2018-01-10 PROCEDURE — 99231 SBSQ HOSP IP/OBS SF/LOW 25: CPT | Mod: GC | Performed by: INTERNAL MEDICINE

## 2018-01-10 PROCEDURE — A9270 NON-COVERED ITEM OR SERVICE: HCPCS | Performed by: STUDENT IN AN ORGANIZED HEALTH CARE EDUCATION/TRAINING PROGRAM

## 2018-01-10 PROCEDURE — 700111 HCHG RX REV CODE 636 W/ 250 OVERRIDE (IP): Performed by: STUDENT IN AN ORGANIZED HEALTH CARE EDUCATION/TRAINING PROGRAM

## 2018-01-10 PROCEDURE — 700102 HCHG RX REV CODE 250 W/ 637 OVERRIDE(OP): Performed by: STUDENT IN AN ORGANIZED HEALTH CARE EDUCATION/TRAINING PROGRAM

## 2018-01-10 PROCEDURE — 770006 HCHG ROOM/CARE - MED/SURG/GYN SEMI*

## 2018-01-10 RX ADMIN — BACLOFEN 10 MG: 10 TABLET ORAL at 02:22

## 2018-01-10 RX ADMIN — DIAZEPAM 10 MG: 5 TABLET ORAL at 05:55

## 2018-01-10 RX ADMIN — NAPROXEN 375 MG: 375 TABLET ORAL at 08:53

## 2018-01-10 RX ADMIN — INSULIN HUMAN 3 UNITS: 100 INJECTION, SOLUTION PARENTERAL at 11:50

## 2018-01-10 RX ADMIN — ACETAMINOPHEN 650 MG: 325 TABLET, FILM COATED ORAL at 07:31

## 2018-01-10 RX ADMIN — POLYVINYL ALCOHOL 2 DROP: 14 SOLUTION/ DROPS OPHTHALMIC at 17:33

## 2018-01-10 RX ADMIN — FAMOTIDINE 20 MG: 20 TABLET, FILM COATED ORAL at 20:44

## 2018-01-10 RX ADMIN — BACLOFEN 10 MG: 10 TABLET ORAL at 17:33

## 2018-01-10 RX ADMIN — POLYVINYL ALCOHOL 2 DROP: 14 SOLUTION/ DROPS OPHTHALMIC at 13:24

## 2018-01-10 RX ADMIN — NAPROXEN 375 MG: 375 TABLET ORAL at 20:45

## 2018-01-10 RX ADMIN — DIAZEPAM 10 MG: 5 TABLET ORAL at 13:24

## 2018-01-10 RX ADMIN — INSULIN GLARGINE 5 UNITS: 100 INJECTION, SOLUTION SUBCUTANEOUS at 20:54

## 2018-01-10 RX ADMIN — POLYVINYL ALCOHOL 2 DROP: 14 SOLUTION/ DROPS OPHTHALMIC at 08:53

## 2018-01-10 RX ADMIN — DIAZEPAM 10 MG: 5 TABLET ORAL at 20:44

## 2018-01-10 RX ADMIN — IMMUNE GLOBULIN 20 G: 100 SOLUTION INTRAVENOUS at 08:52

## 2018-01-10 RX ADMIN — FAMOTIDINE 20 MG: 20 TABLET, FILM COATED ORAL at 08:53

## 2018-01-10 RX ADMIN — Medication 100 MG: at 08:53

## 2018-01-10 RX ADMIN — BACLOFEN 10 MG: 10 TABLET ORAL at 08:53

## 2018-01-10 ASSESSMENT — ENCOUNTER SYMPTOMS
DIAPHORESIS: 0
DEPRESSION: 0
TINGLING: 0
SORE THROAT: 0
VOMITING: 0
SENSORY CHANGE: 0
NAUSEA: 0
SHORTNESS OF BREATH: 0
BLURRED VISION: 0
CONSTIPATION: 0
HEADACHES: 1
EYE REDNESS: 0
FEVER: 0
ABDOMINAL PAIN: 0
HEADACHES: 0
CHILLS: 0
COUGH: 0
PALPITATIONS: 0
DIARRHEA: 0
TREMORS: 0
DIZZINESS: 0
BRUISES/BLEEDS EASILY: 0
MYALGIAS: 1

## 2018-01-10 ASSESSMENT — PAIN SCALES - GENERAL
PAINLEVEL_OUTOF10: 4
PAINLEVEL_OUTOF10: 1
PAINLEVEL_OUTOF10: 1

## 2018-01-10 NOTE — PROGRESS NOTES
Internal Medicine Interval Note  Note Author: Dash Flores M.D.     Name Linden-Depintor, Vicky Ca     1965   Age/Sex 52 y.o. female   MRN [id number]   Code Status Full      After 5PM or if no immediate response to page, please call for cross-coverage  Attending/Team: Dr. Ruggiero / Constantino  See Patient List for primary contact information  Call [phone] to page    1st Call - Day Intern (R1):   Dr. Flores 2nd Call - Day Sr. Resident (R2/R3):   Dr. 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   HENT: Negative for sore throat.    Eyes: Negative for blurred vision.   Respiratory: Negative for cough.    Cardiovascular: Negative for chest pain.   Gastrointestinal: Negative for abdominal pain, constipation, nausea and vomiting.   Genitourinary: Negative for dysuria.   Musculoskeletal:        Pain of dorsum of right foot   Skin: Negative for rash.   Neurological: Negative for dizziness.   Endo/Heme/Allergies: Does not bruise/bleed easily.   Psychiatric/Behavioral: Negative for depression.       Consultants/Specialty  Neurology       Disposition  Inpatient       Quality Measures    Reviewed items::  Labs reviewed and Medications reviewed  Olguin catheter::  No Olguin  DVT prophylaxis pharmacological::  Enoxaparin (Lovenox)  Ulcer Prophylaxis::  Not indicated          Physical Exam       Vitals:    18 2000 18 0400 18 0740 18 1535   BP: 111/72 (!) 93/60 136/72 101/63   Pulse: 81 72 64 69   Resp: 17 16 16 18   Temp: 36.5  °C (97.7 °F) 36.3 °C (97.4 °F) 36.6 °C (97.9 °F) 36.3 °C (97.3 °F)   SpO2: 97% 97% 95% 95%   Weight:       Height:         Body mass index is 20.8 kg/m².    Oxygen Therapy:  Pulse Oximetry: 95 %, O2 (LPM): 0, O2 Delivery: None (Room Air)    Physical Exam   Constitutional: She is oriented to person, place, and time. No distress.   HENT:   Head: Normocephalic and atraumatic.   Eyes: EOM are normal. Pupils are equal, round, and reactive to light.   Neck: Normal range of motion. Neck supple.   Cardiovascular: Normal rate and regular rhythm.  Exam reveals no gallop and no friction rub.    No murmur heard.  Pulmonary/Chest: Effort normal and breath sounds normal. No respiratory distress. She has no wheezes.   Abdominal: Soft. Bowel sounds are normal. She exhibits no distension. There is no tenderness. There is no rebound and no guarding.   Musculoskeletal: She exhibits no edema.   Restricted ROM of right LE but significantly improved compared to yesterday. Dystonia improving. Able to bend knee without assistance   Neurological: She is alert and oriented to person, place, and time.   Skin: She is not diaphoretic.   Nursing note and vitals reviewed.        Lab Data Review:       Recent Labs      01/07/18   0304   SODIUM  141   POTASSIUM  3.7   CHLORIDE  108   CO2  22   BUN  13   CREATININE  0.39*   MAGNESIUM  1.7   CALCIUM  9.3       Recent Labs      01/07/18   0304   ALTSGPT  27   ASTSGOT  12   ALKPHOSPHAT  100*   TBILIRUBIN  0.4   GLUCOSE  113*       Recent Labs      01/07/18   0305   RBC  3.96*   HEMOGLOBIN  12.5   HEMATOCRIT  36.3*   PLATELETCT  343       Recent Labs      01/07/18   0304  01/07/18   0305   WBC   --   7.8   NEUTSPOLYS   --   65.70   LYMPHOCYTES   --   26.30   MONOCYTES   --   5.30   EOSINOPHILS   --   1.80   BASOPHILS   --   0.60   ASTSGOT  12   --    ALTSGPT  27   --    ALKPHOSPHAT  100*   --    TBILIRUBIN  0.4   --            Assessment/Plan     * Stiff person syndrome with positive glutamic acid  decarboxylase (JACKIE) antibody- (present on admission)   Assessment & Plan    - Acute on chronic right leg pain and dystonia. Symptoms have been intermittent for the past 3 years and has been this severe in the past. Has had extensive neurologic workup at outside facility  - Dystonia is in the right and left legs and feet, R>>L.   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 Also quick response to valium & baclofen.   - TSH, free T4, Ferrtin normal  - CRP 2.04, B12>1500.  - Continue Baclofen, Valium, IVIG (through 1/11)  - follow neuro recom.   - PT/OT following; recs for Home Health with PT 2x/week        Right leg pain- (present on admission)   Assessment & Plan    - from dystonia, intermittent.   - responding to valium & baclofen  - should improve with addition of IVIG  - continue same treatment.         Leukocytosis- (present on admission)   Assessment & Plan    - on admission WBC of 14.9, no bands. Afebrile, hemodynamically stable, nontoxic appearing.   - Very dehydrated could be secondary to concentration vs reactive to pain  - resolved        Vitamin D deficiency   Assessment & Plan    - Level of 9  - Vitamin D at 50,000 units Q7days        Hypomagnesemia- (present on admission)   Assessment & Plan    - s/p repletion with IV magnesium sulfate.   - repeat labs and replete as needed.         Type 2 diabetes mellitus untreated, with ketoacidosis - (present on admission)   Assessment & Plan    - Continue home medications, sliding scale insulin, hypoglycemia protocol  - HbA1c 8.3%.  - C-peptide normal

## 2018-01-10 NOTE — PROGRESS NOTES
Neurology Progress Note        Subjective:  Complaint of coughing. She feels much better as far as her spasm is concerned. Day 4/5 of IVIG.      Objective:    Vitals:    01/09/18 1535 01/09/18 1925 01/10/18 0350 01/10/18 0725   BP: 101/63 100/63 109/64 (!) 99/63   Pulse: 69 70 67 74   Resp: 18 18 18 16   Temp: 36.3 °C (97.3 °F) 36.4 °C (97.6 °F) 36.6 °C (97.8 °F) 36.5 °C (97.7 °F)   SpO2: 95% 93% 97% 98%   Weight:       Height:            GENERAL:Asleep but arousable  MENTAL STATUS:  Awake, alert, oriented times 3.  Speech is fluent, comprehension is intact.    CRANIAL NERVES:  PERRL, EOMI with no nystagmus, face is symmetric  MOTOR:  5/5 in the upper extremities.  The right lower extremity is extremely rigid, was able to get some more passive flexion at the knee today compared to yesterday, the foot is does have some passive movement today as well but sudden movements will trigger a spasm. The toes are also essentially fixed by the rigidity.  The left foot is similar but able to get some passive range of motion, moving the foot seems to trigger more of a spasm.  There is movement in the proximal left leg and her left knee is able to be passively flexed.  Achilles are very tight bilaterally preventing foot dorsiflexion.  GAIT:  Deferred    No results for input(s): WBC, RBC, HEMOGLOBIN, HEMATOCRIT, MCV, MCH, RDW, PLATELETCT, MPV, NEUTSPOLYS, LYMPHOCYTES, MONOCYTES, EOSINOPHILS, BASOPHILS, RBCMORPHOLO in the last 72 hours.  No results for input(s): SODIUM, POTASSIUM, CHLORIDE, CO2, GLUCOSE, BUN, CPKTOTAL in the last 72 hours.    Impression/Plan:  52 year old woman who has had progressive leg weakness and spasm/rigidity, possible, Stiff-person syndrome especially with  elevated anti-cleo antibodies, wood-like stiffness in the right leg, and touch and motion increase the spasms.  Stiff-person syndrome usually has more axial involvement although she does state it started in her proximal legs.   Continue with both Valium  and baclofen.  Continue IVIG, 0.4 g per KG for 5 days. Day 4/5.  Okay to DC tomorrow after fifth toes of IVIG to help follow up with neurology as an outpatient.  I will sign off, please call me if there is any question.

## 2018-01-10 NOTE — NON-PROVIDER
Internal Medicine Interval Note  Note Author: Michael V. Padua, Student     Name Linden-Depintor, Vicky Ca     1965   Age/Sex 52 y.o. female   MRN [id number]   Code Status FULL     After 5PM or if no immediate response to page, please call for cross-coverage  Attending/Team: Bahman/Constantino See Patient List for primary contact information  Call [phone] to page    1st Call - Day Intern (R1):   Mark 2nd Call - Day Sr. 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  Patient slept well last night.  Patient's only complaint this morning was drowsiness, likely due to diazepam. Patient is eager to go home.    Patient is on day 4/ of IVIG treatment.      Review of Systems   Constitutional: Negative for chills, diaphoresis, fever and malaise/fatigue.   HENT: Negative for congestion and sore throat.    Eyes: Negative for blurred vision and redness.   Respiratory: Negative for cough and shortness of breath.    Cardiovascular: Negative for chest pain, palpitations and leg swelling.   Gastrointestinal: Negative for abdominal pain, constipation, diarrhea, nausea and vomiting.   Genitourinary: Negative for dysuria, frequency and urgency.   Musculoskeletal:        Mild right leg pain   Skin: Negative for itching and rash.   Neurological: Negative for dizziness, tingling, tremors, sensory change and headaches.       Consultants/Specialty  Neurology    Disposition  Inpatient until completion of IVIG - on day 45    Quality Measures    Reviewed items::  Medications reviewed  Olguin catheter::  No Olguin  DVT prophylaxis pharmacological::  Enoxaparin (Lovenox)  DVT prophylaxis - mechanical:  SCDs  Ulcer Prophylaxis::  No          Physical Exam       Vitals:    18 0740 18 1535  01/09/18 1925 01/10/18 0350   BP: 136/72 101/63 100/63 109/64   Pulse: 64 69 70 67   Resp: 16 18 18 18   Temp: 36.6 °C (97.9 °F) 36.3 °C (97.3 °F) 36.4 °C (97.6 °F) 36.6 °C (97.8 °F)   SpO2: 95% 95% 93% 97%   Weight:       Height:         Body mass index is 20.8 kg/m².    Oxygen Therapy:  Pulse Oximetry: 97 %, O2 (LPM): 0, O2 Delivery: None (Room Air)    Physical Exam   Constitutional: She is oriented to person, place, and time and well-developed, well-nourished, and in no distress. No distress.   HENT:   Head: Normocephalic and atraumatic.   Eyes: Conjunctivae are normal. Pupils are equal, round, and reactive to light. No scleral icterus.   Neck: Normal range of motion.   Cardiovascular: Normal rate, regular rhythm and normal heart sounds.  Exam reveals no gallop and no friction rub.    No murmur heard.  Pulmonary/Chest: Effort normal and breath sounds normal. No respiratory distress. She has no wheezes. She has no rales.   Abdominal: Soft. She exhibits no distension. There is no tenderness.   Musculoskeletal: She exhibits no edema.   Left: 5/5 strength. Full range of movement.   Right: continues to improve - 4/5 strength on extension, 5/5 on flexion. Range of movement improved.  Right foot remains in a dorsiflexed and inverted position, but significantly improved since admission.   Neurological: She is alert and oriented to person, place, and time. She has normal reflexes. No cranial nerve deficit.   Skin: Skin is warm and dry. No rash noted. She is not diaphoretic. No erythema.         Lab Data Review:         1/10/2018  7:15 AM    No results for input(s): SODIUM, POTASSIUM, CHLORIDE, CO2, BUN, CREATININE, MAGNESIUM, PHOSPHORUS, CALCIUM in the last 72 hours.    No results for input(s): ALTSGPT, ASTSGOT, ALKPHOSPHAT, TBILIRUBIN, DBILIRUBIN, GAMMAGT, AMYLASE, LIPASE, ALB, PREALBUMIN, GLUCOSE in the last 72 hours.    No results for input(s): RBC, HEMOGLOBIN, HEMATOCRIT, PLATELETCT, PROTHROMBTM, APTT, INR, IRON,  FERRITIN, TOTIRONBC in the last 72 hours.              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  Patient is able to flex her right leg at the knee and hip.    Anti-JACKIE: 250 supports dx of stiff person syndrome  Will continue with neuro's recommendation.      Symptoms began 3 years ago and was localized to her right hip.  Pain has been intermittent, but has progressively worsened within the past year.    Dystonia associated with pain began about a year ago  Patient was hospitalized previously for the same symptoms  Previous inpatient and outpatient neurologic workup was negative.    Records from October 2017 at [place] [facility]: MRI showed L5-S1 broad based disk protrusion with no central canal stenosis, as well as a 1 cm lesion in L1 vertebral body which is believed to be a hemangioma.  Patient was given gabapentin, baclofen, dilaudid, and morphine for pain control  Patellar reflex: 2/4 on left, none on right due to contraction.   Contraction of right leg from hip to toe, and left leg below the knee.   Negative Babinski bilaterally.  CPK of 231   Sed Rate of 41  CRP of 2.04  D-Dimer of 1135 - duplex scan negative for DVT     Plan:   Follow neurology's recommendation    Continue diazepam 10 mg and baclofen 10 mg tid  Continue IVIG - on day 4/5 - continue to monitor for any adverse reactions to IVIG - will be discharged tomorrow after final IVIG treatment  Continue PT/OT - encourage patient to walk      #Vitamin D Deficiency  (1/6/2018) Vitamin D @ 9 - given 20705 units of Vitamin D  Plan: CTM     #Constipation - resolved  (1/5/2018) KUB showed mild increase of small bowel and colonic gas and degenerative disck change of the lumbar.   Patient having regular BMs  Patient denies abdominal pain, vomiting  Polypharmacy may be contributing to constipation     Plan:  Continue bowel protocol to achieve  2 BMs/day     #DM2 - poorly controlled  POC glucose checks elevated  HbA1C: 8.3%     Plan:   Continue home medications, sliding scale insulin, hypoglycemia protocol  Continue gabapentin     #Leukocytosis - resolved  (1/4/18) WBC of 10.4   (1/3/18) WBC of 14.9   Remains afebrile - unlikely infectious   Hemodynamically stable

## 2018-01-10 NOTE — DISCHARGE SUMMARY
Internal Medicine Discharge Summary  Note Author: Dash Flores M.D.       Admit Date:  1/3/2018       Discharge Date:   1/11/2018    Service:   R Internal Medicine White Team  Attending Physician(s):   Dr. Ruggiero       Senior Resident(s):   Dr. Scales  Ben Resident(s):   Dr. 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 *      Hospital Summary (Brief Narrative):       Ms. Crooks presented to [facility] with complaints of intermittent, progressive RLE pain lasting 6 days similar to previous episodes she had had over the last 6 years.  She stated that her symptoms started with pain in the right hip followed by full contraction of the entire right leg (Of note: Patient had a similar presentation to [facility] in 10/2017 where she underwent evaluation and diagnosed with Myofacial Pain Syndrome).  On presentation, Patient was hemodynamically stable, extremely anxious and hyperventilating.  RLE ROM was severely limited with the limb fixed in complete flexion with pipe-like rigidity throughout.  Right foot was plantar flexed and inverted.  Laboratory studies showed leukocytosis to 14.9.  Neurology was was consulted and ultimately diagnosed her with likely Stiff Person Syndrome after which she was started on Valium 10mg daily and Baclofen 10mg daily.  Diagnosis was confirmed once JACKIE antibodies returned elevated >250 after which a 5 day course of IVIG was started.  Patient showed tremendous improvement with improved ROM and reduction in  pain.  She tolerated the therapies without issue.  She was seen and evaluated by PT/OT with recommendations for Home Health with additional skilled therapies.  Upon completion of IVIG, Patient was determined suitable for discharge.  At time of discharge, Patient has showed large clinical improvement, she is ambulating, tolerating therapies, hemodynamically stable, and amenable to discharge.  Mood is stable.    Primary Team Recommendations:  Patient is to continue on Baclofen and Valium at current doses and follow up with Neurology as outpatient.  Appointment for follow up at [facility] has been made for the patient.  However, as that appointment is not until 05/2018, she is to follow up with her PCP to have per prescriptions refilled.  She is free to follow with her [facility]rologist in addition to [facility] (as she has an upcoming appointment at [facility] in 01/2018).      Patient /Hospital Summary (Details -- Problem Oriented) :          Right leg pain   Assessment & Plan    - from dystonia, intermittent.   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 Symptoms have been intermittent for the past 3 years and has been this severe in the past. Has had extensive neurologic workup at outside facility  - Dystonia is in the right and left legs and feet, R>>L.   - TSH, free T4, Ferrtin normal  - CRP 2.04, B12>1500.  - anti-JACKIE antibody significantly elevated  - Findings c/w Stiff Person Syndrome   - Good response to Baclofen, Valium, IVIG  - IVIG x5 days completed  - Continue Baclofen, Valium  - Follow up with [facility]  - PT/OT: recs for Home Health with PT 2x/week        Leukocytosis   Assessment & Plan    - on admission WBC of 14.9, no bands. Afebrile, hemodynamically stable,  nontoxic appearing.   - Very dehydrated could be secondary to concentration vs reactive to pain  - resolved        Vitamin D deficiency   Assessment & Plan    - Level of 9  - Vitamin D at 50,000 units Q7days        Hypomagnesemia   Assessment & Plan    - s/p repletion with IV magnesium sulfate.         Type 2 diabetes mellitus untreated, with ketoacidosis    Assessment & Plan    - Continue home medications, sliding scale insulin, hypoglycemia protocol  - HbA1c 8.3%.  - C-peptide normal  - Continue outpatient follow up with PCP            Consultants:     Neurology (Dr. Vin Dover, Dr. Benedict Frausto)    Procedures:        None    Imaging/ Testing:      [id number]   Final Result      1.  Mildly increased bowel gas without definite obstruction.   2.  No evidence for bowel perforation.      LE VENOUS DUPLEX (DVT)   Final Result            Discharge Medications:         Medication Reconciliation: Completed       Medication List      START taking these medications      Instructions   artificial tears 1.4 % Soln   Place 2 Drops in both eyes 4 times a day.  Dose:  2 Drop     baclofen 10 MG Tabs  Commonly known as:  LIORESAL   Take 1 Tab by mouth every 8 hours for 30 days.  Dose:  10 mg     diazepam 10 MG tablet  Commonly known as:  VALIUM   Take 1 Tab by mouth every 8 hours for 30 days.  Dose:  10 mg     famotidine 20 MG Tabs  Commonly known as:  PEPCID   Take 1 Tab by mouth 2 Times a Day for 30 days.  Dose:  20 mg     naproxen 375 MG Tabs  Commonly known as:  NAPROSYN   Take 1 Tab by mouth 2 times a day, with meals for 30 days.  Dose:  375 mg     vitamin D (Ergocalciferol) 14525 units Caps capsule  Start taking on:  1/14/2018  Commonly known as:  DRISDOL   Take 1 Cap by mouth every 7 days for 30 days.  Dose:  58994 Units        CONTINUE taking these medications      Instructions   BASAGLAR KWIKPEN 100 UNIT/ML Sopn injection  Generic drug:  insulin glargine   Inject 5 Units as instructed every evening.  Dose:   5 Units     glimepiride 4 MG Tabs  Commonly known as:  AMARYL   Take 4 mg by mouth every morning.  Dose:  4 mg        STOP taking these medications    gabapentin 300 MG Caps  Commonly known as:  NEURONTIN     hydrOXYzine HCl 50 MG Tabs  Commonly known as:  ATARAX     methocarbamol 750 MG Tabs  Commonly known as:  ROBAXIN     predniSONE 10 MG Tabs  Commonly known as:  DELTASONE             Prognosis:   Good    Disposition:   Home with Home Health    Diet:   Diabetic    Activity:   As Tolerated, per Home PT/OT evaluation    Instructions:      As above  The patient was instructed to return to the ER in the event of worsening symptoms. I have counseled the patient on the importance of compliance and the patient has agreed to proceed with all medical recommendations and follow up plan indicated above.   The patient understands that all medications come with benefits and risks. Risks may include permanent injury or death and these risks can be minimized with close reassessment and monitoring.        Primary Care Provider:    Pcp Unknown  Discharge summary faxed to primary care provider:  Deferred  Copy of discharge summary given to the patient: Completed      Follow up appointment details :      1. [facility] (Dr. Alexander Pena) - May 11, 2018 9:00am, please check in at 8:45am  2. PCP within 2-3 weeks - Patient to call for appointment    Pending Studies:        none    Time spent on discharge day patient visit, preparing discharge paperwork and arranging for patient follow up.    Summary of follow up issues:   As above     Discharge Time (Minutes) :    70  Hospital Course Type:  Inpatient Stay >2 midnights      Condition on Discharge    ______________________________________________________________________    Interval history/exam for day of discharge:    - No acute overnight events  - Ambulating, tolerating therapy  - Extremity pain well controlled, though complaining of mild headache, relieved with  Naproxen  - Amenable to discharge    Vitals:    01/10/18 1945 01/11/18 0335 01/11/18 0800 01/11/18 1351   BP: (!) 98/62 (!) 96/62 (!) 98/58 120/58   Pulse: 75 62 64 (!) 57   Resp: 18 18 18 18   Temp: 36.5 °C (97.7 °F) 36.1 °C (97 °F) 36.2 °C (97.1 °F) 36.4 °C (97.5 °F)   SpO2: 97% 96% 97% 97%   Weight:       Height:         Weight/BMI: Body mass index is 20.8 kg/m².  Pulse Oximetry: 97 %, O2 (LPM): 0, O2 Delivery: None (Room Air)    General: petite  CVS: RRR, no MRG  PULM: CTABL, no wheezing, crackles, rhonchi  EXT: no tenderness, able to bend right knee without difficulty, no dystonia noted, right foot plantar flexed with slight inversion.  RLE passive ROM limited but flexible, 2+ pulses    Most Recent Labs:    Lab Results   Component Value Date/Time    WBC 7.8 01/07/2018 03:05 AM    RBC 3.96 (L) 01/07/2018 03:05 AM    HEMOGLOBIN 12.5 01/07/2018 03:05 AM    HEMATOCRIT 36.3 (L) 01/07/2018 03:05 AM    MCV 91.7 01/07/2018 03:05 AM    MCH 31.6 01/07/2018 03:05 AM    MCHC 34.4 01/07/2018 03:05 AM    MPV 11.6 01/07/2018 03:05 AM    NEUTSPOLYS 65.70 01/07/2018 03:05 AM    LYMPHOCYTES 26.30 01/07/2018 03:05 AM    MONOCYTES 5.30 01/07/2018 03:05 AM    EOSINOPHILS 1.80 01/07/2018 03:05 AM    BASOPHILS 0.60 01/07/2018 03:05 AM    ANISOCYTOSIS 2+ 02/11/2015 02:12 AM      Lab Results   Component Value Date/Time    SODIUM 141 01/07/2018 03:04 AM    POTASSIUM 3.7 01/07/2018 03:04 AM    CHLORIDE 108 01/07/2018 03:04 AM    CO2 22 01/07/2018 03:04 AM    GLUCOSE 113 (H) 01/07/2018 03:04 AM    BUN 13 01/07/2018 03:04 AM    CREATININE 0.39 (L) 01/07/2018 03:04 AM      Lab Results   Component Value Date/Time    ALTSGPT 27 01/07/2018 03:04 AM    ASTSGOT 12 01/07/2018 03:04 AM    ALKPHOSPHAT 100 (H) 01/07/2018 03:04 AM    TBILIRUBIN 0.4 01/07/2018 03:04 AM    LIPASE 21 01/28/2015 10:31 PM    ALBUMIN 3.5 01/07/2018 03:04 AM    GLOBULIN 3.2 01/07/2018 03:04 AM    INR 0.95 01/03/2018 09:08 AM     Lab Results   Component Value Date/Time     PROTHROMBTM 12.4 01/03/2018 09:08 AM    INR 0.95 01/03/2018 09:08 AM

## 2018-01-10 NOTE — CARE PLAN
Problem: Safety  Goal: Will remain free from falls  Outcome: PROGRESSING AS EXPECTED  Bed alarm on and pt educated on using call light. Pt using call light appropriately. Bedside table and call light within reach, treaded slipper socks on, and hourly rounding in place.     Problem: Infection  Goal: Will remain free from infection  Outcome: PROGRESSING AS EXPECTED  Encouraging pt and family to use good hand hygiene. RN using proper PPE before and after exiting room, and before and after patient care.

## 2018-01-10 NOTE — CARE PLAN
Problem: Safety  Goal: Will remain free from injury    Intervention: Provide assistance with mobility  Alarms in place. Daughter at bedside. Pt and daughter calls for assistance.       Problem: Pain Management  Goal: Pain level will decrease to patient's comfort goal    Intervention: Follow pain managment plan developed in collaboration with patient and Interdisciplinary Team  Pt complaints of head ache. Medicated pt with tylenol as ordered.

## 2018-01-10 NOTE — PROGRESS NOTES
Pt aox4. Mexican speaking only. PEREZ with generalized weakness. Right leg strength same as yesterday (03/05). Denies SOB. Complaints of headache 04/10 pain. Daughter at bedside. Call light within reach. POC discussed. Alarms in place.

## 2018-01-10 NOTE — PROGRESS NOTES
Received report and assumed patient care at 1900. Patient is A&Ox4 this evening. Reporting no pain. Family at bedside. Night time medications provided. All needs met at this time. Patients bed alarm is on, bed is locked and in lowest position, call light and bedside table are within reach, treaded slipper socks are on, and hourly rounding in place.

## 2018-01-11 VITALS
DIASTOLIC BLOOD PRESSURE: 58 MMHG | OXYGEN SATURATION: 97 % | SYSTOLIC BLOOD PRESSURE: 120 MMHG | TEMPERATURE: 97.5 F | BODY MASS INDEX: 20.83 KG/M2 | HEIGHT: 65 IN | WEIGHT: 125 LBS | HEART RATE: 57 BPM | RESPIRATION RATE: 18 BRPM

## 2018-01-11 LAB
GLUCOSE BLD-MCNC: 201 MG/DL (ref 65–99)
GLUCOSE BLD-MCNC: 81 MG/DL (ref 65–99)
GLUCOSE BLD-MCNC: 92 MG/DL (ref 65–99)

## 2018-01-11 PROCEDURE — A9270 NON-COVERED ITEM OR SERVICE: HCPCS | Performed by: STUDENT IN AN ORGANIZED HEALTH CARE EDUCATION/TRAINING PROGRAM

## 2018-01-11 PROCEDURE — G8979 MOBILITY GOAL STATUS: HCPCS | Mod: CI

## 2018-01-11 PROCEDURE — 700102 HCHG RX REV CODE 250 W/ 637 OVERRIDE(OP): Performed by: STUDENT IN AN ORGANIZED HEALTH CARE EDUCATION/TRAINING PROGRAM

## 2018-01-11 PROCEDURE — 700111 HCHG RX REV CODE 636 W/ 250 OVERRIDE (IP): Performed by: PSYCHIATRY & NEUROLOGY

## 2018-01-11 PROCEDURE — 82962 GLUCOSE BLOOD TEST: CPT

## 2018-01-11 PROCEDURE — 99238 HOSP IP/OBS DSCHRG MGMT 30/<: CPT | Mod: GC | Performed by: INTERNAL MEDICINE

## 2018-01-11 PROCEDURE — 97116 GAIT TRAINING THERAPY: CPT

## 2018-01-11 PROCEDURE — G8980 MOBILITY D/C STATUS: HCPCS | Mod: CI

## 2018-01-11 PROCEDURE — 97530 THERAPEUTIC ACTIVITIES: CPT

## 2018-01-11 PROCEDURE — 700111 HCHG RX REV CODE 636 W/ 250 OVERRIDE (IP): Performed by: STUDENT IN AN ORGANIZED HEALTH CARE EDUCATION/TRAINING PROGRAM

## 2018-01-11 RX ORDER — POLYVINYL ALCOHOL 14 MG/ML
2 SOLUTION/ DROPS OPHTHALMIC 4 TIMES DAILY
Qty: 1 BOTTLE | Refills: 3 | Status: SHIPPED | OUTPATIENT
Start: 2018-01-11 | End: 2019-03-05

## 2018-01-11 RX ORDER — DIAZEPAM 10 MG/1
10 TABLET ORAL EVERY 8 HOURS
Qty: 90 TAB | Refills: 0 | Status: SHIPPED | OUTPATIENT
Start: 2018-01-11 | End: 2018-02-06 | Stop reason: SDUPTHER

## 2018-01-11 RX ORDER — ERGOCALCIFEROL 1.25 MG/1
50000 CAPSULE ORAL
Qty: 4 CAP | Refills: 0 | Status: SHIPPED | OUTPATIENT
Start: 2018-01-14 | End: 2018-02-13

## 2018-01-11 RX ORDER — POLYVINYL ALCOHOL 14 MG/ML
2 SOLUTION/ DROPS OPHTHALMIC 4 TIMES DAILY
Qty: 1 BOTTLE | Refills: 3 | Status: SHIPPED | OUTPATIENT
Start: 2018-01-11 | End: 2018-01-11

## 2018-01-11 RX ORDER — FAMOTIDINE 20 MG/1
20 TABLET, FILM COATED ORAL 2 TIMES DAILY
Qty: 60 TAB | Refills: 0 | Status: SHIPPED | OUTPATIENT
Start: 2018-01-11 | End: 2018-01-11

## 2018-01-11 RX ORDER — DIAZEPAM 10 MG/1
10 TABLET ORAL EVERY 8 HOURS
Qty: 90 TAB | Refills: 0 | Status: SHIPPED | OUTPATIENT
Start: 2018-01-11 | End: 2018-01-11

## 2018-01-11 RX ORDER — ERGOCALCIFEROL 1.25 MG/1
50000 CAPSULE ORAL
Qty: 4 CAP | Refills: 0 | Status: SHIPPED | OUTPATIENT
Start: 2018-01-14 | End: 2018-01-11

## 2018-01-11 RX ORDER — FAMOTIDINE 20 MG/1
20 TABLET, FILM COATED ORAL 2 TIMES DAILY
Qty: 60 TAB | Refills: 0 | Status: SHIPPED | OUTPATIENT
Start: 2018-01-11 | End: 2018-02-10

## 2018-01-11 RX ORDER — NAPROXEN 375 MG/1
375 TABLET ORAL 2 TIMES DAILY WITH MEALS
Qty: 60 TAB | Refills: 0 | Status: SHIPPED | OUTPATIENT
Start: 2018-01-11 | End: 2018-02-10

## 2018-01-11 RX ORDER — BACLOFEN 10 MG/1
10 TABLET ORAL EVERY 8 HOURS
Qty: 90 TAB | Refills: 0 | Status: SHIPPED | OUTPATIENT
Start: 2018-01-11 | End: 2018-01-23 | Stop reason: SDUPTHER

## 2018-01-11 RX ORDER — BACLOFEN 10 MG/1
10 TABLET ORAL EVERY 8 HOURS
Qty: 90 TAB | Refills: 0 | Status: SHIPPED | OUTPATIENT
Start: 2018-01-11 | End: 2018-01-11

## 2018-01-11 RX ORDER — NAPROXEN 375 MG/1
375 TABLET ORAL 2 TIMES DAILY WITH MEALS
Qty: 60 TAB | Refills: 0 | Status: SHIPPED | OUTPATIENT
Start: 2018-01-11 | End: 2018-01-11

## 2018-01-11 RX ADMIN — IMMUNE GLOBULIN 20 G: 100 SOLUTION INTRAVENOUS at 09:36

## 2018-01-11 RX ADMIN — DIAZEPAM 10 MG: 5 TABLET ORAL at 06:10

## 2018-01-11 RX ADMIN — NAPROXEN 375 MG: 375 TABLET ORAL at 09:33

## 2018-01-11 RX ADMIN — POLYETHYLENE GLYCOL 3350 1 PACKET: 17 POWDER, FOR SOLUTION ORAL at 14:35

## 2018-01-11 RX ADMIN — Medication 100 MG: at 09:33

## 2018-01-11 RX ADMIN — FAMOTIDINE 20 MG: 20 TABLET, FILM COATED ORAL at 09:33

## 2018-01-11 RX ADMIN — ACETAMINOPHEN 650 MG: 325 TABLET, FILM COATED ORAL at 00:26

## 2018-01-11 RX ADMIN — DIAZEPAM 10 MG: 5 TABLET ORAL at 14:26

## 2018-01-11 RX ADMIN — BACLOFEN 10 MG: 10 TABLET ORAL at 09:32

## 2018-01-11 RX ADMIN — BACLOFEN 10 MG: 10 TABLET ORAL at 02:20

## 2018-01-11 RX ADMIN — POLYVINYL ALCOHOL 2 DROP: 14 SOLUTION/ DROPS OPHTHALMIC at 12:09

## 2018-01-11 RX ADMIN — INSULIN HUMAN 3 UNITS: 100 INJECTION, SOLUTION PARENTERAL at 12:07

## 2018-01-11 ASSESSMENT — COGNITIVE AND FUNCTIONAL STATUS - GENERAL
WALKING IN HOSPITAL ROOM: A LITTLE
MOVING FROM LYING ON BACK TO SITTING ON SIDE OF FLAT BED: A LITTLE
MOVING TO AND FROM BED TO CHAIR: A LITTLE
SUGGESTED CMS G CODE MODIFIER MOBILITY: CK
TURNING FROM BACK TO SIDE WHILE IN FLAT BAD: A LITTLE
STANDING UP FROM CHAIR USING ARMS: A LITTLE
CLIMB 3 TO 5 STEPS WITH RAILING: TOTAL
MOBILITY SCORE: 16

## 2018-01-11 ASSESSMENT — PAIN SCALES - GENERAL
PAINLEVEL_OUTOF10: 0
PAINLEVEL_OUTOF10: 6
PAINLEVEL_OUTOF10: 0

## 2018-01-11 ASSESSMENT — GAIT ASSESSMENTS
GAIT LEVEL OF ASSIST: STAND BY ASSIST
DISTANCE (FEET): 200
ASSISTIVE DEVICE: FRONT WHEEL WALKER

## 2018-01-11 NOTE — PROGRESS NOTES
I have personally evaluated and examined this patient and agree with the findings as documented in the resident note except as documented in this attending note.  I am actively involved in the patient's care.       Appreciated neuro help,FU Rec  Discussed the risks and benefits of meds with Pt with the help of family   DC today,FU with PCP and Neuro as an out pt    Please see resident DC Summary for complete details

## 2018-01-11 NOTE — CARE PLAN
Problem: Communication  Goal: The ability to communicate needs accurately and effectively will improve  Outcome: PROGRESSING AS EXPECTED  Received in report pt discharging after infusion today, will discuss with MD.     Problem: Safety  Goal: Will remain free from injury  Outcome: PROGRESSING AS EXPECTED  Pt ambulated with walker, pt one person assist with walker.

## 2018-01-11 NOTE — PROGRESS NOTES
Assumed care of pt at 0730. A/Ox4, discussed plan of care. Pt on room air, tolerating diet, up with walker. Daughter at bedside. All needs met at this time. Bed in lowest position, treaded socks on, personal belongings and call light within reach, instructed to call for any assistance.

## 2018-01-11 NOTE — CARE PLAN
Problem: Safety  Goal: Will remain free from falls  Outcome: PROGRESSING AS EXPECTED  Fall risk precautions in place. Bed alarm on, bed in lowest position, call light within reach, treaded slipper socks on pt, and appropriate communication signs on door.     Problem: Infection  Goal: Will remain free from infection  Outcome: PROGRESSING AS EXPECTED  Promote personal hygiene and hand washing to help prevent infection. Perform hand washing before and after entering pt's room.

## 2018-01-11 NOTE — DISCHARGE INSTRUCTIONS
Discharge Instructions    Discharged to home by car with relative. Discharged via wheelchair, hospital escort: Refused.  Special equipment needed: Not Applicable    Be sure to schedule a follow-up appointment with your primary care doctor or any specialists as instructed.     Discharge Plan:   Influenza Vaccine Indication: Not indicated: Previously immunized this influenza season and > 8 years of age (November)    I understand that a diet low in cholesterol, fat, and sodium is recommended for good health. Unless I have been given specific instructions below for another diet, I accept this instruction as my diet prescription.   Other diet: Diabetic    Special Instructions: None    · Is patient discharged on Warfarin / Coumadin?   No     · Is patient Post Blood Transfusion?  No    Depression / Suicide Risk    As you are discharged from this [facility], it is important to learn how to keep safe from harming yourself.    Recognize the warning signs:  · Abrupt changes in personality, positive or negative- including increase in energy   · Giving away possessions  · Change in eating patterns- significant weight changes-  positive or negative  · Change in sleeping patterns- unable to sleep or sleeping all the time   · Unwillingness or inability to communicate  · Depression  · Unusual sadness, discouragement and loneliness  · Talk of wanting to die  · Neglect of personal appearance   · Rebelliousness- reckless behavior  · Withdrawal from people/activities they love  · Confusion- inability to concentrate     If you or a loved one observes any of these behaviors or has concerns about self-harm, here's what you can do:  · Talk about it- your feelings and reasons for harming yourself  · Remove any means that you might use to hurt yourself (examples: pills, rope, extension cords, firearm)  · Get professional help from the community (Mental Health, Substance Abuse, psychological counseling)  · Do not be alone:Call your  Safe Contact- someone whom you trust who will be there for you.  · Call your local CRISIS HOTLINE 743-6152 or 690-639-7527  · Call your local Children's Mobile Crisis Response Team [place] (746) 722-2674 or www.kabuku  · Call the toll free National Suicide Prevention Hotlines   · National Suicide Prevention Lifeline 458-212-VTNJ (1215)  · [employer] 800-SUICIDE (223-3119)    Distonía  (Dystonia)  La distonía es georgi enfermedad que causa la contracción de los músculos sin aviso previo (espasmos musculares). Puede dificultar la realización de las tareas cotidianas. Hay diferentes formas de distonía. La enfermedad puede afectar solo georgi parte del cuerpo, o kami zonas más grandes. La distonía afecta a las personas de diferentes formas. En algunas personas, es leve y desaparece con el tiempo, mientras que en otras puede requerir tratamiento. Si kami no hay georgi kale para la distonía, se puede controlar la enfermedad con tratamiento.  CAUSAS   Entre las causas de la distonía, se incluyen las siguientes:  · Factores genéticos. [place] significa que thompson heredado los genes que lo ponen en riesgo de tener distonía.  · Georgi anomalía en la región del cerebro que controla los movimientos (núcleos basales).  La distonía también puede ser adquirida (secundaria). Si tiene distonía secundaria, desarrolló la enfermedad después de:  · Lesión cerebral.  · Infección.  · Georgi reacción a las drogas.  Es posible que la causa de la distonía no se conozca (distonía idiopática).   SIGNOS Y SÍNTOMAS  Los signos y los síntomas de distonía pueden depender del tipo de enfermedad que se padezca. Entre los signos y los síntomas más frecuentes, se incluyen los siguientes:  · Sacudidas o espasmos musculares alrededor de los ojos (blefaroespasmo).  · Calambres o arrastre de los pies.  · Tirones del carmen hacia un lado (tortícolis).  · Espasmos musculares faciales.  · Espasmos laríngeos.  · Temblores.  · Posiciones extrañas y  dolorosas.  · Calambres musculares después del uso de los músculos.  DIAGNÓSTICO   El médico diagnosticará la distonía en función de los síntomas y la historia clínica. También le realizará un examen físico. También se le pueden realizar:   · Un análisis de christiana para detectar la presencia de los genes que causan distonía.  · Estudios por imágenes del cerebro para descartar otras causas para los síntomas.  No hay estudios que puedan diagnosticar otras causas de distonía.  TRATAMIENTO   No hay tratamientos que puedan curar o prevenir la distonía. El tratamiento para controlar la distonía puede incluir lo siguiente:   · Inyectar los músculos afectados con georgi sustancia química (botulina) que inhibe los espasmos musculares. Vivian tratamiento puede inhibir los espasmos robert algunos días o algunos meses.  · Medicamentos para relajar los músculos.  INSTRUCCIONES PARA EL CUIDADO EN EL HOGAR  · Es posible que el médico le sugiera que kami fisioterapia para mejorar la fuerza muscular. Siga haciendo los ejercicios de fisioterapia en grijalva casa angela se lo haya indicado el fisioterapeuta.  · Asegúrese de tener un buen sistema de apoyo. Informe al médico si tiene problemas de estrés o ansiedad.  · Concurra a todas las visitas de control angela se lo haya indicado el médico. [place] es importante.  · [place] los medicamentos solamente angela se lo haya indicado el médico.  SOLICITE ATENCIÓN MÉDICA SI:  · La enfermedad está cambiando o empeora.  · Necesita más apoyo en grijalva hogar.     Esta información no tiene angela fin reemplazar el consejo del médico. Asegúrese de hacerle al médico cualquier pregunta que zelalem.     Document Released: 04/05/2010 Document Revised: 01/08/2016  Elsevier Interactive Patient Education ©2016 Elsevier Inc.    Dystonia  Dystonia is a condition that makes your muscles contract without warning (muscle spasms). It can make doing everyday tasks hard. There are different forms of dystonia. The condition can affect just  one part of your body, or it can affect larger areas of your body. Dystonia affects people in different ways. In some people, it is mild and goes away over time, while others may need treatment. Although there is no cure for dystonia, you can manage the condition with treatment.  CAUSES   Dystonia may be caused by:  · Genetics. This means you inherited the genes that cause you to be at risk for dystonia.  · An abnormality in the part of your brain that controls movement (basal ganglia).  Dystonia may also be acquired. If you have acquired dystonia, you developed the condition after:  · Brain injury.  · Infection.  · Drug reaction.  The cause of dystonia may also not be known (idiopathic dystonia).   SIGNS AND SYMPTOMS  Signs and symptoms of dystonia can depend on which type of the condition you have. Common signs and symptoms include:  · Muscle twitches or spasms around your eyes (blepharospasm).  · Foot cramping or dragging.  · Pulling of your neck to one side (torticollis).  · Muscles spasms of the face.  · Spasms of the voice box.  · Tremors.  · Awkward and painful positions.  · Muscle cramping after muscle use.  DIAGNOSIS   Your health care provider can diagnose dystonia based on your symptoms and medical history. Your health care provider will also do a physical exam. You may also have:   · A blood test to check for genes that cause dystonia.  · Brain imaging tests to rule out other causes of your symptoms.  There are no tests that can diagnose other causes of dystonia.  TREATMENT   There are no treatments that can cure or prevent dystonia. Treatment to manage dystonia may include:   · Injecting the affected muscles with a chemical (botulinum) that blocks muscle spasms. This treatment can block spasms for a few days to a few months.  · Medicines to relax muscles.  HOME CARE INSTRUCTIONS  · Physical therapy to improve muscle strength and movement may be suggested by your health care provider. Continue your  physical therapy exercises at home as instructed by your physical therapist.  · Make sure you have a good support system. Let your health care provider know if you are struggling with stress or anxiety.  · Keep all follow-up visits as directed by your health care provider. This is important.  · Take medicines only as directed by your health care provider.  SEEK MEDICAL CARE IF:  · Your condition is changing or getting worse.  · You need more support at home.     This information is not intended to replace advice given to you by your health care provider. Make sure you discuss any questions you have with your health care provider.     Document Released: 12/08/2003 Document Revised: 01/08/2016 Document Reviewed: 02/11/2015  Elsevier Interactive Patient Education ©2016 Elsevier Inc.

## 2018-01-11 NOTE — PROGRESS NOTES
Received bedside report from day shift RN. Pt is alert and oriented x4, denies pain at this time. Family is at bedside. Pt is Portuguese speaking only. Bed alarm on, call light within reach, bed in lowest position, and treaded slipper socks on pt.

## 2018-01-11 NOTE — PROGRESS NOTES
Internal Medicine Interval Note  Note Author: Dash Flores M.D.     Name Linden-Depintor, Vicky Ca     1965   Age/Sex 52 y.o. female   MRN [id number]   Code Status Full      After 5PM or if no immediate response to page, please call for cross-coverage  Attending/Team: Dr. Ruggiero / Constantino  See Patient List for primary contact information  Call [phone] to page    1st Call - Day Intern (R1):   Dr. Flores 2nd Call - Day Sr. Resident (R2/R3):   Dr. 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   HENT: Negative for sore throat.    Eyes: Negative for blurred vision.   Respiratory: Negative for cough.    Cardiovascular: Negative for chest pain.   Gastrointestinal: Negative for abdominal pain, constipation, nausea and vomiting.   Genitourinary: Negative for dysuria.   Musculoskeletal: Positive for myalgias (mild pain along left toes).   Skin: Negative for rash.   Neurological: Positive for headaches. Negative for dizziness.   Endo/Heme/Allergies: Does not bruise/bleed easily.   Psychiatric/Behavioral: Negative for depression.       Consultants/Specialty  Neurology       Disposition  Inpatient       Quality Measures    Reviewed items::  Labs reviewed and Medications reviewed  Olguin catheter::  No Olguin  DVT prophylaxis pharmacological::  Enoxaparin (Lovenox)  Ulcer Prophylaxis::  Not indicated          Physical Exam       Vitals:    18 1925 01/10/18 0350 01/10/18 0725 01/10/18 1520   BP: 100/63 109/64 (!) 99/63 (!) 97/73   Pulse: 70 67 74 73   Resp: 18 18 16 16   Temp: 36.4 °C (97.6 °F) 36.6 °C (97.8 °F) 36.5 °C (97.7 °F)  36.6 °C (97.9 °F)   SpO2: 93% 97% 98% 96%   Weight:       Height:         Body mass index is 20.8 kg/m².    Oxygen Therapy:  Pulse Oximetry: 96 %, O2 (LPM): 0, O2 Delivery: None (Room Air)    Physical Exam   Constitutional: She is oriented to person, place, and time. No distress.   HENT:   Head: Normocephalic and atraumatic.   Eyes: EOM are normal. Pupils are equal, round, and reactive to light.   Neck: Normal range of motion. Neck supple.   Cardiovascular: Normal rate and regular rhythm.  Exam reveals no gallop and no friction rub.    No murmur heard.  Pulmonary/Chest: Effort normal and breath sounds normal. No respiratory distress. She has no wheezes.   Abdominal: Soft. Bowel sounds are normal. She exhibits no distension. There is no tenderness. There is no rebound and no guarding.   Musculoskeletal: She exhibits no edema.   Dystonia improving. Able to bend knee without assistance   Neurological: She is alert and oriented to person, place, and time.   Skin: She is not diaphoretic.   Nursing note and vitals reviewed.        Lab Data Review:       No results for input(s): SODIUM, POTASSIUM, CHLORIDE, CO2, BUN, CREATININE, MAGNESIUM, PHOSPHORUS, CALCIUM in the last 72 hours.    No results for input(s): ALTSGPT, ASTSGOT, ALKPHOSPHAT, TBILIRUBIN, DBILIRUBIN, GAMMAGT, AMYLASE, LIPASE, ALB, PREALBUMIN, GLUCOSE in the last 72 hours.    No results for input(s): RBC, HEMOGLOBIN, HEMATOCRIT, PLATELETCT, PROTHROMBTM, APTT, INR, IRON, FERRITIN, TOTIRONBC in the last 72 hours.              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 Symptoms have been intermittent for the past 3 years and has been this severe in the past. Has had extensive neurologic workup at outside facility  - Dystonia is in the right and left legs and feet, R>>L.   - anti-JACKIE antibody significantly elevated,  reflecting stiff-person syndrome being the most likely etiology. Also quick response to valium & baclofen.   - TSH, free T4, Ferrtin normal  - CRP 2.04, B12>1500.  - Continue Baclofen, Valium, IVIG (through 1/11)  - follow neuro recom.   - PT/OT following; recs for Home Health with PT 2x/week        Right leg pain- (present on admission)   Assessment & Plan    - from dystonia, intermittent.   - responding to valium & baclofen  - Improving with addition of IVIG  - continue same treatment.         Leukocytosis- (present on admission)   Assessment & Plan    - on admission WBC of 14.9, no bands. Afebrile, hemodynamically stable, nontoxic appearing.   - Very dehydrated could be secondary to concentration vs reactive to pain  - resolved        Vitamin D deficiency   Assessment & Plan    - Level of 9  - Vitamin D at 50,000 units Q7days        Hypomagnesemia- (present on admission)   Assessment & Plan    - s/p repletion with IV magnesium sulfate.   - repeat labs and replete as needed.         Type 2 diabetes mellitus untreated, with ketoacidosis - (present on admission)   Assessment & Plan    - Continue home medications, sliding scale insulin, hypoglycemia protocol  - HbA1c 8.3%.  - C-peptide normal

## 2018-01-11 NOTE — THERAPY
"Physical Therapy Treatment completed.   Bed Mobility:  Supine to Sit: Stand by Assist  Transfers: Sit to Stand: Stand by Assist  Gait: Level Of Assist: Stand by Assist with Front-Wheel Walker       Plan of Care: Patient with no further skilled PT needs in the acute care setting at this time  Discharge Recommendations: Equipment: Front-Wheel Walker. Post-acute therapy Discharge to home with outpatient or home health for additional skilled therapy services.     See \"Rehab Therapy-Acute\" Patient Summary Report for complete documentation.     Pt. is progressing as expected and demonstrating improved functional mobility, balance, activity tolerance, and strength. Pt. was able to ambulate w/FWW w/SBA demonstrating near baseline gait mechanics as per dtr. Dtr reports ambulating has become easier for the pt. and is able to tolerate longer distances. Pt. attempted to climb up/down 1 step, however, was unable to do so as the pt. was afraid of falling and did not feel steady. Pt. and dtr report they have no concerns of going up 1-2 steps at home as the pt's son typically will lift the pt. into the house when needed and is always available to do so. Pt. is in no need of acute skilled PT at this time. Anticipate pt. to d/c home soon with home health once medically clear.     "

## 2018-01-11 NOTE — PROGRESS NOTES
Received discharge orders. Removed IV. Went over discharge instructions with pt. All questions answered, patient feels safe to discharge. Patient went home with daughter. MD went over controlled substance form with pt, signed consent in chart. All belongings gathered and patient dressed.

## 2018-01-16 ENCOUNTER — OFFICE VISIT (OUTPATIENT)
Dept: MEDICAL GROUP | Facility: MEDICAL CENTER | Age: 53
End: 2018-01-16
Attending: FAMILY MEDICINE
Payer: COMMERCIAL

## 2018-01-16 VITALS
SYSTOLIC BLOOD PRESSURE: 96 MMHG | DIASTOLIC BLOOD PRESSURE: 68 MMHG | HEART RATE: 116 BPM | OXYGEN SATURATION: 97 % | RESPIRATION RATE: 12 BRPM

## 2018-01-16 DIAGNOSIS — R76.0 STIFF PERSON SYNDROME WITH POSITIVE GLUTAMIC ACID DECARBOXYLASE (GAD) ANTIBODY: ICD-10-CM

## 2018-01-16 DIAGNOSIS — E11.10 TYPE 2 DIABETES MELLITUS WITH KETOACIDOSIS WITHOUT COMA, UNSPECIFIED LONG TERM INSULIN USE STATUS: ICD-10-CM

## 2018-01-16 DIAGNOSIS — K21.9 GASTROESOPHAGEAL REFLUX DISEASE WITHOUT ESOPHAGITIS: ICD-10-CM

## 2018-01-16 DIAGNOSIS — E11.65 CONTROLLED TYPE 2 DIABETES MELLITUS WITH HYPERGLYCEMIA, WITH LONG-TERM CURRENT USE OF INSULIN (HCC): ICD-10-CM

## 2018-01-16 DIAGNOSIS — G25.82 STIFF PERSON SYNDROME WITH POSITIVE GLUTAMIC ACID DECARBOXYLASE (GAD) ANTIBODY: ICD-10-CM

## 2018-01-16 DIAGNOSIS — Z79.4 CONTROLLED TYPE 2 DIABETES MELLITUS WITH HYPERGLYCEMIA, WITH LONG-TERM CURRENT USE OF INSULIN (HCC): ICD-10-CM

## 2018-01-16 PROCEDURE — 96372 THER/PROPH/DIAG INJ SC/IM: CPT | Performed by: FAMILY MEDICINE

## 2018-01-16 PROCEDURE — 99204 OFFICE O/P NEW MOD 45 MIN: CPT | Performed by: FAMILY MEDICINE

## 2018-01-16 RX ORDER — DIAZEPAM 10 MG/1
10 TABLET ORAL EVERY 8 HOURS
Qty: 90 TAB | Refills: 0 | Status: CANCELLED | OUTPATIENT
Start: 2018-01-16 | End: 2018-02-15

## 2018-01-16 RX ORDER — BACLOFEN 10 MG/1
10 TABLET ORAL 3 TIMES DAILY
Qty: 90 TAB | Refills: 6 | Status: SHIPPED | OUTPATIENT
Start: 2018-01-16 | End: 2018-06-15

## 2018-01-16 RX ORDER — NAPROXEN 500 MG/1
500 TABLET ORAL 2 TIMES DAILY WITH MEALS
Qty: 60 TAB | Refills: 6 | Status: SHIPPED | OUTPATIENT
Start: 2018-01-16 | End: 2018-08-30

## 2018-01-16 RX ORDER — OMEPRAZOLE 20 MG/1
20 CAPSULE, DELAYED RELEASE ORAL DAILY
Qty: 30 CAP | Refills: 6 | Status: SHIPPED | OUTPATIENT
Start: 2018-01-16 | End: 2019-03-05

## 2018-01-16 NOTE — ASSESSMENT & PLAN NOTE
Patient presents today to establish care. Patient speaks predominant Bruneian and translation is provided through her daughter-in-law and son. She has over one year history of repeated episodes of prolonged spasms affecting her right lower extremity. This greatly inhibits her ability to walk and she spends a lot of time laying in bed. She has been hospitalized at [facility] previously do not have those records. She was recently hospitalized at [facility] and released on 1/11/18 with a diagnosis of stiff person syndrome with positive JACKIE antibodies. At the time of discharge she was given prescriptions for diazepam (filled), baclofen (not filled? Formulary), and famotidine also apparently not filled. She had some leftover gabapentin which ran out 2 days ago and patient has had much more immobility and spasm and pain diffusely in her right lower extremity. She has still continue taking the Valium. No recent fever, dysuria.

## 2018-01-17 NOTE — ASSESSMENT & PLAN NOTE
Patient reports that her morning blood sugars before breakfast are currently running about 120, before dinner 140-150. Patient is taking 1001 g of metformin twice daily and rarely is needing to use a sliding scale of insulin. She denies any recent low blood sugars characterized by headache or diaphoresis. Recent A1c was recorded at 8.3.

## 2018-01-17 NOTE — PROGRESS NOTES
Chief Complaint   Patient presents with   • Establish Care       HISTORY OF PRESENT ILLNESS: Patient is a 52 y.o. 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 Patient speaks predominant German and translation is provided through her daughter-in-law and son. She has over one year history of repeated episodes of prolonged spasms affecting her right lower extremity. This greatly inhibits her ability to walk and she spends a lot of time laying in bed. She has been hospitalized at [facility] previously do not have those records. She was recently hospitalized at [facility] and released on 1/11/18 with a diagnosis of stiff person syndrome with positive JACKIE antibodies. At the time of discharge she was given prescriptions for diazepam (filled), baclofen (not filled? Formulary), and famotidine also apparently not filled. She had some leftover gabapentin which ran out 2 days ago and patient has had much more immobility and spasm and pain diffusely in her right lower extremity. She has still continue taking the Valium. No recent fever, dysuria.    Type 2 diabetes mellitus untreated, with ketoacidosis   Patient reports that her morning blood sugars before breakfast are currently running about 120, before dinner 140-150. Patient is taking 1001 g of metformin twice daily and rarely is needing to use a sliding scale of insulin. She denies any recent low blood sugars characterized by headache or diaphoresis. Recent A1c was recorded at 8.3.    GERD (gastroesophageal reflux disease)  Patient reports that recently she has had epigastric pain along with some substernal burning. She apparently was unable to get a prescription for Pepcid filled after her hospital discharge last week. She typically is taking  Naprosyn which helps with her leg pains and stiffness. Denies any current black or bloody stools. 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 01/03/2018     Priority: High   • Right leg pain 01/03/2018     Priority: High   • Pyelonephritis 02/29/2016     Priority: High   • Severe sepsis with septic shock (CMS-HCC) 02/29/2016     Priority: High   • Leukocytosis 01/03/2018     Priority: Medium   • Pain in lower back 02/29/2016     Priority: Medium   • Lower extremity weakness 02/29/2016     Priority: Medium   • Vitamin D deficiency 01/07/2018     Priority: Low   • Hypomagnesemia 01/06/2018     Priority: Low   • Type 2 diabetes mellitus untreated, with ketoacidosis  02/29/2016     Priority: Low   • GERD (gastroesophageal reflux disease) 02/29/2016     Priority: Low   • Controlled type 2 diabetes mellitus with hyperglycemia, with long-term current use of insulin (CMS-HCC) 01/16/2018   • Bacteremia due to Escherichia coli 02/09/2015   • Hypokalemia 02/09/2015   • Hypophosphatemia 02/09/2015   • Hyperglycemia 02/09/2015   • Sepsis secondary to UTI (CMS-HCC) 02/06/2015   Social history-, not working Allergies:Patient has no known allergies.    Current Outpatient Prescriptions   Medication Sig Dispense Refill   • baclofen (LIORESAL) 10 MG Tab Take 1 Tab by mouth 3 times a day. 90 Tab 6   • naproxen (NAPROSYN) 500 MG Tab Take 1 Tab by mouth 2 times a day, with meals. 60 Tab 6   • omeprazole (PRILOSEC) 20 MG delayed-release capsule Take 1 Cap by mouth every day. 30 Cap 6   • naproxen (NAPROSYN) 375 MG Tab Take 1 Tab by mouth 2 times a day, with meals for 30 days. 60 Tab 0   • diazepam (VALIUM) 10 MG tablet Take 1 Tab by mouth every 8 hours for 30 days. 90 Tab 0   • artificial tears 1.4 % Solution Place 2 Drops in both eyes 4 times a day. 1 Bottle 3   • baclofen (LIORESAL) 10 MG Tab Take 1 Tab by mouth every  8 hours for 30 days. 90 Tab 0   • famotidine (PEPCID) 20 MG Tab Take 1 Tab by mouth 2 Times a Day for 30 days. 60 Tab 0   • vitamin D, Ergocalciferol, (DRISDOL) 69893 units Cap capsule Take 1 Cap by mouth every 7 days for 30 days. 4 Cap 0   • insulin glargine (BASAGLAR KWIKPEN) 100 UNIT/ML Solution Pen-injector injection Inject 5 Units as instructed every evening.     • glimepiride (AMARYL) 4 MG Tab Take 4 mg by mouth every morning.       No current facility-administered medications for this visit.        Social History   Substance Use Topics   • Smoking status: Never Smoker   • Smokeless tobacco: Never Used   • Alcohol use No       Family History   Problem Relation Age of Onset   • Diabetes Father    • Stroke Brother    • Cancer Neg Hx    • Heart Disease Neg Hx        ROS:  Review of Systems   Constitutional: Negative for fever, chills, weight loss and malaise/fatigue.   Eyes: Negative for blurred vision.   Respiratory: Negative for cough, sputum production, shortness of breath and wheezing.    Cardiovascular: Negative for chest pain, palpitations, orthopnea and leg swelling.   Gastrointestinal: Negative for heartburn, nausea, vomiting and abdominal pain.   Musculoskeletal: Positive for myalgias and stiffness diffusely in her right lower extremity  Endo/Heme/Allergies: Does not bruise/bleed easily.     Psychiatry- negative for depression, anxiety          Exam:  Blood pressure (!) 96/68, pulse (!) 116, resp. rate 12, SpO2 97 %.  General: Thin female who actually was carried in by her son and laid down for her entire visit  Head is grossly normal.  Neck: Supple without JVD or bruit. Thyroid is not enlarged. Trachea is midline.  Pulmonary: Clear to ausculation .  Normal effort. No rales, ronchi, or wheezing.  Cardiovascular: Regular rate and rhythm without murmur.  Abdomen-Abdomen is soft, normal bowel sounds, no masses, guarding, ororganomegaly, or tenderness.  Lower extremities- neg for pretibial edema, redness.  Diffuse tenderness over her right thigh calf and foot. Light touch is preserved bilaterally.      Please note that this dictation was created using voice recognition software. I have made every reasonable attempt to correct obvious errors, but I expect that there are errors of grammar and possibly content that I did not discover before finalizing the note.    Assessment/Plan:  1. 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 Has had recurrence of immobilizing pain and stiffness in her right leg past 2 days without any baclofen. Also has been recently out of Naprosyn as well. Baclofen appears to be necessary to control symptoms and keep patient from being rehospitalized.    2. Controlled type 2 diabetes mellitus with hyperglycemia, with long-term current use of insulin (CMS-[facility])     3. Gastroesophageal reflux disease without esophagitis     4. Type 2 diabetes mellitus with ketoacidosis without coma, unspecified long term insulin use status        Plan: 1. Rx baclofen 10 mg 3 times a day  2. Rx Naprosyn 500 mg twice a day with food-GI precautions reviewed  3. Rx omeprazole 20 mg by mouth daily  4. Recheck 2 weeks

## 2018-01-17 NOTE — ASSESSMENT & PLAN NOTE
Patient reports that recently she has had epigastric pain along with some substernal burning. She apparently was unable to get a prescription for Pepcid filled after her hospital discharge last week. She typically is taking Naprosyn which helps with her leg pains and stiffness. Denies any current black or bloody stools. No recent emesis

## 2018-01-23 ENCOUNTER — OFFICE VISIT (OUTPATIENT)
Dept: MEDICAL GROUP | Facility: MEDICAL CENTER | Age: 53
End: 2018-01-23
Attending: FAMILY MEDICINE
Payer: COMMERCIAL

## 2018-01-23 VITALS
OXYGEN SATURATION: 96 % | HEIGHT: 63 IN | WEIGHT: 90 LBS | TEMPERATURE: 97.1 F | SYSTOLIC BLOOD PRESSURE: 104 MMHG | DIASTOLIC BLOOD PRESSURE: 68 MMHG | BODY MASS INDEX: 15.95 KG/M2 | RESPIRATION RATE: 16 BRPM | HEART RATE: 92 BPM

## 2018-01-23 DIAGNOSIS — R76.0 STIFF PERSON SYNDROME WITH POSITIVE GLUTAMIC ACID DECARBOXYLASE (GAD) ANTIBODY: ICD-10-CM

## 2018-01-23 DIAGNOSIS — E11.65 CONTROLLED TYPE 2 DIABETES MELLITUS WITH HYPERGLYCEMIA, WITH LONG-TERM CURRENT USE OF INSULIN (HCC): ICD-10-CM

## 2018-01-23 DIAGNOSIS — M24.571 ANKLE CONTRACTURE, RIGHT: ICD-10-CM

## 2018-01-23 DIAGNOSIS — F33.41 RECURRENT MAJOR DEPRESSIVE DISORDER, IN PARTIAL REMISSION (HCC): ICD-10-CM

## 2018-01-23 DIAGNOSIS — Z12.39 SCREENING FOR MALIGNANT NEOPLASM OF BREAST: ICD-10-CM

## 2018-01-23 DIAGNOSIS — G25.82 STIFF PERSON SYNDROME WITH POSITIVE GLUTAMIC ACID DECARBOXYLASE (GAD) ANTIBODY: ICD-10-CM

## 2018-01-23 DIAGNOSIS — Z79.4 CONTROLLED TYPE 2 DIABETES MELLITUS WITH HYPERGLYCEMIA, WITH LONG-TERM CURRENT USE OF INSULIN (HCC): ICD-10-CM

## 2018-01-23 PROCEDURE — 99213 OFFICE O/P EST LOW 20 MIN: CPT | Performed by: FAMILY MEDICINE

## 2018-01-23 PROCEDURE — 99214 OFFICE O/P EST MOD 30 MIN: CPT | Performed by: FAMILY MEDICINE

## 2018-01-23 RX ORDER — BACLOFEN 10 MG/1
10 TABLET ORAL EVERY 8 HOURS
Qty: 90 TAB | Refills: 6 | Status: SHIPPED | OUTPATIENT
Start: 2018-01-23 | End: 2018-02-22

## 2018-01-23 ASSESSMENT — PAIN SCALES - GENERAL: PAINLEVEL: 3=SLIGHT PAIN

## 2018-01-23 NOTE — ASSESSMENT & PLAN NOTE
Patient reports she is taking once a day a oral antidepressant medication but does not recall the name. She reports that mood has been significantly improved since she has been on this medication, since early November 2017. Currently denies any suicidal ideation, confusion. Currently previous tearfulness has cleared.

## 2018-01-23 NOTE — ASSESSMENT & PLAN NOTE
Patient reports her mobility and flexibility have significantly improved since our last visit since getting back on the baclofen, along with continued use of the 10 mg diazepam. Patient reports that she is more mobile and does some housekeeping chores about 1 time per week rest of the time due to weakness and stiffness she is much more sedentary. Denies any difficulty swallowing or breathing.

## 2018-01-23 NOTE — ASSESSMENT & PLAN NOTE
Patient reports she has recently just been doing bedtime sugar checks as requested by her previous provider at the [facility] clinic. She reports nighttime sugars are running about 120. She is injecting 5 units of basic Jhonatan and has been holding Amaryl. Current weight is 40 pounds below what she weighed in 2015. She reports about 2 years ago she felt sad and did not eat as much. She denies any current diarrhea or symptomatically blood sugars. Recent A1c obtained in [facility] earlier this month was 8.3

## 2018-01-23 NOTE — PROGRESS NOTES
No chief complaint on file.      HISTORY OF PRESENT ILLNESS: Patient is a 52 y.o. 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 Patient reports that she is more mobile and does some housekeeping chores about 1 time per week rest of the time due to weakness and stiffness she is much more sedentary. Denies any difficulty swallowing or breathing.    Controlled type 2 diabetes mellitus with hyperglycemia, with long-term current use of insulin (CMS-HCC)  Patient reports she has recently just been doing bedtime sugar checks as requested by her previous provider at the [facility] clinic. She reports nighttime sugars are running about 120. She is injecting 5 units of basic Jhonatan and has been holding Amaryl. Current weight is 40 pounds below what she weighed in 2015. She reports about 2 years ago she felt sad and did not eat as much. She denies any current diarrhea or symptomatically blood sugars. Recent A1c obtained in [facility] earlier this month was 8.3    Recurrent major depressive disorder, in partial remission (CMS-HCC)  Patient reports she is taking once a day a oral antidepressant medication but does not recall the name. She reports that mood has been significantly improved since she has been on this medication, since early November 2017. Currently denies any suicidal ideation, confusion. Currently previous tearfulness has cleared.     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 01/03/2018     Priority: High   • Right leg pain 01/03/2018      Priority: High   • Pyelonephritis 02/29/2016     Priority: High   • Severe sepsis with septic shock (CMS-HCC) 02/29/2016     Priority: High   • Leukocytosis 01/03/2018     Priority: Medium   • Pain in lower back 02/29/2016     Priority: Medium   • Lower extremity weakness 02/29/2016     Priority: Medium   • Vitamin D deficiency 01/07/2018     Priority: Low   • Hypomagnesemia 01/06/2018     Priority: Low   • Type 2 diabetes mellitus untreated, with ketoacidosis  02/29/2016     Priority: Low   • GERD (gastroesophageal reflux disease) 02/29/2016     Priority: Low   • Recurrent major depressive disorder, in partial remission (CMS-HCC) 01/23/2018   • Ankle contracture, right 01/23/2018   • Controlled type 2 diabetes mellitus with hyperglycemia, with long-term current use of insulin (CMS-HCC) 01/16/2018   • Bacteremia due to Escherichia coli 02/09/2015   • Hypokalemia 02/09/2015   • Hypophosphatemia 02/09/2015   • Hyperglycemia 02/09/2015   • Sepsis secondary to UTI (CMS-HCC) 02/06/2015       Allergies:Patient has no known allergies.    Current Outpatient Prescriptions   Medication Sig Dispense Refill   • baclofen (LIORESAL) 10 MG Tab Take 1 Tab by mouth 3 times a day. 90 Tab 6   • naproxen (NAPROSYN) 500 MG Tab Take 1 Tab by mouth 2 times a day, with meals. 60 Tab 6   • omeprazole (PRILOSEC) 20 MG delayed-release capsule Take 1 Cap by mouth every day. 30 Cap 6   • naproxen (NAPROSYN) 375 MG Tab Take 1 Tab by mouth 2 times a day, with meals for 30 days. 60 Tab 0   • diazepam (VALIUM) 10 MG tablet Take 1 Tab by mouth every 8 hours for 30 days. 90 Tab 0   • artificial tears 1.4 % Solution Place 2 Drops in both eyes 4 times a day. 1 Bottle 3   • baclofen (LIORESAL) 10 MG Tab Take 1 Tab by mouth every 8 hours for 30 days. 90 Tab 0   • famotidine (PEPCID) 20 MG Tab Take 1 Tab by mouth 2 Times a Day for 30 days. 60 Tab 0   • vitamin D, Ergocalciferol, (DRISDOL) 71842 units Cap capsule Take 1 Cap by mouth every 7 days for 30  "days. 4 Cap 0   • insulin glargine (BASAGLAR KWIKPEN) 100 UNIT/ML Solution Pen-injector injection Inject 5 Units as instructed every evening.       No current facility-administered medications for this visit.        Social History   Substance Use Topics   • Smoking status: Never Smoker   • Smokeless tobacco: Never Used   • Alcohol use No       Family History   Problem Relation Age of Onset   • Diabetes Father    • Stroke Brother    • Cancer Neg Hx    • Heart Disease Neg Hx        ROS:  Review of Systems   Constitutional: Negative for fever, chills, weight loss and malaise/fatigue.   Eyes: Negative for blurred vision.   Respiratory: Negative for cough, sputum production, shortness of breath and wheezing.    Cardiovascular: Negative for chest pain, palpitations, orthopnea and leg swelling.   Gastrointestinal: Negative for heartburn, nausea, vomiting and abdominal pain.   Endo/Heme/Allergies: Does not bruise/bleed easily.               Exam:  Blood pressure 104/68, pulse 92, temperature 36.2 °C (97.1 °F), resp. rate 16, height 1.6 m (5' 2.99\"), weight 40.8 kg (90 lb), SpO2 96 %.  General:  Well nourished, well developed female in NAD  Head is grossly normal.  Neck: Supple without JVD or bruit. Thyroid is not enlarged. Trachea is midline.  Pulmonary: Clear to ausculation .  Normal effort. No rales, ronchi, or wheezing.  Cardiovascular: Regular rate and rhythm without murmur.  Abdomen-Abdomen is soft, normal bowel sounds, no masses, guarding, ororganomegaly, or tenderness.  Lower extremities- neg for pretibial edema, redness, tenderness.   Feet-with shoes and socks removed, feet show intact skin without redness, increased warmth, fissures, ulceration, swelling. Monofilament testing 10/10 bilaterally. Marked contracture of the right ankle noted in medial plantar flexion      Please note that this dictation was created using voice recognition software. I have made every reasonable attempt to correct obvious errors, but I " expect that there are errors of grammar and possibly content that I did not discover before finalizing the note.    Assessment/Plan:  1. 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 Ankle contracture, right     3. Controlled type 2 diabetes mellitus with hyperglycemia, with long-term current use of insulin (CMS-[facility])     4. Recurrent major depressive disorder, in partial remission (CMS-HCC)        Plan: 1. PT referral to assist with stiffness and right leg and right ankle  2. Patient will alternate checking before breakfast and before dinner sugars-revisit in 2 weeks  3. Patient will notify us of this by next visit with her current antidepressant medication is-plan on continuing  4.

## 2018-02-06 ENCOUNTER — OFFICE VISIT (OUTPATIENT)
Dept: MEDICAL GROUP | Facility: MEDICAL CENTER | Age: 53
End: 2018-02-06
Attending: FAMILY MEDICINE
Payer: COMMERCIAL

## 2018-02-06 VITALS
BODY MASS INDEX: 16.12 KG/M2 | RESPIRATION RATE: 20 BRPM | WEIGHT: 91 LBS | HEIGHT: 63 IN | HEART RATE: 110 BPM | DIASTOLIC BLOOD PRESSURE: 60 MMHG | SYSTOLIC BLOOD PRESSURE: 124 MMHG | OXYGEN SATURATION: 95 % | TEMPERATURE: 97.1 F

## 2018-02-06 DIAGNOSIS — E11.65 CONTROLLED TYPE 2 DIABETES MELLITUS WITH HYPERGLYCEMIA, WITH LONG-TERM CURRENT USE OF INSULIN (HCC): ICD-10-CM

## 2018-02-06 DIAGNOSIS — G25.82 STIFF PERSON SYNDROME WITH POSITIVE GLUTAMIC ACID DECARBOXYLASE (GAD) ANTIBODY: ICD-10-CM

## 2018-02-06 DIAGNOSIS — F33.41 RECURRENT MAJOR DEPRESSIVE DISORDER, IN PARTIAL REMISSION (HCC): ICD-10-CM

## 2018-02-06 DIAGNOSIS — Z12.39 SCREENING FOR MALIGNANT NEOPLASM OF BREAST: ICD-10-CM

## 2018-02-06 DIAGNOSIS — F32.4 MAJOR DEPRESSIVE DISORDER WITH SINGLE EPISODE, IN PARTIAL REMISSION (HCC): ICD-10-CM

## 2018-02-06 DIAGNOSIS — Z79.4 CONTROLLED TYPE 2 DIABETES MELLITUS WITH HYPERGLYCEMIA, WITH LONG-TERM CURRENT USE OF INSULIN (HCC): ICD-10-CM

## 2018-02-06 DIAGNOSIS — R76.0 STIFF PERSON SYNDROME WITH POSITIVE GLUTAMIC ACID DECARBOXYLASE (GAD) ANTIBODY: ICD-10-CM

## 2018-02-06 PROCEDURE — 99214 OFFICE O/P EST MOD 30 MIN: CPT | Performed by: FAMILY MEDICINE

## 2018-02-06 RX ORDER — ESCITALOPRAM OXALATE 20 MG/1
20 TABLET ORAL DAILY
Qty: 30 TAB | Refills: 3 | Status: SHIPPED | OUTPATIENT
Start: 2018-02-06 | End: 2018-06-15

## 2018-02-06 RX ORDER — DIAZEPAM 10 MG/1
10 TABLET ORAL EVERY 8 HOURS
Qty: 90 TAB | Refills: 2 | Status: SHIPPED | OUTPATIENT
Start: 2018-02-06 | End: 2018-03-08

## 2018-02-06 NOTE — PROGRESS NOTES
Chief Complaint   Patient presents with   • Foot Problem     her foot is starting to rotate inward again       HISTORY OF PRESENT ILLNESS: Patient is a 52 y.o. female established patient who presents today to to follow-up on diabetes mellitus, depression, stiff person syndrome-improved        Controlled type 2 diabetes mellitus with hyperglycemia, with long-term current use of insulin (CMS-HCC)  Patient is taking 5 units of Basaglar once daily. She reports morning sugars currently are running 100-110. In evening about an hour after dinner they are running 140. Patient is not reporting low blood sugars characterized by headache or diaphoresis. Recent A1c was 8.3 on 1/3/18. Patient is being very compliant with dietary restrictions.    Recurrent major depressive disorder, in partial remission (CMS-HCC)  Patient reports mood is improved. She would like to continue on the Lexapro 20 mg daily. This was started while she was a patient at [facility]. Not reporting any suicidal ideation. Reports that she did have more sadness and anxiety when she was more symptomatic with muscle stiffness involving her right leg.    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 She is walking with a walker with significant stiffness and minimal motion at her right ankle and her right knee most of the time. She is able to sit in chair and flex her knee and ankle fairly well. She is compliant taking a Valium and baclofen. Has also been taking Naprosyn recently. Is complaining however of upset stomach. Notes brown and sometimes darker colored stools as well.  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 01/03/2018     Priority: High   • Right leg pain 01/03/2018     Priority: High   •  Pyelonephritis 02/29/2016     Priority: High   • Severe sepsis with septic shock (CMS-HCC) 02/29/2016     Priority: High   • Leukocytosis 01/03/2018     Priority: Medium   • Pain in lower back 02/29/2016     Priority: Medium   • Lower extremity weakness 02/29/2016     Priority: Medium   • Vitamin D deficiency 01/07/2018     Priority: Low   • Hypomagnesemia 01/06/2018     Priority: Low   • Type 2 diabetes mellitus untreated, with ketoacidosis  02/29/2016     Priority: Low   • GERD (gastroesophageal reflux disease) 02/29/2016     Priority: Low   • Recurrent major depressive disorder, in partial remission (CMS-HCC) 01/23/2018   • Ankle contracture, right 01/23/2018   • Controlled type 2 diabetes mellitus with hyperglycemia, with long-term current use of insulin (CMS-HCC) 01/16/2018   • Bacteremia due to Escherichia coli 02/09/2015   • Hypokalemia 02/09/2015   • Hypophosphatemia 02/09/2015   • Hyperglycemia 02/09/2015   • Sepsis secondary to UTI (CMS-HCC) 02/06/2015       Allergies:Patient has no known allergies.    Current Outpatient Prescriptions   Medication Sig Dispense Refill   • escitalopram (LEXAPRO) 20 MG tablet Take 1 Tab by mouth every day. 30 Tab 3   • baclofen (LIORESAL) 10 MG Tab Take 1 Tab by mouth every 8 hours for 30 days. 90 Tab 6   • baclofen (LIORESAL) 10 MG Tab Take 1 Tab by mouth 3 times a day. 90 Tab 6   • naproxen (NAPROSYN) 500 MG Tab Take 1 Tab by mouth 2 times a day, with meals. 60 Tab 6   • omeprazole (PRILOSEC) 20 MG delayed-release capsule Take 1 Cap by mouth every day. 30 Cap 6   • naproxen (NAPROSYN) 375 MG Tab Take 1 Tab by mouth 2 times a day, with meals for 30 days. 60 Tab 0   • diazepam (VALIUM) 10 MG tablet Take 1 Tab by mouth every 8 hours for 30 days. 90 Tab 0   • artificial tears 1.4 % Solution Place 2 Drops in both eyes 4 times a day. 1 Bottle 3   • famotidine (PEPCID) 20 MG Tab Take 1 Tab by mouth 2 Times a Day for 30 days. 60 Tab 0   • vitamin D, Ergocalciferol, (DRISDOL) 50452  "units Cap capsule Take 1 Cap by mouth every 7 days for 30 days. 4 Cap 0   • insulin glargine (BASAGLAR KWIKPEN) 100 UNIT/ML Solution Pen-injector injection Inject 5 Units as instructed every evening.       No current facility-administered medications for this visit.        Social History   Substance Use Topics   • Smoking status: Never Smoker   • Smokeless tobacco: Never Used   • Alcohol use No       Family History   Problem Relation Age of Onset   • Diabetes Father    • Stroke Brother    • Cancer Neg Hx    • Heart Disease Neg Hx        ROS:  Review of Systems   Constitutional: Negative for fever, chills, weight loss and malaise/fatigue.   Eyes: Negative for blurred vision.   Respiratory: Negative for cough, sputum production, shortness of breath and wheezing.    Cardiovascular: Negative for chest pain, palpitations, orthopnea and leg swelling.   Endo/Heme/Allergies: Does not bruise/bleed easily.               Exam:  Blood pressure 124/60, pulse (!) 110, temperature 36.2 °C (97.1 °F), resp. rate 20, height 1.6 m (5' 3\"), weight 41.3 kg (91 lb), SpO2 95 %.  General:  Well nourished, well developed female in NAD  Head is grossly normal.  Neck: Supple without JVD or bruit. Thyroid is not enlarged. Trachea is midline.  Pulmonary: Clear to ausculation .  Normal effort. No rales, ronchi, or wheezing.  Cardiovascular: Regular rate and rhythm without murmur.  Abdomen-Abdomen is soft, normal bowel sounds, no masses, guarding, ororganomegaly, or tenderness.  Lower extremities- neg for pretibial edema, redness. With tenderness at the right knee right lower leg and right ankle. Moderate decreased active range of motion at the right knee and right ankle during walking  Feet-with shoes and socks removed, feet show intact skin without redness, increased warmth, fissures, ulceration, swelling. Monofilament testing 10/10 bilaterally  Psych-normal affect with good eye contact. Normal grooming. Oriented x4.      Please note that this " dictation was created using voice recognition software. I have made every reasonable attempt to correct obvious errors, but I expect that there are errors of grammar and possibly content that I did not discover before finalizing the note.    Assessment/Plan:  1. 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 Controlled type 2 diabetes mellitus with hyperglycemia, with long-term current use of insulin (CMS-HCC)     3. Screening for malignant neoplasm of breast  MA-SCREEN MAMMO W/CAD-BILAT   4. Major depressive disorder with single episode, in partial remission (CMS-HCC)     5. Recurrent major depressive disorder, in partial remission (CMS-HCC)        Plan: 1. May hold Naprosyn at this time  2. Continue on with omeprazole 20 mg capsules  3. Continue diazepam, baclofen-new diazepam 10 mg 3 times a day prescription 2 refills written  4. Patient is given phone number for [facility] to call and schedule, referral was completed  5. Continue to check alternating before breakfast and before dinner sugars with revisit in one month  6. Screening mammogram ordered

## 2018-02-06 NOTE — ASSESSMENT & PLAN NOTE
Patient reports mood is improved. She would like to continue on the Lexapro 20 mg daily. [facility] Not reporting any suicidal ideation. Reports that she did have more sadness and anxiety when she was more symptomatic with muscle stiffness involving her right leg.

## 2018-02-06 NOTE — ASSESSMENT & PLAN NOTE
Patient reports that she will at times have spasms involving her right ankle and foot. She is walking with a walker with significant stiffness and minimal motion at her right ankle and her right knee most of the time. She is able to sit in chair and flex her knee and ankle fairly well. She is compliant taking a Valium and baclofen. Has also been taking Naprosyn recently. Is complaining however of upset stomach. Notes brown and sometimes darker colored stools as well.

## 2018-02-06 NOTE — ASSESSMENT & PLAN NOTE
Patient is taking 5 units of Basaglar once daily. She reports morning sugars currently are running 100-110. In evening about an hour after dinner they are running 140. Patient is not reporting low blood sugars characterized by headache or diaphoresis. Recent A1c was 8.3 on 1/3/18. Patient is being very compliant with dietary restrictions.

## 2018-02-13 ENCOUNTER — PHYSICAL THERAPY (OUTPATIENT)
Dept: PHYSICAL THERAPY | Facility: REHABILITATION | Age: 53
End: 2018-02-13
Attending: FAMILY MEDICINE
Payer: COMMERCIAL

## 2018-02-13 DIAGNOSIS — M24.571 CONTRACTURE OF RIGHT ANKLE: ICD-10-CM

## 2018-02-13 DIAGNOSIS — G25.82 STIFF-MAN SYNDROME: ICD-10-CM

## 2018-02-13 DIAGNOSIS — M62.81 MUSCLE WEAKNESS (GENERALIZED): ICD-10-CM

## 2018-02-13 PROCEDURE — 97161 PT EVAL LOW COMPLEX 20 MIN: CPT

## 2018-02-13 ASSESSMENT — ENCOUNTER SYMPTOMS
PAIN SCALE AT LOWEST: 2
PAIN SCALE: 5
PAIN SCALE AT HIGHEST: 7

## 2018-02-14 ASSESSMENT — ACTIVITIES OF DAILY LIVING (ADL): POOR_BALANCE: 1

## 2018-02-14 NOTE — OP THERAPY EVALUATION
"  Outpatient Physical Therapy  INITIAL NEUROLOGICAL EVALUATION    [facility] Physical Therapy [facility]  [address]  [address]  [place] [address]  Phone:  [phone]  Fax:  [phone]    Date of Evaluation: 2018    Patient: Vicky Cheatham-Depintor  YOB: 1965  MRN: [id number]     Referring Provider: Chalo Whelan M.D.  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 Muscle weakness (generalized) M62.81   2. 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 Contracture of right ankle M24.571       Subjective:   History of Present Illness:     Mechanism of injury:  Pt presents to PT with her son and dtr for translation, primarily Bengali speaking.  Preferred name is Gabriela.  Complaint of progressive difficulty in walking with R>L LE stiffness over the last 4 years.  Had worsened to the point she was unable to walk or sit. Had to spend all day in bed.  Went to ED 2018 due to worsening pain.  It was there that she was officially dx'd with \"Stiff person's syndrome.\"  Started on new medications (Baclofen), which has helped some. Has started to be able to walk short distances. Needs assist from her 2 children to dress, shower, cook and clean.   Prior level of function:  Prior to new meds- bed bound mainly.  FWW when she could.  Unable to work.    Sleep disturbance:  Not disrupted  Pain:     Current pain ratin    At best pain ratin    At worst pain ratin    Location:  R LE.     Progression:  Improving  Social Support:     Lives in:  One-story house (5 to enter)    Lives with:  " Adult children  Hand dominance:  Right  Treatments:     Treatments tried: Had tried PT years ago, but made the cramps worse.    Current treatment:  Medication  Patient Goals:     Patient goals for therapy:  Improved balance, increased strength, decreased pain, increased motion and independence with ADLs/IADLs      Past Medical History:   Diagnosis Date   • Diabetes (CMS-HCC)      Past Surgical History:   Procedure Laterality Date   • APPENDECTOMY     • CHOLECYSTECTOMY     • PB  DELIVERY ONLY       Social History   Substance Use Topics   • Smoking status: Never Smoker   • Smokeless tobacco: Never Used   • Alcohol use No     Family and Occupational History     Social History   • Marital status:      Spouse name: N/A   • Number of children: N/A   • Years of education: N/A       Objective:   Active Range of Motion:   Active range of motion comments: AROM of B UEs is WNL.  AROM of the R LE is 0% due to rigidity, the L is grossly 60%. L LE with primary limitation of ankle DF due to PF contracture.       Passive Range of Motion:     Passive Range of Motion Comments:  UEs both WNL.      R LE- PROM is severely limited by tone.  Able to achieve knee flexion= 5 degrees, hip abd= 10 degrees, SLR= 20 degrees.  Otherwise joints are rigid.  The R ankle is PF to 60 deg. Painful to push EOR in any direction, but eryn at ankle.     L LE PROM is WNL except PF contracture to 40 degrees.      Strength:     Strength Comments:  Power  bilat= 25# avg of 3 trials.     Tone, Sensation and Coordination:   Tone:     Left upper extremity muscle tone: Normal    Right upper extremity muscle tone: Normal    Left lower extremity muscle tone: Contractures and Hypertonic    Right lower extremity muscle tone: Contractures, Rigid and Hypertonic    Modified Maisha:   Lower extremity (left):     Ankle dorsiflexion (knee extended): 4  Lower extremity (right):     Hip flexors: 4    Hip extensors: 3    Hip abductors: 3    Hip  adductors: 3    Knee flexors: 3    Knee extensors: 4    Ankle dorsiflexion (knee extended): 4    Plantar flexors: 4    Sensation   Upper extremity (left):     Light touch: Intact  Upper extremity (right):     Light touch: Intact  Lower extremity (left):     Light touch: Intact  Lower extremity (right):     Light touch: Intact    Balance/Gait Comments   Gait: uses FWW.  Walks on toes due to contractures.  Circumducts the R LE  Strong reliance on WBing through UEs. Indep with FWW for 50 feet with extra time and energy expenditure.     Unable to sit to any extent due to lacking hip flexion.      Ayanna for Supine to sit and sit to stand transitions.       Exercises/Treatment    Assessment, Response and Plan:   Impairments: abnormal ADL function, abnormal coordination, abnormal gait, abnormal muscle firing, abnormal or restricted ROM, activity intolerance, impaired functional mobility, impaired balance, impaired physical strength, lacks appropriate home exercise program, limited ADL's and pain with function    Assessment details:  Ms. Crooks is a 52 y.o female who presents to PT with complaint of declining function related to her newly diagnosed condition- stiff person's disease.  PT eval reveals severe rigidity primarily in the R LE which greatly limits ADLs  She is unable to sit to any extent.  Ambulation is now possible, but requires significant effort and pain mainly in the feet with WBing.  She has gained some function in her LEs after starting new med. Skilled PT services are indicated to address the mentioned functional limitations and enhance QOL.     Barriers to therapy:  Comorbidities (chronicity)  Goals:   Short Term Goals:   - Improve R knee flexion 5 degrees  - Improve hip flexion 5 degrees  - Able to ambulate 100 feet without LOB  Short term goal time span:  2-4 weeks      Long Term Goals:    - Able to prop sit on EOB without pain  - Children indep with handling skills to assist mother in  ROM/mobility    Long term goal time span:  6-8 weeks    Plan:   Therapy options:  Physical therapy treatment to continue  Planned therapy interventions:  E Stim Unattended (CPT 97445), Functional Training, Self Care (CPT 33461), Hot or Cold Pack Therapy (CPT 04048), Neuromuscular Re-education (CPT 24306), Therapeutic Activities (CPT 00666) and Therapeutic Exercise (CPT 16763)  Frequency:  2x week  Duration in weeks:  6  Discussed with:  Patient and family      Functional Limitation G-Codes and Severity Modifiers     Current:     Goal:         Referring provider co-signature:  I have reviewed this plan of care and my co-signature certifies the need for services.  Certification Dates:   From 2/13/18     To 5/13/18    Physician Signature: ________________________________ Date: ______________

## 2018-02-15 ENCOUNTER — TELEPHONE (OUTPATIENT)
Dept: MEDICAL GROUP | Facility: MEDICAL CENTER | Age: 53
End: 2018-02-15

## 2018-02-16 ENCOUNTER — HOSPITAL ENCOUNTER (EMERGENCY)
Facility: MEDICAL CENTER | Age: 53
End: 2018-02-16
Attending: EMERGENCY MEDICINE
Payer: COMMERCIAL

## 2018-02-16 VITALS
OXYGEN SATURATION: 96 % | BODY MASS INDEX: 17.67 KG/M2 | SYSTOLIC BLOOD PRESSURE: 95 MMHG | HEIGHT: 60 IN | WEIGHT: 90 LBS | HEART RATE: 83 BPM | TEMPERATURE: 98.2 F | RESPIRATION RATE: 18 BRPM | DIASTOLIC BLOOD PRESSURE: 60 MMHG

## 2018-02-16 DIAGNOSIS — G25.82 STIFF-MAN SYNDROME: ICD-10-CM

## 2018-02-16 DIAGNOSIS — M62.838 MUSCLE SPASM OF RIGHT LOWER EXTREMITY: ICD-10-CM

## 2018-02-16 LAB
ANION GAP SERPL CALC-SCNC: 6 MMOL/L (ref 0–11.9)
BASOPHILS # BLD AUTO: 0.7 % (ref 0–1.8)
BASOPHILS # BLD: 0.04 K/UL (ref 0–0.12)
BUN SERPL-MCNC: 16 MG/DL (ref 8–22)
CALCIUM SERPL-MCNC: 8.7 MG/DL (ref 8.4–10.2)
CHLORIDE SERPL-SCNC: 107 MMOL/L (ref 96–112)
CO2 SERPL-SCNC: 24 MMOL/L (ref 20–33)
CREAT SERPL-MCNC: 0.59 MG/DL (ref 0.5–1.4)
EOSINOPHIL # BLD AUTO: 0.04 K/UL (ref 0–0.51)
EOSINOPHIL NFR BLD: 0.7 % (ref 0–6.9)
ERYTHROCYTE [DISTWIDTH] IN BLOOD BY AUTOMATED COUNT: 43.7 FL (ref 35.9–50)
GLUCOSE SERPL-MCNC: 157 MG/DL (ref 65–99)
HCT VFR BLD AUTO: 34.3 % (ref 37–47)
HGB BLD-MCNC: 12.3 G/DL (ref 12–16)
IMM GRANULOCYTES # BLD AUTO: 0.01 K/UL (ref 0–0.11)
IMM GRANULOCYTES NFR BLD AUTO: 0.2 % (ref 0–0.9)
LYMPHOCYTES # BLD AUTO: 1.63 K/UL (ref 1–4.8)
LYMPHOCYTES NFR BLD: 28.8 % (ref 22–41)
MAGNESIUM SERPL-MCNC: 1.7 MG/DL (ref 1.5–2.5)
MCH RBC QN AUTO: 31.9 PG (ref 27–33)
MCHC RBC AUTO-ENTMCNC: 35.9 G/DL (ref 33.6–35)
MCV RBC AUTO: 89.1 FL (ref 81.4–97.8)
MONOCYTES # BLD AUTO: 0.28 K/UL (ref 0–0.85)
MONOCYTES NFR BLD AUTO: 4.9 % (ref 0–13.4)
NEUTROPHILS # BLD AUTO: 3.66 K/UL (ref 2–7.15)
NEUTROPHILS NFR BLD: 64.7 % (ref 44–72)
NRBC # BLD AUTO: 0 K/UL
NRBC BLD-RTO: 0 /100 WBC
PHOSPHATE SERPL-MCNC: 3.2 MG/DL (ref 2.5–4.5)
PLATELET # BLD AUTO: 230 K/UL (ref 164–446)
PMV BLD AUTO: 12.7 FL (ref 9–12.9)
POTASSIUM SERPL-SCNC: 3.5 MMOL/L (ref 3.6–5.5)
RBC # BLD AUTO: 3.85 M/UL (ref 4.2–5.4)
SODIUM SERPL-SCNC: 137 MMOL/L (ref 135–145)
WBC # BLD AUTO: 5.7 K/UL (ref 4.8–10.8)

## 2018-02-16 PROCEDURE — 84100 ASSAY OF PHOSPHORUS: CPT

## 2018-02-16 PROCEDURE — 99284 EMERGENCY DEPT VISIT MOD MDM: CPT

## 2018-02-16 PROCEDURE — 96374 THER/PROPH/DIAG INJ IV PUSH: CPT

## 2018-02-16 PROCEDURE — 85025 COMPLETE CBC W/AUTO DIFF WBC: CPT

## 2018-02-16 PROCEDURE — 83735 ASSAY OF MAGNESIUM: CPT

## 2018-02-16 PROCEDURE — 700111 HCHG RX REV CODE 636 W/ 250 OVERRIDE (IP): Performed by: EMERGENCY MEDICINE

## 2018-02-16 PROCEDURE — 80048 BASIC METABOLIC PNL TOTAL CA: CPT

## 2018-02-16 RX ORDER — MIDAZOLAM HYDROCHLORIDE 1 MG/ML
3 INJECTION INTRAMUSCULAR; INTRAVENOUS ONCE
Status: COMPLETED | OUTPATIENT
Start: 2018-02-16 | End: 2018-02-16

## 2018-02-16 RX ORDER — DIAZEPAM 5 MG/ML
5 INJECTION, SOLUTION INTRAMUSCULAR; INTRAVENOUS ONCE
Status: DISCONTINUED | OUTPATIENT
Start: 2018-02-16 | End: 2018-02-16

## 2018-02-16 RX ADMIN — MIDAZOLAM 3 MG: 1 INJECTION INTRAMUSCULAR; INTRAVENOUS at 20:55

## 2018-02-16 NOTE — TELEPHONE ENCOUNTER
Current medicines include diazepam ordered as a muscle relaxant. This is also typically what is used short-term as a anti-anxiety agent in cases of grief. We cannot really expand this or patient will be taking too much medication. Just need to continue to support her day by day as she works through the newness of her 's passing.

## 2018-02-16 NOTE — TELEPHONE ENCOUNTER
1. Caller Name:Daughter in Law                                       Call Back Number: [phone] (home)         Patient approves a detailed voicemail message: yes    Pt daughter in law called stating that the pt  has passed away and they are having difficulties helping her control her emotions. They are requesting medication to help her as she is not doing well since his passing. Please advise      Thank you

## 2018-02-17 NOTE — ED NOTES
Patient arrived via EMS with daughter, language barrier present, daughter . Patient has a long outstanding neurological disorder called stiff man syndrome. Patient was in severe pain this eveing with muscle spasms and unable to move R leg. EMS initiated 24 gauge in L hand with 2 mg of versed.

## 2018-02-17 NOTE — ED PROVIDER NOTES
"ED Provider Note    Scribed for Charles Alan M.D. by Charles Alan. 2018,  8:01 PM.    CHIEF COMPLAINT  Chief Complaint   Patient presents with   • Other Stiffness   • Muscle Spasms       [facility] [place]  Gabriela Cheatham-Depintor is a 52 y.o. female who presents to the Emergency Department for improving right leg pain secondary to a history of stiff person syndrome. This is a chronic condition for her, and is often associated with severe pain and spasms in the right leg. The patient's daughter translates, reports the patient was lying on the couch, and had sudden onset of her usual right leg pain. EMS was called due to the severity of pain, and IV was placed, and she received 2 mg of Versed, and reports that she is feeling somewhat better.    The patient's records confirm this history of \"stiff man syndrome,\" with chronic problems with the right leg.    Review of stiff person syndrome and up-to-date reveals that it is characterized by progressive muscle stiffness and spasms resulting in extremity pain and difficulty walking. It is caused by decreased CNS inhibition due to blockade of glutamic acid decarboxylase, which is an enzyme important for maintaining inhibitory pathways. The patient and her daughter report that this is treated at home with muscle relaxants and benzodiazepines, and those medicines are simply converted to IV form when she has more severe symptoms and is in the hospital. The patient and daughter deny any recent fevers, chills, nausea, vomiting, chest pain or shortness of breath, but reports significant increased stress because the patient's   yesterday.      REVIEW OF SYSTEMS  See HPI for further details. All other systems are negative.     PAST MEDICAL HISTORY   has a past medical history of Diabetes (CMS-HCC) (2015).    SOCIAL HISTORY  Social History     Social History Main Topics   • Smoking status: Never Smoker   • Smokeless tobacco: Never Used   • Alcohol use No   • Drug " use: No   • Sexual activity: Not on file     History   Drug Use No       SURGICAL HISTORY   has a past surgical history that includes cholecystectomy; appendectomy; and  delivery only.    CURRENT MEDICATIONS  Home Medications    **Home medications have not yet been reviewed for this encounter**         ALLERGIES  No Known Allergies    PHYSICAL EXAM  VITAL SIGNS: BP (!) 95/60   Pulse 83   Temp 36.8 °C (98.2 °F)   Resp 18   Ht 1.524 m (5')   Wt 40.8 kg (90 lb)   SpO2 96%   BMI 17.58 kg/m²   Pulse ox interpretation: I interpret this pulse ox as normal.  Constitutional: Alert, somewhat anxious and chronically ill-appearing.  HENT: No signs of trauma, Bilateral external ears normal, Nose normal.   Eyes: Conjunctiva normal, Non-icteric.   Neck: Normal range of motion, Supple, No stridor.   Lymphatic: No lymphadenopathy noted.   Cardiovascular: Regular rate and rhythm, no murmurs.   Thorax & Lungs: Normal breath sounds, No respiratory distress, No wheezing, No chest tenderness.   Abdomen: Bowel sounds normal, Soft, No tenderness, No masses, No pulsatile masses. No peritoneal signs.  Skin: Warm, Dry, No erythema, No rash.   Extremities: Intact distal pulses, No edema, No cyanosis.  Musculoskeletal: Some evidence of chronic muscle wasting. No or major deformities noted. No active muscle spasms.  Neurologic: Alert , Normal motor function, Normal sensory function, No focal deficits noted.   Psychiatric: Affect anxious, Judgment normal, Mood normal.     DIAGNOSTIC STUDIES / PROCEDURES    LABS  Labs Reviewed   CBC WITH DIFFERENTIAL - Abnormal; Notable for the following:        Result Value    RBC 3.85 (*)     Hematocrit 34.3 (*)     MCHC 35.9 (*)     All other components within normal limits   BASIC METABOLIC PANEL - Abnormal; Notable for the following:     Potassium 3.5 (*)     Glucose 157 (*)     All other components within normal limits    Narrative:     RECOLLECT POSSIBLE CONTAMINATION.   MAGNESIUM     Narrative:     RECOLLECT POSSIBLE CONTAMINATION.   PHOSPHORUS    Narrative:     RECOLLECT POSSIBLE CONTAMINATION.   ESTIMATED GFR    Narrative:     RECOLLECT POSSIBLE CONTAMINATION.     All labs reviewed by me.    RADIOLOGY  No orders to display     The radiologist's interpretation of all radiological studies have been reviewed by me.    COURSE & MEDICAL DECISION MAKING  Nursing notes, Jenna GTZ reviewed in chart.     8:01 PM Patient seen and examined at bedside. Differential diagnosis includes but is not limited to acute on chronic muscle spasms secondary to chronic neurodegenerative condition, perhaps exacerbated by the stress of the death of loved one yesterday, with and without laboratory joint abnormalities contributing. Ordered for basic laboratory tests to evaluate. Patient will be treated with IV Versed since IV Valium is not available for her symptoms.     8:58 PM this patient is feeling further improved after a repeat dose of Versed.    9:32 PM this patient's laboratory tests are very reassuring. 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 Her shoulder to the bedside showing that they have medications for her at home, they have additional concerns regarding this patient's ongoing difficulty with mobility, but fortunately have primary care follow-up already arranged for Wednesday of this coming week. I recommended the discuss options for home health, since this seems like it may be helpful to them, especially in the setting of the death of this patient's  very recently.     The patient will return for new or worsening symptoms and is stable at the time of discharge.    The patient is referred to a primary physician for blood pressure management, diabetic screening, and for all other preventative health concerns.      DISPOSITION:  Patient will be discharged home in  stable condition.    FOLLOW UP:  Chalo Whelan M.D.  [address] [place] [id number] [phone]    Call in 1 day        OUTPATIENT MEDICATIONS:  Discharge Medication List as of 2/16/2018  9:44 PM            FINAL IMPRESSION  1. Stiff-man syndrome    2. Muscle spasm of right lower extremity

## 2018-02-17 NOTE — DISCHARGE INSTRUCTIONS
Your laboratory tests were very reassuring. Please continue your home medications, and follow-up with your primary care doctor. Return to the emergency department for severe symptoms that cannot be managed by your regular doctor. Please ask your primary care doctor about making arrangements for home health to come visit from time to time. This can be helpful for wound care, medication management,     Muscle Cramps and Spasms  Muscle cramps and spasms occur when a muscle or muscles tighten and you have no control over this tightening (involuntary muscle contraction). They are a common problem and can develop in any muscle. The most common place is in the calf muscles of the leg. Both muscle cramps and muscle spasms are involuntary muscle contractions, but they also have differences:   · Muscle cramps are sporadic and painful. They may last a few seconds to a quarter of an hour. Muscle cramps are often more forceful and last longer than muscle spasms.  · Muscle spasms may or may not be painful. They may also last just a few seconds or much longer.  CAUSES   It is uncommon for cramps or spasms to be due to a serious underlying problem. In many cases, the cause of cramps or spasms is unknown. Some common causes are:   · Overexertion.    · Overuse from repetitive motions (doing the same thing over and over).    · Remaining in a certain position for a long period of time.    · Improper preparation, form, or technique while performing a sport or activity.    · Dehydration.    · Injury.    · Side effects of some medicines.    · Abnormally low levels of the salts and ions in your blood (electrolytes), especially potassium and calcium. This could happen if you are taking water pills (diuretics) or you are pregnant.    Some underlying medical problems can make it more likely to develop cramps or spasms. These include, but are not limited to:   · Diabetes.    · Parkinson disease.    · Hormone disorders, such as thyroid  problems.    · Alcohol abuse.    · Diseases specific to muscles, joints, and bones.    · Blood vessel disease where not enough blood is getting to the muscles.    HOME CARE INSTRUCTIONS   · Stay well hydrated. Drink enough water and fluids to keep your urine clear or pale yellow.  · It may be helpful to massage, stretch, and relax the affected muscle.  · For tight or tense muscles, use a warm towel, heating pad, or hot shower water directed to the affected area.  · If you are sore or have pain after a cramp or spasm, applying ice to the affected area may relieve discomfort.  ¨ Put ice in a plastic bag.  ¨ Place a towel between your skin and the bag.  ¨ Leave the ice on for 15-20 minutes, 03-04 times a day.  · Medicines used to treat a known cause of cramps or spasms may help reduce their frequency or severity. Only take over-the-counter or prescription medicines as directed by your caregiver.  SEEK MEDICAL CARE IF:   Your cramps or spasms get more severe, more frequent, or do not improve over time.   MAKE SURE YOU:   · Understand these instructions.  · Will watch your condition.  · Will get help right away if you are not doing well or get worse.     This information is not intended to replace advice given to you by your health care provider. Make sure you discuss any questions you have with your health care provider.     Document Released: 06/09/2003 Document Revised: 04/14/2014 Document Reviewed: 12/04/2013  [employer] Patient Education ©2016 [employer]

## 2018-02-19 ENCOUNTER — PHYSICAL THERAPY (OUTPATIENT)
Dept: PHYSICAL THERAPY | Facility: REHABILITATION | Age: 53
End: 2018-02-19
Attending: FAMILY MEDICINE
Payer: COMMERCIAL

## 2018-02-19 DIAGNOSIS — G25.82 STIFF-MAN SYNDROME: ICD-10-CM

## 2018-02-19 DIAGNOSIS — M62.81 MUSCLE WEAKNESS (GENERALIZED): ICD-10-CM

## 2018-02-19 PROCEDURE — 97110 THERAPEUTIC EXERCISES: CPT

## 2018-02-20 NOTE — OP THERAPY DAILY TREATMENT
Outpatient Physical Therapy  DAILY TREATMENT     [facility] Physical Therapy [address]  [address]  [place] [address]  Phone:  [phone]  Fax:  [phone]    Date: 02/19/2018    Patient: Gabriela Cheatham-Depintor  YOB: 1965  MRN: [id number]     Time Calculation  Start time: 1440  Stop time: 1530 Time Calculation (min): 50 minutes     Chief Complaint: Weakness and Difficulty Walking    Visit #: 2    SUBJECTIVE:  Pt reports several stressors over the last week. Pt's mother passed away one week ago and her  passed away from surgical complications 4 days ago. Pt will need to travel to [place] in the next 2 weeks for services. She reports feeling significantly better after the IVIG therapy in January but has slowly been stiffening again and losing function despite baclofen. Pt also reports dealing w/a wound between her buttocks that worsens w/sitting.    OBJECTIVE:       Therapeutic Exercises (CPT 72024):     Total treatment time spent on Therapeutic Exercises: 45 minutes.      1. Standing UBE w/CGA, x5 min    2. Fwd and back in parallel bars, 3x10 ft    3. Sidestep in parallel bars, 3x10 ft.    4. Sit to stand w/min A, x5    5. Sit to supine, Independently    6. Supine to sit , Independently via prone and SL        Pain rating before treatment: 4  Pain rating after treatment: 4    ASSESSMENT:   Pt tolerated ambulating over 200 ft using > 60% UEs and in equinus position.     PLAN/RECOMMENDATIONS:   Plan for treatment: therapy treatment to continue next visit.  Planned interventions for next visit: continue with current treatment.  Progress standing endurance and functional balance. Trial standing frame. UE and LE exercises for HEP

## 2018-02-21 ENCOUNTER — OFFICE VISIT (OUTPATIENT)
Dept: MEDICAL GROUP | Facility: MEDICAL CENTER | Age: 53
End: 2018-02-21
Attending: FAMILY MEDICINE
Payer: COMMERCIAL

## 2018-02-21 VITALS
DIASTOLIC BLOOD PRESSURE: 70 MMHG | HEART RATE: 100 BPM | TEMPERATURE: 98.1 F | WEIGHT: 90 LBS | SYSTOLIC BLOOD PRESSURE: 100 MMHG | OXYGEN SATURATION: 97 % | RESPIRATION RATE: 16 BRPM | BODY MASS INDEX: 17.58 KG/M2

## 2018-02-21 DIAGNOSIS — M53.3 TAIL BONE PAIN: ICD-10-CM

## 2018-02-21 DIAGNOSIS — G25.82 STIFF PERSON SYNDROME WITH POSITIVE GLUTAMIC ACID DECARBOXYLASE (GAD) ANTIBODY: ICD-10-CM

## 2018-02-21 DIAGNOSIS — R76.0 STIFF PERSON SYNDROME WITH POSITIVE GLUTAMIC ACID DECARBOXYLASE (GAD) ANTIBODY: ICD-10-CM

## 2018-02-21 PROBLEM — E83.42 HYPOMAGNESEMIA: Status: RESOLVED | Noted: 2018-01-06 | Resolved: 2018-02-21

## 2018-02-21 PROCEDURE — 99212 OFFICE O/P EST SF 10 MIN: CPT | Performed by: FAMILY MEDICINE

## 2018-02-21 PROCEDURE — 99214 OFFICE O/P EST MOD 30 MIN: CPT | Performed by: INTERNAL MEDICINE

## 2018-02-21 ASSESSMENT — PAIN SCALES - GENERAL: PAINLEVEL: 5=MODERATE PAIN

## 2018-02-22 ENCOUNTER — APPOINTMENT (OUTPATIENT)
Dept: PHYSICAL THERAPY | Facility: REHABILITATION | Age: 53
End: 2018-02-22
Attending: FAMILY MEDICINE
Payer: COMMERCIAL

## 2018-02-22 NOTE — ASSESSMENT & PLAN NOTE
Patient reports pain at the tip of her tailbone and between her gluteal cleft. She is concerned she might have a ulcer or cyst here. She has been applying some cream to the area without improvement. She states that the pain gets worse when she sits for long periods of time and she has been doing more sitting with physical therapy. She denies fevers or chills.

## 2018-02-22 NOTE — PROGRESS NOTES
Subjective:   Gabriela Cheatham-Depintor is a 52 y.o. 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 She states that she had severe pain and cramping in her right leg but it was more isolated to the lower part of the leg. Her  recently passed away which is likely the trigger of this event. She is wondering whether it is safe for her to fly to [place] for her 's . She is worried that she may have a flare of her symptoms while flying. She is unaware of any triggers. She takes baclofen and Valium to help with the symptoms.    Tail bone pain  Patient reports pain at the tip of her tailbone and between her gluteal cleft. She is concerned she might have a ulcer or cyst here. She has been applying some cream to the area without improvement. She states that the pain gets worse when she sits for long periods of time and she has been doing more sitting with physical therapy. She denies fevers or chills.       Current medicines (including changes today)  Current Outpatient Prescriptions   Medication Sig Dispense Refill   • escitalopram (LEXAPRO) 20 MG tablet Take 1 Tab by mouth every day. 30 Tab 3   • diazepam (VALIUM) 10 MG tablet Take 1 Tab by mouth every 8 hours for 30 days. 90 Tab 2   • baclofen (LIORESAL) 10 MG Tab Take 1 Tab by mouth every 8 hours for 30 days. 90 Tab 6   • baclofen (LIORESAL) 10 MG Tab Take 1 Tab by mouth 3 times a day. 90 Tab 6   • naproxen (NAPROSYN) 500 MG Tab Take 1 Tab by mouth 2 times a day, with meals. 60 Tab 6   • omeprazole (PRILOSEC) 20 MG delayed-release capsule Take 1 Cap by mouth every day. 30 Cap 6   • artificial tears 1.4 % Solution Place 2 Drops in both eyes 4 times a day. 1 Bottle 3   • insulin  glargine (BASAGLAR KWIKPEN) 100 UNIT/ML Solution Pen-injector injection Inject 5 Units as instructed every evening.       No current facility-administered medications for this visit.      She  has a past medical history of Diabetes (CMS-[facility]) (2015).    ROS   As above in HPI     Objective:     Blood pressure 100/70, pulse 100, temperature 36.7 °C (98.1 °F), resp. rate 16, weight 40.8 kg (90 lb), SpO2 97 %, not currently breastfeeding. Body mass index is 17.58 kg/m².   Physical Exam:  Constitutional: Alert, no distress.  Skin: Warm, dry, good turgor, no rashes in visible areas.  Eye: Equal, round and reactive, conjunctiva clear, lids normal.  Psych: Alert and oriented x3, normal affect and mood.  Rectal: no lesions or ulcerations or cyst at area of pain.  She is tender to palpation over the tip of the tail bone.  There is no bruising.      Assessment and Plan:   The following treatment plan was discussed    1. 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 I recommended she take her baclofen and Valium with her on the plane so that she can use these medicines if she starts to develop symptoms. We discussed that there is no way for me to prescribe her anything stronger than these medications to help with acute flares.    2. Tail bone pain  There is no evidence of cyst, infection, inflammation. Patient is very thin and I suspect she has been laying in her bed or sitting for long periods of time related to her dysfunction in the legs. She may have some bruising in this region as a result. I recommended she use a cushion when sitting. We discussed Tylenol or ibuprofen as needed for the pain.        Followup: Return if symptoms worsen or fail to improve.

## 2018-02-22 NOTE — ASSESSMENT & PLAN NOTE
Patient presents for emergency room follow-up after a flare of her stiff person syndrome last Friday. She states that she had severe pain and cramping in her right leg but it was more isolated to the lower part of the leg. Her  recently passed away which is likely the trigger of this event. She is wondering whether it is safe for her to fly to [place] for her 's . She is worried that she may have a flare of her symptoms while flying. She is unaware of any triggers. She takes baclofen and Valium to help with the symptoms.

## 2018-02-26 ENCOUNTER — PHYSICAL THERAPY (OUTPATIENT)
Dept: PHYSICAL THERAPY | Facility: REHABILITATION | Age: 53
End: 2018-02-26
Attending: FAMILY MEDICINE
Payer: COMMERCIAL

## 2018-02-26 DIAGNOSIS — G25.82 STIFF-MAN SYNDROME: ICD-10-CM

## 2018-02-26 PROCEDURE — 97116 GAIT TRAINING THERAPY: CPT

## 2018-02-26 PROCEDURE — 97140 MANUAL THERAPY 1/> REGIONS: CPT

## 2018-02-26 PROCEDURE — 97110 THERAPEUTIC EXERCISES: CPT

## 2018-02-27 NOTE — OP THERAPY DAILY TREATMENT
Outpatient Physical Therapy  DAILY TREATMENT     [facility] [address]  [address]  [place] [address]  Phone:  [phone]  Fax:  [phone]    Date: 02/26/2018    Patient: Gabriela Cheatham-Depintor  YOB: 1965  MRN: [id number]     Time Calculation  Start time: 1640  Stop time: 1730 Time Calculation (min): 50 minutes     Chief Complaint: Difficulty Walking and Weakness    Visit #: 3    SUBJECTIVE:  Pt reports feeling and moving a little bit better. Pt and family will be leaving this Friday for 2 weeks for her 's burial in [place].    OBJECTIVE:       Therapeutic Exercises (CPT 09363):     1. Standing Frame, w/bicep curls 3#, trunk rot ball pass, x15 min    Therapeutic Treatments and Modalities:     1. Manual Therapy (CPT 74514), PROM PKB, PROM ankle eversion, Supine hip/knee/ankle flex, x15 min    2. Gait Training (CPT 97630), w/FWW, x10 min    Time-based treatments/modalities:  Manual therapy minutes (CPT 62424): 15 minutes  Gait training minutes (CPT 21227): 10 minutes  Therapeutic exercise minutes (CPT 35213): 15 minutes       Pain rating before treatment: 1  Pain rating after treatment: 1    ASSESSMENT:   Pt w/decreased tone in prone position, able to achieve > 90 degrees knee flex. Ambulating > 200 ft w/o problem. Pt may benefit from soft ankle brace for ambulating longer distances.    PLAN/RECOMMENDATIONS:   Plan for treatment: therapy treatment to continue next visit.  Planned interventions for next visit: gait training (CPT 12353), manual therapy (CPT 02367) and therapeutic exercise (CPT 89062). Progress time in standing frame, add trunk rot

## 2018-03-05 ENCOUNTER — APPOINTMENT (OUTPATIENT)
Dept: PHYSICAL THERAPY | Facility: REHABILITATION | Age: 53
End: 2018-03-05
Attending: FAMILY MEDICINE
Payer: COMMERCIAL

## 2018-03-15 ENCOUNTER — APPOINTMENT (OUTPATIENT)
Dept: PHYSICAL THERAPY | Facility: REHABILITATION | Age: 53
End: 2018-03-15
Attending: FAMILY MEDICINE
Payer: COMMERCIAL

## 2018-05-11 ENCOUNTER — APPOINTMENT (OUTPATIENT)
Dept: NEUROLOGY | Facility: MEDICAL CENTER | Age: 53
End: 2018-05-11

## 2018-05-17 ENCOUNTER — OFFICE VISIT (OUTPATIENT)
Dept: MEDICAL GROUP | Facility: MEDICAL CENTER | Age: 53
End: 2018-05-17
Attending: FAMILY MEDICINE
Payer: COMMERCIAL

## 2018-05-17 VITALS
HEART RATE: 120 BPM | WEIGHT: 90 LBS | OXYGEN SATURATION: 96 % | TEMPERATURE: 97.4 F | RESPIRATION RATE: 18 BRPM | BODY MASS INDEX: 15.95 KG/M2 | HEIGHT: 63 IN | DIASTOLIC BLOOD PRESSURE: 78 MMHG | SYSTOLIC BLOOD PRESSURE: 98 MMHG

## 2018-05-17 DIAGNOSIS — G25.82 STIFF PERSON SYNDROME WITH POSITIVE GLUTAMIC ACID DECARBOXYLASE (GAD) ANTIBODY: ICD-10-CM

## 2018-05-17 DIAGNOSIS — F33.41 RECURRENT MAJOR DEPRESSIVE DISORDER, IN PARTIAL REMISSION (HCC): ICD-10-CM

## 2018-05-17 DIAGNOSIS — R76.0 STIFF PERSON SYNDROME WITH POSITIVE GLUTAMIC ACID DECARBOXYLASE (GAD) ANTIBODY: ICD-10-CM

## 2018-05-17 DIAGNOSIS — M24.571 ANKLE CONTRACTURE, RIGHT: ICD-10-CM

## 2018-05-17 PROCEDURE — 99214 OFFICE O/P EST MOD 30 MIN: CPT | Performed by: FAMILY MEDICINE

## 2018-05-17 PROCEDURE — 99213 OFFICE O/P EST LOW 20 MIN: CPT | Performed by: FAMILY MEDICINE

## 2018-05-17 RX ORDER — FLUOXETINE 10 MG/1
10 CAPSULE ORAL DAILY
Qty: 30 CAP | Refills: 3 | Status: SHIPPED | OUTPATIENT
Start: 2018-05-17 | End: 2019-03-07 | Stop reason: SDUPTHER

## 2018-05-17 RX ORDER — DIAZEPAM 5 MG/1
TABLET ORAL
Qty: 90 TAB | Refills: 2 | Status: SHIPPED | OUTPATIENT
Start: 2018-05-17 | End: 2018-08-29 | Stop reason: SDUPTHER

## 2018-05-17 ASSESSMENT — PAIN SCALES - GENERAL: PAINLEVEL: NO PAIN

## 2018-05-18 NOTE — PROGRESS NOTES
"Subjective:      aGbriela Cheatham-Depintor is a 52 y.o. female who presents with Follow-Up (stiff person syndrome with positive JACKIE)            HPI 1. Stiff person syndrome-patient reports that she discontinued her 3 times a day baclofen 10 mg and Valium 5 mg 3 times a day. She reports that she has not had right leg pain which had been a near daily occurrence since stopping those medicines. However she has developed repeated tremors that occur at the level of her hips bilaterally associated with changing position such as getting into a chair or getting into bed. She still continues to depend significantly on using a wheelchair. Denies urinary or fecal incontinence.  2. Depression-patient lost both her  and her mother in February. She spent 3 weeks in [place] and has returned to [place]. She reports she discontinued taking Lexapro in early February due to perceived lack of effect. She notes increasing feelings that seem to be worsening week by week of sadness and loss. She denies suicidal ideation. Patient notes that she had difficulty falling asleep and was given clonazepam 1 mg at bedtime while in [place]. She has been continuing to use that which causes her to fall asleep promptly.    ROS negative for fever, altered speech, syncope       Objective:     BP (!) 98/78   Pulse (!) 120   Temp 36.3 °C (97.4 °F)   Resp 18   Ht 1.6 m (5' 2.99\")   Wt 40.8 kg (90 lb)   SpO2 96%   Breastfeeding? No   BMI 15.95 kg/m²      Physical Exam  Gen.- alert, cooperative, in no acute distress  Neck- midline trachea, thyroid not enlarged or tender,supple, no cervical adenopathy  Chest-clear to auscultation and percussion with normal breath sounds. No retractions. Chest wall nontender  Cardiac- regular rhythm and rate. No murmur, thrill, or heave  Psych-normal affect with good eye contact. Normal grooming. Oriented x4.  Lower extremities-good flexibility at the left hip left knee and left ankle. Moderate stiffness and " partial contracture at the right ankle and moderately reduced flexion at the right knee. Full extension at both knees.          Assessment/Plan:     1. 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 Recurrent major depressive disorder, in partial remission (HCC)-worsening      3. Ankle contracture, right  Plan: 1. Recommend trial of Prozac 10 mg every morning to assist with depression  2. Recommend restarting diazepam 5 mg 3 times a day to assist with hip symptoms/stiff person syndrome  3. Patient is asked to suspend clonazepam when she is taking the diazepam 3 times daily  4. Revisit one month  5. Neurology referral-stiff person syndrome

## 2018-05-22 ENCOUNTER — TELEMEDICINE2 (OUTPATIENT)
Dept: URGENT CARE | Facility: CLINIC | Age: 53
End: 2018-05-22

## 2018-05-22 DIAGNOSIS — H57.89 REDNESS OF LEFT EYE: ICD-10-CM

## 2018-05-22 DIAGNOSIS — L56.8 PHOTOSENSITIVITY: ICD-10-CM

## 2018-05-22 DIAGNOSIS — H57.12 ACUTE LEFT EYE PAIN: ICD-10-CM

## 2018-05-22 PROCEDURE — 99213 OFFICE O/P EST LOW 20 MIN: CPT | Performed by: NURSE PRACTITIONER

## 2018-05-22 ASSESSMENT — ENCOUNTER SYMPTOMS
DIAPHORESIS: 0
FEVER: 0
CHILLS: 0
WEAKNESS: 0
EYE REDNESS: 1
PHOTOPHOBIA: 1
EYE PAIN: 1
EYE DISCHARGE: 0
DOUBLE VISION: 0
BLURRED VISION: 1

## 2018-05-22 NOTE — PROGRESS NOTES
Subjective:      Gabriela Cheatham-Depintor is a 52 y.o. female who presents with Conjunctivitis (left eye redness/ drainage/ irritation since yesterday )            Patient presents for a virtual visit visit.  Identification verified.  She was informed that encounter would be conducted using secure, encrypted videoconferencing equipment and consent was obtained.  Patient is primarily Setswana speaking and her daughter-in-law provides translation services.  Patient reports a new onset of left eye redness, pain, blurry vision, and photosensitivity.  She notes increased tearing but no purulence, matting, or crusting.  Denies any eye trauma or exposure to irritants.  No recent URI or allergic rhinitis.  No history of glaucoma or iritis.  She had some left over ophthalmic antibiotic drops and has used them since yesterday with no relief.          Review of Systems   Constitutional: Negative for chills, diaphoresis, fever and malaise/fatigue.   Eyes: Positive for blurred vision, photophobia, pain and redness. Negative for double vision and discharge.   Neurological: Negative for weakness.     Medications, Allergies, and current problem list reviewed today in Epic  PHM: significant medical history: stiff person syndrome and type 2 diabetes.       Objective:     There were no vitals taken for this visit.     Physical Exam   Constitutional: She appears well-developed and well-nourished. She appears distressed.   Eyes:   Patient holds hand over left eye due to pain.  She reports generalized orbital pain as well as periorbital TTP.  No erythema, rash, or lesion.  Increased clear watery drainage noted.  Unable to ascertain pupillary response due to image quality of virtual visit.  Diffuse conjunctival injection.     Pulmonary/Chest: Effort normal.   Neurological: She is alert.   Skin: She is not diaphoretic.               Assessment/Plan:     1. Acute left eye pain     2. Photosensitivity     3. Redness of left eye        Discussed exam findings with patient and daughter in law.  High suspicion for significant ophthalmic condition such as iritis, uveitis, or glaucoma.  Recommended further evaluation and care.  Patient will follow up immediately with [facility].  If they are unable to see her presently, she will go to the ED.  Patient and daughter in law verbalized understanding of and agreed with plan of care.

## 2018-06-15 ENCOUNTER — OFFICE VISIT (OUTPATIENT)
Dept: MEDICAL GROUP | Facility: MEDICAL CENTER | Age: 53
End: 2018-06-15
Attending: FAMILY MEDICINE
Payer: COMMERCIAL

## 2018-06-15 VITALS
TEMPERATURE: 96.8 F | RESPIRATION RATE: 20 BRPM | HEART RATE: 104 BPM | DIASTOLIC BLOOD PRESSURE: 78 MMHG | OXYGEN SATURATION: 95 % | BODY MASS INDEX: 15.77 KG/M2 | SYSTOLIC BLOOD PRESSURE: 110 MMHG | HEIGHT: 63 IN | WEIGHT: 89 LBS

## 2018-06-15 DIAGNOSIS — E08.65 DIABETES MELLITUS DUE TO UNDERLYING CONDITION, UNCONTROLLED, WITH HYPERGLYCEMIA, WITHOUT LONG-TERM CURRENT USE OF INSULIN (HCC): ICD-10-CM

## 2018-06-15 DIAGNOSIS — R76.0 STIFF PERSON SYNDROME WITH POSITIVE GLUTAMIC ACID DECARBOXYLASE (GAD) ANTIBODY: ICD-10-CM

## 2018-06-15 DIAGNOSIS — H57.89 REDNESS, EYE: ICD-10-CM

## 2018-06-15 DIAGNOSIS — F33.41 RECURRENT MAJOR DEPRESSIVE DISORDER, IN PARTIAL REMISSION (HCC): ICD-10-CM

## 2018-06-15 DIAGNOSIS — H57.89 EYE REDNESS: ICD-10-CM

## 2018-06-15 DIAGNOSIS — G25.82 STIFF PERSON SYNDROME WITH POSITIVE GLUTAMIC ACID DECARBOXYLASE (GAD) ANTIBODY: ICD-10-CM

## 2018-06-15 PROBLEM — E11.65 CONTROLLED TYPE 2 DIABETES MELLITUS WITH HYPERGLYCEMIA, WITH LONG-TERM CURRENT USE OF INSULIN (HCC): Status: RESOLVED | Noted: 2018-01-16 | Resolved: 2018-06-15

## 2018-06-15 PROBLEM — Z79.4 CONTROLLED TYPE 2 DIABETES MELLITUS WITH HYPERGLYCEMIA, WITH LONG-TERM CURRENT USE OF INSULIN (HCC): Status: RESOLVED | Noted: 2018-01-16 | Resolved: 2018-06-15

## 2018-06-15 PROCEDURE — 99213 OFFICE O/P EST LOW 20 MIN: CPT | Performed by: FAMILY MEDICINE

## 2018-06-15 PROCEDURE — 99214 OFFICE O/P EST MOD 30 MIN: CPT | Performed by: FAMILY MEDICINE

## 2018-06-15 ASSESSMENT — PAIN SCALES - GENERAL: PAINLEVEL: 6=MODERATE PAIN

## 2018-06-16 NOTE — PROGRESS NOTES
No chief complaint on file.      HISTORY OF PRESENT ILLNESS: Patient is a 52 y.o. 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 She does note a rapid tremor that develops in both lower extremities in the morning and improves later in the day. It also seems to be worse when she needs to empty her bladder. She is walking with her walker some of the time with her right leg being extremely stiff. She was able to walk into our office although it was very fatiguing for her. No recent falls.    Recurrent major depressive disorder, in partial remission (CMS-HCC) ([facility])  Patient reports mood has improved with significantly less tearful episodes since starting on the 10 mg dose of Prozac each morning about 1 month ago. There is no suicidal ideation or episodes of confusion. She lost her  in mid February 2018.    Diabetes mellitus due to underlying condition, uncontrolled, with hyperglycemia, without long-term current use of insulin ([facility])  Patient ports she's been out of true Metrix test strips for several months and has not done any recent sugar testing. Earlier the morning sugars are running about 110 and evening sugars about 140. She currently takes no insulin or oral diabetes medication. She does pursue a restricted diet avoiding sweets and limiting starches. No recent symptomatically blood sugars with diaphoresis or headache. A1c was measured at 8.5 in January while in [facility].    Redness, eye  Patient reports a 4 month history of recurrent redness in her left eye with some drainage in only her right eye. [place] lived here [place] history of seasonal allergies. There is no report of pain in either eye. 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 01/03/2018     Priority: High   • Right leg pain 01/03/2018     Priority: High   • Leukocytosis 01/03/2018     Priority: Medium   • Pain in lower back 02/29/2016     Priority: Medium   • Lower extremity weakness 02/29/2016     Priority: Medium   • Vitamin D deficiency 01/07/2018     Priority: Low   • Type 2 diabetes mellitus untreated, with ketoacidosis  02/29/2016     Priority: Low   • GERD (gastroesophageal reflux disease) 02/29/2016     Priority: Low   • Diabetes mellitus due to underlying condition, uncontrolled, with hyperglycemia, without long-term current use of insulin ([facility]) 06/15/2018   • Redness, eye 06/15/2018   • Tail bone pain 02/21/2018   • Recurrent major depressive disorder, in partial remission ([facility]) 01/23/2018   • Ankle contracture, right 01/23/2018   • Bacteremia due to Escherichia coli 02/09/2015   • Sepsis secondary to UTI ([facility]) 02/06/2015       Allergies:Patient has no known allergies.    Current Outpatient Prescriptions   Medication Sig Dispense Refill   • Blood Glucose Monitoring Suppl Supplies Misc Using True metrix  meter. Compatible test strips, use 2 X/day number 100 Lancets use 2 X/day number 100 1 Mutually Defined 6   • FLUoxetine (PROZAC) 10 MG Cap Take 1 Cap by mouth every day. 30 Cap 3   • diazePAM (VALIUM) 5 MG Tab 1 by mouth 3 times daily for muscle spasm 90 Tab 2   • naproxen (NAPROSYN) 500 MG Tab Take 1 Tab by mouth 2 times a day, with meals. 60 Tab 6   • omeprazole (PRILOSEC) 20 MG delayed-release capsule Take 1 Cap by mouth every day. 30 Cap 6   • artificial tears 1.4 % Solution Place 2 Drops in both eyes 4 times a day. 1 Bottle 3   • insulin glargine (BASAGLAR KWIKPEN) 100 UNIT/ML Solution Pen-injector injection Inject 5 Units as instructed every evening.       No current  "facility-administered medications for this visit.        Social History   Substance Use Topics   • Smoking status: Never Smoker   • Smokeless tobacco: Never Used   • Alcohol use No       Family History   Problem Relation Age of Onset   • Diabetes Father    • Stroke Brother    • Cancer Neg Hx    • Heart Disease Neg Hx        ROS:  Review of Systems   Constitutional: Negative for fever, chills, weight loss and malaise/fatigue.   Eyes: Positive for intermittently mildly blurred vision and redness in the left eye.   Respiratory: Negative for cough, sputum production, shortness of breath and wheezing.    Cardiovascular: Negative for chest pain, palpitations, orthopnea and leg swelling.   Gastrointestinal: Negative for heartburn, nausea, vomiting and abdominal pain.    Endo/Heme/Allergies: Does not bruise/bleed easily.               Exam:  Pulse (!) 104, temperature 36 °C (96.8 °F), resp. rate 20, height 1.6 m (5' 2.99\"), weight 40.4 kg (89 lb), SpO2 95 %. /78  General:  Well nourished, well developed female in NAD  Head is grossly normal.  Neck: Supple without JVD or bruit. Thyroid is not enlarged. Trachea is midline.  Pulmonary: Clear to ausculation .  Normal effort. No rales, ronchi, or wheezing.  Cardiovascular: Regular rate and rhythm without murmur.  Abdomen-Abdomen is soft, normal bowel sounds, no masses, guarding, ororganomegaly, or tenderness.  Lower extremities- neg for pretibial edema, redness. Marked stiffness at the right knee and ankle. Marked limp in gait using her walker with stiffness of her right lower extremity  Eyes-slight injection of the left eye as compared to the right. No current eye drainage. EOMI    Please note that this dictation was created using voice recognition software. I have made every reasonable attempt to correct obvious errors, but I expect that there are errors of grammar and possibly content that I did not discover before finalizing the note.    Assessment/Plan:  1. Stiff " person syndrome with positive glutamic acid decarboxylase (JACKIE) antibody     2. Eye redness     3. Diabetes mellitus due to underlying condition, uncontrolled, with hyperglycemia, without long-term current use of insulin (HCC)     4. Recurrent major depressive disorder, in partial remission (HCC)     5. Redness, eye        Plan: 1. Ophthalmology referral -if she will fit in the chair with her stiffness   2. Neurology referral has been placed-telephone number to schedule appointment with [facility] neurology given today  3. Continue on Prozac, 5 mg of diazepam 3 times a day  4. Revisit with me in 6 weeks  5. Obtain CBC, CMP, A1c, urine microalbumin before next visit  6. Treatment strips and lancets reordered, may test twice daily

## 2018-06-16 NOTE — ASSESSMENT & PLAN NOTE
Patient ports she's been out of true Metrix test strips for several months and has not done any recent sugar testing. Earlier the morning sugars are running about 110 and evening sugars about 140. She currently takes no insulin or oral diabetes medication. She does pursue a restricted diet avoiding sweets and limiting starches. No recent symptomatically blood sugars with diaphoresis or headache. A1c was measured at 8.5 in January while in [facility].

## 2018-06-16 NOTE — ASSESSMENT & PLAN NOTE
Patient reports a 4 month history of recurrent redness in her left eye with some drainage in only her right eye. [place] lived [place] history of seasonal allergies. There is no report of pain in either eye. Notes mild blurriness intermittently

## 2018-06-16 NOTE — ASSESSMENT & PLAN NOTE
Patient reports that she has been doing reasonably well taking the diazepam 5 mg 3 times daily. She does note a rapid tremor that develops in both lower extremities in the morning and improves later in the day. It also seems to be worse when she needs to empty her bladder. She is walking with her walker some of the time with her right leg being extremely stiff. She was able to walk into our office although it was very fatiguing for her. No recent falls.

## 2018-06-16 NOTE — ASSESSMENT & PLAN NOTE
Patient reports mood has improved with significantly less tearful episodes since starting on the 10 mg dose of Prozac each morning about 1 month ago. There is no suicidal ideation or episodes of confusion. She lost her  in mid February 2018.

## 2018-07-26 ENCOUNTER — HOSPITAL ENCOUNTER (OUTPATIENT)
Dept: LAB | Facility: MEDICAL CENTER | Age: 53
End: 2018-07-26
Attending: FAMILY MEDICINE
Payer: COMMERCIAL

## 2018-07-26 LAB
ALBUMIN SERPL BCP-MCNC: 4.8 G/DL (ref 3.2–4.9)
ALBUMIN/GLOB SERPL: 1.3 G/DL
ALP SERPL-CCNC: 133 U/L (ref 30–99)
ALT SERPL-CCNC: 16 U/L (ref 2–50)
ANION GAP SERPL CALC-SCNC: 18 MMOL/L (ref 0–11.9)
AST SERPL-CCNC: 19 U/L (ref 12–45)
BASOPHILS # BLD AUTO: 0.6 % (ref 0–1.8)
BASOPHILS # BLD: 0.06 K/UL (ref 0–0.12)
BILIRUB SERPL-MCNC: 0.5 MG/DL (ref 0.1–1.5)
BUN SERPL-MCNC: 15 MG/DL (ref 8–22)
CALCIUM SERPL-MCNC: 10.5 MG/DL (ref 8.5–10.5)
CHLORIDE SERPL-SCNC: 95 MMOL/L (ref 96–112)
CO2 SERPL-SCNC: 24 MMOL/L (ref 20–33)
CREAT SERPL-MCNC: 0.9 MG/DL (ref 0.5–1.4)
EOSINOPHIL # BLD AUTO: 0.04 K/UL (ref 0–0.51)
EOSINOPHIL NFR BLD: 0.4 % (ref 0–6.9)
ERYTHROCYTE [DISTWIDTH] IN BLOOD BY AUTOMATED COUNT: 39.2 FL (ref 35.9–50)
GLOBULIN SER CALC-MCNC: 3.8 G/DL (ref 1.9–3.5)
GLUCOSE SERPL-MCNC: 423 MG/DL (ref 65–99)
HCT VFR BLD AUTO: 48.5 % (ref 37–47)
HGB BLD-MCNC: 16.3 G/DL (ref 12–16)
IMM GRANULOCYTES # BLD AUTO: 0.04 K/UL (ref 0–0.11)
IMM GRANULOCYTES NFR BLD AUTO: 0.4 % (ref 0–0.9)
LYMPHOCYTES # BLD AUTO: 2.92 K/UL (ref 1–4.8)
LYMPHOCYTES NFR BLD: 30.4 % (ref 22–41)
MCH RBC QN AUTO: 30.4 PG (ref 27–33)
MCHC RBC AUTO-ENTMCNC: 33.6 G/DL (ref 33.6–35)
MCV RBC AUTO: 90.5 FL (ref 81.4–97.8)
MONOCYTES # BLD AUTO: 0.31 K/UL (ref 0–0.85)
MONOCYTES NFR BLD AUTO: 3.2 % (ref 0–13.4)
NEUTROPHILS # BLD AUTO: 6.22 K/UL (ref 2–7.15)
NEUTROPHILS NFR BLD: 65 % (ref 44–72)
NRBC # BLD AUTO: 0 K/UL
NRBC BLD-RTO: 0 /100 WBC
PLATELET # BLD AUTO: 265 K/UL (ref 164–446)
PMV BLD AUTO: 14 FL (ref 9–12.9)
POTASSIUM SERPL-SCNC: 3.9 MMOL/L (ref 3.6–5.5)
PROT SERPL-MCNC: 8.6 G/DL (ref 6–8.2)
RBC # BLD AUTO: 5.36 M/UL (ref 4.2–5.4)
SODIUM SERPL-SCNC: 137 MMOL/L (ref 135–145)
WBC # BLD AUTO: 9.6 K/UL (ref 4.8–10.8)

## 2018-07-26 PROCEDURE — 80053 COMPREHEN METABOLIC PANEL: CPT

## 2018-07-26 PROCEDURE — 83036 HEMOGLOBIN GLYCOSYLATED A1C: CPT

## 2018-07-26 PROCEDURE — 85025 COMPLETE CBC W/AUTO DIFF WBC: CPT

## 2018-07-26 PROCEDURE — 36415 COLL VENOUS BLD VENIPUNCTURE: CPT

## 2018-07-27 ENCOUNTER — OFFICE VISIT (OUTPATIENT)
Dept: MEDICAL GROUP | Facility: MEDICAL CENTER | Age: 53
End: 2018-07-27
Attending: FAMILY MEDICINE
Payer: COMMERCIAL

## 2018-07-27 ENCOUNTER — HOSPITAL ENCOUNTER (OUTPATIENT)
Facility: MEDICAL CENTER | Age: 53
End: 2018-07-27
Attending: FAMILY MEDICINE
Payer: COMMERCIAL

## 2018-07-27 VITALS
HEIGHT: 63 IN | SYSTOLIC BLOOD PRESSURE: 100 MMHG | HEART RATE: 84 BPM | TEMPERATURE: 97.1 F | OXYGEN SATURATION: 97 % | DIASTOLIC BLOOD PRESSURE: 64 MMHG | RESPIRATION RATE: 16 BRPM

## 2018-07-27 DIAGNOSIS — R76.0 STIFF PERSON SYNDROME WITH POSITIVE GLUTAMIC ACID DECARBOXYLASE (GAD) ANTIBODY: ICD-10-CM

## 2018-07-27 DIAGNOSIS — E11.65 TYPE 2 DIABETES MELLITUS WITH HYPERGLYCEMIA, WITHOUT LONG-TERM CURRENT USE OF INSULIN (HCC): ICD-10-CM

## 2018-07-27 DIAGNOSIS — G25.82 STIFF PERSON SYNDROME WITH POSITIVE GLUTAMIC ACID DECARBOXYLASE (GAD) ANTIBODY: ICD-10-CM

## 2018-07-27 LAB
CREAT UR-MCNC: 23.1 MG/DL
EST. AVERAGE GLUCOSE BLD GHB EST-MCNC: 335 MG/DL
HBA1C MFR BLD: 13.3 % (ref 0–5.6)
MICROALBUMIN UR-MCNC: 2.6 MG/DL
MICROALBUMIN/CREAT UR: 113 MG/G (ref 0–30)

## 2018-07-27 PROCEDURE — 99214 OFFICE O/P EST MOD 30 MIN: CPT | Performed by: FAMILY MEDICINE

## 2018-07-27 PROCEDURE — 82043 UR ALBUMIN QUANTITATIVE: CPT

## 2018-07-27 PROCEDURE — 82570 ASSAY OF URINE CREATININE: CPT

## 2018-07-27 RX ORDER — GLIMEPIRIDE 4 MG/1
2 TABLET ORAL 2 TIMES DAILY
Qty: 60 TAB | Refills: 6 | Status: SHIPPED | OUTPATIENT
Start: 2018-07-27 | End: 2018-09-14 | Stop reason: SDUPTHER

## 2018-07-28 NOTE — PROGRESS NOTES
Subjective:      Gabriela Cheatham-Depintor is a 52 y.o. female who presents with worsening stiffness and immobility of her right leg, poorly controlled diabetes          HPI 1. Stiff person syndrome-patient reports that right leg is extremely stiff and with the medial contraction of her right ankle is of very little use for her when she is trying to walk with her walker. Left leg is moderately stiff but is mainly what she bears weight on along with a lot of weight through her arms which are mobile. She is taking diazepam 5 mg 3 times daily. Not reporting any urinary incontinence or near syncope.  2. Diabetes mellitus-recent A1c shows marked loss of diabetes control with A1c of 13.3. Patient basically has not been taking her glimepiride or insulin since her   in February. She reports that when she has taken her medication makes her feel lightheaded. Denies current chest pain.    ROS negative for dysuria, urinary incontinence, palpitations, near syncope, focal numbness, altered speech  Current medications-  Prior to Admission medications    Medication Sig Start Date End Date Taking? Authorizing Provider   glimepiride (AMARYL) 4 MG Tab Take 0.5 Tabs by mouth 2 times a day. 18  Yes Chalo Whelan M.D.   Blood Glucose Monitoring Suppl (TRUE METRIX METER) w/Device Kit TEST TWICE A DAY 18   Chalo Whelan M.D.   Blood Glucose Monitoring Suppl Supplies Misc Using True metrix  meter. Compatible test strips, use 2 X/day number 100 Lancets use 2 X/day number 100 6/15/18   Chalo Whelan M.D.   FLUoxetine (PROZAC) 10 MG Cap Take 1 Cap by mouth every day. 18   Chalo Whelan M.D.   naproxen (NAPROSYN) 500 MG Tab Take 1 Tab by mouth 2 times a day, with meals. 18   Chalo Whelan M.D.   omeprazole (PRILOSEC) 20 MG delayed-release capsule Take 1 Cap by mouth every day. 18   Chalo Whelan M.D.   artificial tears 1.4 % Solution Place 2 Drops in both eyes 4 times a day.  "1/11/18   Dash Flores M.D.   insulin glargine (BASAGLAR KWIKPEN) 100 UNIT/ML Solution Pen-injector injection Inject 5 Units as instructed every evening.    Physician Outpatient     Social history-, not working     Objective:     /64   Pulse 84   Temp 36.2 °C (97.1 °F)   Resp 16   Ht 1.6 m (5' 2.99\")   SpO2 97%      Physical Exam   Gen.- alert, cooperative, in no acute distress  Neck- midline trachea, thyroid not enlarged or tender,supple, no cervical adenopathy  Chest-clear to auscultation and percussion with normal breath sounds. No retractions. Chest wall nontender  Cardiac- regular rhythm and rate. No murmur, thrill, or heave  Lower extremities-marked stiffness of the right hip knee and ankle with significant medial contracture of her right ankle. Moderate reduction in circumduction at the left ankle    Psych-normal affect with good eye contact. Normal grooming. Oriented x4.         Assessment/Plan:     1. 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 Type 2 diabetes mellitus with hyperglycemia, without long-term current use of insulin (HCC)-not controlled     Plan: 1. Neurology appointment pending for September  2. Retrial of physical therapy to try and increase mode ability/range of motion  3. Diazepam apparently renewed yesterday  4. Begin glimepiride 2 mg twice daily  5. Log blood sugars twice daily  6. Revisit with me 3 weeks    "

## 2018-08-07 ENCOUNTER — HOSPITAL ENCOUNTER (EMERGENCY)
Facility: MEDICAL CENTER | Age: 53
End: 2018-08-07
Attending: EMERGENCY MEDICINE
Payer: COMMERCIAL

## 2018-08-07 VITALS
TEMPERATURE: 98.6 F | HEART RATE: 83 BPM | WEIGHT: 90 LBS | DIASTOLIC BLOOD PRESSURE: 69 MMHG | BODY MASS INDEX: 15.95 KG/M2 | SYSTOLIC BLOOD PRESSURE: 98 MMHG | RESPIRATION RATE: 16 BRPM | HEIGHT: 63 IN | OXYGEN SATURATION: 98 %

## 2018-08-07 DIAGNOSIS — K08.89 PAIN, DENTAL: ICD-10-CM

## 2018-08-07 PROCEDURE — A9270 NON-COVERED ITEM OR SERVICE: HCPCS | Performed by: EMERGENCY MEDICINE

## 2018-08-07 PROCEDURE — 700102 HCHG RX REV CODE 250 W/ 637 OVERRIDE(OP): Performed by: EMERGENCY MEDICINE

## 2018-08-07 PROCEDURE — 99284 EMERGENCY DEPT VISIT MOD MDM: CPT

## 2018-08-07 RX ORDER — OXYCODONE HYDROCHLORIDE AND ACETAMINOPHEN 5; 325 MG/1; MG/1
2 TABLET ORAL ONCE
Status: COMPLETED | OUTPATIENT
Start: 2018-08-07 | End: 2018-08-07

## 2018-08-07 RX ORDER — TRAMADOL HYDROCHLORIDE 50 MG/1
100 TABLET ORAL EVERY 6 HOURS PRN
Qty: 10 TAB | Refills: 0 | Status: SHIPPED | OUTPATIENT
Start: 2018-08-07 | End: 2018-08-30 | Stop reason: SDUPTHER

## 2018-08-07 RX ORDER — PENICILLIN V POTASSIUM 500 MG/1
500 TABLET ORAL 4 TIMES DAILY
Qty: 40 TAB | Refills: 0 | Status: SHIPPED | OUTPATIENT
Start: 2018-08-07 | End: 2019-03-05

## 2018-08-07 RX ADMIN — OXYCODONE HYDROCHLORIDE AND ACETAMINOPHEN 2 TABLET: 5; 325 TABLET ORAL at 16:39

## 2018-08-07 ASSESSMENT — PAIN SCALES - GENERAL: PAINLEVEL_OUTOF10: 8

## 2018-08-07 NOTE — ED NOTES
"Chief Complaint   Patient presents with   • Dental Pain     Left lower tooth, pt having facial pain on her entire left side for a few days. Denies fevers. Pain intolerable now. Pt has hx stiff person syndrome so unable to sit up.      /68   Pulse 94   Temp 36.9 °C (98.5 °F)   Resp 16   Ht 1.6 m (5' 3\")   Wt 40.8 kg (90 lb)   SpO2 93%   BMI 15.94 kg/m²     "

## 2018-08-07 NOTE — ED PROVIDER NOTES
"ED Provider Note    CHIEF COMPLAINT  Chief Complaint   Patient presents with   • Dental Pain     Left lower tooth, pt having facial pain on her entire left side for a few days. Denies fevers. Pain intolerable now. Pt has hx stiff person syndrome so unable to sit up.        HPI  Gabriela Cheatham-Depintor is a 53 y.o. female here for evaluation of upper left dental pain.  The patient's states, via her daughter who is interpreting, that she has had pain over the last 1-2 days.  She is able to eat and drink, but has pain.  She denies any trauma, and states that chewing does make it worse.  She reports taking some Tylenol for with some relief of the pain, but that it quickly came back.  She has no vomiting, no fever, and no other medical complaints at this time.  The pain stays in the left upper jaw, and radiates to the left side of her head.  She has no neck pain, and no chest pain or shortness of breath.    PAST MEDICAL HISTORY   has a past medical history of Diabetes (HCC) (2015) and Stiff person syndrome.    SOCIAL HISTORY  Social History     Social History Main Topics   • Smoking status: Never Smoker   • Smokeless tobacco: Never Used   • Alcohol use No   • Drug use: No   • Sexual activity: No       SURGICAL HISTORY   has a past surgical history that includes cholecystectomy; appendectomy; and  delivery only.    CURRENT MEDICATIONS  Home Medications    **Home medications have not yet been reviewed for this encounter**         ALLERGIES  No Known Allergies    REVIEW OF SYSTEMS  See HPI for further details. Review of systems as above, otherwise all other systems are negative.     PHYSICAL EXAM  VITAL SIGNS: /68   Pulse 94   Temp 36.9 °C (98.5 °F)   Resp 16   Ht 1.6 m (5' 3\")   Wt 40.8 kg (90 lb)   LMP 2015   SpO2 93%   BMI 15.94 kg/m²     Constitutional: Well developed, well nourished. No acute distress.  HEENT: Normocephalic, atraumatic. MMM.  Poor dentition, no abscess, no " trismus  Neck: Supple, Full range of motion   Chest/Pulmonary:  No respiratory distress.  Equal expansion   Musculoskeletal: No deformity, no edema, neurovascular intact.   Neuro: Clear speech, appropriate, cooperative, cranial nerves II-XII grossly intact.  Psych: Normal mood and affect      PROCEDURES     MEDICAL RECORD  I have reviewed patient's medical record and pertinent results are listed above.    COURSE & MEDICAL DECISION MAKING  I have reviewed any medical record information, laboratory studies and radiographic results as noted above.    The patient is nontoxic appearing, afebrile, and able to open and close without difficulty.  She has no abscess noted on exam, and will be placed on antibiotics and pain medications.      I you have had any blood pressure issues while here in the emergency department, please see your doctor for a further evaluation or work up.    Differential diagnoses include but not limited to: Dental abscess, dental caries    This patient presents with dental caries.  At this time, I have counseled the patient/family regarding their medications, pain control, and follow up.  They will continue their medications, if any, as prescribed.  They will return immediately for any worsening symptoms and/or any other medical concerns.  They will see their doctor, or contact the doctor provided, in 1-2 days for follow up.       FINAL IMPRESSION  1. Pain, dental          Electronically signed by: Fernandez Batres, 8/7/2018 4:36 PM

## 2018-08-07 NOTE — ED NOTES
Medicated per order, released with all follow-up to english speaking daughter. Rx given and instructions. Daughter verbalized understanding follow-up.

## 2018-08-08 ENCOUNTER — PATIENT OUTREACH (OUTPATIENT)
Dept: HEALTH INFORMATION MANAGEMENT | Facility: OTHER | Age: 53
End: 2018-08-08

## 2018-08-08 NOTE — ED NOTES
"On release this pt was carried to the car, experienced \"stiff body syndrome\", she became rigid, shaking, son carried this pt to the car. Family states this patient was dx with this in January. This pt was placed in the back of her car by the son, she calmed down after several minutes, as she settled down her limbs would begin to bend so they could close the door and transport her home.  "

## 2018-08-17 ENCOUNTER — OFFICE VISIT (OUTPATIENT)
Dept: MEDICAL GROUP | Facility: MEDICAL CENTER | Age: 53
End: 2018-08-17
Attending: FAMILY MEDICINE
Payer: COMMERCIAL

## 2018-08-17 VITALS
TEMPERATURE: 97.7 F | RESPIRATION RATE: 16 BRPM | WEIGHT: 93 LBS | DIASTOLIC BLOOD PRESSURE: 62 MMHG | HEART RATE: 80 BPM | OXYGEN SATURATION: 97 % | SYSTOLIC BLOOD PRESSURE: 100 MMHG | BODY MASS INDEX: 16.48 KG/M2 | HEIGHT: 63 IN

## 2018-08-17 DIAGNOSIS — E11.10 TYPE 2 DIABETES MELLITUS WITH KETOACIDOSIS WITHOUT COMA, WITHOUT LONG-TERM CURRENT USE OF INSULIN (HCC): ICD-10-CM

## 2018-08-17 PROCEDURE — 99213 OFFICE O/P EST LOW 20 MIN: CPT | Performed by: FAMILY MEDICINE

## 2018-08-17 RX ORDER — PIOGLITAZONEHYDROCHLORIDE 30 MG/1
30 TABLET ORAL DAILY
Qty: 30 TAB | Refills: 11 | Status: SHIPPED | OUTPATIENT
Start: 2018-08-17 | End: 2018-09-14

## 2018-08-17 ASSESSMENT — PAIN SCALES - GENERAL: PAINLEVEL: NO PAIN

## 2018-08-18 NOTE — PROGRESS NOTES
"Subjective:      Gabriela Cheatham-Depintor is a 53 y.o. female who presents with No chief complaint on file.                 1. Uncontrolled diabetes mellitus-patient reports sugars were running as high as 390 and starting to fall more in the 200 300 range until August 3 when she ran out of strips for her meter. Has not done any testing since that time. Has not reported any low blood sugars characterized by headache or diaphoresis. She reports no ill effects such as nausea or swelling with Amaryl 4 mg twice daily. Previously she has taken metformin which caused nausea and lightheadedness. She is trying to avoid injecting insulin if possible    ROS negative for abdominal pain, dysuria, fever, chest pain       Objective:     /62   Pulse 80   Temp 36.5 °C (97.7 °F)   Resp 16   Ht 1.6 m (5' 2.99\")   Wt 42.2 kg (93 lb)   LMP 01/31/2015   SpO2 97%   Breastfeeding? No   BMI 16.48 kg/m²       Physical Exam  Gen.- alert, cooperative, in no acute distress  Neck- midline trachea, thyroid not enlarged or tender,supple, no cervical adenopathy  Chest-clear to auscultation and percussion with normal breath sounds. No retractions. Chest wall nontender  Cardiac- regular rhythm and rate. No murmur, thrill, or heave  Abdomen- normal bowel sounds, soft without guarding. Liver and spleen not enlarged, no palpable masses or tenderness.          Assessment/Plan:     1. Type 2 diabetes mellitus with ketoacidosis without coma, without long-term current use of insulin (HCC)    Plan: 1. Add Actos 30 mg by mouth daily 2 Amaryl 4 mg twice a day  2. Rx for true metrics meter/strips  3. Revisit one month with blood glucose log  4. Upcoming neurology appointment at [facility] is sometime in September. Patient reports that she got about a months worth of increased flexibility in her right leg after receiving IVIG back in January.      "

## 2018-08-29 DIAGNOSIS — G25.82 STIFF PERSON SYNDROME WITH POSITIVE GLUTAMIC ACID DECARBOXYLASE (GAD) ANTIBODY: ICD-10-CM

## 2018-08-29 DIAGNOSIS — R76.0 STIFF PERSON SYNDROME WITH POSITIVE GLUTAMIC ACID DECARBOXYLASE (GAD) ANTIBODY: ICD-10-CM

## 2018-08-30 ENCOUNTER — APPOINTMENT (OUTPATIENT)
Dept: RADIOLOGY | Facility: MEDICAL CENTER | Age: 53
End: 2018-08-30
Attending: EMERGENCY MEDICINE
Payer: COMMERCIAL

## 2018-08-30 ENCOUNTER — HOSPITAL ENCOUNTER (EMERGENCY)
Facility: MEDICAL CENTER | Age: 53
End: 2018-08-30
Attending: EMERGENCY MEDICINE
Payer: COMMERCIAL

## 2018-08-30 VITALS
DIASTOLIC BLOOD PRESSURE: 77 MMHG | SYSTOLIC BLOOD PRESSURE: 99 MMHG | HEART RATE: 65 BPM | RESPIRATION RATE: 20 BRPM | BODY MASS INDEX: 17.36 KG/M2 | WEIGHT: 98 LBS | OXYGEN SATURATION: 96 % | HEIGHT: 63 IN | TEMPERATURE: 98.1 F

## 2018-08-30 DIAGNOSIS — R51.9 NONINTRACTABLE HEADACHE, UNSPECIFIED CHRONICITY PATTERN, UNSPECIFIED HEADACHE TYPE: ICD-10-CM

## 2018-08-30 DIAGNOSIS — K08.89 PAIN, DENTAL: ICD-10-CM

## 2018-08-30 DIAGNOSIS — G25.82 STIFF PERSON SYNDROME WITH POSITIVE GLUTAMIC ACID DECARBOXYLASE (GAD) ANTIBODY: ICD-10-CM

## 2018-08-30 DIAGNOSIS — L56.8 PHOTOSENSITIVITY: ICD-10-CM

## 2018-08-30 DIAGNOSIS — R76.0 STIFF PERSON SYNDROME WITH POSITIVE GLUTAMIC ACID DECARBOXYLASE (GAD) ANTIBODY: ICD-10-CM

## 2018-08-30 DIAGNOSIS — R11.0 NAUSEA: ICD-10-CM

## 2018-08-30 LAB
ALBUMIN SERPL BCP-MCNC: 4.4 G/DL (ref 3.2–4.9)
ALBUMIN/GLOB SERPL: 1.2 G/DL
ALP SERPL-CCNC: 132 U/L (ref 30–99)
ALT SERPL-CCNC: 25 U/L (ref 2–50)
ANION GAP SERPL CALC-SCNC: 14 MMOL/L (ref 0–11.9)
ANION GAP SERPL CALC-SCNC: 7 MMOL/L (ref 0–11.9)
APPEARANCE UR: CLEAR
AST SERPL-CCNC: 23 U/L (ref 12–45)
BASOPHILS # BLD AUTO: 0.4 % (ref 0–1.8)
BASOPHILS # BLD: 0.03 K/UL (ref 0–0.12)
BILIRUB SERPL-MCNC: 0.6 MG/DL (ref 0.1–1.5)
BILIRUB UR QL STRIP.AUTO: NEGATIVE
BUN SERPL-MCNC: 13 MG/DL (ref 8–22)
BUN SERPL-MCNC: 14 MG/DL (ref 8–22)
CALCIUM SERPL-MCNC: 8.5 MG/DL (ref 8.5–10.5)
CALCIUM SERPL-MCNC: 9.8 MG/DL (ref 8.5–10.5)
CHLORIDE SERPL-SCNC: 107 MMOL/L (ref 96–112)
CHLORIDE SERPL-SCNC: 99 MMOL/L (ref 96–112)
CO2 SERPL-SCNC: 24 MMOL/L (ref 20–33)
CO2 SERPL-SCNC: 27 MMOL/L (ref 20–33)
COLOR UR: YELLOW
CREAT SERPL-MCNC: 0.61 MG/DL (ref 0.5–1.4)
CREAT SERPL-MCNC: 0.73 MG/DL (ref 0.5–1.4)
EOSINOPHIL # BLD AUTO: 0.04 K/UL (ref 0–0.51)
EOSINOPHIL NFR BLD: 0.5 % (ref 0–6.9)
ERYTHROCYTE [DISTWIDTH] IN BLOOD BY AUTOMATED COUNT: 37.5 FL (ref 35.9–50)
GLOBULIN SER CALC-MCNC: 3.7 G/DL (ref 1.9–3.5)
GLUCOSE SERPL-MCNC: 190 MG/DL (ref 65–99)
GLUCOSE SERPL-MCNC: 231 MG/DL (ref 65–99)
GLUCOSE UR STRIP.AUTO-MCNC: 100 MG/DL
HCG SERPL QL: NEGATIVE
HCT VFR BLD AUTO: 46.4 % (ref 37–47)
HGB BLD-MCNC: 16.3 G/DL (ref 12–16)
IMM GRANULOCYTES # BLD AUTO: 0.02 K/UL (ref 0–0.11)
IMM GRANULOCYTES NFR BLD AUTO: 0.2 % (ref 0–0.9)
KETONES UR STRIP.AUTO-MCNC: NEGATIVE MG/DL
LEUKOCYTE ESTERASE UR QL STRIP.AUTO: NEGATIVE
LYMPHOCYTES # BLD AUTO: 2.22 K/UL (ref 1–4.8)
LYMPHOCYTES NFR BLD: 26.9 % (ref 22–41)
MCH RBC QN AUTO: 30.4 PG (ref 27–33)
MCHC RBC AUTO-ENTMCNC: 35.1 G/DL (ref 33.6–35)
MCV RBC AUTO: 86.6 FL (ref 81.4–97.8)
MICRO URNS: ABNORMAL
MONOCYTES # BLD AUTO: 0.32 K/UL (ref 0–0.85)
MONOCYTES NFR BLD AUTO: 3.9 % (ref 0–13.4)
NEUTROPHILS # BLD AUTO: 5.62 K/UL (ref 2–7.15)
NEUTROPHILS NFR BLD: 68.1 % (ref 44–72)
NITRITE UR QL STRIP.AUTO: NEGATIVE
NRBC # BLD AUTO: 0 K/UL
NRBC BLD-RTO: 0 /100 WBC
PH UR STRIP.AUTO: 7.5 [PH]
PLATELET # BLD AUTO: 201 K/UL (ref 164–446)
PMV BLD AUTO: 13.7 FL (ref 9–12.9)
POTASSIUM SERPL-SCNC: 3.6 MMOL/L (ref 3.6–5.5)
POTASSIUM SERPL-SCNC: 3.9 MMOL/L (ref 3.6–5.5)
PROT SERPL-MCNC: 8.1 G/DL (ref 6–8.2)
PROT UR QL STRIP: NEGATIVE MG/DL
RBC # BLD AUTO: 5.36 M/UL (ref 4.2–5.4)
RBC UR QL AUTO: NEGATIVE
SODIUM SERPL-SCNC: 137 MMOL/L (ref 135–145)
SODIUM SERPL-SCNC: 141 MMOL/L (ref 135–145)
SP GR UR STRIP.AUTO: 1.01
UROBILINOGEN UR STRIP.AUTO-MCNC: 0.2 MG/DL
WBC # BLD AUTO: 8.3 K/UL (ref 4.8–10.8)

## 2018-08-30 PROCEDURE — 80053 COMPREHEN METABOLIC PANEL: CPT

## 2018-08-30 PROCEDURE — 81003 URINALYSIS AUTO W/O SCOPE: CPT

## 2018-08-30 PROCEDURE — 84703 CHORIONIC GONADOTROPIN ASSAY: CPT

## 2018-08-30 PROCEDURE — A9270 NON-COVERED ITEM OR SERVICE: HCPCS | Performed by: EMERGENCY MEDICINE

## 2018-08-30 PROCEDURE — 80048 BASIC METABOLIC PNL TOTAL CA: CPT

## 2018-08-30 PROCEDURE — 85025 COMPLETE CBC W/AUTO DIFF WBC: CPT

## 2018-08-30 PROCEDURE — 700102 HCHG RX REV CODE 250 W/ 637 OVERRIDE(OP): Performed by: EMERGENCY MEDICINE

## 2018-08-30 PROCEDURE — 700111 HCHG RX REV CODE 636 W/ 250 OVERRIDE (IP): Performed by: EMERGENCY MEDICINE

## 2018-08-30 PROCEDURE — 96374 THER/PROPH/DIAG INJ IV PUSH: CPT

## 2018-08-30 PROCEDURE — 700105 HCHG RX REV CODE 258: Performed by: EMERGENCY MEDICINE

## 2018-08-30 PROCEDURE — 70450 CT HEAD/BRAIN W/O DYE: CPT

## 2018-08-30 PROCEDURE — 99285 EMERGENCY DEPT VISIT HI MDM: CPT

## 2018-08-30 PROCEDURE — 36415 COLL VENOUS BLD VENIPUNCTURE: CPT

## 2018-08-30 RX ORDER — DIAZEPAM 5 MG/1
TABLET ORAL
Qty: 90 TAB | Refills: 2 | Status: SHIPPED | OUTPATIENT
Start: 2018-08-30 | End: 2018-08-31 | Stop reason: SDUPTHER

## 2018-08-30 RX ORDER — ACETAMINOPHEN 325 MG/1
325 TABLET ORAL EVERY 4 HOURS PRN
Qty: 30 TAB | Refills: 0 | Status: SHIPPED | OUTPATIENT
Start: 2018-08-30 | End: 2018-09-04

## 2018-08-30 RX ORDER — IBUPROFEN 800 MG/1
800 TABLET ORAL ONCE
Status: COMPLETED | OUTPATIENT
Start: 2018-08-30 | End: 2018-08-30

## 2018-08-30 RX ORDER — SODIUM CHLORIDE 9 MG/ML
1000 INJECTION, SOLUTION INTRAVENOUS ONCE
Status: COMPLETED | OUTPATIENT
Start: 2018-08-30 | End: 2018-08-30

## 2018-08-30 RX ORDER — ACETAMINOPHEN 325 MG/1
1000 TABLET ORAL ONCE
Status: COMPLETED | OUTPATIENT
Start: 2018-08-30 | End: 2018-08-30

## 2018-08-30 RX ORDER — TRAMADOL HYDROCHLORIDE 50 MG/1
100 TABLET ORAL EVERY 6 HOURS PRN
Qty: 10 TAB | Refills: 0 | Status: SHIPPED | OUTPATIENT
Start: 2018-08-30 | End: 2018-09-02

## 2018-08-30 RX ORDER — IBUPROFEN 400 MG/1
400 TABLET ORAL EVERY 6 HOURS PRN
Qty: 30 TAB | Refills: 0 | Status: SHIPPED | OUTPATIENT
Start: 2018-08-30 | End: 2019-10-08

## 2018-08-30 RX ORDER — DIAZEPAM 5 MG/1
TABLET ORAL
Qty: 90 TAB | Refills: 2 | OUTPATIENT
Start: 2018-08-30 | End: 2018-12-11

## 2018-08-30 RX ADMIN — PROCHLORPERAZINE EDISYLATE 10 MG: 5 INJECTION INTRAMUSCULAR; INTRAVENOUS at 17:23

## 2018-08-30 RX ADMIN — ACETAMINOPHEN 975 MG: 325 TABLET, FILM COATED ORAL at 17:23

## 2018-08-30 RX ADMIN — SODIUM CHLORIDE 1000 ML: 9 INJECTION, SOLUTION INTRAVENOUS at 17:23

## 2018-08-30 RX ADMIN — IBUPROFEN 800 MG: 800 TABLET, FILM COATED ORAL at 17:23

## 2018-08-30 ASSESSMENT — PAIN SCALES - GENERAL
PAINLEVEL_OUTOF10: 0
PAINLEVEL_OUTOF10: 10

## 2018-08-30 NOTE — ED PROVIDER NOTES
"                                                                       ED Provider Note    Scribed for Sesar Juares M.D. by Lucinda Griffin. 8/30/2018  4:37 PM    CHIEF COMPLAINT  Chief Complaint   Patient presents with   • Headache     h/a since this am +blurry vission and nausea.       HPI  Gabriela Cheatham-Lindsey is a 53 y.o. female with a history of diabetes and \"stiff-person syndrome\" who presents to the ED vis EMS for evaluation of a headache starting at 4/10 intensity at 9-10am this morning.  This progressed over the course of several hours.  Patient reports associated photosensitivity, chills, and nausea. She was lying in bed when her symptoms began. Patient describes her headache as a \"pulsing\" sensation. Her headache is located to the top of her head and her pain radiates to her neck. No recent trauma. Patient was told she has a dental infection 3 weeks ago and was given Penicillin for treatment. She has been compliant with taking her antibiotics. Additionally, patient reports pain with bowel movements and constipation that has been going on \"for awhile\". 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 She is followed by a physician for this. No complaints of dental pain, fevers, sore throat, chest pain, back pain, vomiting, and dysuria.     REVIEW OF SYSTEMS  Constitutional: Chills. No fevers.  Eyes: Photosensitivity   ENT: No sore throat, dental pain.   Cardiac: No CP  GI: Left sided abdominal pain, nausea, constipation. No vomiting.   : No dysuria.  MSK: No back pain  Neuro: HA  See HPI for further details. All other systems are negative. C.     PAST MEDICAL HISTORY   has a past medical history of Diabetes (HCC) (2015) and Stiff person syndrome.    SOCIAL HISTORY  Social History     Social History Main Topics   • Smoking status: Never Smoker   • Smokeless tobacco: Never Used   • Alcohol use No   • Drug use: No   • Sexual activity: No     SURGICAL HISTORY   " "has a past surgical history that includes cholecystectomy; appendectomy; and  delivery only.    CURRENT MEDICATIONS  Home Medications     Reviewed by Symone Palmer R.N. (Registered Nurse) on 18 at 1624  Med List Status: Partial   Medication Last Dose Status   artificial tears 1.4 % Solution  Active   Blood Glucose Monitoring Suppl (TRUE METRIX METER) w/Device Kit  Active   Blood Glucose Monitoring Suppl Supplies Misc  Active   Blood Glucose Monitoring Suppl Supplies Misc  Active   diazePAM (VALIUM) 5 MG Tab 2018 Active   FLUoxetine (PROZAC) 10 MG Cap  Active   glimepiride (AMARYL) 4 MG Tab  Active   insulin glargine (BASAGLAR KWIKPEN) 100 UNIT/ML Solution Pen-injector injection  Active   naproxen (NAPROSYN) 500 MG Tab  Active   omeprazole (PRILOSEC) 20 MG delayed-release capsule  Active   penicillin v potassium (VEETID) 500 MG Tab 2018 Active   pioglitazone (ACTOS) 30 MG Tab  Active   tramadol (ULTRAM) 50 MG Tab  Active                ALLERGIES  No Known Allergies    PHYSICAL EXAM  VITAL SIGNS: BP (!) 99/77   Pulse 83   Temp 36.8 °C (98.3 °F)   Resp (!) 23   Ht 1.6 m (5' 3\")   Wt 44.5 kg (98 lb)   LMP 2015   SpO2 97%   BMI 17.36 kg/m²    Pulse ox interpretation: I interpret this pulse ox as normal.  Genl: Female lying on the gurney, speaking clearly, appears in no moderate distress.   Head: NC/AT   ENT: Mucous membranes moist, posterior pharynx clear, uvula midline, nares patent bilaterally   Eyes: Injected sclera bilaterally.   Neck: Supple, FROM, positive lymphadenopathy bilaterally, diffuse muscle tenderness in the paracervical portions of the neck  Pulmonary: Lungs are clear to auscultation bilaterally  Chest: No TTP  CV:  RRR, no murmur appreciated, pulses 2+ in both upper and lower extremities  Abdomen: soft, NT/ND; no rebound/guarding, no masses palpated, no HSM   Musculoskeletal: Moving upper and lower extremities and spontaneous in coordinated fashion.   Neuro: " A&Ox4 (person, place, time, situation), Mental Status: Speech fluent without errors. Follows all commands. No dysarthria or apraxia.  Cranial Nerves: Pupils equal round and reactive to light. Photosensitivity without photophobia. Extraocular motion intact. Visual fields intact. No nystagmus. CN V1-V3 intact to light touch. No facial asymmetry. Hearing clinically intact bilaterally. Tongue protrusion midline. No uvular deviation. Normal shoulder shrug and head turn.  Motor:  RUE: 5/5 with hand , 5/5 with flexion at the elbow 5/5 with extension at the elbow  LUE: 5/5 with hand , 5/5 with flexion at the elbow 5/5 with extension at the elbow  RLE: 5/5 with leg raise, 5/5 with plantar flexion, 5/5 with dorsal flexion  LLE: 5/5 with leg raise, 5/5 with plantar flexion, 5/5 with dorsal flexion  Sensation difficult to interpret secondary to her chronic spacticity of the right side, sensation otherwise appears grossly intact  Reflexes limited secondary to her chronic spacticity of the right side, otherwise normal on left side.   Gait not assessed due to chronic right sided stiffness, normally walks with walker  Skin: No rash or lesions.  No pallor or jaundice.  No cyanosis.  Warm and dry.     DIAGNOSTIC STUDIES / PROCEDURES    LABS  Results for orders placed or performed during the hospital encounter of 08/30/18   HCG Qual Serum   Result Value Ref Range    Beta-Hcg Qualitative Serum Negative Negative   CBC WITH DIFFERENTIAL   Result Value Ref Range    WBC 8.3 4.8 - 10.8 K/uL    RBC 5.36 4.20 - 5.40 M/uL    Hemoglobin 16.3 (H) 12.0 - 16.0 g/dL    Hematocrit 46.4 37.0 - 47.0 %    MCV 86.6 81.4 - 97.8 fL    MCH 30.4 27.0 - 33.0 pg    MCHC 35.1 (H) 33.6 - 35.0 g/dL    RDW 37.5 35.9 - 50.0 fL    Platelet Count 201 164 - 446 K/uL    MPV 13.7 (H) 9.0 - 12.9 fL    Neutrophils-Polys 68.10 44.00 - 72.00 %    Lymphocytes 26.90 22.00 - 41.00 %    Monocytes 3.90 0.00 - 13.40 %    Eosinophils 0.50 0.00 - 6.90 %    Basophils 0.40  0.00 - 1.80 %    Immature Granulocytes 0.20 0.00 - 0.90 %    Nucleated RBC 0.00 /100 WBC    Neutrophils (Absolute) 5.62 2.00 - 7.15 K/uL    Lymphs (Absolute) 2.22 1.00 - 4.80 K/uL    Monos (Absolute) 0.32 0.00 - 0.85 K/uL    Eos (Absolute) 0.04 0.00 - 0.51 K/uL    Baso (Absolute) 0.03 0.00 - 0.12 K/uL    Immature Granulocytes (abs) 0.02 0.00 - 0.11 K/uL    NRBC (Absolute) 0.00 K/uL   COMP METABOLIC PANEL   Result Value Ref Range    Sodium 137 135 - 145 mmol/L    Potassium 3.9 3.6 - 5.5 mmol/L    Chloride 99 96 - 112 mmol/L    Co2 24 20 - 33 mmol/L    Anion Gap 14.0 (H) 0.0 - 11.9    Glucose 231 (H) 65 - 99 mg/dL    Bun 14 8 - 22 mg/dL    Creatinine 0.73 0.50 - 1.40 mg/dL    Calcium 9.8 8.5 - 10.5 mg/dL    AST(SGOT) 23 12 - 45 U/L    ALT(SGPT) 25 2 - 50 U/L    Alkaline Phosphatase 132 (H) 30 - 99 U/L    Total Bilirubin 0.6 0.1 - 1.5 mg/dL    Albumin 4.4 3.2 - 4.9 g/dL    Total Protein 8.1 6.0 - 8.2 g/dL    Globulin 3.7 (H) 1.9 - 3.5 g/dL    A-G Ratio 1.2 g/dL   URINALYSIS CULTURE, IF INDICATED   Result Value Ref Range    Color Yellow     Character Clear     Specific Gravity 1.006 <1.035    Ph 7.5 5.0 - 8.0    Glucose 100 (A) Negative mg/dL    Ketones Negative Negative mg/dL    Protein Negative Negative mg/dL    Bilirubin Negative Negative    Urobilinogen, Urine 0.2 Negative    Nitrite Negative Negative    Leukocyte Esterase Negative Negative    Occult Blood Negative Negative    Micro Urine Req see below    ESTIMATED GFR   Result Value Ref Range    GFR If African American >60 >60 mL/min/1.73 m 2    GFR If Non African American >60 >60 mL/min/1.73 m 2   BASIC METABOLIC PANEL   Result Value Ref Range    Sodium 141 135 - 145 mmol/L    Potassium 3.6 3.6 - 5.5 mmol/L    Chloride 107 96 - 112 mmol/L    Co2 27 20 - 33 mmol/L    Glucose 190 (H) 65 - 99 mg/dL    Bun 13 8 - 22 mg/dL    Creatinine 0.61 0.50 - 1.40 mg/dL    Calcium 8.5 8.5 - 10.5 mg/dL    Anion Gap 7.0 0.0 - 11.9   ESTIMATED GFR   Result Value Ref Range    GFR If  African American >60 >60 mL/min/1.73 m 2    GFR If Non African American >60 >60 mL/min/1.73 m 2       RADIOLOGY  CT-HEAD W/O   Final Result      No acute intracranial abnormality is identified.      Air-fluid levels in the sphenoid sinuses bilaterally.           COURSE & MEDICAL DECISION MAKING  Pertinent Labs & Imaging studies reviewed. (See chart for details)    Review of past medical records shows the patient has a history of stiff person syndrome and diabetes. Her last A1C was 18.6.     Differential diagnoses include but not limited to:   Migraine/tension/cluster headaches  Traumatic: EDH/SDH/SAH  Infectious: meningitis/encephalitis  Other: Temporal arteritis, glaucoma,Intracranial mass/Neoplasm,Idiopathic Intracranial Hypertension  HTN emergency    4:37 PM - Patient seen and examined at bedside. Patient will be treated with Tylenol 975 mg, Compazine 10 mg, Motrin 800 mg for her symptoms. The patient will be resuscitated with 1L NS IV for her headache. Ordered CT head, urinalysis culture, HCG qual serum, CBC, CMP to evaluate her symptoms.     7:01 PM- Reviewed the patient's lab and imaging results. Patient's anion gap is elevated at 14.0.     7:21 PM- Patient was reevaluated at bedside. Discussed lab and radiology results with the patient. Patient no longer has a headache or neck pain and continues to be afebrile. She has had 100% resolution in her symptoms and is ambulating with nursing staff currently. As long as she can ambulate without difficulty and her BMP results are normal, she will be stable for discharge.     2000- Reviewed the patient's BMP results, which were overall unremarkable. Patient is stable for discharge. Patient was reassessed and is feeling well. Instructed the patient on return to ED precautions/ follow up instructions and she is comfortable with discharge.    Medical Decision Making:   Patient presented to the emergency room for new onset headache that began earlier this morning and  presented to the emergency room at approximately 6 hours after this initiated.  She had a delayed onset, without initiation denies any recent trauma or infectious symptoms.  She has been taking some medications including chronic benzodiazepines for her right-sided spasticity and has some other associated neck tenderness, though this is noted to be only in the soft tissues of the neck as she has free range of motion of her head.  Based on initial evaluation she does.  Slightly ill but nontoxic.  She had no focal findings on exam of nuchal rigidity and has no photophobia.  Patient appears clinically dehydrated and IV was initiated.    HYDRATION: Based on the patient's presentation of Dehydration the patient was given IV fluids.  Upon recheck following hydration, the patient was feeling improved .     Broad differential above was considered, meningitis encephalitis is not likely based on clinical exam, lack of infectious hx and no other historical findings.  There is no acute traumatic mechanism making that much less likely.  There is was a concern for metabolic derangement a contribution from her ongoing diabetes, and labs noted increased initial anion gap however this is likely due to her clinical dehydration.  ICH/Aneurysm considered however the onset is progressive, there is alleviation with minimal intervention.  CT head notes no blood or signs of acute intracranial abnormalities.  AG and metabolic changes noted on initial CMP are corrected after IV therapy and repeat BMP.  I discussed the possibility of head bleeding.  The possibility of bleeding was discussed but the pt did not want a lumbar puncture at this time.  Her headache had completely resolved and pt was ambulatory without focal findings.  Pt wished to be discharged home.  If the pt is having any resumption of symptoms within 24 hrs she should return to the ER for further diagnostic workup, as LP may be indicated at that time.       DISPOSITION:  Patient  will be discharged home in stable condition.    FOLLOW UP:  Chalo Whelan M.D.  [address]  [address]  [facility] [address]  [phone]    Schedule an appointment as soon as possible for a visit      [facility], Emergency Dept  [address]  [place] [id number] [phone]  In 1 day  If symptoms worsen      OUTPATIENT MEDICATIONS:  Discharge Medication List as of 8/30/2018  9:42 PM      START taking these medications    Details   acetaminophen (TYLENOL) 325 MG Tab Take 1 Tab by mouth every four hours as needed for up to 5 days., Disp-30 Tab, R-0, Print Rx Paper      ibuprofen (MOTRIN) 400 MG Tab Take 1 Tab by mouth every 6 hours as needed for Headache., Disp-30 Tab, R-0, Print Rx Paper           FINAL IMPRESSION  Visit Diagnoses     ICD-10-CM   1. Nonintractable headache, unspecified chronicity pattern, unspecified headache type R51   2. Photosensitivity L56.8   3. Nausea R11.0        @ ILucinda (Scribe), am scribing for, and in the presence of, Sesar Juares M.D..    Electronically signed by: Lucinda Griffin (Scribe), 8/30/2018    ISesar M.D. personally performed the services described in this documentation, as scribed by Lucinda Griffin in my presence, and it is both accurate and complete.    The note accurately reflects work and decisions made by me.  Sesar Juares  8/30/2018  11:12 PM

## 2018-08-30 NOTE — ED NOTES
Pt resting in bed w/ lights dimmed, aaox4, tearful, c/o 8/10 pain to head. States she does not typically gets HA. Associated nausea and photophobia. Took penicillin (that she had from a recent tooth infection), and she believes this helped her HA earlier today. Denies fever, neck stiffness, rash, vision changes, SOB, CP, numbness/tingling.

## 2018-08-30 NOTE — ED TRIAGE NOTES
Chief Complaint   Patient presents with   • Headache     h/a since this am +blurry vission and nausea.     BIB EMS from home c/o headache and blurry vision since this am. +nausea. Pain 10/10

## 2018-08-31 DIAGNOSIS — R76.0 STIFF PERSON SYNDROME WITH POSITIVE GLUTAMIC ACID DECARBOXYLASE (GAD) ANTIBODY: ICD-10-CM

## 2018-08-31 DIAGNOSIS — G25.82 STIFF PERSON SYNDROME WITH POSITIVE GLUTAMIC ACID DECARBOXYLASE (GAD) ANTIBODY: ICD-10-CM

## 2018-08-31 RX ORDER — DIAZEPAM 5 MG/1
TABLET ORAL
Qty: 90 TAB | Refills: 2 | Status: SHIPPED | OUTPATIENT
Start: 2018-08-31 | End: 2018-12-14 | Stop reason: SDUPTHER

## 2018-08-31 NOTE — ED NOTES
Discharge orders received, IV and monitor discontinued, instructions and education given to pt and family, follow-up discussed. Pt out to front lobby by WC with family.

## 2018-08-31 NOTE — TELEPHONE ENCOUNTER
1. Caller Name: nish                                          Call Back Number: [phone]   called in to see if she could get the diazepam filed faxed the pharmacy the prescription called pt back to let them know it was sent to the pharmacy

## 2018-08-31 NOTE — DISCHARGE INSTRUCTIONS
Dolor de sulema general sin causa  (General Headache Without Cause)  El dolor de sulema es un dolor o malestar que se siente en la amara de la sulema o del carmen. Hay muchas causas y tipos de brisa de sulema. En algunos casos, es posible que no se encuentre la causa.  CUIDADOS EN EL HOGAR  Control del dolor   · Prentice los medicamentos de venta haroon y los recetados solamente angela se lo haya indicado el médico.  · Cuando sienta dolor de sulema acuéstese en un cuarto oscuro y tranquilo.  · Si se lo indican, aplique hielo sobre la sulema y la amara del carmen:  ¨ Ponga el hielo en georgi bolsa plástica.  ¨ Coloque georgi toalla entre la piel y la bolsa de hielo.  ¨ Coloque el hielo robert 20 minutos, 2 a 3 veces por día.  · Utilice georgi almohadilla térmica o tome georgi ducha con [place] para aplicar calor en la sulema y la amara del carmen angela se lo haya indicado el médico.  · Mantenga las luces tenues si le molesta las luces brillantes o isabelle birsa de sulema empeoran.  Comida y bebida   · Mantenga un horario para las comidas.  · Kimberly menos alcohol.  · Consuma menos o deje de abiel cafeína.  Instrucciones generales   · Concurra a todas las visitas de control angela se lo haya indicado el médico. [place] es importante.  · Lleve un registro diario para averiguar si ciertas cosas provocan los brisa de sulema. Por ejemplo, escriba los siguientes datos:  ¨ Lo que usted come y lloyd.  ¨ Cuánto tiempo duerme.  ¨ Algún cambio en grijalva dieta o en los medicamentos.  · Realice actividades relajantes, angela recibir masajes.  · Disminuya el nivel de estrés.  · Siéntese con la espalda recta. No contraiga (tensione) los músculos.  · No consuma productos que contengan tabaco. Estos incluyen cigarrillos, tabaco para mascar y cigarrillos electrónicos. Si necesita ayuda para dejar de fumar, consulte al médico.  · Carmela ejercicios con regularidad octaviano angela se lo indicó el médico.  · Duerma lo suficiente. [place] a menudo significa entre 7 y 9 horas de  sueño.  SOLICITE AYUDA SI:  · Los medicamentos no logran aliviar los síntomas.  · Tiene un dolor de sulema que es diferente a los otros brisa de sulema.  · Tiene malestar estomacal (náuseas) o vomita.  · Tiene fiebre.  SOLICITE AYUDA DE INMEDIATO SI:  · El dolor de sulema empeora.  · Sigue vomitando.  · Presenta rigidez en el carmen.  · Tiene dificultad para jessica.  · Tiene dificultad para hablar.  · Siente dolor en el filippo o en el oído.  · Yara músculos están débiles, o pierde el control muscular.  · Pierde el equilibrio o tiene problemas para caminar.  · Siente que se desvanece (pierde el conocimiento) o se desmaya.  · Se siente confundido.  Esta información no tiene angela fin reemplazar el consejo del médico. Asegúrese de hacerle al médico cualquier pregunta que tenga.  Document Released: 03/11/2013 Document Revised: 09/07/2016 Document Reviewed: 04/11/2016  Elsevier Interactive Patient Education © 2017 [employer]

## 2018-08-31 NOTE — ED NOTES
Pt resting in bed, states HA has resolved. Blankets provided for comfort. Reports no other needs. Son at bedside.

## 2018-09-05 ENCOUNTER — PHYSICAL THERAPY (OUTPATIENT)
Dept: PHYSICAL THERAPY | Facility: REHABILITATION | Age: 53
End: 2018-09-05
Attending: FAMILY MEDICINE
Payer: COMMERCIAL

## 2018-09-05 ENCOUNTER — TELEPHONE (OUTPATIENT)
Dept: PHYSICAL THERAPY | Facility: REHABILITATION | Age: 53
End: 2018-09-05

## 2018-09-05 DIAGNOSIS — G25.82 STIFF-MAN SYNDROME: ICD-10-CM

## 2018-09-05 PROCEDURE — 97162 PT EVAL MOD COMPLEX 30 MIN: CPT

## 2018-09-05 ASSESSMENT — ENCOUNTER SYMPTOMS
QUALITY: ACHING
PAIN SCALE AT HIGHEST: 7
QUALITY: CRAMPING
PAIN LOCATION: L SHOULDER
PAIN TIMING: IN THE MORNING
QUALITY: TIGHTNESS
PAIN SCALE: 0
PAIN SCALE AT LOWEST: 0

## 2018-09-05 NOTE — OP THERAPY EVALUATION
Outpatient Physical Therapy  INITIAL EVALUATION    [facility] Physical Therapy [facility]  [address]  [address]  [place] [address]  Phone:  [phone]  Fax:  [phone]    Date of Evaluation: 2018    Patient: Gabriela Cheatham-Depintor  YOB: 1965  MRN: [id number]     Referring Provider: Chalo Whelan M.D.  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 Pt was diagnosed w/stiff person's syndrome about one year ago. Pt initially presented for therapy in February w/ severe c/o R LE pain/cramping. Pt's  passed away shortly after and pt needed to travel to [place] for burial. Pt is returning to physical therapy to address ROM, strength, and mobility. Pt reports improved pain in R LE, however notes decreasing ROM/mobility In R > L LE. Pt also reports L shoulder pain. Pt is very concerned about participating in outpatient therapy as it takes two family members to transfer her out of the house and into a car. She does not want to burden them as they have full-time jobs and education responsibilities.     Pain:     Current pain ratin    At best pain ratin    At worst pain ratin    Location:  L Shoulder    Quality:  Cramping, aching and tightness    Pain timing:  In the morning    Aggravating factors:  Walking, bed mobility, sitting     Relieving factors:  Stretching    Activity Tolerance:     Current activity tolerance / Recreational activities:  Pt requires assistance for all ADLs, specifically LE dressing, bathing, meal prep, transfers. Pt relies on bed pan (daughter empties) for bowel and bladder needs.    Social Support:     Lives in:  One-story house    Lives with:  Adult children    Treatments:     Treatments to date:  Pt trialed baclofen w/some help approximately one year ago. Pt is unclear why primary care stopped the medication.    Patient Goals:     Patient goals for therapy:  Be able to sit and perform more mobility and ADLs independently.      Past Medical History:   Diagnosis Date   • Diabetes (HCC) 2015   • Stiff person syndrome      Past Surgical History:   Procedure Laterality Date   • APPENDECTOMY     • CHOLECYSTECTOMY     • PB  DELIVERY ONLY         Objective   Range of motion and strength:    Unable to assess strength due to contractures and tone    Sensation and reflexes:     Sensation is intact.      Tone, Sensation and Coordination:   Tone:     Bilateral UE muscle tone: Normal    Left lower extremity muscle tone: Contractures and Hypertonic    Right lower extremity muscle tone: Contractures, Rigid and Hypertonic     Modified Maisha:   Lower extremity (left):     Ankle dorsiflexion (knee extended): 4  Lower extremity (right):     Hip flexors: 4    Hip extensors: 3    Hip abductors: 3    Hip adductors: 3    Knee flexors: 3    Knee extensors: 4    Ankle dorsiflexion (knee extended): 4    Plantar flexors: 4    Balance/Gait:   Pt uses FWW and ambulates on toes due to contractures and tone. Pt reports she is able to get flat footed in stance against wall after about 5 min. Pt circumducts the R LE and relies on WBing through UEs. Indep with FWW for 50 feet with extra time and energy expenditure.     Pt requires max assistance to dependent for transfers. She has been relying on her son to pick her up from her current  standard manual chair and transfer her to a supine position on her bed or couch. Her son has also been lifting her from WC and descending and ascending 4 steps in front of home to get pt to/from the car.    Exercises/Treatment  Time-based treatments/modalities:          Assessment, Response and Plan:   Assessment details:  53 yr old female w/dx of stiff person's syndrome. Pt presents w/extremely limited ROM R > L LEs. Pt presents w/abnormal tone and contractures. Pt will benefit from PT however it is extremely difficult for pt's family to assist for outpatient therapy 2x/wk. We will trial 3-4 visits and assess pt's ability to attend w/o undue strain on her and her family. In addition, the manual wheelchair that the patient is using does not adequately meet her positioning needs. I am recommending an evaluation for a lightweight manual wheelchair w/proper trunk positioning and LE support that will allow pt to self-propel for increased independence at home.    Goals:   Short Term Goals:   Assist pt w/obtaining a lightweight manual WC for independent mobility  Provide caregiver training to address joint mobility and protection  Short term goal time span:  1-2 weeks      Long Term Goals:    Pt independent w/HEP for trunk and UE strength  Pt able to transfer from mat to WC w/CGA  Long term goal time span:  2-4 weeks    Plan:   Therapy options:  Physical therapy treatment to continue  Planned therapy interventions:  Neuromuscular Re-education (CPT 61864) and Therapeutic Exercise (CPT 34875)  Frequency:  1x week  Duration in weeks:  4  Plan details:  Cont skilled PT to address improved mobility and increase independence w/ADLs.         Referring provider co-signature:  I have reviewed this plan of care and my co-signature certifies the need for services.      Physician Signature: ________________________________ Date: ______________

## 2018-09-06 ASSESSMENT — ENCOUNTER SYMPTOMS: ALLEVIATING FACTOR: STRETCHING

## 2018-09-06 NOTE — OP THERAPY DISCHARGE SUMMARY
Outpatient Physical Therapy  DISCHARGE SUMMARY NOTE      [facility] Physical Therapy [address]  [address]  [place] [address]  Phone:  [phone]  Fax:  [phone]    Date of Visit: 09/05/2018    Patient: Gabriela Cheatham-Depintor  YOB: 1965  MRN: [id number]     Referring Provider: Chalo Whelan M.D.   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 Pt had to stop therapy due to the death of her .     Recommendations:  D/C this episode. Pt returned today for a new evaluation.    Lila Ludwig, PT, DPT    Date: 9/5/2018

## 2018-09-12 ENCOUNTER — PHYSICAL THERAPY (OUTPATIENT)
Dept: PHYSICAL THERAPY | Facility: REHABILITATION | Age: 53
End: 2018-09-12
Attending: FAMILY MEDICINE
Payer: COMMERCIAL

## 2018-09-12 DIAGNOSIS — G25.82 STIFF-MAN SYNDROME: ICD-10-CM

## 2018-09-12 PROCEDURE — 97530 THERAPEUTIC ACTIVITIES: CPT

## 2018-09-12 PROCEDURE — 97535 SELF CARE MNGMENT TRAINING: CPT

## 2018-09-13 NOTE — OP THERAPY DAILY TREATMENT
Outpatient Physical Therapy  DAILY TREATMENT     [facility] Physical Therapy [address]  [address]  [place] [address]  Phone:  [phone]  Fax:  [phone]    Date: 09/12/2018    Patient: Gabriela Cheatham-Depintor  YOB: 1965  MRN: [id number]     Time Calculation  Start time: 1630  Stop time: 1715 Time Calculation (min): 45 minutes     Chief Complaint: Difficulty Walking    Visit #: 2    SUBJECTIVE:  Pt presents w/increased c/o tailbone pain and wants to get supine to relieve.    OBJECTIVE:      Therapeutic Treatments and Modalities:     1. Therapeutic Activities (CPT 47120), Pt and caregiver training for gentle LE AROM, tx, prone lying w/mild ext, sustained ankle stretch    Time-based treatments/modalities:  Therapeutic activity minutes (CPT 11185): 30 minutes       Pain rating before treatment: 0  Pain rating after treatment: 0    ASSESSMENT:   Pt able to break up tone after approx 5 min sustained stretching. Pt demod full flex of hip and knee, requires sustained contact to keep ankle in neutral position.    PLAN/RECOMMENDATIONS:   Plan for treatment: therapy treatment to continue next visit. Will address UE ther ex program and perform WC evaluation.

## 2018-09-14 ENCOUNTER — APPOINTMENT (OUTPATIENT)
Dept: PHYSICAL THERAPY | Facility: REHABILITATION | Age: 53
End: 2018-09-14
Attending: FAMILY MEDICINE
Payer: COMMERCIAL

## 2018-09-14 ENCOUNTER — OFFICE VISIT (OUTPATIENT)
Dept: MEDICAL GROUP | Facility: MEDICAL CENTER | Age: 53
End: 2018-09-14
Attending: FAMILY MEDICINE
Payer: COMMERCIAL

## 2018-09-14 VITALS
DIASTOLIC BLOOD PRESSURE: 66 MMHG | HEART RATE: 72 BPM | OXYGEN SATURATION: 98 % | SYSTOLIC BLOOD PRESSURE: 110 MMHG | TEMPERATURE: 98.2 F | RESPIRATION RATE: 16 BRPM | HEIGHT: 63 IN

## 2018-09-14 DIAGNOSIS — R76.0 STIFF PERSON SYNDROME WITH POSITIVE GLUTAMIC ACID DECARBOXYLASE (GAD) ANTIBODY: ICD-10-CM

## 2018-09-14 DIAGNOSIS — G25.82 STIFF PERSON SYNDROME WITH POSITIVE GLUTAMIC ACID DECARBOXYLASE (GAD) ANTIBODY: ICD-10-CM

## 2018-09-14 DIAGNOSIS — F41.9 ANXIETY: ICD-10-CM

## 2018-09-14 DIAGNOSIS — E08.65 DIABETES MELLITUS DUE TO UNDERLYING CONDITION, UNCONTROLLED, WITH HYPERGLYCEMIA, WITHOUT LONG-TERM CURRENT USE OF INSULIN (HCC): ICD-10-CM

## 2018-09-14 PROCEDURE — 99212 OFFICE O/P EST SF 10 MIN: CPT | Performed by: FAMILY MEDICINE

## 2018-09-14 PROCEDURE — 99213 OFFICE O/P EST LOW 20 MIN: CPT | Performed by: FAMILY MEDICINE

## 2018-09-14 RX ORDER — GLIMEPIRIDE 4 MG/1
2 TABLET ORAL 2 TIMES DAILY
Qty: 60 TAB | Refills: 6 | Status: SHIPPED | OUTPATIENT
Start: 2018-09-14 | End: 2019-03-05

## 2018-09-14 RX ORDER — FLUOXETINE 10 MG/1
TABLET, FILM COATED ORAL
Qty: 30 TAB | Refills: 3 | Status: SHIPPED | OUTPATIENT
Start: 2018-09-14 | End: 2019-02-27 | Stop reason: SDUPTHER

## 2018-09-14 RX ORDER — PIOGLITAZONEHYDROCHLORIDE 45 MG/1
45 TABLET ORAL DAILY
Qty: 30 TAB | Refills: 11 | Status: SHIPPED | OUTPATIENT
Start: 2018-09-14 | End: 2019-10-08

## 2018-09-15 NOTE — PROGRESS NOTES
"Subjective:      Gabriela Cheatham-Depintor is a 53 y.o. female who presents with No chief complaint on file.            HPI 1. Diabetes mellitus-patient reports she tolerated starting on how glitazone 30 mg without difficulty. She has not noticed any ankle edema or shortness of breath. Current blood sugars are slightly lower per patient report. Morning sugars are still high running 180-220, and evening sugars around 290. Patient is also continuing on glimepiride 4 mg twice a day. She denies any low blood sugars characterized by headache or diaphoresis. She has used insulin therapy in the past not currently injecting. She did get her new meter and is testing twice a day although they forgot their logbook today.  2. Stiff person syndrome  -patient continues to have very fatiguing episodes of spasms/tremor affecting her right lower extremity each day. Next [facility] neurology visit is not until 10/29/18. Patient continues to utilize diazepam 3 times a day for partial control.    ROS negative for current nausea, diarrhea, chest pain    Daughter acted as  today   Objective:     /66   Pulse 72   Temp 36.8 °C (98.2 °F)   Resp 16   Ht 1.6 m (5' 2.99\")   LMP 01/31/2015   SpO2 98%      Physical Exam  Gen.- alert, cooperative, in moderate distress during a spasm of her right lower extremity during the visit. Patient is confined to her wheelchair  Neck- midline trachea, thyroid not enlarged or tender,supple, no cervical adenopathy  Chest-clear to auscultation and percussion with normal breath sounds. No retractions. Chest wall nontender  Cardiac- regular rhythm and rate. No murmur, thrill, or heave   Abdomen- normal bowel sounds, soft without guarding. Liver and spleen not enlarged, no palpable masses or tenderness.  Lower extremities-boardlike rigidity noted diffusely in the right lower extremity. Moderate flexibility in the left lower extremity. Intermittent low amplitude, 1/s tremors right leg        "   Assessment/Plan:     1. Diabetes mellitus due to underlying condition, uncontrolled, with hyperglycemia, without long-term current use of insulin ([facility])    - REFERRAL TO PHARMACOTHERAPY SERVICE    2. 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 Increase pioglitazone 45 mg, continue glimepiride  2. Pharmacology consult referral placed for expanded diet education, consideration of initiation of a long-acting insulin  3. Revisit with me in 2 months, neurology in 6 weeks

## 2018-09-25 ENCOUNTER — PHYSICAL THERAPY (OUTPATIENT)
Dept: PHYSICAL THERAPY | Facility: REHABILITATION | Age: 53
End: 2018-09-25
Attending: FAMILY MEDICINE
Payer: COMMERCIAL

## 2018-09-25 DIAGNOSIS — R76.0 STIFF PERSON SYNDROME WITH POSITIVE GLUTAMIC ACID DECARBOXYLASE (GAD) ANTIBODY: ICD-10-CM

## 2018-09-25 DIAGNOSIS — G25.82 STIFF PERSON SYNDROME WITH POSITIVE GLUTAMIC ACID DECARBOXYLASE (GAD) ANTIBODY: ICD-10-CM

## 2018-09-25 PROCEDURE — 97542 WHEELCHAIR MNGMENT TRAINING: CPT

## 2018-09-28 ENCOUNTER — APPOINTMENT (OUTPATIENT)
Dept: PHYSICAL THERAPY | Facility: REHABILITATION | Age: 53
End: 2018-09-28
Attending: FAMILY MEDICINE
Payer: COMMERCIAL

## 2018-09-28 ASSESSMENT — BALANCE ASSESSMENTS
BALANCE - STANDING STATIC: POOR
WEIGHT SHIFT STANDING: ABSENT
BALANCE - SITTING STATIC: POOR
WEIGHT SHIFT SITTING: POOR
BALANCE - SITTING DYNAMIC: DEPENDENT
BALANCE - STANDING DYNAMIC: DEPENDENT

## 2018-09-28 ASSESSMENT — ACTIVITIES OF DAILY LIVING (ADL)
ADLS_COMMENTS: .
AMBULATION_WITH_ASSISTIVE_DEVICE: UNABLE
AMBULATION_WITHOUT_ASSISTIVE_DEVICE: UNABLE

## 2018-09-28 ASSESSMENT — ENCOUNTER SYMPTOMS: PRECIPITATING FACTORS - STAIRS: 1

## 2018-09-29 NOTE — OP THERAPY EVALUATION
"  Outpatient Physical Therapy  WHEELCHAIR EVALUATION    [facility] Physical Therapy [facility]  [address]  [address]  [facility] [address]  Phone:  [phone]  Fax:  [phone]    Date of Evaluation: 2018    Patient Name: Gabriela Cheatham-Depintor  YOB: 1965  MRN: [id number]   Height: 1.6 m (5' 2.99\") Weight: 44.5 kg (98 lb)     Referring Provider: Chalo Whelan M.D.  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  Sxs worsened to the point she was unable to walk or sit. Pt spent all day in bed.  Went to ED 2018 due to worsening pain and was officially dx'd with \"Stiff person's syndrome.\"  Pt started on Baclofen which helped some.     Current level of functional mobility / ADLs: Pt requires max A for all ADLs, Pt is a dependent transfer, her son picks her up to move her from her bed to a chair. Pt is able to stand briefly once son lifts her w/all weight through her UEs. Pt is unable to take any steps w/o significant ext tone.    Pain     Current pain ratin    Pain quality: cramping    Pain timing: sporadic    Progression: worsening    Pain comments:  Pt reports she no longer has pain in her LEs, just tightness and spasms    Living situation     Lives with: adult child(lalitha) (Pt's  passed away unexpectedly shortly after her diagnosis. )    Lives in: single level home    Access to home environment: Pt has 6 steps to enter " her home. Her son is currently carrying her up and down the stairs for transfers to the car.     Dwelling has stairs: yes    Dwelling has a ramp: no (Pt's son will be building a ramp)    Caregiver assistance needed:  Pt's daughter and son provide all caregiving. This has become a huge challenge as her daughter is a full-time student and her son works full-time and has a wife and 2 kids.    Assistive device history:    Current assistive device(s) used: front-wheeled walker and wheelchair    Hours per day in a wheelchair: 1 hours    Assistive device history comments: Pt uses the FWW briefly. She was previously able to walk up to 20 ft, but increased tone prevents her from taking steps at this time. Pt was also unable to self-propel her manual wheelchair w/o going into full extensor tone and spasms causing her to slide down.    Fall history:    Recent fall: no recent fall      Active Range of Motion:   Upper extremity (left):     All left upper extremity active range of motion: All within functional limits  Upper extremity (right):     All right upper extremity active range of motion: All within functional limits  Lower extremity (left):     All left lower extremity active range of motion: All below functional limits  Lower extremity (right):     All right lower extremity active range of motion: All below functional limits    Strength:   Strength comments: UE strength appears WFL, but is difficult to assess w/o severe increase in LE tone. Unable to accurately assess LE strength due to spasticity.    Tone:     Left upper extremity muscle tone: Normal    Right upper extremity muscle tone: Normal    Left lower extremity muscle tone: Rigid    Right lower extremity muscle tone: Rigid    Sensation   Upper extremity (left):     Light touch: Intact  Upper extremity (right):     Light touch: Intact  Lower extremity (left):     Light touch: Intact  Lower extremity (right):     Light touch: Intact      Postural Control (Balance)      Sitting (static): Poor    Sitting (dynamic): Dependent    Standing (static): Poor    Standing (dynamic): Dependent    Weight shift (sitting): Poor    Weight shift (standing): Absent    Ambulation     Ambulation with assistive device: unable    Ambulation without assistive device: unable    Additional ambulation details: Pt has not been able to ambulate for the last few months w/any assistive device    Equipment recommendations   Type/Model     Recommendation: power wheelchair    Justification: Pt requires the use of a power wheelchair. She is unable to perform any ADLs or transfers independently. She has been relying on her son to lift her out of bed and onto the couch for the day. If she has an appointment, he lifts her into her manual wheelchair, transports her outside and then carries her down a flight of steps to the car. A power wheelchair will enhance her functional mobility and allow her the most amount of independence possible. She is unable to ambulate with any assistive device and was unable to self-propel a manual wheelchair w/o debilitating extensor tone. Each attempt in the clinic resulted in a full extensor pattern that had the pt sliding out of the wheelchair and requiring max A to transfer to a mat until her body relaxed.    Seating System:    Recommendation(s) for power wheelchair: headrest with removable hardware, pressure relieving/positioning seat cushion, positioning back support, power recline and power tilt    Justification: Pt requires power tilt and recline features to allow for changes in position for decreased pain and for pressure relief throughout the day.  She will be in the wheelchair approximately 12 hours per day and will need recline and tilt functions to help her reduce spasticity and provide pressure relief. Pt is unable to perform active movements without significant increase in extensor tone. These two features are needed together and neither one alone will be sufficient.  Both features are also required to position appropriately for dressing and bathing to minimize sheering and skin breakdown.     Pt requires a headrest with removable hardware to provide proper support for her head to rest while using the tilt or recline features on her power wheelchair.  As she will be in the wheelchair about 12 hours/day she will need the opportunity to rest and to relieve pressure.      Arm Rests:    Recommendation: height-adjustable arm rests and flip back height-adjustable arm rests    Justification: Pt request height adjustable arm rests to provide proper UE support. The arm rests will need to be able to move out of the way for safe and efficient transfers.    Foot Rests/Foot Plates:    Recommendation: power elevating leg rests and swing away removable leg rests    Justification: Pt requires power-elevating leg rests in order to elevate her lower extremities to provide repositioning of the lower leg to lessen the risk of skin breakdown and to decrease tone. The articulating legrests extend with the leg as it elevates, reducing pressure and pain on the leg and on the knee and hip joints. The power assist is needed to elevate the legrests due to the patient's inability to lift her legs manually due to severe spasticity.     Electronic Components:    Recommendation: swing away joystick with mounting hardware    Justification: Pt requires a joystick to enable her to utilize the wheelchair independently. She requires swing away hardware to enable the joystick to move out of the way for transferring safely and independently.         The patient has been determined to be independent and safe with the above equipment. It is my recommendation that she receive a power wheelchair with the above specifications for use in her home.  The use of this equipment will improve her independence and safety for mobility and ADLs in the home. The use of this equipment is considered a required lifetime medical need  and will contribute to cost containment in the future.      The therapist performing the evaluation has no financial relationship with the vendor involved in the evaluation. Thank you in advance for the consideration for the medical needs of this patient. If you have and questions regarding this equipment please do not hesitate to call me at [phone].      Electronically signed by Lila Ludwig PT, DPT on 9/28/2018    Referring provider co-signature:  I have reviewed this evaluation and recommendation(s) and my co-signature certifies my agreement with the contents.    Physician Signature: ________________________________ Date: ______________

## 2018-10-02 ENCOUNTER — APPOINTMENT (OUTPATIENT)
Dept: PHYSICAL THERAPY | Facility: REHABILITATION | Age: 53
End: 2018-10-02
Attending: FAMILY MEDICINE
Payer: COMMERCIAL

## 2018-10-05 ENCOUNTER — APPOINTMENT (OUTPATIENT)
Dept: PHYSICAL THERAPY | Facility: REHABILITATION | Age: 53
End: 2018-10-05
Attending: FAMILY MEDICINE
Payer: COMMERCIAL

## 2018-10-05 ASSESSMENT — ENCOUNTER SYMPTOMS
PAIN SCALE: 0
PAIN TIMING: SPORADIC
QUALITY: CRAMPING

## 2018-10-09 ENCOUNTER — APPOINTMENT (OUTPATIENT)
Dept: PHYSICAL THERAPY | Facility: REHABILITATION | Age: 53
End: 2018-10-09
Attending: FAMILY MEDICINE
Payer: COMMERCIAL

## 2018-10-12 ENCOUNTER — APPOINTMENT (OUTPATIENT)
Dept: PHYSICAL THERAPY | Facility: REHABILITATION | Age: 53
End: 2018-10-12
Attending: FAMILY MEDICINE
Payer: COMMERCIAL

## 2018-10-16 ENCOUNTER — APPOINTMENT (OUTPATIENT)
Dept: PHYSICAL THERAPY | Facility: REHABILITATION | Age: 53
End: 2018-10-16
Payer: COMMERCIAL

## 2018-10-31 ENCOUNTER — NON-PROVIDER VISIT (OUTPATIENT)
Dept: MEDICAL GROUP | Facility: MEDICAL CENTER | Age: 53
End: 2018-10-31
Attending: FAMILY MEDICINE
Payer: COMMERCIAL

## 2018-10-31 VITALS
WEIGHT: 97 LBS | DIASTOLIC BLOOD PRESSURE: 65 MMHG | HEIGHT: 63 IN | SYSTOLIC BLOOD PRESSURE: 110 MMHG | BODY MASS INDEX: 17.19 KG/M2

## 2018-10-31 NOTE — PROGRESS NOTES
New Patient Consult Note  Primary care physician: Chalo Whelan M.D.    Reason for consult: Management of Uncontrolled Type 2 Diabetes    HPI:  Gabriela Cheatham-Depintor is a 53 y.o. old patient who comes in today for evaluation of above stated problem.Basic physiology of DMII was explained to patient as well as microvascular/macrovascular complications. Increase physical activity is not possible at this time due to her condition of Stiff person syndrome. Few tips and education point were provided regarding diet.    Most Recent HbA1c:   Lab Results   Component Value Date/Time    HBA1C 13.3 (H) 07/26/2018 05:25 PM        Current Diabetes Regimen:  actos 45mg  glimiperide 4mg    Before Breakfast: 230+  Hypoglycemia:  None      ROS:  Constitutional: No weight loss  Cardiac: No palpitations or racing heart  Resp: No shortness of breath  Neuro: No numbness or tinging in feet  Endo: No heat or cold intolerance, no polyuria or polydipsia  All other systems were reviewed and were negative.    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 01/03/2018     Priority: High   • Right leg pain 01/03/2018     Priority: High   • Leukocytosis 01/03/2018     Priority: Medium   • Pain in lower back 02/29/2016     Priority: Medium   • Lower extremity weakness 02/29/2016     Priority: Medium   • Vitamin D deficiency 01/07/2018     Priority: Low   • Type 2 diabetes mellitus untreated, with ketoacidosis  02/29/2016     Priority: Low   • GERD (gastroesophageal reflux disease) 02/29/2016     Priority: Low   • Diabetes mellitus due to underlying condition, uncontrolled, with hyperglycemia, without long-term current use of insulin ([facility]) 06/15/2018   • Redness, eye 06/15/2018   • Tail bone pain 02/21/2018   • Recurrent major depressive disorder, in partial remission ([facility]) 01/23/2018   • Ankle contracture, right 01/23/2018   •  Bacteremia due to Escherichia coli 2015   • Sepsis secondary to UTI (HCC) 2015       Past Surgical History:  Past Surgical History:   Procedure Laterality Date   • APPENDECTOMY     • CHOLECYSTECTOMY     • PB  DELIVERY ONLY         Allergies:  Patient has no known allergies.    Social History:  Social History     Social History   • Marital status:      Spouse name: N/A   • Number of children: N/A   • Years of education: N/A     Occupational History   • Not on file.     Social History Main Topics   • Smoking status: Never Smoker   • Smokeless tobacco: Never Used   • Alcohol use No   • Drug use: No   • Sexual activity: No     Other Topics Concern   • Not on file     Social History Narrative    ** Merged History Encounter **            Family History:  Family History   Problem Relation Age of Onset   • Diabetes Father    • Stroke Brother    • Cancer Neg Hx    • Heart Disease Neg Hx        Medications:    Current Outpatient Prescriptions:   •  Empagliflozin (JARDIANCE) 10 MG Tab, Take 10 mg by mouth every day. Before breakfast, Disp: 30 Tab, Rfl: 0  •  Insulin Glargine-Lixisenatide (SOLIQUA) 100-33 UNT-MCG/ML Solution Pen-injector, Inject 15 Units as instructed every day. Before breakfast, Disp: 1 PEN, Rfl: 0  •  Blood Glucose Test Strips, Using True metrix  meter. Compatible test strips, use 2 X/day number 100 Lancets use 2 X/day number 100, Disp: 1 Mutually Defined, Rfl: 6  •  pioglitazone (ACTOS) 45 MG Tab, Take 1 Tab by mouth every day., Disp: 30 Tab, Rfl: 11  •  glimepiride (AMARYL) 4 MG Tab, Take 0.5 Tabs by mouth 2 times a day., Disp: 60 Tab, Rfl: 6  •  diazePAM (VALIUM) 5 MG Tab, 1 by mouth 3 times daily for muscle spasm, Disp: 90 Tab, Rfl: 2  •  Blood Glucose Monitoring Suppl Supplies Misc, Order  True Metrix meter. Compatible test strips, use 2 X/day number 100 Lancets use 2 X/day number 100, Disp: 1 Mutually Defined, Rfl: 2  •  Blood Glucose Monitoring Suppl (TRUE METRIX METER)  "w/Device Kit, TEST TWICE A DAY, Disp: 1 Kit, Rfl: 0  •  FLUoxetine (PROZAC) 10 MG Cap, Take 1 Cap by mouth every day., Disp: 30 Cap, Rfl: 3  •  artificial tears 1.4 % Solution, Place 2 Drops in both eyes 4 times a day., Disp: 1 Bottle, Rfl: 3  •  fluoxetine (PROZAC) 10 MG tablet, TAKE 1 CAP BY MOUTH EVERYDAY, Disp: 30 Tab, Rfl: 3  •  ibuprofen (MOTRIN) 400 MG Tab, Take 1 Tab by mouth every 6 hours as needed for Headache., Disp: 30 Tab, Rfl: 0  •  penicillin v potassium (VEETID) 500 MG Tab, Take 1 Tab by mouth 4 times a day. (Patient not taking: Reported on 10/31/2018), Disp: 40 Tab, Rfl: 0  •  omeprazole (PRILOSEC) 20 MG delayed-release capsule, Take 1 Cap by mouth every day. (Patient not taking: Reported on 10/31/2018), Disp: 30 Cap, Rfl: 6    Labs: Reviewed    Physical Examination:  Vital signs: /65   Ht 1.6 m (5' 2.99\")   Wt 44 kg (97 lb)   LMP 01/31/2015   BMI 17.19 kg/m²  Body mass index is 17.19 kg/m².  General: No apparent distress, cooperative  Eyes: No scleral icterus or discharge  ENMT: Normal on external inspection of nose, lips, normal thyroid exam  Neck: No abnormal masses on inspection  Resp: Normal effort, clear to auscultation bilaterally   CVS: Regular rate and rhythm, S1 S2 normal, no murmur   Extremities: No edema  Abdomen: abdominal obesity present  Neuro: Alert and oriented  Skin: No rash  Psych: Normal mood and affect, intact memory and able to make informed decisions    Assessment and Plan:  Pt is unable to increase her physical activity at this time and she doesn't have big room for weight loss but I did provide diet education and few tips to make better choices and do few changes with her meal timing and carbohydrate choices.    Pt mentioned stopping lantus long time ago and that she is on Actos and Glimiperide only. Pt can't tolerate Metformin due to severe diarrhea and stomach upset.  We will start the following therapy:  DC Glimiperide and Actose.  Soliqua 15 units qd, may " increase if pt tolerated. First dose given in the office and sample was provided. Side effects were explained.  Jardiance 10mg qd was started and samples were provided as well as detailed education about expectations and side effects and how to avoid yeast infection and dehydration.  Education was provided regarding hypoglycemia and how to correct if it does happen.  Pt was provided with BG log and educated to check every morning, I also provided my extension to pt in case she has any question.  I will see her again in 14 days.  No Follow-up on file.    Thank you for allowing me to participate in the care of this patient.    Robert Reyes, PharmD  10/31/18    CC:   Chalo Whelan M.D.

## 2018-11-15 ENCOUNTER — NON-PROVIDER VISIT (OUTPATIENT)
Dept: MEDICAL GROUP | Facility: MEDICAL CENTER | Age: 53
End: 2018-11-15
Attending: FAMILY MEDICINE
Payer: COMMERCIAL

## 2018-11-15 VITALS
DIASTOLIC BLOOD PRESSURE: 65 MMHG | BODY MASS INDEX: 17.19 KG/M2 | SYSTOLIC BLOOD PRESSURE: 90 MMHG | WEIGHT: 97 LBS | HEIGHT: 63 IN

## 2018-11-15 DIAGNOSIS — E11.21 CONTROLLED TYPE 2 DIABETES MELLITUS WITH DIABETIC NEPHROPATHY, WITHOUT LONG-TERM CURRENT USE OF INSULIN (HCC): Primary | ICD-10-CM

## 2018-11-16 NOTE — PROGRESS NOTES
New Patient Consult Note  Primary care physician: Chalo Whelan M.D.    Reason for consult: Management of Uncontrolled Type 2 Diabetes    HPI:  Gabriela Cheatham-Depintor is a 53 y.o. old patient who comes in today with her daughter /caregiver for a f/u on her diabetes since I saw her last time 2 weeks ago. She was placed on soliqua 15units before breakfast and Jardiance 10 mg before breakfast. Physical activity is still not an option for this pt due to her generalize stiffness issue. Pt is tolerating both Soliqua and Jardiance very well. Her FBG are ranging 140-160. She report being less thirsty as she used to and feels more energetic through out the day. NO HYPOGLYCEMIA REPORTED.    Most Recent HbA1c:   Lab Results   Component Value Date/Time    HBA1C 13.3 (H) 07/26/2018 05:25 PM        Current Diabetes Regimen:  soliqua 15units qam  SGLT-2 Inhibitor:  Empagliflozin 10 mg once daily       Before Breakfast:140-160  Before Lunch:  Before Dinner:  Before Bedtime:  Other times:  Hypoglycemia:  None      ROS:  Constitutional: No weight loss  Cardiac: No palpitations or racing heart  Resp: No shortness of breath  Neuro: No numbness or tinging in feet  Endo: No heat or cold intolerance, no polyuria or polydipsia  All other systems were reviewed and were negative.    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 01/03/2018     Priority: High   • Right leg pain 01/03/2018     Priority: High   • Leukocytosis 01/03/2018     Priority: Medium   • Pain in lower back 02/29/2016     Priority: Medium   • Lower extremity weakness 02/29/2016     Priority: Medium   • Vitamin D deficiency 01/07/2018     Priority: Low   • Type 2 diabetes mellitus untreated, with ketoacidosis  02/29/2016     Priority: Low   • GERD (gastroesophageal reflux disease) 02/29/2016     Priority: Low   • Diabetes mellitus due to underlying  condition, uncontrolled, with hyperglycemia, without long-term current use of insulin ([facility]) 06/15/2018   • Redness, eye 06/15/2018   • Tail bone pain 2018   • Recurrent major depressive disorder, in partial remission ([facility]) 2018   • Ankle contracture, right 2018   • Bacteremia due to Escherichia coli 2015   • Sepsis secondary to UTI ([facility]) 2015       Past Surgical History:  Past Surgical History:   Procedure Laterality Date   • APPENDECTOMY     • CHOLECYSTECTOMY     • PB  DELIVERY ONLY         Allergies:  Patient has no known allergies.    Social History:  Social History     Social History   • Marital status:      Spouse name: N/A   • Number of children: N/A   • Years of education: N/A     Occupational History   • Not on file.     Social History Main Topics   • Smoking status: Never Smoker   • Smokeless tobacco: Never Used   • Alcohol use No   • Drug use: No   • Sexual activity: No     Other Topics Concern   • Not on file     Social History Narrative    ** Merged History Encounter **            Family History:  Family History   Problem Relation Age of Onset   • Diabetes Father    • Stroke Brother    • Cancer Neg Hx    • Heart Disease Neg Hx        Medications:    Current Outpatient Prescriptions:   •  Empagliflozin (JARDIANCE) 25 MG Tab, Take 25 mg by mouth every day., Disp: 30 Tab, Rfl: 5  •  Insulin Glargine-Lixisenatide (SOLIQUA) 100-33 UNT-MCG/ML Solution Pen-injector, Inject 15 Units as instructed every day. Before breakfast, Disp: 1 PEN, Rfl: 0  •  Blood Glucose Test Strips, Using True metrix  meter. Compatible test strips, use 2 X/day number 100 Lancets use 2 X/day number 100, Disp: 1 Mutually Defined, Rfl: 6  •  fluoxetine (PROZAC) 10 MG tablet, TAKE 1 CAP BY MOUTH EVERYDAY, Disp: 30 Tab, Rfl: 3  •  pioglitazone (ACTOS) 45 MG Tab, Take 1 Tab by mouth every day., Disp: 30 Tab, Rfl: 11  •  glimepiride (AMARYL) 4 MG Tab, Take 0.5 Tabs by mouth 2 times a day., Disp: 60  "Tab, Rfl: 6  •  diazePAM (VALIUM) 5 MG Tab, 1 by mouth 3 times daily for muscle spasm, Disp: 90 Tab, Rfl: 2  •  ibuprofen (MOTRIN) 400 MG Tab, Take 1 Tab by mouth every 6 hours as needed for Headache., Disp: 30 Tab, Rfl: 0  •  Blood Glucose Monitoring Suppl Supplies Misc, Order  True Metrix meter. Compatible test strips, use 2 X/day number 100 Lancets use 2 X/day number 100, Disp: 1 Mutually Defined, Rfl: 2  •  penicillin v potassium (VEETID) 500 MG Tab, Take 1 Tab by mouth 4 times a day. (Patient not taking: Reported on 10/31/2018), Disp: 40 Tab, Rfl: 0  •  Blood Glucose Monitoring Suppl (TRUE METRIX METER) w/Device Kit, TEST TWICE A DAY, Disp: 1 Kit, Rfl: 0  •  FLUoxetine (PROZAC) 10 MG Cap, Take 1 Cap by mouth every day., Disp: 30 Cap, Rfl: 3  •  omeprazole (PRILOSEC) 20 MG delayed-release capsule, Take 1 Cap by mouth every day. (Patient not taking: Reported on 10/31/2018), Disp: 30 Cap, Rfl: 6  •  artificial tears 1.4 % Solution, Place 2 Drops in both eyes 4 times a day., Disp: 1 Bottle, Rfl: 3    Labs: Reviewed    Physical Examination:  Vital signs: BP (!) 90/65   Ht 1.6 m (5' 2.99\")   Wt 44 kg (97 lb)   LMP 01/31/2015   BMI 17.19 kg/m²  Body mass index is 17.19 kg/m².  General: No apparent distress, cooperative  Eyes: No scleral icterus or discharge  ENMT: Normal on external inspection of nose, lips, normal thyroid exam  Neck: No abnormal masses on inspection  Resp: Normal effort, clear to auscultation bilaterally   CVS: Regular rate and rhythm, S1 S2 normal, no murmur   Extremities: No edema  Abdomen: abdominal obesity present  Neuro: Alert and oriented  Skin: No rash  Psych: Normal mood and affect, intact memory and able to make informed decisions    Assessment and Plan:    Controlled type 2 diabetes mellitus with diabetic nephropathy, without long-term current use of insulin (HCC)  Pt FBG reading improved so much over the last 14 days and since I put her on Soliqua and Jardiance. 240+ " --------->140-160    Will increase Soliqua by 5 units (from 15 units-20 units) to get better basal coverage. Pt was re-educated on hypoglycemia and how to treat if it occurs.  Will increase Jardiance to 25 mg daily.    Pt is not in a position to increase physical activity due to her generalize stiffness.  She is already reporting decreased appetite due to her Soliqua.    It's highly possible to reach diabetes goal with this pt with current therapy.    I ordered CMP, A1C and GFR to be done before next visit.    F/u in 6 weeks.      Thank you for allowing me to participate in the care of this patient.    Robert Reyes, PharmD  11/15/18    CC:   Chalo Whelan M.D.

## 2018-12-14 DIAGNOSIS — R76.0 STIFF PERSON SYNDROME WITH POSITIVE GLUTAMIC ACID DECARBOXYLASE (GAD) ANTIBODY: ICD-10-CM

## 2018-12-14 DIAGNOSIS — G25.82 STIFF PERSON SYNDROME WITH POSITIVE GLUTAMIC ACID DECARBOXYLASE (GAD) ANTIBODY: ICD-10-CM

## 2018-12-14 RX ORDER — DIAZEPAM 5 MG/1
TABLET ORAL
Qty: 90 TAB | Refills: 2 | Status: SHIPPED | OUTPATIENT
Start: 2018-12-14 | End: 2019-03-05 | Stop reason: SDUPTHER

## 2019-02-27 DIAGNOSIS — F41.9 ANXIETY: ICD-10-CM

## 2019-02-28 RX ORDER — FLUOXETINE 10 MG/1
TABLET, FILM COATED ORAL
Qty: 30 TAB | Refills: 3 | Status: SHIPPED | OUTPATIENT
Start: 2019-02-28 | End: 2019-03-05

## 2019-03-05 ENCOUNTER — OFFICE VISIT (OUTPATIENT)
Dept: NEUROLOGY | Facility: MEDICAL CENTER | Age: 54
End: 2019-03-05
Payer: COMMERCIAL

## 2019-03-05 VITALS
HEART RATE: 98 BPM | TEMPERATURE: 97.5 F | DIASTOLIC BLOOD PRESSURE: 72 MMHG | HEIGHT: 59 IN | SYSTOLIC BLOOD PRESSURE: 122 MMHG | BODY MASS INDEX: 19.56 KG/M2 | WEIGHT: 97 LBS | RESPIRATION RATE: 17 BRPM | OXYGEN SATURATION: 92 %

## 2019-03-05 DIAGNOSIS — G25.82 STIFF PERSON SYNDROME WITH POSITIVE GLUTAMIC ACID DECARBOXYLASE (GAD) ANTIBODY: Primary | ICD-10-CM

## 2019-03-05 DIAGNOSIS — R76.0 STIFF PERSON SYNDROME WITH POSITIVE GLUTAMIC ACID DECARBOXYLASE (GAD) ANTIBODY: Primary | ICD-10-CM

## 2019-03-05 PROCEDURE — 99205 OFFICE O/P NEW HI 60 MIN: CPT | Performed by: PSYCHIATRY & NEUROLOGY

## 2019-03-05 RX ORDER — DIAZEPAM 5 MG/1
10 TABLET ORAL 3 TIMES DAILY
Qty: 180 TAB | Refills: 0 | Status: SHIPPED | OUTPATIENT
Start: 2019-03-05 | End: 2019-03-20 | Stop reason: SDUPTHER

## 2019-03-05 ASSESSMENT — PAIN SCALES - GENERAL: PAINLEVEL: NO PAIN

## 2019-03-05 ASSESSMENT — ENCOUNTER SYMPTOMS
FOCAL WEAKNESS: 1
SENSORY CHANGE: 0
FALLS: 0
SPEECH CHANGE: 0
MEMORY LOSS: 0
MYALGIAS: 1
DEPRESSION: 1
SEIZURES: 0

## 2019-03-06 NOTE — PROGRESS NOTES
Subjective:      Gabriela Cheatham-Lindsey is a 53 y.o. female who presents from the office of Dr. Whelan, with her 2 sons and daughter, all of whom assist with translation as translation services were not available, a suboptimal circumstance, with a history of diagnosed stiff person syndrome over one year ago.    HPI    Patient is a very pleasant 53-year-old [place] woman who symptoms of stiffness and spasms involving just the right lower extremity started years before the diagnosis was established.  She described increasing pain and spasms in the leg which seem to be elicited simply by weightbearing as well as with the sudden emotional change or with simple tactile stimulus of the leg itself.  This could result in tremulousness around the hips proximally, and making it very difficult to sit for long intervals of time.  After a while the spasms became more prominent, the right foot became internally rotated resulting in significant pain at the ankle.    She denied any involvement with the left lower extremity, both upper extremities or bulbar symptoms.  She never was aware of significant neck, spinal or low back pain.  Cognition has never been involved, though she does acknowledge feeling quite depressed.  She does sleep well.  A diabetic of 4-year duration, she denied paresthesias or dysesthesias in the feet predating all of the symptoms.    She had several admissions to different hospitals because of the symptoms, a clear diagnosis not established though she was undergoing outpatient evaluation when she was hospitalized here back on January 3, 2018.  I reviewed the electronic health record at length.  It was felt that she did suffer from stiff person syndrome, though EMG studies were never done, she was given oral Valium and baclofen which seemed to help, though it was quite soporific.  Her JACKIE antibodies came back significantly elevated at >250.  She was given a 5-day course of IVIG which  "provided notable benefit, she was discharged for outpatient follow-up, physical therapy, etc.    Patient states that she had may be 2-3 weeks of relief though the symptoms began to recur.  She has been on Valium 5 mg, 3 times daily ever since.  This has helped to a degree only.  She feels that things have never gotten as bad as what they were before her IVIG treatment, but her quality of life has been severely curtailed.  EMG/NCV studies have never been done.  Seen by another neurologist, because of insurance changes, she is now following up here for long-term management.    Her medical history is remarkable for diabetes of about 4-year duration.  Otherwise she denies a history of MS, neuro degenerative disease, CVA, CAD, PVD, malignancy, thyroid disease, pulmonary disease, hypertension, dyslipidemia, liver or kidney disease.  There is no surgical history of note.    Her father has diabetes, her mother  at 80, all 8 siblings as far she knows are alive and well.  No one in the family has a history of similar symptoms or diagnosed stiff person syndrome.  She neither smokes nor drinks, denies MSA.    She is on Valium 5 mg 3 times daily, Prozac 10 mg daily, Soliqua, Actos, and ibuprofen.    Review of Systems   Constitutional: Negative for malaise/fatigue.   Musculoskeletal: Positive for joint pain and myalgias. Negative for falls.   Neurological: Positive for focal weakness. Negative for sensory change, speech change and seizures.   Psychiatric/Behavioral: Positive for depression. Negative for memory loss.   All other systems reviewed and are negative.       Objective:     /72 (BP Location: Right arm, Patient Position: Supine)   Pulse 98   Temp 36.4 °C (97.5 °F)   Resp 17   Ht 1.499 m (4' 11\")   Wt 44 kg (97 lb)   LMP 2015   SpO2 92%   BMI 19.59 kg/m²      Physical Exam    She appears in no acute distress, she was brought in a wheelchair, requires significant assistance getting up onto the " examination table where she remained in supine position.  She was quite pleasant and spirit and demeanor, though became easily tearful, crying when she was talking about her condition, symptoms and disability.  She is cooperative.  Vital signs are stable.  There is no malar rash or temporal tenderness.  The neck is supple, range of motion is full.  There is no spasm or tenderness of the paraspinal muscle bodies.  Carotid pulses are present bilaterally without asymmetry on palpation.  Cardiac evaluation reveals a regular rhythm.  Right lower extremity remains in a spastic stiffened position with the ankle inverted and rotated internally.    Fully oriented, there is no aphasia, apraxia, or inattention.  She follows commands easily.    PERRLA/EOMI, visual fields are full, there is no bulbar dysfunction, facial movements are symmetric, sensory exam intact to light touch and temperature bilaterally, the tongue and uvula are midline, jaw jerk is absent, shoulder shrug and head rotation are intact and symmetric.    Musculoskeletal exam does reveal normal tone throughout, strength is intact in both upper and left lower extremity.  In the right lower extremity movements are limited because of the spasms that occur with any type of voluntary movement as well as with simple light touch of the skin over the leg.  Reflexes are easily elicited throughout though the left ankle jerk is diminished, there are no asymmetries in the upper extremities.  Poor relaxation prevents assessing the right lower extremity.    She could not stand because of the stiffness and spasms with the right leg, fine motor control and repetitive movements are intact in the upper extremities and left foot.  There is no appendicular dystaxia with the upper extremities.    Sensory exam is intact to vibration, light touch and temperature throughout.     Assessment/Plan:     1. 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  An unusual form, typically 70-80% of these patients having more generalized and axial involvement, hers is a monoextremity process, hopefully less severe though prognosis cannot be accurately assessed.  She will require IVIG treatments again, initiating 5-day course followed by frequent though hopefully shorter duration treatments into the foreseeable future.  Hopefully, drugs like Rituxan, CD20 depleting treatments, will not be necessary.    Symptomatically, Valium does need to be increased, patient's find himself taking upwards of 60-80 mg/day and total.  Baclofen was tried, evidently providing little benefit, though the family cannot be specific.  A retrial of the drug or a trial of Zanaflex moving forwards might be options.    Spent some time talking about all the above with the patient's family who helped with translation.  Patient simply wants to get the sensory sensitivity gone, so that she can sit, even going to the bathroom, without discomfort.  They were told that Valium can be used at higher doses, it is relieving symptoms, but the underlying disease process is being treated by the IVIG, so both will be required long-term.  Phone contact in the interim with the patient's family as we adjust her Valium, we will follow-up over the next 4 or 5 months.    Valium 10 mg, 3 times daily will be started, but if there is too much sleepiness, we can go slower on the titration, starting at 10 mg in the morning only, adding additional 5 mg doses on a weekly basis to the remaining doses, eventually achieving 10 mg, 3 times daily.    - diazePAM (VALIUM) 5 MG Tab; Take 2 Tabs by mouth 3 times a day for 31 days.  Dispense: 180 Tab; Refill: 0    Time: 60 minutes spent face-to-face for exam, review, discussion, and education, of this over 50% of the time spent counseling and coordinating care surrounding all of  the above issues.

## 2019-03-07 RX ORDER — FLUOXETINE 10 MG/1
10 CAPSULE ORAL DAILY
Qty: 30 CAP | Refills: 2 | Status: SHIPPED | OUTPATIENT
Start: 2019-03-07 | End: 2019-06-09 | Stop reason: SDUPTHER

## 2019-03-21 ENCOUNTER — OUTPATIENT INFUSION SERVICES (OUTPATIENT)
Dept: ONCOLOGY | Facility: MEDICAL CENTER | Age: 54
End: 2019-03-21
Attending: PSYCHIATRY & NEUROLOGY
Payer: COMMERCIAL

## 2019-03-21 VITALS
TEMPERATURE: 98.8 F | BODY MASS INDEX: 20.89 KG/M2 | SYSTOLIC BLOOD PRESSURE: 112 MMHG | HEART RATE: 90 BPM | HEIGHT: 61 IN | OXYGEN SATURATION: 96 % | WEIGHT: 110.67 LBS | DIASTOLIC BLOOD PRESSURE: 65 MMHG | RESPIRATION RATE: 18 BRPM

## 2019-03-21 PROCEDURE — 700111 HCHG RX REV CODE 636 W/ 250 OVERRIDE (IP): Performed by: PSYCHIATRY & NEUROLOGY

## 2019-03-21 PROCEDURE — 96365 THER/PROPH/DIAG IV INF INIT: CPT

## 2019-03-21 PROCEDURE — 96366 THER/PROPH/DIAG IV INF ADDON: CPT

## 2019-03-21 RX ORDER — IMMUNE GLOBULIN INFUSION (HUMAN) 100 MG/ML
20 INJECTION, SOLUTION INTRAVENOUS; SUBCUTANEOUS ONCE
Status: COMPLETED | OUTPATIENT
Start: 2019-03-21 | End: 2019-03-21

## 2019-03-21 RX ADMIN — IMMUNE GLOBULIN INFUSION (HUMAN) 20 G: 100 INJECTION, SOLUTION INTRAVENOUS; SUBCUTANEOUS at 14:59

## 2019-03-21 NOTE — PROGRESS NOTES
Pt presents to infusion center for IVIG as treatment for stiff-person syndrome.  Pt Canadian-speaking.  Refused  phone; preferred daughter to translate.  Pt received IVIG previously while in the hospital and had no reaction.  Drug information sheet in Canadian given to patient.  All questions answered.  PIV established, pt requested to leave in for remaining days.  Pt currently tolerated infusion without incident with max rate of 200ml/hr.  PIV flushed with saline per policy, removed per pt request, gauze dressing placed.  Pt left infusion center via wheelchair and in good condition.  Returns tomorrow.

## 2019-03-22 ENCOUNTER — OUTPATIENT INFUSION SERVICES (OUTPATIENT)
Dept: ONCOLOGY | Facility: MEDICAL CENTER | Age: 54
End: 2019-03-22
Attending: PSYCHIATRY & NEUROLOGY
Payer: COMMERCIAL

## 2019-03-22 VITALS
DIASTOLIC BLOOD PRESSURE: 68 MMHG | BODY MASS INDEX: 20.89 KG/M2 | HEART RATE: 72 BPM | HEIGHT: 61 IN | SYSTOLIC BLOOD PRESSURE: 117 MMHG | OXYGEN SATURATION: 95 % | TEMPERATURE: 97.8 F | RESPIRATION RATE: 18 BRPM | WEIGHT: 110.67 LBS

## 2019-03-22 DIAGNOSIS — G25.82 STIFF PERSON SYNDROME WITH POSITIVE GLUTAMIC ACID DECARBOXYLASE (GAD) ANTIBODY: ICD-10-CM

## 2019-03-22 DIAGNOSIS — M79.604 RIGHT LEG PAIN: ICD-10-CM

## 2019-03-22 DIAGNOSIS — R76.0 STIFF PERSON SYNDROME WITH POSITIVE GLUTAMIC ACID DECARBOXYLASE (GAD) ANTIBODY: ICD-10-CM

## 2019-03-22 PROCEDURE — 96366 THER/PROPH/DIAG IV INF ADDON: CPT

## 2019-03-22 PROCEDURE — 700111 HCHG RX REV CODE 636 W/ 250 OVERRIDE (IP): Performed by: PSYCHIATRY & NEUROLOGY

## 2019-03-22 PROCEDURE — 96365 THER/PROPH/DIAG IV INF INIT: CPT

## 2019-03-22 RX ORDER — IMMUNE GLOBULIN INFUSION (HUMAN) 100 MG/ML
20 INJECTION, SOLUTION INTRAVENOUS; SUBCUTANEOUS ONCE
Status: COMPLETED | OUTPATIENT
Start: 2019-03-22 | End: 2019-03-22

## 2019-03-22 RX ADMIN — IMMUNE GLOBULIN INFUSION (HUMAN) 20 G: 100 INJECTION, SOLUTION INTRAVENOUS; SUBCUTANEOUS at 15:02

## 2019-03-22 NOTE — PROGRESS NOTES
Returns for IVIG to tx stiff person syndrome.  Arrives in w/c today and son transfers to chair as R leg stiff today and difficult to bend.  Denies headache or fatigue issues.  Not drinking much fluids she reports.  Encouraged this prior to IV starts.  Tx infusing.

## 2019-03-23 ENCOUNTER — OUTPATIENT INFUSION SERVICES (OUTPATIENT)
Dept: ONCOLOGY | Facility: MEDICAL CENTER | Age: 54
End: 2019-03-23
Attending: PSYCHIATRY & NEUROLOGY
Payer: COMMERCIAL

## 2019-03-23 VITALS
HEART RATE: 84 BPM | TEMPERATURE: 97.8 F | HEIGHT: 59 IN | DIASTOLIC BLOOD PRESSURE: 84 MMHG | RESPIRATION RATE: 18 BRPM | BODY MASS INDEX: 22.93 KG/M2 | WEIGHT: 113.76 LBS | SYSTOLIC BLOOD PRESSURE: 122 MMHG | OXYGEN SATURATION: 96 %

## 2019-03-23 PROCEDURE — 96366 THER/PROPH/DIAG IV INF ADDON: CPT

## 2019-03-23 PROCEDURE — 96365 THER/PROPH/DIAG IV INF INIT: CPT

## 2019-03-23 PROCEDURE — 700111 HCHG RX REV CODE 636 W/ 250 OVERRIDE (IP): Performed by: PSYCHIATRY & NEUROLOGY

## 2019-03-23 RX ORDER — IMMUNE GLOBULIN INFUSION (HUMAN) 100 MG/ML
20 INJECTION, SOLUTION INTRAVENOUS; SUBCUTANEOUS ONCE
Status: COMPLETED | OUTPATIENT
Start: 2019-03-23 | End: 2019-03-23

## 2019-03-23 RX ADMIN — IMMUNE GLOBULIN INFUSION (HUMAN) 20 G: 100 INJECTION, SOLUTION INTRAVENOUS; SUBCUTANEOUS at 15:24

## 2019-03-23 NOTE — PROGRESS NOTES
IVIG ramped per rate sheet without rx.  At dose end, pt assisted up with 2 person assist, and ambulated with walker and sba to door, then w/c to car.  Returns tomorrow for d3.  DC to care of family.

## 2019-03-24 ENCOUNTER — OUTPATIENT INFUSION SERVICES (OUTPATIENT)
Dept: ONCOLOGY | Facility: MEDICAL CENTER | Age: 54
End: 2019-03-24
Attending: PSYCHIATRY & NEUROLOGY
Payer: COMMERCIAL

## 2019-03-24 VITALS
OXYGEN SATURATION: 97 % | HEART RATE: 90 BPM | DIASTOLIC BLOOD PRESSURE: 80 MMHG | RESPIRATION RATE: 18 BRPM | TEMPERATURE: 97.2 F | WEIGHT: 113.76 LBS | SYSTOLIC BLOOD PRESSURE: 122 MMHG | HEIGHT: 59 IN | BODY MASS INDEX: 22.93 KG/M2

## 2019-03-24 DIAGNOSIS — G25.82 STIFF PERSON SYNDROME WITH POSITIVE GLUTAMIC ACID DECARBOXYLASE (GAD) ANTIBODY: ICD-10-CM

## 2019-03-24 DIAGNOSIS — R76.0 STIFF PERSON SYNDROME WITH POSITIVE GLUTAMIC ACID DECARBOXYLASE (GAD) ANTIBODY: ICD-10-CM

## 2019-03-24 PROCEDURE — 96365 THER/PROPH/DIAG IV INF INIT: CPT

## 2019-03-24 PROCEDURE — 700111 HCHG RX REV CODE 636 W/ 250 OVERRIDE (IP): Performed by: PSYCHIATRY & NEUROLOGY

## 2019-03-24 PROCEDURE — 96366 THER/PROPH/DIAG IV INF ADDON: CPT

## 2019-03-24 RX ORDER — IMMUNE GLOBULIN INFUSION (HUMAN) 100 MG/ML
20 INJECTION, SOLUTION INTRAVENOUS; SUBCUTANEOUS ONCE
Status: COMPLETED | OUTPATIENT
Start: 2019-03-24 | End: 2019-03-24

## 2019-03-24 RX ADMIN — IMMUNE GLOBULIN INFUSION (HUMAN) 20 G: 100 INJECTION, SOLUTION INTRAVENOUS; SUBCUTANEOUS at 15:03

## 2019-03-24 NOTE — PROGRESS NOTES
Patient arrived ambulatory to the [facility] for D3/5 of IVIG. Pt's daughter at chairside, able to speak english and Kinyarwanda. Reviewed option for translation services if needed, declines at this time. Reviewed vital signs, labs, and physician order. Patient denies S&S of infection, or bleeding. IV access established in left hand bu Steph, visualized brisk blood return. IVIG administered per titration scale up to max rate of 200ml's/hr, no adverse reaction observed. IV flushed per protocol, catheter removed with tip intact, gauze and coban dressing placed. Confirmed upcoming appointment date and time with patient. Patient left the [facility] ambulatory in no sign of distress.

## 2019-03-25 ENCOUNTER — TELEPHONE (OUTPATIENT)
Dept: NEUROLOGY | Facility: MEDICAL CENTER | Age: 54
End: 2019-03-25

## 2019-03-25 ENCOUNTER — OUTPATIENT INFUSION SERVICES (OUTPATIENT)
Dept: ONCOLOGY | Facility: MEDICAL CENTER | Age: 54
End: 2019-03-25
Attending: PSYCHIATRY & NEUROLOGY
Payer: COMMERCIAL

## 2019-03-25 VITALS
SYSTOLIC BLOOD PRESSURE: 113 MMHG | TEMPERATURE: 97.8 F | HEART RATE: 81 BPM | WEIGHT: 113.76 LBS | OXYGEN SATURATION: 97 % | HEIGHT: 59 IN | DIASTOLIC BLOOD PRESSURE: 70 MMHG | BODY MASS INDEX: 22.93 KG/M2 | RESPIRATION RATE: 18 BRPM

## 2019-03-25 DIAGNOSIS — G25.82 STIFF PERSON SYNDROME WITH POSITIVE GLUTAMIC ACID DECARBOXYLASE (GAD) ANTIBODY: ICD-10-CM

## 2019-03-25 DIAGNOSIS — R76.0 STIFF PERSON SYNDROME WITH POSITIVE GLUTAMIC ACID DECARBOXYLASE (GAD) ANTIBODY: ICD-10-CM

## 2019-03-25 PROCEDURE — 96365 THER/PROPH/DIAG IV INF INIT: CPT

## 2019-03-25 PROCEDURE — 96366 THER/PROPH/DIAG IV INF ADDON: CPT

## 2019-03-25 PROCEDURE — 700111 HCHG RX REV CODE 636 W/ 250 OVERRIDE (IP): Performed by: PSYCHIATRY & NEUROLOGY

## 2019-03-25 RX ORDER — IMMUNE GLOBULIN INFUSION (HUMAN) 100 MG/ML
20 INJECTION, SOLUTION INTRAVENOUS; SUBCUTANEOUS ONCE
Status: COMPLETED | OUTPATIENT
Start: 2019-03-25 | End: 2019-03-25

## 2019-03-25 RX ADMIN — IMMUNE GLOBULIN INFUSION (HUMAN) 20 G: 100 INJECTION, SOLUTION INTRAVENOUS; SUBCUTANEOUS at 15:46

## 2019-03-25 NOTE — TELEPHONE ENCOUNTER
Patient's insurance will only cover #4 diazepam per day. Did you want to adjust her prescription? Please advise.

## 2019-03-25 NOTE — PROGRESS NOTES
Pt arrived to [facility] in [place] with family, here for D5 of 5 IVIG for stiff person syndrome.  IV access established in right hand. IVIG infused using rate calculator in pt's chart. Max rate to 250 ml/hr. Infusion completed w/ no adverse reactions. PIV flushed and removed, gauze and coban dressing applied.  Pt left in [place] in care of family in [place].  Pt does not need f/u appt at this time.

## 2019-03-25 NOTE — PROGRESS NOTES
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 Family assisting pt from WC to chair. Pt Kinyarwanda speaking only, requesting family to assist with translation instead of phone. IV access established to R hand with some difficulty. IV flushed and patent with brisk blood return observed. IVIG infused using titration sheet in pt's chart. Pt evelyn well with no apparent reactions. IV flushed and removed per pt request, pressure dressing applied. Pt assisted to WC by family (2 person assist) and discharged home under care of family in no apparent distress. Pt knows to return tomorrow for D5.

## 2019-04-22 DIAGNOSIS — R76.0 STIFF PERSON SYNDROME WITH POSITIVE GLUTAMIC ACID DECARBOXYLASE (GAD) ANTIBODY: ICD-10-CM

## 2019-04-22 DIAGNOSIS — G25.82 STIFF PERSON SYNDROME WITH POSITIVE GLUTAMIC ACID DECARBOXYLASE (GAD) ANTIBODY: ICD-10-CM

## 2019-04-22 RX ORDER — DIAZEPAM 5 MG/1
5 TABLET ORAL 3 TIMES DAILY PRN
Qty: 90 TAB | Refills: 2 | Status: SHIPPED | OUTPATIENT
Start: 2019-04-22 | End: 2019-04-23 | Stop reason: SDUPTHER

## 2019-04-22 RX ORDER — CLOTRIMAZOLE AND BETAMETHASONE DIPROPIONATE 10; .64 MG/G; MG/G
1 CREAM TOPICAL 2 TIMES DAILY
Qty: 30 G | Refills: 1 | Status: SHIPPED
Start: 2019-04-22 | End: 2019-10-08

## 2019-04-22 NOTE — TELEPHONE ENCOUNTER
Was the patient seen in the last year in this department? Yes    Does patient have an active prescription for medications requested? No     Received Request Via: Patient would like to restart this medication    It is difficult for the pt to come in due to worsening tremors that make it hard for her to walk and for them to transfer her. Her daughter in law states that the itching is getting so bad that she is scratching to the point of bleeding. The cream she is using was given to her by a previous PCP ([facility]). They are wondering what to do regarding this issue,

## 2019-04-22 NOTE — TELEPHONE ENCOUNTER
1. Caller Name: Mary Ann                                         Call Back Number: [phone]      Patient approves a detailed voicemail message: yes    2. What are the patient's symptoms (location & severity)? Increased anal itching    3. Is this a new symptom No    4. When did it start? Been going on for a while, pt has a cream that was prescribed but it is not working. They would like to know if fluconazole cream or ointment would help. Please advise    5. Action taken per Active Symptom Guide: Pt informed that I would ask PCP and call them back as soon as I hear from the Dr.     6. Patient agrees to recommended action per Active Symptom Escalation Protocol.

## 2019-04-22 NOTE — TELEPHONE ENCOUNTER
As family is reporting that patient is increasingly difficult to transport for dermatitis evaluation we will empirically try a combination steroid antifungal (Lotrisone) cream twice daily with follow-up as needed

## 2019-04-22 NOTE — TELEPHONE ENCOUNTER
Please have pt come in so I can see the rash, and bring in the ointment that has not helped.  Dr ROTHMAN

## 2019-06-17 ENCOUNTER — OFFICE VISIT (OUTPATIENT)
Dept: NEUROLOGY | Facility: MEDICAL CENTER | Age: 54
End: 2019-06-17
Payer: COMMERCIAL

## 2019-06-17 VITALS
DIASTOLIC BLOOD PRESSURE: 64 MMHG | OXYGEN SATURATION: 94 % | BODY MASS INDEX: 22.63 KG/M2 | TEMPERATURE: 97.7 F | HEIGHT: 59 IN | SYSTOLIC BLOOD PRESSURE: 100 MMHG | HEART RATE: 77 BPM

## 2019-06-17 DIAGNOSIS — R76.0 STIFF PERSON SYNDROME WITH POSITIVE GLUTAMIC ACID DECARBOXYLASE (GAD) ANTIBODY: Primary | ICD-10-CM

## 2019-06-17 DIAGNOSIS — G25.82 STIFF PERSON SYNDROME WITH POSITIVE GLUTAMIC ACID DECARBOXYLASE (GAD) ANTIBODY: Primary | ICD-10-CM

## 2019-06-17 PROCEDURE — 99213 OFFICE O/P EST LOW 20 MIN: CPT | Performed by: PSYCHIATRY & NEUROLOGY

## 2019-06-17 RX ORDER — DIAZEPAM 10 MG/1
10 TABLET ORAL 3 TIMES DAILY PRN
Qty: 90 TAB | Refills: 3 | Status: SHIPPED | OUTPATIENT
Start: 2019-06-17 | End: 2019-09-17

## 2019-06-17 RX ORDER — TIZANIDINE 2 MG/1
2 TABLET ORAL 3 TIMES DAILY
Qty: 90 TAB | Refills: 2 | Status: SHIPPED | OUTPATIENT
Start: 2019-06-17 | End: 2019-10-08

## 2019-06-17 ASSESSMENT — ENCOUNTER SYMPTOMS
FOCAL WEAKNESS: 1
MEMORY LOSS: 0
BACK PAIN: 1
TINGLING: 0
MYALGIAS: 1

## 2019-06-18 NOTE — PROGRESS NOTES
"Subjective:      Gabriela Cheatham-Depintor is a 53 y.o. female who presents with her daughter Gabriela and son Kashmir who provide translation, for follow-up, with a history of stiff person syndrome with JACKIE antibodies.    HPI    With her IVIG infusion for 5 days, in March of this year, she has had some improvement.  The skin hypersensitivity and spasms with the right lower extremity have improved to a degree.  She still has the tremulousness that she describes in the hips which worsens if she tries to walk or lays down in the wrong position.  Sitting down or moving into a different position usually helps.  Valium 10 mg, 3 times daily does work, but insurance has been getting in the way of the appropriate dose being provided.    She tolerated the IVIG without major issue though the infusion center staff were having a difficult time finding IV access.  She denied headaches, malaise, etc. with the infusion itself.  She is asking if there is some additional help that can be provided.    Medical, surgical and family histories are reviewed in the electronic health record, there are no new drug allergies.  She is on Valium 10 mg, 3 times daily, also Actos, Soliqua, and Prozac.    Review of Systems   Musculoskeletal: Positive for back pain and myalgias.   Neurological: Positive for focal weakness. Negative for tingling.   Psychiatric/Behavioral: Negative for memory loss.   All other systems reviewed and are negative.       Objective:     /64   Pulse 77   Temp 36.5 °C (97.7 °F) (Temporal)   Ht 1.51 m (4' 11.45\")   LMP 01/31/2015   SpO2 94%   BMI 22.63 kg/m²      Physical Exam    She appears in notable distress.  Still, she is a little more comfortable than she had been originally with movement.  Her vital signs are stable.  She is cooperative.  There is no malar rash.  The neck is supple.    Cognition is grossly intact, even in Belgian, fluency is not impaired.  PERRLA/EOMI, visual fields are full, facial " movements are symmetric, the tongue and uvula midline without bulbar dysfunction.    Musculoskeletal exam reveals normal tone in both upper and left lower extremity, there is spastic-like dystonic posture with the right lower extremity with knee extension as well as ankle extension and inversion.  Fine motor control and repetitive movements are intact in the upper and left lower extremities, there is no appendicular dystaxia with the extremities.  She could not be stood to walk.  Sensory exam is intact to vibration.       Assessment/Plan:     1. 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  Valium can be increased further, I will write for a 10 mg pill allowing her an adequate number of pills in a month (insurance evidently is limiting her to #4 tablets/month, regardless of strength, the problem is that her pharmacy would only give her 5 mg tablets), adding Zanaflex 2 mg, 3 times daily.  She has already failed baclofen.  Side effects were reviewed.    It does seem that the disease did respond to a degree with IVIG, I will continue a 2-day infusion at 0.4 g/kilogram/day.  She was told she will need this on a monthly basis.  Phone contact as we adjust her medications at home, a 6-month follow-up is scheduled.    - diazepam (VALIUM) 10 MG tablet; Take 1 Tab by mouth 3 times a day as needed for up to 92 days.  Dispense: 90 Tab; Refill: 3  - tizanidine (ZANAFLEX) 2 MG tablet; Take 1 Tab by mouth 3 times a day.  Dispense: 90 Tab; Refill: 2    Time: 20 minutes spent face-to-face for exam, review, discussion, and education, of this over 60% of the time spent counseling and coordinating care.

## 2019-08-20 ENCOUNTER — TELEPHONE (OUTPATIENT)
Dept: MEDICAL GROUP | Facility: MEDICAL CENTER | Age: 54
End: 2019-08-20

## 2019-09-01 ENCOUNTER — OFFICE VISIT (OUTPATIENT)
Dept: URGENT CARE | Facility: CLINIC | Age: 54
End: 2019-09-01
Payer: COMMERCIAL

## 2019-09-01 VITALS
OXYGEN SATURATION: 97 % | RESPIRATION RATE: 22 BRPM | DIASTOLIC BLOOD PRESSURE: 76 MMHG | SYSTOLIC BLOOD PRESSURE: 132 MMHG | WEIGHT: 110 LBS | BODY MASS INDEX: 21.88 KG/M2 | TEMPERATURE: 97.9 F | HEART RATE: 75 BPM

## 2019-09-01 DIAGNOSIS — K08.89 PAIN, DENTAL: ICD-10-CM

## 2019-09-01 PROCEDURE — 99213 OFFICE O/P EST LOW 20 MIN: CPT | Performed by: NURSE PRACTITIONER

## 2019-09-01 RX ORDER — AMOXICILLIN 875 MG/1
875 TABLET, COATED ORAL 2 TIMES DAILY
Qty: 20 TAB | Refills: 0 | Status: SHIPPED | OUTPATIENT
Start: 2019-09-01 | End: 2019-09-11

## 2019-09-01 RX ORDER — KETOROLAC TROMETHAMINE 30 MG/ML
30 INJECTION, SOLUTION INTRAMUSCULAR; INTRAVENOUS ONCE
Status: COMPLETED | OUTPATIENT
Start: 2019-09-01 | End: 2019-09-01

## 2019-09-01 RX ORDER — TRAMADOL HYDROCHLORIDE 50 MG/1
50 TABLET ORAL EVERY 6 HOURS PRN
Qty: 20 TAB | Refills: 0 | Status: SHIPPED | OUTPATIENT
Start: 2019-09-01 | End: 2019-09-08

## 2019-09-01 RX ADMIN — KETOROLAC TROMETHAMINE 30 MG: 30 INJECTION, SOLUTION INTRAMUSCULAR; INTRAVENOUS at 12:31

## 2019-09-01 ASSESSMENT — ENCOUNTER SYMPTOMS
FEVER: 0
CHILLS: 0

## 2019-09-01 NOTE — PROGRESS NOTES
Subjective:      Gabriela Cheatham-Depintor is a 54 y.o. female who presents with Tooth Ache ((R) side upper and lower-started today)    Past Medical History:   Diagnosis Date   • Diabetes (HCC) 2015   • Stiff person syndrome      Social History     Socioeconomic History   • Marital status:      Spouse name: Not on file   • Number of children: Not on file   • Years of education: Not on file   • Highest education level: Not on file   Occupational History   • Not on file   Social Needs   • Financial resource strain: Not on file   • Food insecurity:     Worry: Not on file     Inability: Not on file   • Transportation needs:     Medical: Not on file     Non-medical: Not on file   Tobacco Use   • Smoking status: Never Smoker   • Smokeless tobacco: Never Used   Substance and Sexual Activity   • Alcohol use: No   • Drug use: No   • Sexual activity: Never   Lifestyle   • Physical activity:     Days per week: Not on file     Minutes per session: Not on file   • Stress: Not on file   Relationships   • Social connections:     Talks on phone: Not on file     Gets together: Not on file     Attends Rastafari service: Not on file     Active member of club or organization: Not on file     Attends meetings of clubs or organizations: Not on file     Relationship status: Not on file   • Intimate partner violence:     Fear of current or ex partner: Not on file     Emotionally abused: Not on file     Physically abused: Not on file     Forced sexual activity: Not on file   Other Topics Concern   • Not on file   Social History Narrative    ** Merged History Encounter **          Family History   Problem Relation Age of Onset   • Diabetes Father    • Stroke Brother    • Cancer Neg Hx    • Heart Disease Neg Hx        Allergies: Patient has no known allergies.    Patient is 54-year-old female who presents today with complaint of pain to the right mandible.  Symptoms started over the last week.  Patient states now she has headache  that radiates into the right side of her face and into the right forehead.  Patient states she does have pain with biting down.        Other   This is a new problem. The current episode started in the past 7 days. The problem occurs constantly. The problem has been unchanged. Pertinent negatives include no chills or fever. Associated symptoms comments: Mouth pain  . Nothing aggravates the symptoms. She has tried nothing for the symptoms. The treatment provided no relief.       Review of Systems   Constitutional: Negative for chills and fever.   HENT:        Mouth pain   All other systems reviewed and are negative.         Objective:     /76 (BP Location: Right arm)   Pulse 75   Temp 36.6 °C (97.9 °F) (Temporal)   Resp (!) 22   Wt 49.9 kg (110 lb)   LMP 01/31/2015   SpO2 97%   BMI 21.88 kg/m²      Physical Exam   Constitutional: She is oriented to person, place, and time. She appears well-developed and well-nourished.   Eyes: Pupils are equal, round, and reactive to light. EOM are normal.   Neurological: She is alert and oriented to person, place, and time.   Skin: Skin is warm and dry.   Psychiatric: She has a normal mood and affect. Her behavior is normal. Judgment and thought content normal.   Vitals reviewed.    Dentition is generally poor.  There are several fractured teeth on the right mandible.  Mild soft tissue swelling around.  There is point tenderness over the right mandible.          Assessment/Plan:     1. Pain, dental  2.  Possible dental abscess    Amoxicillin  Warm compresses  Ultram as needed for pain; clearly stated no driving or alcohol with this medication.  Checked patient's  and find no evidence for narcotic misuse.  Consent signed.  toradol IM  Follow-up with dentist next week  Return to urgent care otherwise for any further questions or concerns

## 2019-09-11 ENCOUNTER — HOSPITAL ENCOUNTER (EMERGENCY)
Facility: MEDICAL CENTER | Age: 54
End: 2019-09-11
Attending: EMERGENCY MEDICINE
Payer: COMMERCIAL

## 2019-09-11 VITALS
WEIGHT: 100 LBS | SYSTOLIC BLOOD PRESSURE: 104 MMHG | BODY MASS INDEX: 17.72 KG/M2 | RESPIRATION RATE: 16 BRPM | HEIGHT: 63 IN | OXYGEN SATURATION: 96 % | HEART RATE: 71 BPM | DIASTOLIC BLOOD PRESSURE: 63 MMHG

## 2019-09-11 DIAGNOSIS — K08.89 PAIN, DENTAL: ICD-10-CM

## 2019-09-11 PROCEDURE — 99284 EMERGENCY DEPT VISIT MOD MDM: CPT

## 2019-09-11 PROCEDURE — 64400 NJX AA&/STRD TRIGEMINAL NRV: CPT

## 2019-09-11 PROCEDURE — 700101 HCHG RX REV CODE 250: Performed by: EMERGENCY MEDICINE

## 2019-09-11 RX ORDER — SODIUM CHLORIDE 9 MG/ML
1000 INJECTION, SOLUTION INTRAVENOUS ONCE
Status: DISCONTINUED | OUTPATIENT
Start: 2019-09-11 | End: 2019-09-12 | Stop reason: HOSPADM

## 2019-09-11 RX ORDER — BUPIVACAINE HYDROCHLORIDE AND EPINEPHRINE 5; 5 MG/ML; UG/ML
20 INJECTION, SOLUTION EPIDURAL; INTRACAUDAL; PERINEURAL ONCE
Status: COMPLETED | OUTPATIENT
Start: 2019-09-11 | End: 2019-09-11

## 2019-09-11 RX ADMIN — BUPIVACAINE HYDROCHLORIDE AND EPINEPHRINE BITARTRATE 20 ML: 5; .005 INJECTION, SOLUTION EPIDURAL; INTRACAUDAL; PERINEURAL at 21:00

## 2019-09-12 NOTE — ED TRIAGE NOTES
"Pt BIB EMS for dental pain. Seen at Urgent Care one week ago for same complaint, Pt given Amoxicillin. Pt seen at dentist today and given a referral for oral surgeon, no appointment scheduled yet. Pt here for unbearable pain in lower left jaw. Pt has \"Stiff person syndrome\", unable to sit up and usues walker to ambulate short distances.   "

## 2019-09-12 NOTE — DISCHARGE INSTRUCTIONS
You were seen in the ER for dental pain.  We have performed something called a dental block which is improved her symptoms and you are safe to go home.  Please follow-up with your dentist and an oral surgeon tomorrow.  Continue all medication as prescribed.  Return to the ER with new or worsening symptoms.

## 2019-09-12 NOTE — ED PROVIDER NOTES
ED Provider Note    Scribed for Dank Richardson M.D. by Pj Mcadams. 2019, 8:31 PM.    Primary care provider: Chalo Whelan M.D.  Means of arrival: EMS  History obtained from: Patient and Family member  History limited by: none     CHIEF COMPLAINT  Chief Complaint   Patient presents with   • Dental Pain       HPI  Gabriela Cheatham-Depintor is a 54 y.o. female who presents to the Emergency Department for right maxillary dental pain onset a week and half ago. The patient went to urgent care for the pain at onset and was prescribed tramadol and amoxicillin which she is currently taking. She endorses associated gum pain.  She then followed up with her dentist today and was referred to an oral surgeon but states that when she got home her pain increased. Chewing makes the pain worse. She tried Tramadol without relief. No fever. Acting at baseline per children who are bedside.    REVIEW OF SYSTEMS  Pertinent positives include right sided dental pain and gum pain. Pertinent negatives include no fever or new pain.   See HPI for further details.     PAST MEDICAL HISTORY   has a past medical history of Diabetes (HCC) () and Stiff person syndrome.    SURGICAL HISTORY   has a past surgical history that includes cholecystectomy; appendectomy; and  delivery only.    SOCIAL HISTORY  Social History     Tobacco Use   • Smoking status: Never Smoker   • Smokeless tobacco: Never Used   Substance Use Topics   • Alcohol use: No   • Drug use: No      Social History     Substance and Sexual Activity   Drug Use No       FAMILY HISTORY  Family History   Problem Relation Age of Onset   • Diabetes Father    • Stroke Brother    • Cancer Neg Hx    • Heart Disease Neg Hx        CURRENT MEDICATIONS  Home Medications     Reviewed by Symone Berman R.N. (Registered Nurse) on 19 at   Med List Status: Partial   Medication Last Dose Status   amoxicillin (AMOXIL) 875 MG tablet 2019 Active   Blood  "Glucose Monitoring Suppl (TRUE METRIX METER) w/Device Kit  Active   Blood Glucose Monitoring Suppl Supplies Misc  Active   Blood Glucose Test Strips  Active   clotrimazole-betamethasone (LOTRISONE) 1-0.05 % Cream  Active   diazepam (VALIUM) 10 MG tablet 9/11/2019 Active   FLUoxetine (PROZAC) 10 MG Cap  Active   ibuprofen (MOTRIN) 400 MG Tab  Active   Insulin Glargine-Lixisenatide (SOLIQUA) 100-33 UNT-MCG/ML Solution Pen-injector  Active   pioglitazone (ACTOS) 45 MG Tab  Active   tizanidine (ZANAFLEX) 2 MG tablet  Active                ALLERGIES  No Known Allergies    PHYSICAL EXAM  VITAL SIGNS: BP (!) 86/58   Pulse 70   Resp 16   Ht 1.6 m (5' 3\")   Wt 45.4 kg (100 lb)   LMP 01/31/2015   SpO2 96%   BMI 17.71 kg/m²   Vitals reviewed.  Constitutional: Alert, lying in bed. Appears uncomfortable.  HENT: No signs of trauma, Bilateral external ears normal, Nose normal. Dry mucous membranes. Poor dentition throughout. Gum disease noted without obvious fluctuance or drainage. Tenderness to percussion over right maxillary premolar. No trismus. No loss of nasolabial fold. No facial swelling  Eyes: Pupils are equal and reactive, Conjunctiva normal, Non-icteric.   Neck: Normal range of motion, No tenderness, Supple, No stridor.   Lymphatic: No lymphadenopathy noted.   Cardiovascular: Regular rate and rhythm, no murmurs.   Thorax & Lungs: Normal breath sounds, No respiratory distress, No wheezing, No chest tenderness.   Skin: Warm, Dry, No erythema, No rash.   Musculoskeletal: No major deformities noted.   Neurologic: Alert.   Psychiatric: Appropriate for situation.    DIAGNOSTIC STUDIES / PROCEDURES    Procedure  Dental Block:  8:57 PM and 9:30 PM performed by Dr Richardson  Sterile process was used  Indication: dental pain  Consetnt: verbal from the patient  Location: right maxillary premolar  Anesthesia: 0.5% bupivacaine without epi 3cc's and 2cc's  Toleration: the patient tolerated well, no immediate complications or " bleeding  Total procedure time: 7 minutes     COURSE & MEDICAL DECISION MAKING  Nursing notes, VS, PMSFHx reviewed in chart.  Differential diagnoses include but not limited to: Dental caries, dental abscess, ANUG     8:31 PM Patient seen and examined at bedside. Patient arrives afebrile but with low blood pressure that I suspect is due to dehydration as she has very dry mucous membranes. No other complaints currently besides the dental pain. Children are bedside and report patient is at her baseline mentation. She is Welsh speaking but children are translating. Warm and well perfused. No nausea or vomiting so will attempt oral rehydration.  She has agreed to a dental block.    8:57 PM Dental block performed at this time as detailed above with immediate relief. Second block was performed approximately 30 minutes later and upon reevaluation, patient is smiling, happy, reports that her pain is completely resolved. Blood pressure has improved to the 100s with oral rehydration and mucous membranes appear more hydrated. She is safe for discharge. Continue prescribed meds as directed. Follow up with oral surgeon tomorrow. Encouraged continued PO hydration.    The patient will return for new or worsening symptoms and is stable at the time of discharge.    DISPOSITION:  Patient will be discharged home in stable condition.    FOLLOW UP:  Chalo Whelan M.D.  [address] [place] [id number] [phone]    Schedule an appointment as soon as possible for a visit in 1 day  For recheck    Your oral surgeon    Schedule an appointment as soon as possible for a visit in 1 day  for further management       FINAL IMPRESSION  1. Pain, dental          Pj BECERRA (Damie), am scribing for, and in the presence of, Dank Richardson M.D..    Electronically signed by: Pj Mcadams (Cristinibe), 9/11/2019    Dank BECERRA M.D. personally performed the services described in this documentation, as scribed by Pj Mcadams in my  presence, and it is both accurate and complete.  E  The note accurately reflects work and decisions made by me.  Dank Richardson  9/12/2019  12:42 PM

## 2019-09-12 NOTE — ED NOTES
Discharge instructions given to patient, a verbal understanding of all instructions was stated. Pt placed in wheel chair and wheeled out accompanied by daughter,  VSS, all belongings accounted for.

## 2019-09-24 ENCOUNTER — TELEPHONE (OUTPATIENT)
Dept: INFECTIOUS DISEASES | Facility: MEDICAL CENTER | Age: 54
End: 2019-09-24

## 2019-09-24 NOTE — TELEPHONE ENCOUNTER
Received a call from Yesenia ([phone]) with [facility] that pt's insurance would not allow them to bill for pt's IVIG, but [facility] is approved. [facility] was not able to treat pt. Yesenia called and informed pt of situation.  Faxed referral to [facility].  -AMP

## 2019-09-30 ENCOUNTER — HOSPITAL ENCOUNTER (EMERGENCY)
Facility: MEDICAL CENTER | Age: 54
End: 2019-09-30
Attending: EMERGENCY MEDICINE
Payer: COMMERCIAL

## 2019-09-30 VITALS
RESPIRATION RATE: 18 BRPM | SYSTOLIC BLOOD PRESSURE: 102 MMHG | BODY MASS INDEX: 19.88 KG/M2 | HEART RATE: 76 BPM | HEIGHT: 62 IN | DIASTOLIC BLOOD PRESSURE: 63 MMHG | TEMPERATURE: 97.9 F | OXYGEN SATURATION: 93 % | WEIGHT: 108 LBS

## 2019-09-30 DIAGNOSIS — J02.9 PHARYNGITIS, UNSPECIFIED ETIOLOGY: ICD-10-CM

## 2019-09-30 DIAGNOSIS — R51.9 NONINTRACTABLE EPISODIC HEADACHE, UNSPECIFIED HEADACHE TYPE: ICD-10-CM

## 2019-09-30 DIAGNOSIS — E86.0 DEHYDRATION: ICD-10-CM

## 2019-09-30 LAB
BLOOD CULTURE HOLD CXBCH: NORMAL
S PYO DNA SPEC NAA+PROBE: NOT DETECTED

## 2019-09-30 PROCEDURE — 700105 HCHG RX REV CODE 258: Performed by: EMERGENCY MEDICINE

## 2019-09-30 PROCEDURE — A9270 NON-COVERED ITEM OR SERVICE: HCPCS | Performed by: EMERGENCY MEDICINE

## 2019-09-30 PROCEDURE — 87651 STREP A DNA AMP PROBE: CPT

## 2019-09-30 PROCEDURE — 96375 TX/PRO/DX INJ NEW DRUG ADDON: CPT

## 2019-09-30 PROCEDURE — 700111 HCHG RX REV CODE 636 W/ 250 OVERRIDE (IP): Performed by: EMERGENCY MEDICINE

## 2019-09-30 PROCEDURE — 96374 THER/PROPH/DIAG INJ IV PUSH: CPT

## 2019-09-30 PROCEDURE — 700102 HCHG RX REV CODE 250 W/ 637 OVERRIDE(OP): Performed by: EMERGENCY MEDICINE

## 2019-09-30 PROCEDURE — 99285 EMERGENCY DEPT VISIT HI MDM: CPT

## 2019-09-30 RX ORDER — PROCHLORPERAZINE EDISYLATE 5 MG/ML
10 INJECTION INTRAMUSCULAR; INTRAVENOUS ONCE
Status: COMPLETED | OUTPATIENT
Start: 2019-09-30 | End: 2019-09-30

## 2019-09-30 RX ORDER — DEXAMETHASONE SODIUM PHOSPHATE 4 MG/ML
10 INJECTION, SOLUTION INTRA-ARTICULAR; INTRALESIONAL; INTRAMUSCULAR; INTRAVENOUS; SOFT TISSUE ONCE
Status: COMPLETED | OUTPATIENT
Start: 2019-09-30 | End: 2019-09-30

## 2019-09-30 RX ORDER — ACETAMINOPHEN 325 MG/1
975 TABLET ORAL ONCE
Status: COMPLETED | OUTPATIENT
Start: 2019-09-30 | End: 2019-09-30

## 2019-09-30 RX ORDER — SODIUM CHLORIDE 9 MG/ML
1000 INJECTION, SOLUTION INTRAVENOUS ONCE
Status: COMPLETED | OUTPATIENT
Start: 2019-09-30 | End: 2019-09-30

## 2019-09-30 RX ORDER — KETOROLAC TROMETHAMINE 30 MG/ML
15 INJECTION, SOLUTION INTRAMUSCULAR; INTRAVENOUS ONCE
Status: COMPLETED | OUTPATIENT
Start: 2019-09-30 | End: 2019-09-30

## 2019-09-30 RX ADMIN — PROCHLORPERAZINE EDISYLATE 10 MG: 5 INJECTION INTRAMUSCULAR; INTRAVENOUS at 06:20

## 2019-09-30 RX ADMIN — ACETAMINOPHEN 975 MG: 325 TABLET, FILM COATED ORAL at 06:19

## 2019-09-30 RX ADMIN — DEXAMETHASONE SODIUM PHOSPHATE 10 MG: 4 INJECTION, SOLUTION INTRA-ARTICULAR; INTRALESIONAL; INTRAMUSCULAR; INTRAVENOUS; SOFT TISSUE at 05:14

## 2019-09-30 RX ADMIN — KETOROLAC TROMETHAMINE 15 MG: 30 INJECTION, SOLUTION INTRAMUSCULAR at 05:14

## 2019-09-30 RX ADMIN — SODIUM CHLORIDE 1000 ML: 9 INJECTION, SOLUTION INTRAVENOUS at 05:13

## 2019-09-30 ASSESSMENT — ENCOUNTER SYMPTOMS
VOMITING: 0
ABDOMINAL PAIN: 0
FALLS: 0
FEVER: 0
NAUSEA: 0
HEADACHES: 1
NECK PAIN: 0
SORE THROAT: 1
FOCAL WEAKNESS: 0
SHORTNESS OF BREATH: 0
TINGLING: 0

## 2019-09-30 NOTE — ED TRIAGE NOTES
Pt brought in from home. Has had a cough since yesterday with sore throat. This morning woke up with severe HA and called EMS. Pain is 10/10 all over. Denies neck pain. Pt has hx of diabetes and stiff person syndrome effecting her lower extremities. Pt able to ambulate with walker at baseline. Pt also c/o abd pain, no nausea.  Pt appears uncomfortable. Daughter at bedside. Pt is Korean speaking only. Pt placed on monitor. Awaiting provider eval.

## 2019-09-30 NOTE — ED PROVIDER NOTES
ED Provider Note    ED Provider Note    Primary care provider: Chalo Whelan M.D.  Means of arrival: EMS  History obtained from: patient  History limited by: None    CHIEF COMPLAINT  Chief Complaint   Patient presents with   • Headache   • Sore Throat   • Abdominal Pain       HPI  Gabriela Cheatham-Depintor is a 54 y.o. female who presents to the Emergency Department via EMS.  She is accompanied by her adult daughter who is bilingual.  Patient is Lao-speaking.  She lives with her daughter who is here at the bedside.  She is assist with obtaining history.  Patient presents with a headache and a sore throat.  No fever reported.  She describes burning in her throat that started about 2 AM.  Prior to this, she was in her normal state of health.  She denies nausea or vomiting.  She does have a history of headaches that, and go.  They occur approximately every 6 weeks.  She describes them as always occurring in the frontal region, which is consistent with today's presentation.  She is never been diagnosed with migraines.  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  Although she states that current symptoms, are not consistent with an exacerbation of this illness.  No diarrhea.  No chest pain or shortness of breath.  No urinary symptoms.  No neck pain.  No recent trauma or falls.    REVIEW OF SYSTEMS  Review of Systems   Constitutional: Negative for fever.   HENT: Positive for sore throat.    Respiratory: Negative for shortness of breath.    Cardiovascular: Negative for chest pain.   Gastrointestinal: Negative for abdominal pain, nausea and vomiting.   Genitourinary: Negative for dysuria and urgency.   Musculoskeletal: Negative for falls and neck pain.   Skin: Negative for rash.   Neurological: Positive for headaches. Negative for tingling and focal weakness.   All other systems reviewed and are negative.      PAST MEDICAL HISTORY   has a past medical history of Diabetes (HCC) (2015) and Stiff person  "syndrome.    SURGICAL HISTORY   has a past surgical history that includes cholecystectomy; appendectomy; and  delivery only.    SOCIAL HISTORY  Social History     Tobacco Use   • Smoking status: Never Smoker   • Smokeless tobacco: Never Used   Substance Use Topics   • Alcohol use: No   • Drug use: No      Social History     Substance and Sexual Activity   Drug Use No       FAMILY HISTORY  Family History   Problem Relation Age of Onset   • Diabetes Father    • Stroke Brother    • Cancer Neg Hx    • Heart Disease Neg Hx        CURRENT MEDICATIONS  Home Medications    **Home medications have not yet been reviewed for this encounter**         ALLERGIES  No Known Allergies    PHYSICAL EXAM  VITAL SIGNS: /64   Pulse 78   Temp 36.6 °C (97.9 °F) (Temporal)   Resp 18   Ht 1.575 m (5' 2\")   Wt 49 kg (108 lb)   LMP 2015   SpO2 94%   BMI 19.75 kg/m²   Vitals reviewed.  Constitutional: Patient is oriented to person, place, and time. Appears well-developed and well-nourished.  Moderate distress.    Head: Normocephalic and atraumatic.   Ears: Normal external ears bilaterally.  TMs normal bilaterally.  Mouth/Throat: Oropharynx is clear and dry, no exudates.   Eyes: Conjunctivae are normal, on the right.  The left conjunctive is injected and slightly edematous. Pupils are equal, round, and reactive to light. Nystagmus.  Neck: Normal range of motion. Neck supple.  Ill signs.  Cardiovascular: Normal rate, regular rhythm and normal heart sounds. Normal peripheral pulses.  Pulmonary/Chest: Effort normal and breath sounds normal. No respiratory distress, no wheezes, rhonchi, or rales. No chest wall tenderness.  Abdominal: Soft. Bowel sounds are normal. There is no tenderness. No rebound or guarding, or peritoneal signs.   Musculoskeletal: No edema and no tenderness. Normal range of motion bilateral upper and lower extremities  Lymphadenopathy: No cervical adenopathy.   Neurological: No focal deficits.   Skin: " Skin is warm and dry. No erythema. No pallor.   Psychiatric: Patient has a normal mood and affect.     LABS  Results for orders placed or performed during the hospital encounter of 09/30/19   Group A Strep by PCR   Result Value Ref Range    Group A Strep by PCR Not Detected Not Detected   Blood Culture,Hold   Result Value Ref Range    Blood Culture Hold Collected        All labs reviewed by me.    COURSE & MEDICAL DECISION MAKING  Pertinent Labs & Imaging studies reviewed. (See chart for details)    Obtained and reviewed past medical records.  Patient's last encounter was September 11 he was seen in the emergency department, for dental pain.  Prior to that, patient was seen in the outpatient setting, September 1 also for tooth pain.    4:39 AM - Patient seen and examined at bedside.  This is a 54-year-old female who presents with her daughter who assists with translation.  Daughter notes that the injected conjunctiva on her left eye is chronic and intermittent.  She has chronic intermittent headaches.  Headache today seems to be consistent with prior headaches.  She has no meningeal signs and no fever.  She complains of a sore throat and she will be treated with Decadron.  She also has dry mucous membranes and for this reason she will be treated with IV fluids.  A rapid strep has been collected.  We treated with Toradol.  Patient has a benign abdominal exam and is not complaining of abdominal pain at this time.  Patient is not tachycardic or febrile at this time.    The differential diagnoses include but are not limited to: Strep pharyngitis, viral pharyngitis, dehydration, acute exacerbation of chronic headaches.    7:19 AM patient's reevaluated the bedside.  Vital signs are normal.  Patient's resting.  She awakens with some stimulation.  Her daughters at the bedside.  She reports she is feeling much better after IV fluids and second combination of medications which was Tylenol and Compazine.  Of note, on patient's  previous visit to the emergency department about 1 month ago with headache, she was treated with Tylenol and Compazine with resolution of her symptoms as well.  She did not receive much change in her symptoms after Toradol.  She was improved after IV fluids were infused.  I have advised her, to discuss with her neurologist who she sees here in town for her stiff person syndrome to talk with them about her ongoing headaches.  I suspect, that these may be migraine in their etiology and she could be on preventative medicines to decrease their frequency.  Patient and family voiced understanding.  At this time, I feel she can safely be discharged home.  Strep was not detected.  I suspect her pharyngitis symptoms are likely viral in their etiology.    She is discharged home in stable condition.    FINAL IMPRESSION  1. Pharyngitis, unspecified etiology    2. Nonintractable episodic headache, unspecified headache type    3. Dehydration

## 2019-09-30 NOTE — ED NOTES
Discharge instructions given to daughter per patient request. Daughter verbalizes understanding of discharge instructions. Patient leaves with stable vital signs. Wheelchair to lobby with daughter and son.

## 2019-10-08 ENCOUNTER — HOSPITAL ENCOUNTER (INPATIENT)
Facility: MEDICAL CENTER | Age: 54
LOS: 6 days | DRG: 638 | End: 2019-10-14
Attending: EMERGENCY MEDICINE | Admitting: INTERNAL MEDICINE
Payer: COMMERCIAL

## 2019-10-08 ENCOUNTER — APPOINTMENT (OUTPATIENT)
Dept: RADIOLOGY | Facility: MEDICAL CENTER | Age: 54
DRG: 638 | End: 2019-10-08
Attending: EMERGENCY MEDICINE
Payer: COMMERCIAL

## 2019-10-08 DIAGNOSIS — R78.81 BACTEREMIA DUE TO ESCHERICHIA COLI: ICD-10-CM

## 2019-10-08 DIAGNOSIS — A41.9 SEPSIS SECONDARY TO UTI (HCC): ICD-10-CM

## 2019-10-08 DIAGNOSIS — E83.39 HYPOPHOSPHATEMIA: ICD-10-CM

## 2019-10-08 DIAGNOSIS — N39.0 SEPSIS SECONDARY TO UTI (HCC): ICD-10-CM

## 2019-10-08 DIAGNOSIS — D69.6 THROMBOCYTOPENIA (HCC): ICD-10-CM

## 2019-10-08 DIAGNOSIS — B96.20 BACTEREMIA DUE TO ESCHERICHIA COLI: ICD-10-CM

## 2019-10-08 DIAGNOSIS — E08.10 DIABETIC KETOACIDOSIS WITHOUT COMA ASSOCIATED WITH DIABETES MELLITUS DUE TO UNDERLYING CONDITION (HCC): ICD-10-CM

## 2019-10-08 PROBLEM — E11.10 DKA (DIABETIC KETOACIDOSES): Status: ACTIVE | Noted: 2019-10-08

## 2019-10-08 PROBLEM — R65.10 SIRS (SYSTEMIC INFLAMMATORY RESPONSE SYNDROME) (HCC): Status: ACTIVE | Noted: 2019-10-08

## 2019-10-08 LAB
ALBUMIN SERPL BCP-MCNC: 3.2 G/DL (ref 3.2–4.9)
ALBUMIN/GLOB SERPL: 1 G/DL
ALP SERPL-CCNC: 157 U/L (ref 30–99)
ALT SERPL-CCNC: 12 U/L (ref 2–50)
ANION GAP SERPL CALC-SCNC: 20 MMOL/L (ref 0–11.9)
ANION GAP SERPL CALC-SCNC: 24 MMOL/L (ref 0–11.9)
APPEARANCE UR: CLEAR
AST SERPL-CCNC: 9 U/L (ref 12–45)
B-OH-BUTYR SERPL-MCNC: 6.99 MMOL/L (ref 0.02–0.27)
BACTERIA #/AREA URNS HPF: ABNORMAL /HPF
BASE EXCESS BLDMV CALC-SCNC: -18 MMOL/L
BASOPHILS # BLD AUTO: 0.4 % (ref 0–1.8)
BASOPHILS # BLD: 0.07 K/UL (ref 0–0.12)
BILIRUB SERPL-MCNC: 0.3 MG/DL (ref 0.1–1.5)
BILIRUB UR QL STRIP.AUTO: NEGATIVE
BODY TEMPERATURE: NORMAL CENTIGRADE
BUN SERPL-MCNC: 10 MG/DL (ref 8–22)
BUN SERPL-MCNC: 16 MG/DL (ref 8–22)
CALCIUM SERPL-MCNC: 7.3 MG/DL (ref 8.5–10.5)
CALCIUM SERPL-MCNC: 8.6 MG/DL (ref 8.5–10.5)
CHLORIDE SERPL-SCNC: 108 MMOL/L (ref 96–112)
CHLORIDE SERPL-SCNC: 113 MMOL/L (ref 96–112)
CO2 SERPL-SCNC: 8 MMOL/L (ref 20–33)
CO2 SERPL-SCNC: 8 MMOL/L (ref 20–33)
COLOR UR: YELLOW
COMMENT 1642: NORMAL
CREAT SERPL-MCNC: 0.58 MG/DL (ref 0.5–1.4)
CREAT SERPL-MCNC: 0.72 MG/DL (ref 0.5–1.4)
EKG IMPRESSION: NORMAL
EOSINOPHIL # BLD AUTO: 0.01 K/UL (ref 0–0.51)
EOSINOPHIL NFR BLD: 0.1 % (ref 0–6.9)
EPI CELLS #/AREA URNS HPF: ABNORMAL /HPF
ERYTHROCYTE [DISTWIDTH] IN BLOOD BY AUTOMATED COUNT: 51.1 FL (ref 35.9–50)
EST. AVERAGE GLUCOSE BLD GHB EST-MCNC: 410 MG/DL
FLUAV RNA SPEC QL NAA+PROBE: NEGATIVE
FLUBV RNA SPEC QL NAA+PROBE: NEGATIVE
GLOBULIN SER CALC-MCNC: 3.2 G/DL (ref 1.9–3.5)
GLUCOSE SERPL-MCNC: 287 MG/DL (ref 65–99)
GLUCOSE SERPL-MCNC: 492 MG/DL (ref 65–99)
GLUCOSE UR STRIP.AUTO-MCNC: 500 MG/DL
HBA1C MFR BLD: 15.9 % (ref 0–5.6)
HCO3 BLDMV-SCNC: 9 MMOL/L
HCT VFR BLD AUTO: 43.5 % (ref 37–47)
HGB BLD-MCNC: 13.4 G/DL (ref 12–16)
IMM GRANULOCYTES # BLD AUTO: 0.27 K/UL (ref 0–0.11)
IMM GRANULOCYTES NFR BLD AUTO: 1.7 % (ref 0–0.9)
INR PPP: 1.03 (ref 0.87–1.13)
KETONES UR STRIP.AUTO-MCNC: >=80 MG/DL
LACTATE BLD-SCNC: 1.4 MMOL/L (ref 0.5–2)
LEUKOCYTE ESTERASE UR QL STRIP.AUTO: NEGATIVE
LIPASE SERPL-CCNC: 25 U/L (ref 11–82)
LYMPHOCYTES # BLD AUTO: 0.86 K/UL (ref 1–4.8)
LYMPHOCYTES NFR BLD: 5.4 % (ref 22–41)
MAGNESIUM SERPL-MCNC: 2 MG/DL (ref 1.5–2.5)
MCH RBC QN AUTO: 30.9 PG (ref 27–33)
MCHC RBC AUTO-ENTMCNC: 30.8 G/DL (ref 33.6–35)
MCV RBC AUTO: 100.5 FL (ref 81.4–97.8)
MICRO URNS: ABNORMAL
MONOCYTES # BLD AUTO: 0.57 K/UL (ref 0–0.85)
MONOCYTES NFR BLD AUTO: 3.6 % (ref 0–13.4)
MORPHOLOGY BLD-IMP: NORMAL
NEUTROPHILS # BLD AUTO: 14.01 K/UL (ref 2–7.15)
NEUTROPHILS NFR BLD: 88.8 % (ref 44–72)
NITRITE UR QL STRIP.AUTO: NEGATIVE
NRBC # BLD AUTO: 0 K/UL
NRBC BLD-RTO: 0 /100 WBC
PCO2 BLDMV: 24.3 MMHG
PH BLDMV: 7.19 [PH]
PH UR STRIP.AUTO: 5.5 [PH] (ref 5–8)
PHOSPHATE SERPL-MCNC: 1.6 MG/DL (ref 2.5–4.5)
PHOSPHATE SERPL-MCNC: 2.4 MG/DL (ref 2.5–4.5)
PLATELET # BLD AUTO: 158 K/UL (ref 164–446)
PMV BLD AUTO: 12.2 FL (ref 9–12.9)
PO2 BLDMV: 43.9 MMHG
POTASSIUM SERPL-SCNC: 3.3 MMOL/L (ref 3.6–5.5)
POTASSIUM SERPL-SCNC: 3.8 MMOL/L (ref 3.6–5.5)
PROCALCITONIN SERPL-MCNC: 0.35 NG/ML
PROT SERPL-MCNC: 6.4 G/DL (ref 6–8.2)
PROT UR QL STRIP: NEGATIVE MG/DL
PROTHROMBIN TIME: 13.7 SEC (ref 12–14.6)
RBC # BLD AUTO: 4.33 M/UL (ref 4.2–5.4)
RBC # URNS HPF: ABNORMAL /HPF
RBC UR QL AUTO: ABNORMAL
SAO2 % BLDMV: 75.8 %
SODIUM SERPL-SCNC: 140 MMOL/L (ref 135–145)
SODIUM SERPL-SCNC: 141 MMOL/L (ref 135–145)
SP GR UR STRIP.AUTO: 1.02
TROPONIN T SERPL-MCNC: <6 NG/L (ref 6–19)
UROBILINOGEN UR STRIP.AUTO-MCNC: 0.2 MG/DL
WBC # BLD AUTO: 15.8 K/UL (ref 4.8–10.8)
WBC #/AREA URNS HPF: ABNORMAL /HPF
YEAST BUDDING URNS QL: PRESENT /HPF

## 2019-10-08 PROCEDURE — 84484 ASSAY OF TROPONIN QUANT: CPT

## 2019-10-08 PROCEDURE — 82010 KETONE BODYS QUAN: CPT

## 2019-10-08 PROCEDURE — 93005 ELECTROCARDIOGRAM TRACING: CPT | Performed by: INTERNAL MEDICINE

## 2019-10-08 PROCEDURE — 700101 HCHG RX REV CODE 250: Performed by: INTERNAL MEDICINE

## 2019-10-08 PROCEDURE — 700102 HCHG RX REV CODE 250 W/ 637 OVERRIDE(OP): Performed by: INTERNAL MEDICINE

## 2019-10-08 PROCEDURE — 87502 INFLUENZA DNA AMP PROBE: CPT

## 2019-10-08 PROCEDURE — 99291 CRITICAL CARE FIRST HOUR: CPT

## 2019-10-08 PROCEDURE — 80048 BASIC METABOLIC PNL TOTAL CA: CPT

## 2019-10-08 PROCEDURE — 700105 HCHG RX REV CODE 258: Performed by: EMERGENCY MEDICINE

## 2019-10-08 PROCEDURE — 700111 HCHG RX REV CODE 636 W/ 250 OVERRIDE (IP): Performed by: EMERGENCY MEDICINE

## 2019-10-08 PROCEDURE — 84145 PROCALCITONIN (PCT): CPT

## 2019-10-08 PROCEDURE — 80053 COMPREHEN METABOLIC PANEL: CPT

## 2019-10-08 PROCEDURE — 700111 HCHG RX REV CODE 636 W/ 250 OVERRIDE (IP): Performed by: INTERNAL MEDICINE

## 2019-10-08 PROCEDURE — 83036 HEMOGLOBIN GLYCOSYLATED A1C: CPT

## 2019-10-08 PROCEDURE — 770022 HCHG ROOM/CARE - ICU (200)

## 2019-10-08 PROCEDURE — 85025 COMPLETE CBC W/AUTO DIFF WBC: CPT

## 2019-10-08 PROCEDURE — 99291 CRITICAL CARE FIRST HOUR: CPT | Performed by: INTERNAL MEDICINE

## 2019-10-08 PROCEDURE — 82962 GLUCOSE BLOOD TEST: CPT | Mod: 91

## 2019-10-08 PROCEDURE — 84100 ASSAY OF PHOSPHORUS: CPT | Mod: 91

## 2019-10-08 PROCEDURE — 82803 BLOOD GASES ANY COMBINATION: CPT

## 2019-10-08 PROCEDURE — 71045 X-RAY EXAM CHEST 1 VIEW: CPT

## 2019-10-08 PROCEDURE — 85610 PROTHROMBIN TIME: CPT

## 2019-10-08 PROCEDURE — 83690 ASSAY OF LIPASE: CPT

## 2019-10-08 PROCEDURE — 83605 ASSAY OF LACTIC ACID: CPT

## 2019-10-08 PROCEDURE — 700105 HCHG RX REV CODE 258: Performed by: INTERNAL MEDICINE

## 2019-10-08 PROCEDURE — 96361 HYDRATE IV INFUSION ADD-ON: CPT

## 2019-10-08 PROCEDURE — 81001 URINALYSIS AUTO W/SCOPE: CPT

## 2019-10-08 PROCEDURE — 96374 THER/PROPH/DIAG INJ IV PUSH: CPT

## 2019-10-08 PROCEDURE — 87040 BLOOD CULTURE FOR BACTERIA: CPT

## 2019-10-08 PROCEDURE — 83735 ASSAY OF MAGNESIUM: CPT

## 2019-10-08 RX ORDER — DEXTROSE, SODIUM CHLORIDE, SODIUM LACTATE, POTASSIUM CHLORIDE, AND CALCIUM CHLORIDE 5; .6; .31; .03; .02 G/100ML; G/100ML; G/100ML; G/100ML; G/100ML
INJECTION, SOLUTION INTRAVENOUS CONTINUOUS
Status: DISCONTINUED | OUTPATIENT
Start: 2019-10-08 | End: 2019-10-09

## 2019-10-08 RX ORDER — HEPARIN SODIUM 5000 [USP'U]/ML
5000 INJECTION, SOLUTION INTRAVENOUS; SUBCUTANEOUS EVERY 8 HOURS
Status: DISCONTINUED | OUTPATIENT
Start: 2019-10-09 | End: 2019-10-14 | Stop reason: HOSPADM

## 2019-10-08 RX ORDER — PROMETHAZINE HYDROCHLORIDE 25 MG/1
12.5-25 SUPPOSITORY RECTAL EVERY 4 HOURS PRN
Status: DISCONTINUED | OUTPATIENT
Start: 2019-10-08 | End: 2019-10-14 | Stop reason: HOSPADM

## 2019-10-08 RX ORDER — FLUOXETINE 10 MG/1
10 CAPSULE ORAL
Status: DISCONTINUED | OUTPATIENT
Start: 2019-10-09 | End: 2019-10-14 | Stop reason: HOSPADM

## 2019-10-08 RX ORDER — MAGNESIUM SULFATE HEPTAHYDRATE 40 MG/ML
2 INJECTION, SOLUTION INTRAVENOUS
Status: DISCONTINUED | OUTPATIENT
Start: 2019-10-08 | End: 2019-10-09

## 2019-10-08 RX ORDER — MAGNESIUM SULFATE HEPTAHYDRATE 40 MG/ML
2 INJECTION, SOLUTION INTRAVENOUS ONCE
Status: COMPLETED | OUTPATIENT
Start: 2019-10-08 | End: 2019-10-08

## 2019-10-08 RX ORDER — SODIUM CHLORIDE, SODIUM LACTATE, POTASSIUM CHLORIDE, AND CALCIUM CHLORIDE .6; .31; .03; .02 G/100ML; G/100ML; G/100ML; G/100ML
2000 INJECTION, SOLUTION INTRAVENOUS ONCE
Status: DISCONTINUED | OUTPATIENT
Start: 2019-10-08 | End: 2019-10-08

## 2019-10-08 RX ORDER — PROCHLORPERAZINE EDISYLATE 5 MG/ML
5-10 INJECTION INTRAMUSCULAR; INTRAVENOUS EVERY 4 HOURS PRN
Status: DISCONTINUED | OUTPATIENT
Start: 2019-10-08 | End: 2019-10-14 | Stop reason: HOSPADM

## 2019-10-08 RX ORDER — DEXTROSE AND SODIUM CHLORIDE 10; .45 G/100ML; G/100ML
INJECTION, SOLUTION INTRAVENOUS CONTINUOUS
Status: ACTIVE | OUTPATIENT
Start: 2019-10-08 | End: 2019-10-09

## 2019-10-08 RX ORDER — SODIUM CHLORIDE 9 MG/ML
1000 INJECTION, SOLUTION INTRAVENOUS ONCE
Status: COMPLETED | OUTPATIENT
Start: 2019-10-08 | End: 2019-10-08

## 2019-10-08 RX ORDER — AMOXICILLIN 250 MG
2 CAPSULE ORAL 2 TIMES DAILY
Status: DISCONTINUED | OUTPATIENT
Start: 2019-10-09 | End: 2019-10-14 | Stop reason: HOSPADM

## 2019-10-08 RX ORDER — BISACODYL 10 MG
10 SUPPOSITORY, RECTAL RECTAL
Status: DISCONTINUED | OUTPATIENT
Start: 2019-10-08 | End: 2019-10-14 | Stop reason: HOSPADM

## 2019-10-08 RX ORDER — SODIUM CHLORIDE, SODIUM LACTATE, POTASSIUM CHLORIDE, AND CALCIUM CHLORIDE .6; .31; .03; .02 G/100ML; G/100ML; G/100ML; G/100ML
1000 INJECTION, SOLUTION INTRAVENOUS
Status: COMPLETED | OUTPATIENT
Start: 2019-10-08 | End: 2019-10-09

## 2019-10-08 RX ORDER — SODIUM CHLORIDE, SODIUM LACTATE, POTASSIUM CHLORIDE, AND CALCIUM CHLORIDE .6; .31; .03; .02 G/100ML; G/100ML; G/100ML; G/100ML
1000 INJECTION, SOLUTION INTRAVENOUS ONCE
Status: COMPLETED | OUTPATIENT
Start: 2019-10-08 | End: 2019-10-08

## 2019-10-08 RX ORDER — POLYETHYLENE GLYCOL 3350 17 G/17G
1 POWDER, FOR SOLUTION ORAL
Status: DISCONTINUED | OUTPATIENT
Start: 2019-10-08 | End: 2019-10-14 | Stop reason: HOSPADM

## 2019-10-08 RX ORDER — ONDANSETRON 2 MG/ML
4 INJECTION INTRAMUSCULAR; INTRAVENOUS ONCE
Status: COMPLETED | OUTPATIENT
Start: 2019-10-08 | End: 2019-10-08

## 2019-10-08 RX ORDER — PROMETHAZINE HYDROCHLORIDE 25 MG/1
12.5-25 TABLET ORAL EVERY 4 HOURS PRN
Status: DISCONTINUED | OUTPATIENT
Start: 2019-10-08 | End: 2019-10-14 | Stop reason: HOSPADM

## 2019-10-08 RX ORDER — ONDANSETRON 4 MG/1
4 TABLET, ORALLY DISINTEGRATING ORAL EVERY 4 HOURS PRN
Status: DISCONTINUED | OUTPATIENT
Start: 2019-10-08 | End: 2019-10-14 | Stop reason: HOSPADM

## 2019-10-08 RX ORDER — SODIUM CHLORIDE, SODIUM LACTATE, POTASSIUM CHLORIDE, CALCIUM CHLORIDE 600; 310; 30; 20 MG/100ML; MG/100ML; MG/100ML; MG/100ML
INJECTION, SOLUTION INTRAVENOUS CONTINUOUS
Status: DISCONTINUED | OUTPATIENT
Start: 2019-10-08 | End: 2019-10-09

## 2019-10-08 RX ORDER — ACETAMINOPHEN 325 MG/1
650 TABLET ORAL EVERY 6 HOURS PRN
Status: DISCONTINUED | OUTPATIENT
Start: 2019-10-08 | End: 2019-10-14 | Stop reason: HOSPADM

## 2019-10-08 RX ORDER — ONDANSETRON 2 MG/ML
4 INJECTION INTRAMUSCULAR; INTRAVENOUS EVERY 4 HOURS PRN
Status: DISCONTINUED | OUTPATIENT
Start: 2019-10-08 | End: 2019-10-14 | Stop reason: HOSPADM

## 2019-10-08 RX ORDER — SODIUM CHLORIDE, SODIUM LACTATE, POTASSIUM CHLORIDE, CALCIUM CHLORIDE 600; 310; 30; 20 MG/100ML; MG/100ML; MG/100ML; MG/100ML
1000 INJECTION, SOLUTION INTRAVENOUS ONCE
Status: COMPLETED | OUTPATIENT
Start: 2019-10-08 | End: 2019-10-08

## 2019-10-08 RX ORDER — SODIUM CHLORIDE 9 MG/ML
2000 INJECTION, SOLUTION INTRAVENOUS ONCE
Status: DISCONTINUED | OUTPATIENT
Start: 2019-10-08 | End: 2019-10-08

## 2019-10-08 RX ORDER — MAGNESIUM SULFATE HEPTAHYDRATE 40 MG/ML
4 INJECTION, SOLUTION INTRAVENOUS
Status: DISCONTINUED | OUTPATIENT
Start: 2019-10-08 | End: 2019-10-09

## 2019-10-08 RX ORDER — SODIUM CHLORIDE, SODIUM LACTATE, POTASSIUM CHLORIDE, AND CALCIUM CHLORIDE .6; .31; .03; .02 G/100ML; G/100ML; G/100ML; G/100ML
30 INJECTION, SOLUTION INTRAVENOUS
Status: COMPLETED | OUTPATIENT
Start: 2019-10-08 | End: 2019-10-11

## 2019-10-08 RX ADMIN — POTASSIUM PHOSPHATE, MONOBASIC AND POTASSIUM PHOSPHATE, DIBASIC 15 MMOL: 224; 236 INJECTION, SOLUTION, CONCENTRATE INTRAVENOUS at 22:09

## 2019-10-08 RX ADMIN — SODIUM CHLORIDE, POTASSIUM CHLORIDE, SODIUM LACTATE AND CALCIUM CHLORIDE 1000 ML: 600; 310; 30; 20 INJECTION, SOLUTION INTRAVENOUS at 17:54

## 2019-10-08 RX ADMIN — SODIUM CHLORIDE, POTASSIUM CHLORIDE, SODIUM LACTATE AND CALCIUM CHLORIDE: 600; 310; 30; 20 INJECTION, SOLUTION INTRAVENOUS at 21:32

## 2019-10-08 RX ADMIN — SODIUM CHLORIDE, SODIUM LACTATE, POTASSIUM CHLORIDE, CALCIUM CHLORIDE AND DEXTROSE MONOHYDRATE: 5; 600; 310; 30; 20 INJECTION, SOLUTION INTRAVENOUS at 23:48

## 2019-10-08 RX ADMIN — SODIUM CHLORIDE 2 UNITS/HR: 9 INJECTION, SOLUTION INTRAVENOUS at 21:32

## 2019-10-08 RX ADMIN — ONDANSETRON 4 MG: 2 INJECTION INTRAMUSCULAR; INTRAVENOUS at 17:54

## 2019-10-08 RX ADMIN — MAGNESIUM SULFATE IN WATER 2 G: 40 INJECTION, SOLUTION INTRAVENOUS at 21:50

## 2019-10-08 RX ADMIN — SODIUM CHLORIDE, POTASSIUM CHLORIDE, SODIUM LACTATE AND CALCIUM CHLORIDE 1000 ML: 600; 310; 30; 20 INJECTION, SOLUTION INTRAVENOUS at 20:30

## 2019-10-08 RX ADMIN — SODIUM CHLORIDE 1000 ML: 9 INJECTION, SOLUTION INTRAVENOUS at 19:58

## 2019-10-08 ASSESSMENT — ENCOUNTER SYMPTOMS
NERVOUS/ANXIOUS: 0
SORE THROAT: 0
DIAPHORESIS: 0
BRUISES/BLEEDS EASILY: 0
DEPRESSION: 1
VOMITING: 0
NECK PAIN: 0
FOCAL WEAKNESS: 0
ABDOMINAL PAIN: 1
DIZZINESS: 0
COUGH: 0
FLANK PAIN: 0
BACK PAIN: 0
FEVER: 0
PALPITATIONS: 0
DOUBLE VISION: 0
MYALGIAS: 0
SENSORY CHANGE: 0
DIARRHEA: 0
SPUTUM PRODUCTION: 0
HEADACHES: 0
CONSTIPATION: 0
WHEEZING: 0
SHORTNESS OF BREATH: 0
SPEECH CHANGE: 0
BLURRED VISION: 0
FEVER: 1
NAUSEA: 1
SEIZURES: 0
VOMITING: 1
BLOOD IN STOOL: 0
CHILLS: 0
SINUS PAIN: 0

## 2019-10-08 ASSESSMENT — COPD QUESTIONNAIRES
DO YOU EVER COUGH UP ANY MUCUS OR PHLEGM?: NO/ONLY WITH OCCASIONAL COLDS OR INFECTIONS
COPD SCREENING SCORE: 1
DURING THE PAST 4 WEEKS HOW MUCH DID YOU FEEL SHORT OF BREATH: NONE/LITTLE OF THE TIME
HAVE YOU SMOKED AT LEAST 100 CIGARETTES IN YOUR ENTIRE LIFE: NO/DON'T KNOW

## 2019-10-08 ASSESSMENT — LIFESTYLE VARIABLES
SUBSTANCE_ABUSE: 0
EVER_SMOKED: NEVER

## 2019-10-09 ENCOUNTER — APPOINTMENT (OUTPATIENT)
Dept: RADIOLOGY | Facility: MEDICAL CENTER | Age: 54
DRG: 638 | End: 2019-10-09
Attending: INTERNAL MEDICINE
Payer: COMMERCIAL

## 2019-10-09 PROBLEM — E87.6 HYPOKALEMIA: Status: ACTIVE | Noted: 2019-10-09

## 2019-10-09 LAB
ANION GAP SERPL CALC-SCNC: 6 MMOL/L (ref 0–11.9)
ANION GAP SERPL CALC-SCNC: 7 MMOL/L (ref 0–11.9)
ANION GAP SERPL CALC-SCNC: 8 MMOL/L (ref 0–11.9)
ANION GAP SERPL CALC-SCNC: 9 MMOL/L (ref 0–11.9)
ANION GAP SERPL CALC-SCNC: 9 MMOL/L (ref 0–11.9)
B-OH-BUTYR SERPL-MCNC: 0.37 MMOL/L (ref 0.02–0.27)
BASOPHILS # BLD AUTO: 0.2 % (ref 0–1.8)
BASOPHILS # BLD: 0.02 K/UL (ref 0–0.12)
BUN SERPL-MCNC: 3 MG/DL (ref 8–22)
BUN SERPL-MCNC: 4 MG/DL (ref 8–22)
BUN SERPL-MCNC: 4 MG/DL (ref 8–22)
BUN SERPL-MCNC: 6 MG/DL (ref 8–22)
BUN SERPL-MCNC: 8 MG/DL (ref 8–22)
CALCIUM SERPL-MCNC: 7.5 MG/DL (ref 8.5–10.5)
CALCIUM SERPL-MCNC: 7.5 MG/DL (ref 8.5–10.5)
CALCIUM SERPL-MCNC: 7.9 MG/DL (ref 8.5–10.5)
CALCIUM SERPL-MCNC: 8 MG/DL (ref 8.5–10.5)
CALCIUM SERPL-MCNC: 8.1 MG/DL (ref 8.5–10.5)
CHLORIDE SERPL-SCNC: 105 MMOL/L (ref 96–112)
CHLORIDE SERPL-SCNC: 106 MMOL/L (ref 96–112)
CHLORIDE SERPL-SCNC: 108 MMOL/L (ref 96–112)
CHLORIDE SERPL-SCNC: 113 MMOL/L (ref 96–112)
CHLORIDE SERPL-SCNC: 114 MMOL/L (ref 96–112)
CO2 SERPL-SCNC: 18 MMOL/L (ref 20–33)
CO2 SERPL-SCNC: 21 MMOL/L (ref 20–33)
CO2 SERPL-SCNC: 24 MMOL/L (ref 20–33)
CREAT SERPL-MCNC: 0.43 MG/DL (ref 0.5–1.4)
CREAT SERPL-MCNC: 0.45 MG/DL (ref 0.5–1.4)
CREAT SERPL-MCNC: 0.46 MG/DL (ref 0.5–1.4)
CREAT SERPL-MCNC: 0.48 MG/DL (ref 0.5–1.4)
CREAT SERPL-MCNC: 0.55 MG/DL (ref 0.5–1.4)
EOSINOPHIL # BLD AUTO: 0.02 K/UL (ref 0–0.51)
EOSINOPHIL NFR BLD: 0.2 % (ref 0–6.9)
ERYTHROCYTE [DISTWIDTH] IN BLOOD BY AUTOMATED COUNT: 44.2 FL (ref 35.9–50)
GLUCOSE BLD-MCNC: 134 MG/DL (ref 65–99)
GLUCOSE BLD-MCNC: 140 MG/DL (ref 65–99)
GLUCOSE BLD-MCNC: 144 MG/DL (ref 65–99)
GLUCOSE BLD-MCNC: 149 MG/DL (ref 65–99)
GLUCOSE BLD-MCNC: 165 MG/DL (ref 65–99)
GLUCOSE BLD-MCNC: 168 MG/DL (ref 65–99)
GLUCOSE BLD-MCNC: 168 MG/DL (ref 65–99)
GLUCOSE BLD-MCNC: 176 MG/DL (ref 65–99)
GLUCOSE BLD-MCNC: 181 MG/DL (ref 65–99)
GLUCOSE BLD-MCNC: 183 MG/DL (ref 65–99)
GLUCOSE BLD-MCNC: 197 MG/DL (ref 65–99)
GLUCOSE BLD-MCNC: 210 MG/DL (ref 65–99)
GLUCOSE BLD-MCNC: 223 MG/DL (ref 65–99)
GLUCOSE BLD-MCNC: 234 MG/DL (ref 65–99)
GLUCOSE BLD-MCNC: 268 MG/DL (ref 65–99)
GLUCOSE BLD-MCNC: 285 MG/DL (ref 65–99)
GLUCOSE BLD-MCNC: 298 MG/DL (ref 65–99)
GLUCOSE BLD-MCNC: 304 MG/DL (ref 65–99)
GLUCOSE SERPL-MCNC: 161 MG/DL (ref 65–99)
GLUCOSE SERPL-MCNC: 175 MG/DL (ref 65–99)
GLUCOSE SERPL-MCNC: 186 MG/DL (ref 65–99)
GLUCOSE SERPL-MCNC: 240 MG/DL (ref 65–99)
GLUCOSE SERPL-MCNC: 240 MG/DL (ref 65–99)
HCT VFR BLD AUTO: 31.2 % (ref 37–47)
HGB BLD-MCNC: 10.8 G/DL (ref 12–16)
IMM GRANULOCYTES # BLD AUTO: 0.14 K/UL (ref 0–0.11)
IMM GRANULOCYTES NFR BLD AUTO: 1.1 % (ref 0–0.9)
LYMPHOCYTES # BLD AUTO: 1.22 K/UL (ref 1–4.8)
LYMPHOCYTES NFR BLD: 9.5 % (ref 22–41)
MCH RBC QN AUTO: 31 PG (ref 27–33)
MCHC RBC AUTO-ENTMCNC: 34.6 G/DL (ref 33.6–35)
MCV RBC AUTO: 89.7 FL (ref 81.4–97.8)
MONOCYTES # BLD AUTO: 0.59 K/UL (ref 0–0.85)
MONOCYTES NFR BLD AUTO: 4.6 % (ref 0–13.4)
NEUTROPHILS # BLD AUTO: 10.82 K/UL (ref 2–7.15)
NEUTROPHILS NFR BLD: 84.4 % (ref 44–72)
NRBC # BLD AUTO: 0 K/UL
NRBC BLD-RTO: 0 /100 WBC
PLATELET # BLD AUTO: 255 K/UL (ref 164–446)
PMV BLD AUTO: 11.1 FL (ref 9–12.9)
POTASSIUM SERPL-SCNC: 2.8 MMOL/L (ref 3.6–5.5)
POTASSIUM SERPL-SCNC: 2.9 MMOL/L (ref 3.6–5.5)
POTASSIUM SERPL-SCNC: 2.9 MMOL/L (ref 3.6–5.5)
POTASSIUM SERPL-SCNC: 3.2 MMOL/L (ref 3.6–5.5)
POTASSIUM SERPL-SCNC: 3.3 MMOL/L (ref 3.6–5.5)
RBC # BLD AUTO: 3.48 M/UL (ref 4.2–5.4)
SODIUM SERPL-SCNC: 134 MMOL/L (ref 135–145)
SODIUM SERPL-SCNC: 137 MMOL/L (ref 135–145)
SODIUM SERPL-SCNC: 138 MMOL/L (ref 135–145)
SODIUM SERPL-SCNC: 140 MMOL/L (ref 135–145)
SODIUM SERPL-SCNC: 141 MMOL/L (ref 135–145)
WBC # BLD AUTO: 12.8 K/UL (ref 4.8–10.8)

## 2019-10-09 PROCEDURE — 99233 SBSQ HOSP IP/OBS HIGH 50: CPT | Performed by: HOSPITALIST

## 2019-10-09 PROCEDURE — 700111 HCHG RX REV CODE 636 W/ 250 OVERRIDE (IP): Performed by: INTERNAL MEDICINE

## 2019-10-09 PROCEDURE — 36556 INSERT NON-TUNNEL CV CATH: CPT | Mod: RT | Performed by: INTERNAL MEDICINE

## 2019-10-09 PROCEDURE — 700102 HCHG RX REV CODE 250 W/ 637 OVERRIDE(OP): Performed by: INTERNAL MEDICINE

## 2019-10-09 PROCEDURE — 71045 X-RAY EXAM CHEST 1 VIEW: CPT

## 2019-10-09 PROCEDURE — B548ZZA ULTRASONOGRAPHY OF SUPERIOR VENA CAVA, GUIDANCE: ICD-10-PCS | Performed by: INTERNAL MEDICINE

## 2019-10-09 PROCEDURE — 80048 BASIC METABOLIC PNL TOTAL CA: CPT | Mod: 91

## 2019-10-09 PROCEDURE — 700102 HCHG RX REV CODE 250 W/ 637 OVERRIDE(OP): Performed by: HOSPITALIST

## 2019-10-09 PROCEDURE — A9270 NON-COVERED ITEM OR SERVICE: HCPCS | Performed by: INTERNAL MEDICINE

## 2019-10-09 PROCEDURE — C1751 CATH, INF, PER/CENT/MIDLINE: HCPCS

## 2019-10-09 PROCEDURE — 82010 KETONE BODYS QUAN: CPT

## 2019-10-09 PROCEDURE — 700111 HCHG RX REV CODE 636 W/ 250 OVERRIDE (IP): Performed by: HOSPITALIST

## 2019-10-09 PROCEDURE — 700105 HCHG RX REV CODE 258: Performed by: INTERNAL MEDICINE

## 2019-10-09 PROCEDURE — 02HV33Z INSERTION OF INFUSION DEVICE INTO SUPERIOR VENA CAVA, PERCUTANEOUS APPROACH: ICD-10-PCS | Performed by: INTERNAL MEDICINE

## 2019-10-09 PROCEDURE — 82962 GLUCOSE BLOOD TEST: CPT | Mod: 91

## 2019-10-09 PROCEDURE — 85025 COMPLETE CBC W/AUTO DIFF WBC: CPT

## 2019-10-09 PROCEDURE — 700105 HCHG RX REV CODE 258

## 2019-10-09 PROCEDURE — 770001 HCHG ROOM/CARE - MED/SURG/GYN PRIV*

## 2019-10-09 PROCEDURE — A9270 NON-COVERED ITEM OR SERVICE: HCPCS | Performed by: HOSPITALIST

## 2019-10-09 PROCEDURE — 99291 CRITICAL CARE FIRST HOUR: CPT | Mod: 25 | Performed by: INTERNAL MEDICINE

## 2019-10-09 RX ORDER — POTASSIUM CHLORIDE 7.45 MG/ML
10 INJECTION INTRAVENOUS
Status: COMPLETED | OUTPATIENT
Start: 2019-10-09 | End: 2019-10-09

## 2019-10-09 RX ORDER — SODIUM CHLORIDE 9 MG/ML
INJECTION, SOLUTION INTRAVENOUS
Status: DISCONTINUED
Start: 2019-10-09 | End: 2019-10-09

## 2019-10-09 RX ORDER — POTASSIUM CHLORIDE 29.8 MG/ML
40 INJECTION INTRAVENOUS ONCE
Status: COMPLETED | OUTPATIENT
Start: 2019-10-09 | End: 2019-10-09

## 2019-10-09 RX ORDER — POTASSIUM CHLORIDE 20 MEQ/1
40 TABLET, EXTENDED RELEASE ORAL ONCE
Status: COMPLETED | OUTPATIENT
Start: 2019-10-09 | End: 2019-10-09

## 2019-10-09 RX ORDER — SODIUM CHLORIDE, SODIUM LACTATE, POTASSIUM CHLORIDE, CALCIUM CHLORIDE 600; 310; 30; 20 MG/100ML; MG/100ML; MG/100ML; MG/100ML
INJECTION, SOLUTION INTRAVENOUS
Status: ACTIVE
Start: 2019-10-09 | End: 2019-10-10

## 2019-10-09 RX ORDER — INSULIN GLARGINE 100 [IU]/ML
10 INJECTION, SOLUTION SUBCUTANEOUS 2 TIMES DAILY
Status: DISCONTINUED | OUTPATIENT
Start: 2019-10-09 | End: 2019-10-10

## 2019-10-09 RX ORDER — SODIUM CHLORIDE, SODIUM LACTATE, POTASSIUM CHLORIDE, AND CALCIUM CHLORIDE .6; .31; .03; .02 G/100ML; G/100ML; G/100ML; G/100ML
1000 INJECTION, SOLUTION INTRAVENOUS ONCE
Status: COMPLETED | OUTPATIENT
Start: 2019-10-09 | End: 2019-10-09

## 2019-10-09 RX ORDER — SODIUM CHLORIDE, SODIUM LACTATE, POTASSIUM CHLORIDE, CALCIUM CHLORIDE 600; 310; 30; 20 MG/100ML; MG/100ML; MG/100ML; MG/100ML
1000 INJECTION, SOLUTION INTRAVENOUS CONTINUOUS
Status: DISCONTINUED | OUTPATIENT
Start: 2019-10-09 | End: 2019-10-10

## 2019-10-09 RX ADMIN — POLYETHYLENE GLYCOL 3350 1 PACKET: 17 POWDER, FOR SOLUTION ORAL at 19:10

## 2019-10-09 RX ADMIN — INSULIN HUMAN 2 UNITS: 100 INJECTION, SOLUTION PARENTERAL at 11:53

## 2019-10-09 RX ADMIN — POTASSIUM CHLORIDE 10 MEQ: 7.46 INJECTION, SOLUTION INTRAVENOUS at 01:36

## 2019-10-09 RX ADMIN — HEPARIN SODIUM 5000 UNITS: 5000 INJECTION INTRAVENOUS; SUBCUTANEOUS at 21:39

## 2019-10-09 RX ADMIN — POTASSIUM CHLORIDE 40 MEQ: 29.8 INJECTION, SOLUTION INTRAVENOUS at 16:47

## 2019-10-09 RX ADMIN — SODIUM CHLORIDE, POTASSIUM CHLORIDE, SODIUM LACTATE AND CALCIUM CHLORIDE 1000 ML: 600; 310; 30; 20 INJECTION, SOLUTION INTRAVENOUS at 22:34

## 2019-10-09 RX ADMIN — POTASSIUM CHLORIDE 10 MEQ: 7.46 INJECTION, SOLUTION INTRAVENOUS at 03:34

## 2019-10-09 RX ADMIN — HEPARIN SODIUM 5000 UNITS: 5000 INJECTION INTRAVENOUS; SUBCUTANEOUS at 13:32

## 2019-10-09 RX ADMIN — HEPARIN SODIUM 5000 UNITS: 5000 INJECTION INTRAVENOUS; SUBCUTANEOUS at 06:04

## 2019-10-09 RX ADMIN — SODIUM CHLORIDE, POTASSIUM CHLORIDE, SODIUM LACTATE AND CALCIUM CHLORIDE 1000 ML: 600; 310; 30; 20 INJECTION, SOLUTION INTRAVENOUS at 04:30

## 2019-10-09 RX ADMIN — POTASSIUM CHLORIDE 40 MEQ: 1500 TABLET, EXTENDED RELEASE ORAL at 16:47

## 2019-10-09 RX ADMIN — SENNOSIDES, DOCUSATE SODIUM 2 TABLET: 50; 8.6 TABLET, FILM COATED ORAL at 06:04

## 2019-10-09 RX ADMIN — INSULIN HUMAN 5 UNITS: 100 INJECTION, SOLUTION PARENTERAL at 17:25

## 2019-10-09 RX ADMIN — POTASSIUM CHLORIDE 10 MEQ: 7.46 INJECTION, SOLUTION INTRAVENOUS at 00:35

## 2019-10-09 RX ADMIN — FLUOXETINE 10 MG: 10 CAPSULE ORAL at 06:04

## 2019-10-09 RX ADMIN — INSULIN HUMAN 5 UNITS: 100 INJECTION, SOLUTION PARENTERAL at 21:40

## 2019-10-09 RX ADMIN — INSULIN GLARGINE 10 UNITS: 100 INJECTION, SOLUTION SUBCUTANEOUS at 17:26

## 2019-10-09 RX ADMIN — POTASSIUM CHLORIDE 40 MEQ: 29.8 INJECTION, SOLUTION INTRAVENOUS at 07:19

## 2019-10-09 RX ADMIN — SODIUM CHLORIDE, POTASSIUM CHLORIDE, SODIUM LACTATE AND CALCIUM CHLORIDE 1000 ML: 600; 310; 30; 20 INJECTION, SOLUTION INTRAVENOUS at 12:11

## 2019-10-09 RX ADMIN — SODIUM CHLORIDE, POTASSIUM CHLORIDE, SODIUM LACTATE AND CALCIUM CHLORIDE 1000 ML: 600; 310; 30; 20 INJECTION, SOLUTION INTRAVENOUS at 04:35

## 2019-10-09 RX ADMIN — DEXTROSE AND SODIUM CHLORIDE 1000 ML: 10; .45 INJECTION, SOLUTION INTRAVENOUS at 07:40

## 2019-10-09 RX ADMIN — POTASSIUM CHLORIDE 10 MEQ: 7.46 INJECTION, SOLUTION INTRAVENOUS at 02:33

## 2019-10-09 RX ADMIN — INSULIN GLARGINE 10 UNITS: 100 INJECTION, SOLUTION SUBCUTANEOUS at 10:38

## 2019-10-09 RX ADMIN — SODIUM CHLORIDE: 9 INJECTION, SOLUTION INTRAVENOUS at 01:00

## 2019-10-09 ASSESSMENT — ENCOUNTER SYMPTOMS
ABDOMINAL PAIN: 0
FOCAL WEAKNESS: 0
NAUSEA: 0
COUGH: 0
SPUTUM PRODUCTION: 0
DIZZINESS: 0
SHORTNESS OF BREATH: 0
CHILLS: 0
MYALGIAS: 0
NERVOUS/ANXIOUS: 0
VOMITING: 0
BLURRED VISION: 0
DEPRESSION: 0
SORE THROAT: 0
FEVER: 0
PALPITATIONS: 0
HEADACHES: 0

## 2019-10-09 NOTE — PROGRESS NOTES
2 RN skin check:    Pt skin intact throughout, no issues noted on admission. Pt turning independently in bed.

## 2019-10-09 NOTE — ED TRIAGE NOTES
Pt bib ems for n/v. Pt non compliant with DM medications, only takes them when she doesn't feel well. Pt has not been taking any but did take some today.

## 2019-10-09 NOTE — ED NOTES
Pt with multiple IV attempts. EJ placed. Lab attempting blood again. Pt incontinent of urine. Linens changed.

## 2019-10-09 NOTE — CONSULTS
Critical Care Consultation    Date of consult: 10/8/2019    Referring Physician  ROSELIA Mathias    Reason for Consultation  Critical care management of DKA    History of Presenting Illness  54 y.o. female with a history of diabetes treated with oral agent who presented 10/8/2019 with nausea and hyperglycemia.  Patient had nausea and vomiting yesterday.  Patient apparently only taking oral agents and not taking insulin because she has side effects from it?  Patient had some dizziness in the last day or 2 and received a couple liters of fluids here in the ER and feels better.  Blood sugar was around 500.  She had metabolic acidosis with elevated anion gap.  Unclear if she had DKA but family stated her diabetes was diagnosed about 3 years ago.  Review of systems with 2 members that speak English and Japanese well was negative for any source of infection.    Code Status  Full Code    Review of Systems  Review of Systems   Constitutional: Positive for fever (Last week) and malaise/fatigue. Negative for chills and diaphoresis.   HENT: Negative for congestion, sinus pain and sore throat.         Very dry mouth causing some dysarthria   Eyes: Negative for blurred vision and double vision.   Respiratory: Negative for cough, sputum production and shortness of breath.    Cardiovascular: Negative for chest pain, palpitations and leg swelling.   Gastrointestinal: Positive for abdominal pain, nausea and vomiting. Negative for blood in stool, constipation and diarrhea.        GI symptoms primarily yesterday and improved today   Genitourinary: Negative for dysuria, flank pain, frequency, hematuria and urgency.   Musculoskeletal: Negative for back pain and joint pain.   Skin: Negative for rash.   Neurological: Negative for sensory change, speech change, focal weakness and headaches.   Endo/Heme/Allergies: Does not bruise/bleed easily.   Psychiatric/Behavioral: Positive for depression (On Prozac). Negative for substance  abuse. The patient is not nervous/anxious.    History obtained with help from family at the bedside in the ER    Past Medical History   has a past medical history of Diabetes (HCC) (2015) and Stiff person syndrome.    Surgical History   has a past surgical history that includes cholecystectomy; appendectomy; and pr  delivery only.    Family History  family history includes Diabetes in her father; Stroke in her brother.    Social History   reports that she has never smoked. She has never used smokeless tobacco. She reports that she does not drink alcohol or use drugs.    Medications  Home Medications     Reviewed by Miguelito Mcfarlane (Pharmacy Tech) on 10/08/19 at 1907  Med List Status: Complete   Medication Last Dose Status   FLUoxetine (PROZAC) 10 MG Cap 10/8/2019 Active              Current Facility-Administered Medications   Medication Dose Route Frequency Provider Last Rate Last Dose   • D10%-0.45% NaCl infusion   Intravenous Continuous Eyad Mccollum M.D.       • [START ON 10/9/2019] senna-docusate (PERICOLACE or SENOKOT S) 8.6-50 MG per tablet 2 Tab  2 Tab Oral BID Eyad Mccollum M.D.        And   • polyethylene glycol/lytes (MIRALAX) PACKET 1 Packet  1 Packet Oral QDAY PRN Eyad Mccollum M.D.        And   • magnesium hydroxide (MILK OF MAGNESIA) suspension 30 mL  30 mL Oral QDAY PRN Eyad Mccollum M.D.        And   • bisacodyl (DULCOLAX) suppository 10 mg  10 mg Rectal QDAY PRN Eyad Mccollum M.D.       • MD ALERT-PHARMACY TO CONSULT FOR DKA MONITORING 1 Each  1 Each Other PRN Eyad Mccollum M.D.       • Adult DKA potassium(K+) replacement scale  1 Each Intravenous Q4HRS Eyad Mccollum M.D.       • magnesium sulfate IVPB premix 2 g  2 g Intravenous Once PRN Eyad Mccollum M.D.        Or   • magnesium sulfate IVPB premix 4 g  4 g Intravenous Once PRN Eyad Mccollum M.D.       • potassium phosphates 30 mmol in  mL ivpb  30 mmol Intravenous Once PRN Eyad Mccollum M.D.        Or   • sodium phosphate 30 mmol in 1/2   mL ivpb  30 mmol Intravenous Once PRN Eyad Mccollum M.D.       • Respiratory Care per Protocol   Nebulization Continuous RT Eyad Mccollum M.D.       • lactated ringers infusion   Intravenous Continuous Eyad Mccollum M.D.       • D5LR infusion   Intravenous Continuous Eyad Mccollum M.D.       • [START ON 10/9/2019] heparin injection 5,000 Units  5,000 Units Subcutaneous Q8HRS Eyad Mccollum M.D.       • acetaminophen (TYLENOL) tablet 650 mg  650 mg Oral Q6HRS PRN Eyad Mccollum M.D.       • ondansetron (ZOFRAN) syringe/vial injection 4 mg  4 mg Intravenous Q4HRS PRN Eyad Mccollum M.D.       • ondansetron (ZOFRAN ODT) dispertab 4 mg  4 mg Oral Q4HRS PRN Eyad Mccollum M.D.       • promethazine (PHENERGAN) tablet 12.5-25 mg  12.5-25 mg Oral Q4HRS PRN Eyad Mccollum M.D.       • promethazine (PHENERGAN) suppository 12.5-25 mg  12.5-25 mg Rectal Q4HRS PRN Eyad Mccollum M.D.       • prochlorperazine (COMPAZINE) injection 5-10 mg  5-10 mg Intravenous Q4HRS PRN Eyad Mccollum M.D.       • MD ALERT-INSULIN DRIP INITIAL RATE BY PHARMACY AT 0.05 UNITS/KG/HR 1 Each  1 Each Other PRN Eyad Mccollum M.D.       • lactated ringers infusion (BOLUS): BMI less than or equal to 30  30 mL/kg Intravenous Once PRN Eyad Mccollum M.D.       • lactated ringers infusion (BOLUS)  1,000 mL Intravenous Once PRN Eyad Mccollum M.D.       • [START ON 10/9/2019] FLUoxetine (PROZAC) capsule 10 mg  10 mg Oral QDAY Eyad Mccollum M.D.       • potassium phosphates 15 mmol in D5W 500 mL ivpb  15 mmol Intravenous Once Eyad Mccollum M.D.       • lactated ringer BOLUS infusion  1,000 mL Intravenous Once Eyad Mccollum M.D.       • insulin regular human (HUMULIN/NOVOLIN R) 62.5 Units in  mL Infusion for DKA  2 Units/hr Intravenous Continuous Eyad Mccollum M.D.       • magnesium sulfate IVPB premix 2 g  2 g Intravenous Once Steffen Baker M.D.           Allergies  No Known Allergies    Vital Signs last 24 hours  Temp:  [36.8 °C (98.2 °F)] 36.8 °C (98.2 °F)  Pulse:   [] 95  Resp:  [18] 18  BP: (104-121)/(68-79) 104/68  SpO2:  [98 %-99 %] 99 %    Physical Exam  Physical Exam   Constitutional: She appears well-developed and well-nourished. She appears lethargic. She is cooperative. She has a sickly appearance. No distress.   HENT:   Head: Normocephalic and atraumatic.   Mouth/Throat: Mucous membranes are dry and not cyanotic.   Eyes: Pupils are equal, round, and reactive to light. EOM are normal. No scleral icterus.   Neck: Phonation normal. Neck supple. No JVD present. No thyromegaly present.   Cardiovascular: Normal rate and regular rhythm.  No extrasystoles are present. Exam reveals no gallop and no friction rub.   No murmur heard.  Sinus rhythm/sinus tachycardia   Pulmonary/Chest: No respiratory distress. She has no wheezes. She has no rhonchi. She has no rales.   Abdominal: Soft. Bowel sounds are normal. She exhibits no distension. There is no hepatosplenomegaly. There is no tenderness. There is no rebound, no guarding and no CVA tenderness.   Neurological: She appears lethargic. She displays no atrophy and no tremor. No cranial nerve deficit or sensory deficit. She exhibits normal muscle tone. She displays no seizure activity. Coordination normal. GCS eye subscore is 4. GCS verbal subscore is 5. GCS motor subscore is 6.   Speech off a little bit per family but primarily related to dry mouth, both family members believe she is alert oriented and appropriate   Skin: She is not diaphoretic. No cyanosis. Nails show no clubbing.   Psychiatric: Her speech is normal. Her mood appears not anxious. She is not agitated. Cognition and memory are normal.   Obtained with help of family       Fluids    Intake/Output Summary (Last 24 hours) at 10/8/2019 2131  Last data filed at 10/8/2019 1959  Gross per 24 hour   Intake 1000 ml   Output --   Net 1000 ml       Laboratory  Recent Results (from the past 48 hour(s))   CBC WITH DIFFERENTIAL    Collection Time: 10/08/19  5:29 PM   Result  Value Ref Range    WBC 15.8 (H) 4.8 - 10.8 K/uL    RBC 4.33 4.20 - 5.40 M/uL    Hemoglobin 13.4 12.0 - 16.0 g/dL    Hematocrit 43.5 37.0 - 47.0 %    .5 (H) 81.4 - 97.8 fL    MCH 30.9 27.0 - 33.0 pg    MCHC 30.8 (L) 33.6 - 35.0 g/dL    RDW 51.1 (H) 35.9 - 50.0 fL    Platelet Count 158 (L) 164 - 446 K/uL    MPV 12.2 9.0 - 12.9 fL    Neutrophils-Polys 88.80 (H) 44.00 - 72.00 %    Lymphocytes 5.40 (L) 22.00 - 41.00 %    Monocytes 3.60 0.00 - 13.40 %    Eosinophils 0.10 0.00 - 6.90 %    Basophils 0.40 0.00 - 1.80 %    Immature Granulocytes 1.70 (H) 0.00 - 0.90 %    Nucleated RBC 0.00 /100 WBC    Neutrophils (Absolute) 14.01 (H) 2.00 - 7.15 K/uL    Lymphs (Absolute) 0.86 (L) 1.00 - 4.80 K/uL    Monos (Absolute) 0.57 0.00 - 0.85 K/uL    Eos (Absolute) 0.01 0.00 - 0.51 K/uL    Baso (Absolute) 0.07 0.00 - 0.12 K/uL    Immature Granulocytes (abs) 0.27 (H) 0.00 - 0.11 K/uL    NRBC (Absolute) 0.00 K/uL   COMP METABOLIC PANEL    Collection Time: 10/08/19  5:29 PM   Result Value Ref Range    Sodium 140 135 - 145 mmol/L    Potassium 3.8 3.6 - 5.5 mmol/L    Chloride 108 96 - 112 mmol/L    Co2 8 (LL) 20 - 33 mmol/L    Anion Gap 24.0 (H) 0.0 - 11.9    Glucose 492 (H) 65 - 99 mg/dL    Bun 16 8 - 22 mg/dL    Creatinine 0.72 0.50 - 1.40 mg/dL    Calcium 8.6 8.5 - 10.5 mg/dL    AST(SGOT) 9 (L) 12 - 45 U/L    ALT(SGPT) 12 2 - 50 U/L    Alkaline Phosphatase 157 (H) 30 - 99 U/L    Total Bilirubin 0.3 0.1 - 1.5 mg/dL    Albumin 3.2 3.2 - 4.9 g/dL    Total Protein 6.4 6.0 - 8.2 g/dL    Globulin 3.2 1.9 - 3.5 g/dL    A-G Ratio 1.0 g/dL   LIPASE    Collection Time: 10/08/19  5:29 PM   Result Value Ref Range    Lipase 25 11 - 82 U/L   MAGNESIUM    Collection Time: 10/08/19  5:29 PM   Result Value Ref Range    Magnesium 2.0 1.5 - 2.5 mg/dL   PHOSPHORUS    Collection Time: 10/08/19  5:29 PM   Result Value Ref Range    Phosphorus 2.4 (L) 2.5 - 4.5 mg/dL   ESTIMATED GFR    Collection Time: 10/08/19  5:29 PM   Result Value Ref Range    GFR If  African American >60 >60 mL/min/1.73 m 2    GFR If Non African American >60 >60 mL/min/1.73 m 2   PERIPHERAL SMEAR REVIEW    Collection Time: 10/08/19  5:29 PM   Result Value Ref Range    Peripheral Smear Review see below    DIFFERENTIAL COMMENT    Collection Time: 10/08/19  5:29 PM   Result Value Ref Range    Comments-Diff see below    BETA-HYDROXYBUTYRIC ACID    Collection Time: 10/08/19  7:38 PM   Result Value Ref Range    beta-Hydroxybutyric Acid 6.99 (H) 0.02 - 0.27 mmol/L   HEMOGLOBIN A1C    Collection Time: 10/08/19  7:38 PM   Result Value Ref Range    Glycohemoglobin 15.9 (H) 0.0 - 5.6 %    Est Avg Glucose 410 mg/dL   MIX VENOUS BLOOD GAS    Collection Time: 10/08/19  7:38 PM   Result Value Ref Range    MV Ph 7.19     MV Pco2 24.3 mmHg    MV Po2 43.9 mmHg    MV O2 Saturation 75.8 %    MV Hco3 9 mmol/L    MV Base Excess -18 mmol/L    Body Temp see below Centigrade   URINALYSIS,CULTURE IF INDICATED    Collection Time: 10/08/19  7:55 PM   Result Value Ref Range    Color Yellow     Character Clear     Specific Gravity 1.020 <1.035    Ph 5.5 5.0 - 8.0    Glucose 500 (A) Negative mg/dL    Ketones >=80 (A) Negative mg/dL    Protein Negative Negative mg/dL    Bilirubin Negative Negative    Urobilinogen, Urine 0.2 Negative    Nitrite Negative Negative    Leukocyte Esterase Negative Negative    Occult Blood Trace (A) Negative    Micro Urine Req Microscopic    Influenza A/B By PCR (Adult - Flu Only)    Collection Time: 10/08/19  7:55 PM   Result Value Ref Range    Influenza virus A RNA Negative Negative    Influenza virus B, PCR Negative Negative   URINE MICROSCOPIC (W/UA)    Collection Time: 10/08/19  7:55 PM   Result Value Ref Range    WBC 2-5 /hpf    RBC 0-2 /hpf    Bacteria Rare (A) None /hpf    Epithelial Cells Rare /hpf    Budding Yeast Present (A) Absent /hpf   Lactic Acid -STAT Once    Collection Time: 10/08/19  8:40 PM   Result Value Ref Range    Lactic Acid 1.4 0.5 - 2.0 mmol/L   Prothrombin time (INR)     Collection Time: 10/08/19  8:40 PM   Result Value Ref Range    PT 13.7 12.0 - 14.6 sec    INR 1.03 0.87 - 1.13   PHOSPHORUS    Collection Time: 10/08/19  8:40 PM   Result Value Ref Range    Phosphorus 1.6 (L) 2.5 - 4.5 mg/dL       Imaging  DX-CHEST-LIMITED (1 VIEW)   Final Result         No acute cardiac or pulmonary abnormality is identified. There are mild perihilar opacifications. These are slightly less prominent than on the prior radiograph.          Assessment/Plan  * DKA (diabetic ketoacidoses) (HCC)- (present on admission)  Assessment & Plan  To ICU  For blood sugar 492, beta hydroxybutyric acid 6.99, anion gap 24, venous pH 7.19  Fluid resuscitation  DKA protocols  Insulin drip, titrate nightly hour with blood sugar measurements  Optimize electrolytes, every 4 hours BMP, several times a day phosphorus/magnesium  Needs magnesium, phosphorus and potassium  IV access is adequate at this time, monitor for the need for CVC  May be some noncompliance with insulin therapy?  Diabetic education and E[place]  Family to bring in her oral diabetes medication tonight or in a.m.    SIRS (systemic inflammatory response syndrome) ([facility])- (present on admission)  Assessment & Plan  SIRS criteria identified on my evaluation include:  Tachycardia, with heart rate greater than 90 BPM and Leukocyosis, with WBC greater than 12,000  SIRS is non-infectious, the patient does not have sepsis  Infectious disease ROS with family is negative  UA grossly negative, chest x-ray clear  Lactic acid is normal  Monitor      Leukocytosis- (present on admission)  Assessment & Plan  Suspect secondary to DKA  ID ROS negative  SIRS criteria present but doubt infection/sepsis at this time, monitoring    Thrombocytopenia (HCC)  Assessment & Plan  Mild, platelet count 158, no bleeding clinically  Serial CBC, usual transfusion criteria to be followed  Monitor    Recurrent major depressive disorder, in partial remission (HCC)- (present on  admission)  Assessment & Plan  Resume fluoxetine as clinically appropriate  Monitor    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    The patient remains critically ill.  Critical care time = 40 minutes in directly providing and coordinating critical care and extensive data review.  No time overlap and excludes procedures.

## 2019-10-09 NOTE — H&P
Hospital Medicine History & Physical Note    Date of Service  10/8/2019    Primary Care Physician  Chalo Whelan M.D.    Consultants  None    Code Status  Full code    Chief Complaint  Generalized weakness and nausea    History of Presenting Illness  54 y.o. female with a past medical history of stiff person syndrome, depression, diabetes mellitus who presented 10/8/2019 with nausea, generalized weakness and fatigue for the past 3 days.  History is obtained from family at bedside.  Apparently the patient is not on any diabetic medications because she reports feeling nauseated with insulin and oral hypoglycemics.  For the past few days the patient is noted to be nauseated but has not had any vomiting.  She reports vague generalized abdominal pain without any fevers or chills.  She denies any dysuria or diarrhea.  She denies any chest pain or shortness of breath.  She reports generalized weakness and fatigue.  Her blood sugar at home was noted to be elevated 450 which is why her family brought her into the ER.    Chest x-ray interpreted by me reveals no acute cardiopulmonary process.  Mild perihilar opacifications are noted which are less prominent compared to her previous studies.    Review of Systems  Review of Systems   Constitutional: Positive for malaise/fatigue. Negative for chills, diaphoresis and fever.   HENT: Negative for hearing loss and sore throat.    Eyes: Negative for blurred vision.   Respiratory: Negative for cough, sputum production, shortness of breath and wheezing.    Cardiovascular: Negative for chest pain, palpitations and leg swelling.   Gastrointestinal: Positive for abdominal pain and nausea. Negative for blood in stool, diarrhea and vomiting.   Genitourinary: Negative for dysuria, flank pain and urgency.   Musculoskeletal: Negative for back pain, joint pain, myalgias and neck pain.   Skin: Negative for rash.   Neurological: Negative for dizziness, focal weakness, seizures and headaches.    Endo/Heme/Allergies: Does not bruise/bleed easily.   Psychiatric/Behavioral: Negative for suicidal ideas.   All other systems reviewed and are negative.      Past Medical History   has a past medical history of Diabetes ([facility]) (2015) and Stiff person syndrome.    Surgical History   has a past surgical history that includes cholecystectomy; appendectomy; and pr  delivery only.     Family History  family history includes Diabetes in her father; Stroke in her brother.     Social History   reports that she has never smoked. She has never used smokeless tobacco. She reports that she does not drink alcohol or use drugs.    Allergies  No Known Allergies    Medications  Prior to Admission Medications   Prescriptions Last Dose Informant Patient Reported? Taking?   FLUoxetine (PROZAC) 10 MG Cap 10/8/2019 at Unknown time  No No   Sig: TAKE 1 CAPSULE BY MOUTH EVERY DAY      Facility-Administered Medications: None       Physical Exam  Temp:  [36.8 °C (98.2 °F)] 36.8 °C (98.2 °F)  Pulse:  [] 98  Resp:  [18] 18  BP: (104-121)/(68-79) 104/68  SpO2:  [98 %-99 %] 99 %    Physical Exam   Constitutional: She is oriented to person, place, and time. She appears well-developed and well-nourished. No distress.   HENT:   Head: Normocephalic and atraumatic.   Dry oral mucous membranes   Eyes: Pupils are equal, round, and reactive to light. Conjunctivae are normal. Right eye exhibits no discharge. Left eye exhibits no discharge. No scleral icterus.   Neck: Normal range of motion. Neck supple. No tracheal deviation present.   Cardiovascular: Regular rhythm and normal heart sounds.   Tachycardic   Pulmonary/Chest: Effort normal and breath sounds normal. No stridor. No respiratory distress. She has no wheezes. She has no rales.   Abdominal: Soft. Bowel sounds are normal. She exhibits no distension. There is tenderness (Mild diffuse). There is no rebound and no guarding.   Musculoskeletal: Normal range of motion. She exhibits no  edema, tenderness or deformity.   Lymphadenopathy:     She has no cervical adenopathy.   Neurological: She is alert and oriented to person, place, and time. No cranial nerve deficit. Coordination normal.   No focal deficits   Skin: Skin is warm and dry. No rash noted. She is not diaphoretic. No erythema.   Psychiatric: She has a normal mood and affect. Her behavior is normal.   Nursing note and vitals reviewed.      Laboratory:  Recent Labs     10/08/19  1729   WBC 15.8*   RBC 4.33   HEMOGLOBIN 13.4   HEMATOCRIT 43.5   .5*   MCH 30.9   MCHC 30.8*   RDW 51.1*   PLATELETCT 158*   MPV 12.2     Recent Labs     10/08/19  1729   SODIUM 140   POTASSIUM 3.8   CHLORIDE 108   CO2 8*   GLUCOSE 492*   BUN 16   CREATININE 0.72   CALCIUM 8.6     Recent Labs     10/08/19  1729   ALTSGPT 12   ASTSGOT 9*   ALKPHOSPHAT 157*   TBILIRUBIN 0.3   LIPASE 25   GLUCOSE 492*         No results for input(s): NTPROBNP in the last 72 hours.      No results for input(s): TROPONINT in the last 72 hours.    Urinalysis:    No results found     Imaging:  DX-CHEST-LIMITED (1 VIEW)   Final Result         No acute cardiac or pulmonary abnormality is identified. There are mild perihilar opacifications. These are slightly less prominent than on the prior radiograph.            Assessment/Plan:  I anticipate this patient will require at least two midnights for appropriate medical management, necessitating inpatient admission.    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)  Assessment & Plan  Chronic  Muscle relaxers as needed  PT OT    SIRS  (systemic inflammatory response syndrome) (HCC)- (present on admission)  Assessment & Plan  SIRS criteria identified on my evaluation include:  Tachycardia, with heart rate greater than 90 BPM and Leukocyosis, with WBC greater than 12,000  SIRS is non-infectious, the patient does not have sepsis  Check UA  Monitor CBC and vitals      Hypophosphatemia- (present on admission)  Assessment & Plan  Replete with IV K-Phos      VTE prophylaxis: Heparin    This patient is critically ill and will be admitted to the ICU with acute DKA.  I have assessed and reassessed the blood glucose and acid-base status with titration of an insulin drip, blood pressure, hemodynamics, urine output and response to IV fluid resuscitation. There is significant risk for worsening metabolic and endocrine system dysfunction including life threatening hypokalemia and hypophosphotemia. Total of 35 minutes of critical care time spent with patient, discussed with RN, pharmacy, and RT. This time is exclusive of any procedures performed and does not overlap with other physician's time.

## 2019-10-09 NOTE — ED PROVIDER NOTES
ED Provider Note    Scribed for Beka Redding D.O. by Adeel Lucas. 10/8/2019  5:37 PM    Primary care provider: Chalo Whelan M.D.  Means of arrival: EMS  History obtained from: Patient  History limited by: None    CHIEF COMPLAINT  Chief Complaint   Patient presents with   • N/V   • High Blood Sugar       HPI  Gabriela Cheatham-Depintor is a 54 y.o. Female with a history of diabetes who presents to the Emergency Department after being brought in by EMS secondary to nausea and hyperglycemia. Per family she is noncompliant with her diabetic medications, and refuses to take her insulin as she becomes dizzy when she injects herself. Family states she last used her medication yesterday, and became dizzy and nauseous but did not have any vomiting. Since then she has also been feeling weak and tired and thus her family called EMS. Family states that her sugars at home were around 450 which is elevated for her. Family is unsure if she has ever been in DKA.    REVIEW OF SYSTEMS  Pertinent positives include nausea, weakness, hyperglycemia. Pertinent negatives include no vomiting.  All other systems reviewed and negative.    PAST MEDICAL HISTORY  Past Medical History:   Diagnosis Date   • Diabetes (HCC)    • Stiff person syndrome        SURGICAL HISTORY  Past Surgical History:   Procedure Laterality Date   • APPENDECTOMY     • CHOLECYSTECTOMY     • PB  DELIVERY ONLY          SOCIAL HISTORY  Social History     Tobacco Use   • Smoking status: Never Smoker   • Smokeless tobacco: Never Used   Substance Use Topics   • Alcohol use: No   • Drug use: No      Social History     Substance and Sexual Activity   Drug Use No       FAMILY HISTORY  Family History   Problem Relation Age of Onset   • Diabetes Father    • Stroke Brother    • Cancer Neg Hx    • Heart Disease Neg Hx        CURRENT MEDICATIONS  No current facility-administered medications on file prior to encounter.      Current Outpatient  Medications on File Prior to Encounter   Medication Sig Dispense Refill   • FLUoxetine (PROZAC) 10 MG Cap TAKE 1 CAPSULE BY MOUTH EVERY DAY 30 Cap 5   • tizanidine (ZANAFLEX) 2 MG tablet Take 1 Tab by mouth 3 times a day. 90 Tab 2   • clotrimazole-betamethasone (LOTRISONE) 1-0.05 % Cream Apply 1 Application to affected area(s) 2 times a day. 30 g 1   • Insulin Glargine-Lixisenatide (SOLIQUA) 100-33 UNT-MCG/ML Solution Pen-injector Inject 15 Units as instructed every day. Before breakfast 1 PEN 0   • Blood Glucose Test Strips Using True metrix  meter. Compatible test strips, use 2 X/day number 100 Lancets use 2 X/day number 100 1 Mutually Defined 6   • pioglitazone (ACTOS) 45 MG Tab Take 1 Tab by mouth every day. 30 Tab 11   • ibuprofen (MOTRIN) 400 MG Tab Take 1 Tab by mouth every 6 hours as needed for Headache. 30 Tab 0   • Blood Glucose Monitoring Suppl Supplies Misc Order  True Metrix meter. Compatible test strips, use 2 X/day number 100 Lancets use 2 X/day number 100 1 Mutually Defined 2   • Blood Glucose Monitoring Suppl (TRUE METRIX METER) w/Device Kit TEST TWICE A DAY 1 Kit 0       ALLERGIES  No Known Allergies    PHYSICAL EXAM  VITAL SIGNS: /79   Pulse (!) 106   Temp 36.8 °C (98.2 °F) (Temporal)   Resp 18   Wt 49 kg (108 lb 0.4 oz)   LMP 01/31/2015   SpO2 99%   BMI 19.76 kg/m²     Nursing notes and vitals reviewed.  Constitutional: Well developed, Well nourished, No acute distress, Non-toxic appearance.   HENT: Dry mucous membranes  Eyes: PERRLA, EOMI, Conjunctiva normal, No discharge.   Cardiovascular: Tachycardic heart rate, Normal rhythm, No murmurs, No rubs, No gallops.   Thorax & Lungs: No respiratory distress, No rales, No rhonchi, No wheezing, No chest tenderness.   Abdomen: Bowel sounds normal, Soft, No tenderness, No guarding, No rebound, No masses, No pulsatile masses.   Skin: Warm, Dry, No erythema, No rash.   Musculoskeletal: Intact distal pulses, No edema, No cyanosis, No clubbing.  Good range of motion in all major joints. No tenderness to palpation or major deformities noted, no CVA tenderness, no midline back tenderness.   Neurologic: Alert & oriented x 3, Normal motor function, Normal sensory function, No focal deficits noted.  Psychiatric: Affect normal for clinical presentation.    DIAGNOSTIC STUDIES/PROCEDURES    LABS  Results for orders placed or performed during the hospital encounter of 10/08/19   CBC WITH DIFFERENTIAL   Result Value Ref Range    WBC 15.8 (H) 4.8 - 10.8 K/uL    RBC 4.33 4.20 - 5.40 M/uL    Hemoglobin 13.4 12.0 - 16.0 g/dL    Hematocrit 43.5 37.0 - 47.0 %    .5 (H) 81.4 - 97.8 fL    MCH 30.9 27.0 - 33.0 pg    MCHC 30.8 (L) 33.6 - 35.0 g/dL    RDW 51.1 (H) 35.9 - 50.0 fL    Platelet Count 158 (L) 164 - 446 K/uL    MPV 12.2 9.0 - 12.9 fL    Neutrophils-Polys 88.80 (H) 44.00 - 72.00 %    Lymphocytes 5.40 (L) 22.00 - 41.00 %    Monocytes 3.60 0.00 - 13.40 %    Eosinophils 0.10 0.00 - 6.90 %    Basophils 0.40 0.00 - 1.80 %    Immature Granulocytes 1.70 (H) 0.00 - 0.90 %    Nucleated RBC 0.00 /100 WBC    Neutrophils (Absolute) 14.01 (H) 2.00 - 7.15 K/uL    Lymphs (Absolute) 0.86 (L) 1.00 - 4.80 K/uL    Monos (Absolute) 0.57 0.00 - 0.85 K/uL    Eos (Absolute) 0.01 0.00 - 0.51 K/uL    Baso (Absolute) 0.07 0.00 - 0.12 K/uL    Immature Granulocytes (abs) 0.27 (H) 0.00 - 0.11 K/uL    NRBC (Absolute) 0.00 K/uL   COMP METABOLIC PANEL   Result Value Ref Range    Sodium 140 135 - 145 mmol/L    Potassium 3.8 3.6 - 5.5 mmol/L    Chloride 108 96 - 112 mmol/L    Co2 8 (LL) 20 - 33 mmol/L    Anion Gap 24.0 (H) 0.0 - 11.9    Glucose 492 (H) 65 - 99 mg/dL    Bun 16 8 - 22 mg/dL    Creatinine 0.72 0.50 - 1.40 mg/dL    Calcium 8.6 8.5 - 10.5 mg/dL    AST(SGOT) 9 (L) 12 - 45 U/L    ALT(SGPT) 12 2 - 50 U/L    Alkaline Phosphatase 157 (H) 30 - 99 U/L    Total Bilirubin 0.3 0.1 - 1.5 mg/dL    Albumin 3.2 3.2 - 4.9 g/dL    Total Protein 6.4 6.0 - 8.2 g/dL    Globulin 3.2 1.9 - 3.5 g/dL     A-G Ratio 1.0 g/dL   LIPASE   Result Value Ref Range    Lipase 25 11 - 82 U/L   MAGNESIUM   Result Value Ref Range    Magnesium 2.0 1.5 - 2.5 mg/dL   PHOSPHORUS   Result Value Ref Range    Phosphorus 2.4 (L) 2.5 - 4.5 mg/dL   ESTIMATED GFR   Result Value Ref Range    GFR If African American >60 >60 mL/min/1.73 m 2    GFR If Non African American >60 >60 mL/min/1.73 m 2   PERIPHERAL SMEAR REVIEW   Result Value Ref Range    Peripheral Smear Review see below    DIFFERENTIAL COMMENT   Result Value Ref Range    Comments-Diff see below    MIX VENOUS BLOOD GAS   Result Value Ref Range    MV Ph 7.19     MV Pco2 24.3 mmHg    MV Po2 43.9 mmHg    MV O2 Saturation 75.8 %    MV Hco3 9 mmol/L    MV Base Excess -18 mmol/L    Body Temp see below Centigrade     DX-CHEST-LIMITED (1 VIEW)   Final Result         No acute cardiac or pulmonary abnormality is identified. There are mild perihilar opacifications. These are slightly less prominent than on the prior radiograph.          All labs reviewed by me.    COURSE & MEDICAL DECISION MAKING  Pertinent Labs & Imaging studies reviewed. (See chart for details)    5:37 PM - Patient seen and examined at bedside. Patient will be treated with LR Bolus and Zofran 4 mg. Ordered Beta-Hydroxybutyric acid, Hemoglobin A1C, HCG Qual Serum, Magnesium, Phosphorous, CBC with differential, CMP, Lipase, UA culture to evaluate her symptoms. Discussed with the patient that with her sugars being so elevated she may be entering into diabetic ketoacidosis where her body will start to break itself down. Given this she will be given fluids, I will check her blood work, and she will likely require admission to the hospital.    HYDRATION: Based on the patient's presentation of Hyperglycemia the patient was given IV fluids. IV Hydration was used because oral hydration was not adequate alone. Upon recheck following hydration, the patient was stable with improved sugars.  The patient received 1 L lactated Ringer's and  was found to have a CO2 less than 8 therefore she was switched to normal saline for 1 L.  She responded appropriately to IV fluid administration with a decreased heart rate.  Patient's cap refill is less than 2 seconds.    This is a charming 54 y.o. female that presents with diabetic ketoacidosis with a glucose of 492, anion gap of 24, CO2 less than 8.  3 IVs have been established and the patient is being resuscitated with IV fluid in form of lactated Ringer's and followed by an NSTEMI.  The patient does have leukocytosis and I have ordered urinalysis as well as influenza, x-rays negative for pneumonia.  This point, the patient has been admitted to Dr. Mccollum with Dr. Trammell critical care to evaluate the patient as well.  CRITICAL CARE  The very real possibilty of a deterioration of this patient's condition required the highest level of my preparedness for sudden, emergent intervention.  I provided critical care services, which included medication orders, frequent reevaluations of the patient's condition and response to treatment, ordering and reviewing test results, and discussing the case with various consultants.  The critical care time associated with the care of the patient was 35 minutes. Review chart for interventions. This time is exclusive of any other billable procedures.       DISPOSITION:  Patient will be admitted to ICU to Dr. Mccollum, Dr. Trammell in critical condition     FINAL IMPRESSION  Diabetic ketoacidosis  Dehydration  Acidosis  Critical care time 35 minutes   Adeel BECERRA (Damie), am scribing for, and in the presence of, Beka Redding D.O    Electronically signed by: Adeel Lucas (Siobhan), 10/8/2019    Beka BECERRA D.O. personally performed the services described in this documentation, as scribed by Adeel Lucas in my presence, and it is both accurate and complete. C.    The note accurately reflects work and decisions made by me.  Beka Redding   10/8/2019  7:57 PM

## 2019-10-09 NOTE — PROGRESS NOTES
"Hospital Medicine Daily Progress Note    Date of Service  10/9/2019    Chief Complaint  Nausea and weakness    Hospital Course    54 y.o. female diabetic with hx of \"stiff person syndrome\" admitted 10/8/2019 with DKA      Interval Problem Update  A+Ox4  Lethargic   Son at bedside and helped translate  Afebrile  UOP:1800cc overnight  Initiated ADA diet  Stop insulin drip  lantus 10units BID    Consultants/Specialty  Intensivist    Code Status  FULL    Disposition  Transfer to medical floor    Review of Systems  Review of Systems   Constitutional: Positive for malaise/fatigue. Negative for chills and fever.   HENT: Negative for nosebleeds and sore throat.    Respiratory: Negative for shortness of breath.    Cardiovascular: Negative for chest pain and palpitations.   Gastrointestinal: Negative for abdominal pain, nausea and vomiting.   Genitourinary: Negative for dysuria.   Musculoskeletal: Negative for myalgias.   Neurological: Negative for dizziness and headaches.   Psychiatric/Behavioral: The patient is not nervous/anxious.         Physical Exam  Temp:  [36.1 °C (97 °F)-36.8 °C (98.2 °F)] 36.1 °C (97 °F)  Pulse:  [] 82  Resp:  [15-25] 25  BP: ()/(50-79) 93/54  SpO2:  [96 %-99 %] 97 %    Physical Exam   Constitutional: She is oriented to person, place, and time. She appears well-developed. No distress.   HENT:   Head: Normocephalic and atraumatic.   Nose: Nose normal.   Mouth/Throat: No oropharyngeal exudate.   Eyes: Conjunctivae and EOM are normal. Right eye exhibits no discharge. Left eye exhibits no discharge. No scleral icterus.   Neck: No tracheal deviation present.   Cardiovascular: Normal rate, regular rhythm, normal heart sounds and intact distal pulses.   No murmur heard.  Pulmonary/Chest: Effort normal and breath sounds normal. No stridor. No respiratory distress. She has no wheezes.   Abdominal: Soft. Bowel sounds are normal. She exhibits no distension. There is no tenderness. "   Musculoskeletal: She exhibits no edema.   Neurological: She is alert and oriented to person, place, and time. No cranial nerve deficit.   Skin: Skin is warm. She is not diaphoretic.   Psychiatric: She has a normal mood and affect. Her behavior is normal.   Vitals reviewed.      Fluids    Intake/Output Summary (Last 24 hours) at 10/9/2019 1634  Last data filed at 10/9/2019 0719  Gross per 24 hour   Intake 5237.44 ml   Output 1800 ml   Net 3437.44 ml       Laboratory  Recent Labs     10/08/19  1729 10/09/19  0600   WBC 15.8* 12.8*   RBC 4.33 3.48*   HEMOGLOBIN 13.4 10.8*   HEMATOCRIT 43.5 31.2*   .5* 89.7   MCH 30.9 31.0   MCHC 30.8* 34.6   RDW 51.1* 44.2   PLATELETCT 158* 255   MPV 12.2 11.1     Recent Labs     10/09/19  0600 10/09/19  0645 10/09/19  1445   SODIUM 140 138 134*   POTASSIUM 2.9* 2.9* 2.8*   CHLORIDE 113* 108 105   CO2 21 21 21   GLUCOSE 175* 161* 240*   BUN 6* 3* 4*   CREATININE 0.46* 0.48* 0.45*   CALCIUM 7.5* 8.0* 7.9*     Recent Labs     10/08/19  2040   INR 1.03               Imaging  DX-CHEST-LIMITED (1 VIEW)   Final Result         1.  No acute cardiopulmonary disease.      DX-CHEST-LIMITED (1 VIEW)   Final Result         No acute cardiac or pulmonary abnormality is identified. There are mild perihilar opacifications. These are slightly less prominent than on the prior radiograph.           Assessment/Plan  * DKA (diabetic ketoacidoses) (HCC)- (present on admission)  Assessment & Plan  Stopped insulin drip and initiated diet  Improved acidosis and AG  Lantus 10 units BID  Monitor accuchecks and cover with SSI  10/9 HgbA1c:15.9, needs home insulin  Diabetic education ordered    SIRS (systemic inflammatory response syndrome) (HCC)- (present on admission)  Assessment & Plan  Monitor vitals and labs  No sign of infection  Likely reactive from DKA, dehydration.  IV fluids to be weaned      Leukocytosis- (present on admission)  Assessment & Plan  Reactive.  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

## 2019-10-09 NOTE — ASSESSMENT & PLAN NOTE
SIRS criteria identified on my evaluation include:  Tachycardia, with heart rate greater than 90 BPM and Leukocyosis, with WBC greater than 12,000  SIRS is non-infectious, the patient does not have sepsis  Infectious disease ROS with family is negative  UA grossly negative, chest x-ray clear  Lactic acid is normal  Monitor

## 2019-10-09 NOTE — DISCHARGE PLANNING
Patient is eligible for Medicaid Meds to Beds at discharge Monday-Friday 8 a.m. - 4 p.m. Preferred pharmacy changed to [facility]. Please call [phone] prior to discharge.

## 2019-10-09 NOTE — PROGRESS NOTES
Critical Care Progress Note    Date of admission  10/8/2019    Chief Complaint  54 y.o. female admitted 10/8/2019 with DKA    Hospital Course    54 y.o. female with a history of diabetes treated with oral agent who presented 10/8/2019 with nausea and hyperglycemia.  Patient had nausea and vomiting yesterday.  Patient apparently only taking oral agents and not taking insulin because she has side effects from it?  Patient had some dizziness in the last day or 2 and received a couple liters of fluids here in the ER and feels better.  Blood sugar was around 500.  She had metabolic acidosis with elevated anion gap.  Unclear if she had DKA but family stated her diabetes was diagnosed about 3 years ago.  Review of systems with 2 members that speak English and Indonesian well was negative for any source of infection.      Interval Problem Update  Reviewed last 24 hour events:  Tm 98.2  +4L over last 24hr  cxr reviewed  K 2.9    bcx 10/8 ngtd    Insulin @ 4  D10 1/2 NS @ 100    97% on RA  Last bm pta    Review of Systems  Review of Systems   Constitutional: Negative for chills and fever.   HENT: Negative for congestion.    Eyes: Negative for blurred vision.   Respiratory: Negative for cough and sputum production.    Cardiovascular: Negative for chest pain and palpitations.   Gastrointestinal: Negative for abdominal pain, nausea and vomiting.   Neurological: Negative for focal weakness.   Psychiatric/Behavioral: Negative for depression.   All other systems reviewed and are negative.       Vital Signs for last 24 hours   Temp:  [36.1 °C (97 °F)-36.8 °C (98.2 °F)] 36.1 °C (97 °F)  Pulse:  [] 82  Resp:  [15-25] 25  BP: ()/(50-79) 93/54  SpO2:  [96 %-99 %] 97 %    Hemodynamic parameters for last 24 hours       Respiratory Information for the last 24 hours       Physical Exam   Physical Exam   Constitutional: She appears well-developed and well-nourished.   HENT:   Head: Normocephalic and atraumatic.   Eyes: Pupils are  equal, round, and reactive to light. Conjunctivae are normal.   Neck: No tracheal deviation present.   Cardiovascular: Normal rate and intact distal pulses.   Pulmonary/Chest: She has no wheezes. She has no rales.   Abdominal: Soft. She exhibits no distension. There is no tenderness.   Musculoskeletal: She exhibits no edema.   Neurological: No cranial nerve deficit.   Skin: Skin is warm and dry.   Psychiatric: She has a normal mood and affect.   Nursing note and vitals reviewed.      Medications  Current Facility-Administered Medications   Medication Dose Route Frequency Provider Last Rate Last Dose   • potassium chloride in water (KCL) ivpb **Administer in ICU only** 40 mEq  40 mEq Intravenous Once Troy Abbott M.D. 25 mL/hr at 10/09/19 0719 40 mEq at 10/09/19 0719   • D10%-0.45% NaCl infusion   Intravenous Continuous Eyad Mccollum M.D. 100 mL/hr at 10/09/19 0740 1,000 mL at 10/09/19 0740   • senna-docusate (PERICOLACE or SENOKOT S) 8.6-50 MG per tablet 2 Tab  2 Tab Oral BID Eyad Mccollum M.D.   2 Tab at 10/09/19 0604    And   • polyethylene glycol/lytes (MIRALAX) PACKET 1 Packet  1 Packet Oral QDAY PRN Eyad Mccollum M.D.        And   • magnesium hydroxide (MILK OF MAGNESIA) suspension 30 mL  30 mL Oral QDAY PRN Eyad Mccollum M.D.        And   • bisacodyl (DULCOLAX) suppository 10 mg  10 mg Rectal QDAY PRN Eyad Mccollum M.D.       • MD ALERT-PHARMACY TO CONSULT FOR DKA MONITORING 1 Each  1 Each Other PRN Eyad Mccollum M.D.       • Adult DKA potassium(K+) replacement scale  1 Each Intravenous Q4HRS Eyad Mccollum M.D.   1 Each at 10/09/19 0600   • magnesium sulfate IVPB premix 2 g  2 g Intravenous Once PRN Eyad Mccollum M.D.        Or   • magnesium sulfate IVPB premix 4 g  4 g Intravenous Once PRN Eyad Mccollum M.D.       • potassium phosphates 30 mmol in  mL ivpb  30 mmol Intravenous Once PRN Eyad Mccollum M.D.   Stopped at 10/09/19 0135    Or   • sodium phosphate 30 mmol in 1/2  mL ivpb  30 mmol Intravenous  Once PRN Eyad Mccollum M.D.       • Respiratory Care per Protocol   Nebulization Continuous RT Eyad Mccollum M.D.       • lactated ringers infusion   Intravenous Continuous Eyad Mccollum M.D.   Stopped at 10/08/19 2349   • D5LR infusion   Intravenous Continuous Eyad Mccollum M.D.   Stopped at 10/09/19 0741   • heparin injection 5,000 Units  5,000 Units Subcutaneous Q8HRS Eyad Mccollum M.D.   5,000 Units at 10/09/19 0604   • acetaminophen (TYLENOL) tablet 650 mg  650 mg Oral Q6HRS PRN Eyad Mccollum M.D.       • ondansetron (ZOFRAN) syringe/vial injection 4 mg  4 mg Intravenous Q4HRS PRN Eyad Mccolulm M.D.       • ondansetron (ZOFRAN ODT) dispertab 4 mg  4 mg Oral Q4HRS PRN Eyad Mccollum M.D.       • promethazine (PHENERGAN) tablet 12.5-25 mg  12.5-25 mg Oral Q4HRS PRN Eyad Mccollum M.D.       • promethazine (PHENERGAN) suppository 12.5-25 mg  12.5-25 mg Rectal Q4HRS PRN Eyad Mccollum M.D.       • prochlorperazine (COMPAZINE) injection 5-10 mg  5-10 mg Intravenous Q4HRS PRN Eyad Mccollum M.D.       • MD ALERT-INSULIN DRIP INITIAL RATE BY PHARMACY AT 0.05 UNITS/KG/HR 1 Each  1 Each Other PRN Eyad Mccollum M.D.       • lactated ringers infusion (BOLUS): BMI less than or equal to 30  30 mL/kg Intravenous Once PRN Eyad Mccollum M.D.       • FLUoxetine (PROZAC) capsule 10 mg  10 mg Oral QDAY Eyad Mccollum M.D.   10 mg at 10/09/19 0604   • insulin regular human (HUMULIN/NOVOLIN R) 62.5 Units in  mL Infusion for DKA  2 Units/hr Intravenous Continuous Eyad Mccollum M.D. 16 mL/hr at 10/09/19 0716 4 Units/hr at 10/09/19 0716       Fluids    Intake/Output Summary (Last 24 hours) at 10/9/2019 0757  Last data filed at 10/9/2019 0719  Gross per 24 hour   Intake 5237.44 ml   Output 1800 ml   Net 3437.44 ml       Laboratory          Recent Labs     10/08/19  1729 10/08/19  2040 10/08/19  2240 10/09/19  0320 10/09/19  0600   SODIUM 140  --  141 141 140   POTASSIUM 3.8  --  3.3* 3.3* 2.9*   CHLORIDE 108  --  113* 114* 113*   CO2 8*  --  8* 18*  21   BUN 16  --  10 8 6*   CREATININE 0.72  --  0.58 0.55 0.46*   MAGNESIUM 2.0  --   --   --   --    PHOSPHORUS 2.4* 1.6*  --   --   --    CALCIUM 8.6  --  7.3* 7.5* 7.5*     Recent Labs     10/08/19  1729 10/08/19  2240 10/09/19  0320 10/09/19  0600   ALTSGPT 12  --   --   --    ASTSGOT 9*  --   --   --    ALKPHOSPHAT 157*  --   --   --    TBILIRUBIN 0.3  --   --   --    LIPASE 25  --   --   --    GLUCOSE 492* 287* 186* 175*     Recent Labs     10/08/19  1729 10/09/19  0600   WBC 15.8* 12.8*   NEUTSPOLYS 88.80* 84.40*   LYMPHOCYTES 5.40* 9.50*   MONOCYTES 3.60 4.60   EOSINOPHILS 0.10 0.20   BASOPHILS 0.40 0.20   ASTSGOT 9*  --    ALTSGPT 12  --    ALKPHOSPHAT 157*  --    TBILIRUBIN 0.3  --      Recent Labs     10/08/19  1729 10/08/19  2040 10/09/19  0600   RBC 4.33  --  3.48*   HEMOGLOBIN 13.4  --  10.8*   HEMATOCRIT 43.5  --  31.2*   PLATELETCT 158*  --  255   PROTHROMBTM  --  13.7  --    INR  --  1.03  --        Imaging  X-Ray:  No film today    Assessment/Plan  * DKA (diabetic ketoacidoses) (HCC)- (present on admission)  Assessment & Plan  A1c 15.9 extremely poorly controlled  Continue DKA protocols  Insulin infusion with active titration and q. one hour fingersticks  Aggressive electrolyte replacement  Etiology suspected secondary to advancing DM, no evidence of infectious etiologies at this time, troponin WNL  Every 4 hours BMP  N.p.o. 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 In review of yesterday's note (10/8/2019), there are no changes except as documented above.     Discussed patient condition and risk of morbidity and/or mortality with Hospitalist, RN, RT, Pharmacy and Patient  The patient remains critically ill.  Critical care time = 32 minutes in directly providing and coordinating critical care and extensive data review.  No time overlap and excludes procedures.

## 2019-10-09 NOTE — ASSESSMENT & PLAN NOTE
A1c 15.9 extremely poorly controlled  Continue DKA protocols  Insulin infusion with active titration and q. one hour fingersticks  Aggressive electrolyte replacement  Etiology suspected secondary to advancing DM, no evidence of infectious etiologies at this time, troponin WNL  Every 4 hours BMP  N.p.o. until able to transition off of insulin infusion

## 2019-10-09 NOTE — ASSESSMENT & PLAN NOTE
improved  Monitor vitals and labs  No sign of infection  Likely reactive from DKA, dehydration.  IV fluids to be weaned

## 2019-10-09 NOTE — PROCEDURES
Central Line Insertion  Date/Time: 10/9/2019 3:04 AM  Performed by: Troy Abbott M.D.  Authorized by: Troy Abbott M.D.     Consent:     Consent obtained:  Emergent situation    Alternatives discussed:  Delayed treatment  Pre-procedure details:     Hand hygiene: Hand hygiene performed prior to insertion      Sterile barrier technique: All elements of maximal sterile technique followed      Skin preparation:  2% chlorhexidine    Skin preparation agent: Skin preparation agent completely dried prior to procedure    Sedation:     Sedation type:  None  Anesthesia:     Anesthesia method:  Local infiltration    Local anesthetic:  Lidocaine 1% w/o epi  Procedure details:     Location:  R internal jugular    Patient position:  Trendelenburg    Procedural supplies:  Triple lumen    Catheter size:  7 Fr    Landmarks identified: yes      Ultrasound guidance: yes      Sterile ultrasound techniques: Sterile gel and sterile probe covers were used      Number of attempts:  1    Successful placement: yes    Post-procedure details:     Post-procedure:  Dressing applied and line sutured    Assessment:  Blood return through all ports and free fluid flow    Patient tolerance of procedure:  Tolerated well, no immediate complications  Comments:      Indications: DKA, requiring high dose potassium replacement at rates not safe via PIV.

## 2019-10-09 NOTE — PROGRESS NOTES
"The BMP ordered for 1000 was collected by this RN at 0943 and sent to lab at 0945. Results for this lab are entered under the incorrect time in \"results review\". This lab is labeled as \"0645\" instead of \"0943\". Lab was contacted about this error, and this RN was told that the time was fixed. However, the incorrect time is still listed for this set of results.   "

## 2019-10-09 NOTE — ASSESSMENT & PLAN NOTE
Chronic  IVIG if acutely worsens and neurology consult if worsens  Reviewed Dr Pacheco notes from 6/2019 and increased diazepam to 10mg TID and stopped baclofen and started zanaflex 2mg prn  PT/OT

## 2019-10-10 LAB
ANION GAP SERPL CALC-SCNC: 5 MMOL/L (ref 0–11.9)
ANION GAP SERPL CALC-SCNC: 7 MMOL/L (ref 0–11.9)
B-OH-BUTYR SERPL-MCNC: 0.34 MMOL/L (ref 0.02–0.27)
BASOPHILS # BLD AUTO: 0.2 % (ref 0–1.8)
BASOPHILS # BLD: 0.02 K/UL (ref 0–0.12)
BUN SERPL-MCNC: 4 MG/DL (ref 8–22)
BUN SERPL-MCNC: 4 MG/DL (ref 8–22)
CALCIUM SERPL-MCNC: 7.4 MG/DL (ref 8.5–10.5)
CALCIUM SERPL-MCNC: 7.7 MG/DL (ref 8.5–10.5)
CHLORIDE SERPL-SCNC: 104 MMOL/L (ref 96–112)
CHLORIDE SERPL-SCNC: 105 MMOL/L (ref 96–112)
CO2 SERPL-SCNC: 26 MMOL/L (ref 20–33)
CO2 SERPL-SCNC: 28 MMOL/L (ref 20–33)
CORTIS SERPL-MCNC: 21.4 UG/DL (ref 0–23)
CREAT SERPL-MCNC: 0.38 MG/DL (ref 0.5–1.4)
CREAT SERPL-MCNC: 0.41 MG/DL (ref 0.5–1.4)
EOSINOPHIL # BLD AUTO: 0.01 K/UL (ref 0–0.51)
EOSINOPHIL NFR BLD: 0.1 % (ref 0–6.9)
ERYTHROCYTE [DISTWIDTH] IN BLOOD BY AUTOMATED COUNT: 41.2 FL (ref 35.9–50)
GLUCOSE BLD-MCNC: 149 MG/DL (ref 65–99)
GLUCOSE BLD-MCNC: 186 MG/DL (ref 65–99)
GLUCOSE BLD-MCNC: 287 MG/DL (ref 65–99)
GLUCOSE BLD-MCNC: 345 MG/DL (ref 65–99)
GLUCOSE SERPL-MCNC: 120 MG/DL (ref 65–99)
GLUCOSE SERPL-MCNC: 265 MG/DL (ref 65–99)
HCT VFR BLD AUTO: 32.8 % (ref 37–47)
HGB BLD-MCNC: 11.5 G/DL (ref 12–16)
IMM GRANULOCYTES # BLD AUTO: 0.1 K/UL (ref 0–0.11)
IMM GRANULOCYTES NFR BLD AUTO: 0.8 % (ref 0–0.9)
LYMPHOCYTES # BLD AUTO: 1.3 K/UL (ref 1–4.8)
LYMPHOCYTES NFR BLD: 9.8 % (ref 22–41)
MAGNESIUM SERPL-MCNC: 1.5 MG/DL (ref 1.5–2.5)
MCH RBC QN AUTO: 30.8 PG (ref 27–33)
MCHC RBC AUTO-ENTMCNC: 35.1 G/DL (ref 33.6–35)
MCV RBC AUTO: 87.9 FL (ref 81.4–97.8)
MONOCYTES # BLD AUTO: 0.54 K/UL (ref 0–0.85)
MONOCYTES NFR BLD AUTO: 4.1 % (ref 0–13.4)
NEUTROPHILS # BLD AUTO: 11.29 K/UL (ref 2–7.15)
NEUTROPHILS NFR BLD: 85 % (ref 44–72)
NRBC # BLD AUTO: 0 K/UL
NRBC BLD-RTO: 0 /100 WBC
PLATELET # BLD AUTO: 253 K/UL (ref 164–446)
PMV BLD AUTO: 11.4 FL (ref 9–12.9)
POTASSIUM SERPL-SCNC: 2.9 MMOL/L (ref 3.6–5.5)
POTASSIUM SERPL-SCNC: 4 MMOL/L (ref 3.6–5.5)
RBC # BLD AUTO: 3.73 M/UL (ref 4.2–5.4)
SODIUM SERPL-SCNC: 137 MMOL/L (ref 135–145)
SODIUM SERPL-SCNC: 138 MMOL/L (ref 135–145)
TSH SERPL DL<=0.005 MIU/L-ACNC: 4.99 UIU/ML (ref 0.38–5.33)
WBC # BLD AUTO: 13.3 K/UL (ref 4.8–10.8)

## 2019-10-10 PROCEDURE — 99233 SBSQ HOSP IP/OBS HIGH 50: CPT | Performed by: HOSPITALIST

## 2019-10-10 PROCEDURE — 82533 TOTAL CORTISOL: CPT

## 2019-10-10 PROCEDURE — 700102 HCHG RX REV CODE 250 W/ 637 OVERRIDE(OP): Performed by: INTERNAL MEDICINE

## 2019-10-10 PROCEDURE — 700105 HCHG RX REV CODE 258: Performed by: HOSPITALIST

## 2019-10-10 PROCEDURE — 83735 ASSAY OF MAGNESIUM: CPT

## 2019-10-10 PROCEDURE — 700111 HCHG RX REV CODE 636 W/ 250 OVERRIDE (IP): Performed by: HOSPITALIST

## 2019-10-10 PROCEDURE — 770022 HCHG ROOM/CARE - ICU (200)

## 2019-10-10 PROCEDURE — 85025 COMPLETE CBC W/AUTO DIFF WBC: CPT

## 2019-10-10 PROCEDURE — 82962 GLUCOSE BLOOD TEST: CPT | Mod: 91

## 2019-10-10 PROCEDURE — 700111 HCHG RX REV CODE 636 W/ 250 OVERRIDE (IP): Performed by: INTERNAL MEDICINE

## 2019-10-10 PROCEDURE — 82010 KETONE BODYS QUAN: CPT

## 2019-10-10 PROCEDURE — 700105 HCHG RX REV CODE 258: Performed by: INTERNAL MEDICINE

## 2019-10-10 PROCEDURE — A9270 NON-COVERED ITEM OR SERVICE: HCPCS | Performed by: INTERNAL MEDICINE

## 2019-10-10 PROCEDURE — 84443 ASSAY THYROID STIM HORMONE: CPT

## 2019-10-10 PROCEDURE — 80048 BASIC METABOLIC PNL TOTAL CA: CPT

## 2019-10-10 PROCEDURE — 700102 HCHG RX REV CODE 250 W/ 637 OVERRIDE(OP): Performed by: HOSPITALIST

## 2019-10-10 PROCEDURE — A9270 NON-COVERED ITEM OR SERVICE: HCPCS | Performed by: HOSPITALIST

## 2019-10-10 RX ORDER — POTASSIUM CHLORIDE 29.8 MG/ML
40 INJECTION INTRAVENOUS ONCE
Status: COMPLETED | OUTPATIENT
Start: 2019-10-10 | End: 2019-10-10

## 2019-10-10 RX ORDER — MAGNESIUM SULFATE HEPTAHYDRATE 40 MG/ML
4 INJECTION, SOLUTION INTRAVENOUS ONCE
Status: COMPLETED | OUTPATIENT
Start: 2019-10-10 | End: 2019-10-10

## 2019-10-10 RX ORDER — SODIUM CHLORIDE, SODIUM LACTATE, POTASSIUM CHLORIDE, AND CALCIUM CHLORIDE .6; .31; .03; .02 G/100ML; G/100ML; G/100ML; G/100ML
1000 INJECTION, SOLUTION INTRAVENOUS ONCE
Status: COMPLETED | OUTPATIENT
Start: 2019-10-10 | End: 2019-10-10

## 2019-10-10 RX ORDER — INSULIN GLARGINE 100 [IU]/ML
12 INJECTION, SOLUTION SUBCUTANEOUS 2 TIMES DAILY
Status: DISCONTINUED | OUTPATIENT
Start: 2019-10-10 | End: 2019-10-13

## 2019-10-10 RX ORDER — DIAZEPAM 5 MG/1
5 TABLET ORAL EVERY 8 HOURS PRN
Status: DISCONTINUED | OUTPATIENT
Start: 2019-10-10 | End: 2019-10-11

## 2019-10-10 RX ORDER — POTASSIUM CHLORIDE 20 MEQ/1
40 TABLET, EXTENDED RELEASE ORAL DAILY
Status: DISCONTINUED | OUTPATIENT
Start: 2019-10-10 | End: 2019-10-13

## 2019-10-10 RX ORDER — DIAZEPAM 5 MG/1
10 TABLET ORAL EVERY 8 HOURS
COMMUNITY
End: 2019-11-28 | Stop reason: ALTCHOICE

## 2019-10-10 RX ORDER — SODIUM CHLORIDE, SODIUM LACTATE, POTASSIUM CHLORIDE, CALCIUM CHLORIDE 600; 310; 30; 20 MG/100ML; MG/100ML; MG/100ML; MG/100ML
1000 INJECTION, SOLUTION INTRAVENOUS CONTINUOUS
Status: DISCONTINUED | OUTPATIENT
Start: 2019-10-10 | End: 2019-10-12

## 2019-10-10 RX ADMIN — INSULIN GLARGINE 12 UNITS: 100 INJECTION, SOLUTION SUBCUTANEOUS at 17:12

## 2019-10-10 RX ADMIN — INSULIN HUMAN 2 UNITS: 100 INJECTION, SOLUTION PARENTERAL at 17:12

## 2019-10-10 RX ADMIN — DIAZEPAM 5 MG: 5 TABLET ORAL at 21:15

## 2019-10-10 RX ADMIN — ONDANSETRON 4 MG: 2 INJECTION INTRAMUSCULAR; INTRAVENOUS at 06:07

## 2019-10-10 RX ADMIN — HEPARIN SODIUM 5000 UNITS: 5000 INJECTION INTRAVENOUS; SUBCUTANEOUS at 12:48

## 2019-10-10 RX ADMIN — DIAZEPAM 5 MG: 5 TABLET ORAL at 12:46

## 2019-10-10 RX ADMIN — HEPARIN SODIUM 5000 UNITS: 5000 INJECTION INTRAVENOUS; SUBCUTANEOUS at 05:27

## 2019-10-10 RX ADMIN — SODIUM CHLORIDE, POTASSIUM CHLORIDE, SODIUM LACTATE AND CALCIUM CHLORIDE 1000 ML: 600; 310; 30; 20 INJECTION, SOLUTION INTRAVENOUS at 05:20

## 2019-10-10 RX ADMIN — INSULIN GLARGINE 10 UNITS: 100 INJECTION, SOLUTION SUBCUTANEOUS at 06:16

## 2019-10-10 RX ADMIN — POTASSIUM CHLORIDE 40 MEQ: 1500 TABLET, EXTENDED RELEASE ORAL at 05:27

## 2019-10-10 RX ADMIN — HEPARIN SODIUM 5000 UNITS: 5000 INJECTION INTRAVENOUS; SUBCUTANEOUS at 21:10

## 2019-10-10 RX ADMIN — MAGNESIUM SULFATE IN WATER 4 G: 40 INJECTION, SOLUTION INTRAVENOUS at 06:22

## 2019-10-10 RX ADMIN — INSULIN HUMAN 5 UNITS: 100 INJECTION, SOLUTION PARENTERAL at 12:43

## 2019-10-10 RX ADMIN — SODIUM CHLORIDE, POTASSIUM CHLORIDE, SODIUM LACTATE AND CALCIUM CHLORIDE 1000 ML: 600; 310; 30; 20 INJECTION, SOLUTION INTRAVENOUS at 14:33

## 2019-10-10 RX ADMIN — SENNOSIDES, DOCUSATE SODIUM 2 TABLET: 50; 8.6 TABLET, FILM COATED ORAL at 17:10

## 2019-10-10 RX ADMIN — FLUOXETINE 10 MG: 10 CAPSULE ORAL at 05:27

## 2019-10-10 RX ADMIN — POTASSIUM CHLORIDE 40 MEQ: 29.8 INJECTION, SOLUTION INTRAVENOUS at 04:02

## 2019-10-10 RX ADMIN — SODIUM CHLORIDE, POTASSIUM CHLORIDE, SODIUM LACTATE AND CALCIUM CHLORIDE 1000 ML: 600; 310; 30; 20 INJECTION, SOLUTION INTRAVENOUS at 21:20

## 2019-10-10 RX ADMIN — INSULIN HUMAN 6 UNITS: 100 INJECTION, SOLUTION PARENTERAL at 21:11

## 2019-10-10 RX ADMIN — SENNOSIDES, DOCUSATE SODIUM 2 TABLET: 50; 8.6 TABLET, FILM COATED ORAL at 05:27

## 2019-10-10 ASSESSMENT — ENCOUNTER SYMPTOMS
ABDOMINAL PAIN: 0
FEVER: 0
MYALGIAS: 1
NAUSEA: 1
DIZZINESS: 0
NERVOUS/ANXIOUS: 0
VOMITING: 0
CHILLS: 0
SORE THROAT: 0
PALPITATIONS: 0
SHORTNESS OF BREATH: 0

## 2019-10-10 NOTE — PROGRESS NOTES
"Hospital Medicine Daily Progress Note    Date of Service  10/10/2019    Chief Complaint  Nausea and weakness    Hospital Course    54 y.o. female diabetic with hx of \"stiff person syndrome\" admitted 10/8/2019 with DKA      Interval Problem Update  Increase in leg muscle spasms  States she is normally on diazepam 10mg TID  I confirmed this with her pharmacy.  Some nausea with dry heaves after insulin given (per RN)  Son at bedside.  A+Ox3  Replacing electrolytes this am  Diabetes education ordered        Consultants/Specialty  Intensivist    Code Status  FULL    Disposition  Transfer to medical floor    Review of Systems  Review of Systems   Constitutional: Positive for malaise/fatigue. Negative for chills and fever.   HENT: Negative for sore throat.    Respiratory: Negative for shortness of breath.    Cardiovascular: Negative for palpitations and leg swelling.   Gastrointestinal: Positive for nausea. Negative for abdominal pain and vomiting.   Musculoskeletal: Positive for myalgias.   Neurological: Negative for dizziness.   Psychiatric/Behavioral: The patient is not nervous/anxious.         Physical Exam  Temp:  [36.6 °C (97.8 °F)-36.8 °C (98.3 °F)] 36.6 °C (97.9 °F)  Pulse:  [] 105  Resp:  [18-38] 30  BP: ()/(46-79) 80/54  SpO2:  [88 %-98 %] 97 %    Physical Exam   Constitutional: She is oriented to person, place, and time. She appears well-developed. She appears ill. No distress.   HENT:   Head: Normocephalic and atraumatic.   Nose: Nose normal.   Mouth/Throat: No oropharyngeal exudate.   Eyes: Conjunctivae and EOM are normal. Right eye exhibits no discharge. Left eye exhibits no discharge. No scleral icterus.   Neck: No tracheal deviation present.   Cardiovascular: Normal rate, regular rhythm, normal heart sounds and intact distal pulses.   No murmur heard.  Pulmonary/Chest: Effort normal and breath sounds normal. No stridor. No respiratory distress. She has no wheezes.   Abdominal: Soft. Bowel sounds " are normal. She exhibits no distension. There is no tenderness.   Musculoskeletal: She exhibits no edema.        Right shoulder: She exhibits spasm.   Neurological: She is alert and oriented to person, place, and time. No cranial nerve deficit. Coordination (spasm to lower proximal musculature) abnormal.   Skin: Skin is warm. She is not diaphoretic.   Psychiatric: She has a normal mood and affect. Her behavior is normal.   Vitals reviewed.      Fluids    Intake/Output Summary (Last 24 hours) at 10/10/2019 1344  Last data filed at 10/10/2019 1200  Gross per 24 hour   Intake 3393.76 ml   Output 1800 ml   Net 1593.76 ml       Laboratory  Recent Labs     10/08/19  1729 10/09/19  0600 10/10/19  0815   WBC 15.8* 12.8* 13.3*   RBC 4.33 3.48* 3.73*   HEMOGLOBIN 13.4 10.8* 11.5*   HEMATOCRIT 43.5 31.2* 32.8*   .5* 89.7 87.9   MCH 30.9 31.0 30.8   MCHC 30.8* 34.6 35.1*   RDW 51.1* 44.2 41.2   PLATELETCT 158* 255 253   MPV 12.2 11.1 11.4     Recent Labs     10/09/19  2240 10/10/19  0245 10/10/19  1043   SODIUM 137 138 137   POTASSIUM 3.2* 2.9* 4.0   CHLORIDE 106 105 104   CO2 24 26 28   GLUCOSE 240* 120* 265*   BUN 4* 4* 4*   CREATININE 0.43* 0.38* 0.41*   CALCIUM 8.1* 7.7* 7.4*     Recent Labs     10/08/19  2040   INR 1.03               Imaging  DX-CHEST-LIMITED (1 VIEW)   Final Result         1.  No acute cardiopulmonary disease.      DX-CHEST-LIMITED (1 VIEW)   Final Result         No acute cardiac or pulmonary abnormality is identified. There are mild perihilar opacifications. These are slightly less prominent than on the prior radiograph.           Assessment/Plan  * DKA (diabetic ketoacidoses) (HCC)- (present on admission)  Assessment & Plan  Stopped insulin drip and initiated diet  Improved acidosis and AG  Lantus 10 units BID  Monitor accuchecks and cover with SSI  10/9 HgbA1c:15.9, needs home insulin  Diabetic education ordered    SIRS (systemic inflammatory response syndrome) (HCC)- (present on  admission)  Assessment & Plan  Monitor vitals and labs  No sign of infection  Likely reactive from DKA, dehydration.  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

## 2019-10-10 NOTE — PROGRESS NOTES
Patient had a large BM. During toileting , HR - 's.   Pt states the relief after. BP is 82/48,  on recheck.

## 2019-10-10 NOTE — DIETARY
"Nutrition services: Day 2 of admit.  Gabriela Cheatham-Depintor is a 54 y.o. female with admitting DX of DKA.   Consult received for low BMI.     Spoke with pt with family member translating. Noted in chart review height of 4'11\" at office visit but 5'2\" recorded upon admit. Pt stated her height is 5'0\". RD provided diabetic diet education to pt and family. Left handout for reference.     Assessment:  Height: 152.4 cm (5')  Weight: 47 kg (103 lb 9.9 oz)  Body mass index is 20.24 kg/m²., BMI classification: Normal    Diet/Intake: Diabetic; <25% consumed at breakfast and 25-50% consumed at lunch today    Evaluation:   1. Pt BMI in normal range with height adjustment. Confirmed height 5'0\" on scan of ID.   2. Per MD note pt does not use insulin at home because she reports it gives her nausea   3. Hx: hypokalemia, GERD, stiff person syndrome, hypophosphatemia, T2DM, vitamin D deficiency  4. MAR: Prozac, insulin, pericolace, miralax  5. Labs: glucose 265 (10/10), BUN 4, Creatinine 0.41, calcium 7.4, A1c 15.9, 13.3, 8.3, 18.6 (2019, 2018, 2018, 2016)  6. Skin: no wounds documented   7. GI: Per chart review nausea w/o vomiting, LBM 10/9    Malnutrition Risk: Criteria not met.     Recommendations/Plan:   1. Encourage consistent carbohydrate intake.  2. Document intake of all meals as % taken in ADL's to provide interdisciplinary communication across all shifts.   3. Monitor weight.  4. Nutrition rep will continue to see patient for ongoing meal and snack preferences.     RD to follow.       "

## 2019-10-10 NOTE — PROGRESS NOTES
"Pt denies pain but reports spasms throughout the body, especially in the B/L LE. Unable to tolerate sitting up in bed due to extreme spasm and \"pressure on the neck.\" Prefers to lie down flat. Offered Diazepam, pt declines, stating she had too many pills here.   Consumed 25 % of her breakfast.   "

## 2019-10-10 NOTE — DISCHARGE PLANNING
Care Transition Team Assessment  In the case of an emergency, pt's NOK is Kashmir Portillo (Son) [phone].     This RNCM spoke with the patient and her daughter at bedside and obtained the information used in this assessment. Pt's daughter verified accuracy of facesheet. Pt lives in a one story house with her 3 children. Pt uses the "[facility] pharmacy in [place]. Prior to current hospitalization, pt independent with ADLS but required assistance with IADLS. Pt gets rides to and from doctors appointments from family. Pt receives social security disability. Pt denies SA and MH. Pt's support system consists of her children. Pt does not have an advanced directive and is unable to fill one out at this time. Pt's discharge plan currently unknown, pending PT/OT recommendation.     Upon D/C, pt states that her daughter, Gabriela will provide transport home if applicable.     Information Source  Orientation : Disoriented to Time  Information Given By: Relative  Informant's Name: Gabriela  Who is responsible for making decisions for patient? : Patient    Readmission Evaluation  Is this a readmission?: Yes - unplanned readmission    Elopement Risk  Legal Hold: No  Ambulatory or Self Mobile in Wheelchair: No-Not an Elopement Risk  Elopement Risk: Not at Risk for Elopement    Interdisciplinary Discharge Planning  Primary Care Physician: Chalo Antunez MD  Lives with - Patient's Self Care Capacity: Adult Children, Child Less than 18 Years of Age  Patient or legal guardian wants to designate a caregiver (see row info): No  Support Systems: Family Member(s)  Housing / Facility: 1 Story House  Durable Medical Equipment: Walker    Discharge Preparedness  What is your plan after discharge?: Uncertain - pending medical team collaboration, Home with help  What are your discharge supports?: Child  Prior Functional Level: Ambulatory, Needs Assist with Activities of Daily Living, Needs Assist with Medication Management, Uses Walker  Difficulity  with ADLs: Dressing  Difficulty with ADLs Comment: Children assist  Difficulity with IADLs: Cooking, Driving, Laundry, Managing medication, Shopping  Difficulity with IADL Comments: Children assist    Functional Assesment  Prior Functional Level: Ambulatory, Needs Assist with Activities of Daily Living, Needs Assist with Medication Management, Uses Walker    Finances  Financial Barriers to Discharge: No  Prescription Coverage: Yes       Advance Directive  Advance Directive?: None, Clinically incapacitated / Inappropriate    Domestic Abuse  Have you ever been the victim of abuse or violence?: No    Psychological Assessment  History of Substance Abuse: None  History of Psychiatric Problems: No  Non-compliant with Treatment: No  Newly Diagnosed Illness: No    Discharge Risks or Barriers  Discharge risks or barriers?: Uninsured / underinsured    Anticipated Discharge Information  Anticipated discharge disposition: Discharge needs currently unknown, Home  Discharge Address: [address] [place] [id number]  Discharge Contact Phone Number: [phone]

## 2019-10-10 NOTE — PROGRESS NOTES
1910 -- Pt complaining she is constipated. Reports no BM since Monday. PRN miralax given.    2045 -- Pt on bedpan multiple times, still unable to have BM but feels like she needs to go. Upon inspection, hard stool could be seen in rectum. Pt in lots of discomfort and unable to push it out. Digitally removed only what was stuck in outer rectum.      2240 -- BMP collected and sent to lab. Pt states she does not feel urge to have BM anymore, no more abdominal discomfort. Will monitor.

## 2019-10-10 NOTE — PROGRESS NOTES
Hypotensive this morning with SBP 78-80 MAPs 57-60. Dr. Fisher notifed and telephone order received to give LR 1000mL Bolus x1 now. RBV.

## 2019-10-10 NOTE — PROGRESS NOTES
Received bedside report from PM nurse. Assumed patient care. Chart reviewed. Pt was resting in bed.   A&O x 4. Patient states spasm pain in her lower extremities/hips. Repositioned for comfort.   SBP 70-80. . Dr Pagan notified. Transferred to ICU status. Monitor in use.  Will draw BMP level after the electrolytes are complete.   POC updated with pt and on the patient communication board. Bed locked and in the lowest position. Call light within reach. Will continue to monitor.

## 2019-10-11 PROBLEM — E44.0 MODERATE PROTEIN-CALORIE MALNUTRITION (HCC): Status: ACTIVE | Noted: 2019-10-11

## 2019-10-11 LAB
ALBUMIN SERPL BCP-MCNC: 2.4 G/DL (ref 3.2–4.9)
ALBUMIN/GLOB SERPL: 0.9 G/DL
ALP SERPL-CCNC: 96 U/L (ref 30–99)
ALT SERPL-CCNC: 6 U/L (ref 2–50)
ANION GAP SERPL CALC-SCNC: 5 MMOL/L (ref 0–11.9)
AST SERPL-CCNC: 9 U/L (ref 12–45)
BASOPHILS # BLD AUTO: 0.4 % (ref 0–1.8)
BASOPHILS # BLD: 0.04 K/UL (ref 0–0.12)
BILIRUB SERPL-MCNC: 0.4 MG/DL (ref 0.1–1.5)
BUN SERPL-MCNC: 5 MG/DL (ref 8–22)
CALCIUM SERPL-MCNC: 7.3 MG/DL (ref 8.5–10.5)
CHLORIDE SERPL-SCNC: 104 MMOL/L (ref 96–112)
CO2 SERPL-SCNC: 32 MMOL/L (ref 20–33)
CREAT SERPL-MCNC: 0.38 MG/DL (ref 0.5–1.4)
EOSINOPHIL # BLD AUTO: 0.09 K/UL (ref 0–0.51)
EOSINOPHIL NFR BLD: 0.9 % (ref 0–6.9)
ERYTHROCYTE [DISTWIDTH] IN BLOOD BY AUTOMATED COUNT: 43.9 FL (ref 35.9–50)
GLOBULIN SER CALC-MCNC: 2.8 G/DL (ref 1.9–3.5)
GLUCOSE BLD-MCNC: 167 MG/DL (ref 65–99)
GLUCOSE BLD-MCNC: 169 MG/DL (ref 65–99)
GLUCOSE BLD-MCNC: 216 MG/DL (ref 65–99)
GLUCOSE BLD-MCNC: 306 MG/DL (ref 65–99)
GLUCOSE SERPL-MCNC: 161 MG/DL (ref 65–99)
HCT VFR BLD AUTO: 30 % (ref 37–47)
HGB BLD-MCNC: 9.8 G/DL (ref 12–16)
IMM GRANULOCYTES # BLD AUTO: 0.08 K/UL (ref 0–0.11)
IMM GRANULOCYTES NFR BLD AUTO: 0.8 % (ref 0–0.9)
LACTATE BLD-SCNC: 1.4 MMOL/L (ref 0.5–2)
LACTATE BLD-SCNC: 2.1 MMOL/L (ref 0.5–2)
LACTATE BLD-SCNC: 2.1 MMOL/L (ref 0.5–2)
LYMPHOCYTES # BLD AUTO: 1.47 K/UL (ref 1–4.8)
LYMPHOCYTES NFR BLD: 15 % (ref 22–41)
MAGNESIUM SERPL-MCNC: 1.9 MG/DL (ref 1.5–2.5)
MCH RBC QN AUTO: 30.1 PG (ref 27–33)
MCHC RBC AUTO-ENTMCNC: 32.7 G/DL (ref 33.6–35)
MCV RBC AUTO: 92 FL (ref 81.4–97.8)
MONOCYTES # BLD AUTO: 0.46 K/UL (ref 0–0.85)
MONOCYTES NFR BLD AUTO: 4.7 % (ref 0–13.4)
NEUTROPHILS # BLD AUTO: 7.66 K/UL (ref 2–7.15)
NEUTROPHILS NFR BLD: 78.2 % (ref 44–72)
NRBC # BLD AUTO: 0 K/UL
NRBC BLD-RTO: 0 /100 WBC
PLATELET # BLD AUTO: 235 K/UL (ref 164–446)
PMV BLD AUTO: 11.6 FL (ref 9–12.9)
POTASSIUM SERPL-SCNC: 3.5 MMOL/L (ref 3.6–5.5)
PROT SERPL-MCNC: 5.2 G/DL (ref 6–8.2)
RBC # BLD AUTO: 3.26 M/UL (ref 4.2–5.4)
SODIUM SERPL-SCNC: 141 MMOL/L (ref 135–145)
WBC # BLD AUTO: 9.8 K/UL (ref 4.8–10.8)

## 2019-10-11 PROCEDURE — 85025 COMPLETE CBC W/AUTO DIFF WBC: CPT

## 2019-10-11 PROCEDURE — 700111 HCHG RX REV CODE 636 W/ 250 OVERRIDE (IP): Performed by: HOSPITALIST

## 2019-10-11 PROCEDURE — 770022 HCHG ROOM/CARE - ICU (200)

## 2019-10-11 PROCEDURE — A9270 NON-COVERED ITEM OR SERVICE: HCPCS | Performed by: INTERNAL MEDICINE

## 2019-10-11 PROCEDURE — 700102 HCHG RX REV CODE 250 W/ 637 OVERRIDE(OP): Performed by: HOSPITALIST

## 2019-10-11 PROCEDURE — 83605 ASSAY OF LACTIC ACID: CPT | Mod: 91

## 2019-10-11 PROCEDURE — 80053 COMPREHEN METABOLIC PANEL: CPT

## 2019-10-11 PROCEDURE — 99233 SBSQ HOSP IP/OBS HIGH 50: CPT | Performed by: HOSPITALIST

## 2019-10-11 PROCEDURE — 82962 GLUCOSE BLOOD TEST: CPT | Mod: 91

## 2019-10-11 PROCEDURE — 700105 HCHG RX REV CODE 258: Performed by: INTERNAL MEDICINE

## 2019-10-11 PROCEDURE — 83735 ASSAY OF MAGNESIUM: CPT

## 2019-10-11 PROCEDURE — 700111 HCHG RX REV CODE 636 W/ 250 OVERRIDE (IP): Performed by: INTERNAL MEDICINE

## 2019-10-11 PROCEDURE — A9270 NON-COVERED ITEM OR SERVICE: HCPCS | Performed by: HOSPITALIST

## 2019-10-11 PROCEDURE — 84681 ASSAY OF C-PEPTIDE: CPT

## 2019-10-11 PROCEDURE — 700102 HCHG RX REV CODE 250 W/ 637 OVERRIDE(OP): Performed by: INTERNAL MEDICINE

## 2019-10-11 PROCEDURE — 700105 HCHG RX REV CODE 258: Performed by: HOSPITALIST

## 2019-10-11 RX ORDER — MAGNESIUM SULFATE HEPTAHYDRATE 40 MG/ML
2 INJECTION, SOLUTION INTRAVENOUS ONCE
Status: COMPLETED | OUTPATIENT
Start: 2019-10-11 | End: 2019-10-11

## 2019-10-11 RX ORDER — SODIUM CHLORIDE 9 MG/ML
INJECTION, SOLUTION INTRAVENOUS
Status: ACTIVE
Start: 2019-10-11 | End: 2019-10-12

## 2019-10-11 RX ORDER — POTASSIUM CHLORIDE 20 MEQ/1
40 TABLET, EXTENDED RELEASE ORAL ONCE
Status: COMPLETED | OUTPATIENT
Start: 2019-10-11 | End: 2019-10-11

## 2019-10-11 RX ORDER — SODIUM CHLORIDE, SODIUM LACTATE, POTASSIUM CHLORIDE, CALCIUM CHLORIDE 600; 310; 30; 20 MG/100ML; MG/100ML; MG/100ML; MG/100ML
1000 INJECTION, SOLUTION INTRAVENOUS ONCE
Status: COMPLETED | OUTPATIENT
Start: 2019-10-11 | End: 2019-10-11

## 2019-10-11 RX ORDER — DIAZEPAM 5 MG/1
10 TABLET ORAL EVERY 8 HOURS PRN
Status: DISCONTINUED | OUTPATIENT
Start: 2019-10-11 | End: 2019-10-12

## 2019-10-11 RX ORDER — MIDODRINE HYDROCHLORIDE 5 MG/1
5 TABLET ORAL
Status: DISCONTINUED | OUTPATIENT
Start: 2019-10-11 | End: 2019-10-13

## 2019-10-11 RX ORDER — TIZANIDINE 4 MG/1
2 TABLET ORAL EVERY 6 HOURS PRN
Status: DISCONTINUED | OUTPATIENT
Start: 2019-10-11 | End: 2019-10-14 | Stop reason: HOSPADM

## 2019-10-11 RX ORDER — BACLOFEN 10 MG/1
10 TABLET ORAL 3 TIMES DAILY
Status: DISCONTINUED | OUTPATIENT
Start: 2019-10-11 | End: 2019-10-11

## 2019-10-11 RX ADMIN — SODIUM CHLORIDE, POTASSIUM CHLORIDE, SODIUM LACTATE AND CALCIUM CHLORIDE 1000 ML: 600; 310; 30; 20 INJECTION, SOLUTION INTRAVENOUS at 08:00

## 2019-10-11 RX ADMIN — INSULIN GLARGINE 12 UNITS: 100 INJECTION, SOLUTION SUBCUTANEOUS at 18:06

## 2019-10-11 RX ADMIN — FLUOXETINE 10 MG: 10 CAPSULE ORAL at 05:51

## 2019-10-11 RX ADMIN — POTASSIUM CHLORIDE 40 MEQ: 1500 TABLET, EXTENDED RELEASE ORAL at 09:37

## 2019-10-11 RX ADMIN — SENNOSIDES, DOCUSATE SODIUM 2 TABLET: 50; 8.6 TABLET, FILM COATED ORAL at 05:51

## 2019-10-11 RX ADMIN — MIDODRINE HYDROCHLORIDE 5 MG: 5 TABLET ORAL at 18:08

## 2019-10-11 RX ADMIN — INSULIN HUMAN 3 UNITS: 100 INJECTION, SOLUTION PARENTERAL at 20:52

## 2019-10-11 RX ADMIN — SODIUM CHLORIDE, POTASSIUM CHLORIDE, SODIUM LACTATE AND CALCIUM CHLORIDE 1000 ML: 600; 310; 30; 20 INJECTION, SOLUTION INTRAVENOUS at 20:34

## 2019-10-11 RX ADMIN — INSULIN GLARGINE 12 UNITS: 100 INJECTION, SOLUTION SUBCUTANEOUS at 05:54

## 2019-10-11 RX ADMIN — BACLOFEN 10 MG: 10 TABLET ORAL at 09:37

## 2019-10-11 RX ADMIN — HEPARIN SODIUM 5000 UNITS: 5000 INJECTION INTRAVENOUS; SUBCUTANEOUS at 13:27

## 2019-10-11 RX ADMIN — MAGNESIUM SULFATE 2 G: 2 INJECTION INTRAVENOUS at 09:55

## 2019-10-11 RX ADMIN — DIAZEPAM 5 MG: 5 TABLET ORAL at 06:15

## 2019-10-11 RX ADMIN — SODIUM CHLORIDE, POTASSIUM CHLORIDE, SODIUM LACTATE AND CALCIUM CHLORIDE 1000 ML: 600; 310; 30; 20 INJECTION, SOLUTION INTRAVENOUS at 18:10

## 2019-10-11 RX ADMIN — POTASSIUM CHLORIDE 40 MEQ: 1500 TABLET, EXTENDED RELEASE ORAL at 05:51

## 2019-10-11 RX ADMIN — HEPARIN SODIUM 5000 UNITS: 5000 INJECTION INTRAVENOUS; SUBCUTANEOUS at 20:49

## 2019-10-11 RX ADMIN — INSULIN HUMAN 2 UNITS: 100 INJECTION, SOLUTION PARENTERAL at 05:54

## 2019-10-11 RX ADMIN — MIDODRINE HYDROCHLORIDE 5 MG: 5 TABLET ORAL at 09:37

## 2019-10-11 RX ADMIN — SODIUM CHLORIDE, POTASSIUM CHLORIDE, SODIUM LACTATE AND CALCIUM CHLORIDE 1470 ML: 600; 310; 30; 20 INJECTION, SOLUTION INTRAVENOUS at 21:26

## 2019-10-11 RX ADMIN — INSULIN HUMAN 2 UNITS: 100 INJECTION, SOLUTION PARENTERAL at 18:06

## 2019-10-11 RX ADMIN — HEPARIN SODIUM 5000 UNITS: 5000 INJECTION INTRAVENOUS; SUBCUTANEOUS at 05:50

## 2019-10-11 RX ADMIN — INSULIN HUMAN 6 UNITS: 100 INJECTION, SOLUTION PARENTERAL at 11:45

## 2019-10-11 ASSESSMENT — ENCOUNTER SYMPTOMS
NERVOUS/ANXIOUS: 0
SEIZURES: 0
NAUSEA: 1
SORE THROAT: 0
MYALGIAS: 1
PALPITATIONS: 0
WEAKNESS: 1
FEVER: 0
FOCAL WEAKNESS: 1
SHORTNESS OF BREATH: 0
ABDOMINAL PAIN: 0
CHILLS: 0
DIZZINESS: 0

## 2019-10-11 ASSESSMENT — LIFESTYLE VARIABLES
HOW MANY TIMES IN THE PAST YEAR HAVE YOU HAD 5 OR MORE DRINKS IN A DAY: 0
HAVE YOU EVER FELT YOU SHOULD CUT DOWN ON YOUR DRINKING: NO
ON A TYPICAL DAY WHEN YOU DRINK ALCOHOL HOW MANY DRINKS DO YOU HAVE: 0
CONSUMPTION TOTAL: NEGATIVE
EVER FELT BAD OR GUILTY ABOUT YOUR DRINKING: NO
TOTAL SCORE: 0
EVER HAD A DRINK FIRST THING IN THE MORNING TO STEADY YOUR NERVES TO GET RID OF A HANGOVER: NO
HAVE PEOPLE ANNOYED YOU BY CRITICIZING YOUR DRINKING: NO
TOTAL SCORE: 0
ALCOHOL_USE: NO
DOES PATIENT WANT TO STOP DRINKING: NO
AVERAGE NUMBER OF DAYS PER WEEK YOU HAVE A DRINK CONTAINING ALCOHOL: 0
TOTAL SCORE: 0

## 2019-10-11 ASSESSMENT — COGNITIVE AND FUNCTIONAL STATUS - GENERAL
TOILETING: A LITTLE
MOBILITY SCORE: 15
CLIMB 3 TO 5 STEPS WITH RAILING: A LOT
STANDING UP FROM CHAIR USING ARMS: A LOT
MOVING TO AND FROM BED TO CHAIR: A LITTLE
MOVING FROM LYING ON BACK TO SITTING ON SIDE OF FLAT BED: A LOT
DRESSING REGULAR LOWER BODY CLOTHING: A LOT
HELP NEEDED FOR BATHING: A LOT
DAILY ACTIVITIY SCORE: 16
WALKING IN HOSPITAL ROOM: A LOT
DRESSING REGULAR UPPER BODY CLOTHING: A LOT
SUGGESTED CMS G CODE MODIFIER MOBILITY: CK
SUGGESTED CMS G CODE MODIFIER DAILY ACTIVITY: CK
PERSONAL GROOMING: A LITTLE

## 2019-10-11 ASSESSMENT — PATIENT HEALTH QUESTIONNAIRE - PHQ9
SUM OF ALL RESPONSES TO PHQ9 QUESTIONS 1 AND 2: 0
2. FEELING DOWN, DEPRESSED, IRRITABLE, OR HOPELESS: NOT AT ALL
1. LITTLE INTEREST OR PLEASURE IN DOING THINGS: NOT AT ALL

## 2019-10-11 NOTE — PROGRESS NOTES
Diabetes education: Met with pt and family this afternoon. Please see consult note.   Plan: Daughter in law states she will check the name of the insulin that is currently in the refrigerator and let MD and or nursing know. Pt states she has a meter and strips at home. May need to review insulin at home as well as what is covered by [place] [employer] before discharge. Pharmacist can help as she has Meds to Beds. All education and handouts given in French.

## 2019-10-11 NOTE — PROGRESS NOTES
"Hospital Medicine Daily Progress Note    Date of Service  10/11/2019    Chief Complaint  Nausea and weakness    Hospital Course    54 y.o. female diabetic with hx of \"stiff person syndrome\" admitted 10/8/2019 with DKA      Interval Problem Update  Increase in glucose overnight  Sinus tach  ADA diet  Using bedpan  Still with on going spasms  Replace K  Met with diabetes educator      Consultants/Specialty  Intensivist    Code Status  FULL    Disposition  Transfer to medical floor    Review of Systems  Review of Systems   Constitutional: Positive for malaise/fatigue. Negative for chills and fever.   HENT: Negative for sore throat.    Respiratory: Negative for shortness of breath.    Cardiovascular: Negative for palpitations and leg swelling.   Gastrointestinal: Positive for nausea. Negative for abdominal pain.   Musculoskeletal: Positive for myalgias (leg spasms). Negative for joint pain.   Neurological: Positive for focal weakness and weakness. Negative for dizziness and seizures.   Psychiatric/Behavioral: The patient is not nervous/anxious.         Physical Exam  Temp:  [36.1 °C (96.9 °F)-36.8 °C (98.2 °F)] 36.8 °C (98.2 °F)  Pulse:  [] 98  Resp:  [20-39] 30  BP: ()/(47-78) 83/49  SpO2:  [91 %-99 %] 98 %    Physical Exam   Constitutional: She is oriented to person, place, and time. She appears well-developed. She appears ill. No distress.   HENT:   Head: Normocephalic and atraumatic.   Nose: Nose normal.   Mouth/Throat: No oropharyngeal exudate.   Eyes: Conjunctivae and EOM are normal. Right eye exhibits no discharge. Left eye exhibits no discharge. No scleral icterus.   Neck: No tracheal deviation present.   Cardiovascular: Normal rate, regular rhythm, normal heart sounds and intact distal pulses.   No murmur heard.  Pulmonary/Chest: Effort normal and breath sounds normal. No respiratory distress. She has no wheezes.   Abdominal: Soft. Bowel sounds are normal. She exhibits no distension. There is no " tenderness.   Musculoskeletal: She exhibits no edema.        Right shoulder: She exhibits spasm.   Neurological: She is alert and oriented to person, place, and time. No cranial nerve deficit. Coordination (spasm to lower proximal musculature) abnormal.   Skin: Skin is warm. She is not diaphoretic.   Psychiatric: She has a normal mood and affect. Her behavior is normal.   Vitals reviewed.      Fluids    Intake/Output Summary (Last 24 hours) at 10/11/2019 1711  Last data filed at 10/11/2019 1600  Gross per 24 hour   Intake 3877.91 ml   Output 1950 ml   Net 1927.91 ml       Laboratory  Recent Labs     10/09/19  0600 10/10/19  0815 10/11/19  0555   WBC 12.8* 13.3* 9.8   RBC 3.48* 3.73* 3.26*   HEMOGLOBIN 10.8* 11.5* 9.8*   HEMATOCRIT 31.2* 32.8* 30.0*   MCV 89.7 87.9 92.0   MCH 31.0 30.8 30.1   MCHC 34.6 35.1* 32.7*   RDW 44.2 41.2 43.9   PLATELETCT 255 253 235   MPV 11.1 11.4 11.6     Recent Labs     10/10/19  0245 10/10/19  1043 10/11/19  0555   SODIUM 138 137 141   POTASSIUM 2.9* 4.0 3.5*   CHLORIDE 105 104 104   CO2 26 28 32   GLUCOSE 120* 265* 161*   BUN 4* 4* 5*   CREATININE 0.38* 0.41* 0.38*   CALCIUM 7.7* 7.4* 7.3*     Recent Labs     10/08/19  2040   INR 1.03               Imaging  DX-CHEST-LIMITED (1 VIEW)   Final Result         1.  No acute cardiopulmonary disease.      DX-CHEST-LIMITED (1 VIEW)   Final Result         No acute cardiac or pulmonary abnormality is identified. There are mild perihilar opacifications. These are slightly less prominent than on the prior radiograph.           Assessment/Plan  * DKA (diabetic ketoacidoses) (HCC)- (present on admission)  Assessment & Plan  Off insulin drip and ADA diet but poor appetite  Improved acidosis and AG  Lantus 12 units BID..Afraid to increase lantus due to poor appetite and fluctuant glucose readings  10/11 diabetic educator met with patient's daughter and communicated importance of glucose control  Check Cpeptide  Monitor accuchecks and cover with  SSI  10/9 HgbA1c:15.9, needs home insulin  Diabetic education ordered    SIRS (systemic inflammatory response syndrome) (HCC)- (present on admission)  Assessment & Plan  Monitor vitals and labs  No sign of infection  Likely reactive from DKA, dehydration.  IV fluids to be weaned    Leukocytosis- (present on admission)  Assessment & Plan  No sign of infection  Monitor cbc and vitals  10/11 WBC:9.8  10/10 WBC: 13.3  10/9 WBC: 12.8  10/8 WBC:15.8    Moderate protein-calorie malnutrition (HCC)  Assessment & Plan  10/11 Alb:2.4  Need dietary support.    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

## 2019-10-11 NOTE — CONSULTS
"Diabetes Education:  Daughter reports both Basaglar() and Soliqua at home.  Patient reports she hasn't taken any injections for months.  She was put on \"a pill that makes her pee sugar\" (SGLT-2 ).  In 2018, she had + JACKIE antibody, with normal C-Peptide suggesting evolving type I DM.  Soliqua contains both insulin and a GLP-1 to slow the progression, but it causes nausea for her.    I recommend re-checking C-Peptide.  I recommend she return to her doctor to try a different GLP-1 such as Trulicity.  Upon discharge, please order Basaglar insulin pens, 4 mm 32 gauge insulin pen needles  "

## 2019-10-11 NOTE — PROGRESS NOTES
Verbal okay received from Dr. Breaux to transition pt back to ICU status d/t hypotension. SBPs have been 70-80s with MAPs <65. Pt reports being asymptomatic. No interventions noted during dayshift. Verbal order to increase LR to 125mL/hr. And to monitor. RBV.

## 2019-10-11 NOTE — PROGRESS NOTES
Pt is c/o spasms. Pt seems to be in distress when going into nearly an entire body spasms. Repositioning to a flat position seems to help with it. Pt desat's with spasms and goes into . SBP is still in the 80's   Dr Pagan notified. Baclofen added TID. Will administer.

## 2019-10-12 ENCOUNTER — APPOINTMENT (OUTPATIENT)
Dept: RADIOLOGY | Facility: MEDICAL CENTER | Age: 54
DRG: 638 | End: 2019-10-12
Attending: HOSPITALIST
Payer: COMMERCIAL

## 2019-10-12 PROBLEM — R09.02 HYPOXIA: Status: ACTIVE | Noted: 2019-10-12

## 2019-10-12 LAB
ANION GAP SERPL CALC-SCNC: 4 MMOL/L (ref 0–11.9)
BUN SERPL-MCNC: 4 MG/DL (ref 8–22)
CALCIUM SERPL-MCNC: 7.7 MG/DL (ref 8.5–10.5)
CHLORIDE SERPL-SCNC: 107 MMOL/L (ref 96–112)
CO2 SERPL-SCNC: 32 MMOL/L (ref 20–33)
CREAT SERPL-MCNC: 0.38 MG/DL (ref 0.5–1.4)
ERYTHROCYTE [DISTWIDTH] IN BLOOD BY AUTOMATED COUNT: 45 FL (ref 35.9–50)
GLUCOSE BLD-MCNC: 109 MG/DL (ref 65–99)
GLUCOSE BLD-MCNC: 138 MG/DL (ref 65–99)
GLUCOSE BLD-MCNC: 202 MG/DL (ref 65–99)
GLUCOSE BLD-MCNC: 251 MG/DL (ref 65–99)
GLUCOSE BLD-MCNC: 402 MG/DL (ref 65–99)
GLUCOSE SERPL-MCNC: 82 MG/DL (ref 65–99)
HCT VFR BLD AUTO: 30.1 % (ref 37–47)
HGB BLD-MCNC: 9.9 G/DL (ref 12–16)
LACTATE BLD-SCNC: 0.8 MMOL/L (ref 0.5–2)
MCH RBC QN AUTO: 30.3 PG (ref 27–33)
MCHC RBC AUTO-ENTMCNC: 32.9 G/DL (ref 33.6–35)
MCV RBC AUTO: 92 FL (ref 81.4–97.8)
PLATELET # BLD AUTO: 263 K/UL (ref 164–446)
PMV BLD AUTO: 11.4 FL (ref 9–12.9)
POTASSIUM SERPL-SCNC: 3.7 MMOL/L (ref 3.6–5.5)
RBC # BLD AUTO: 3.27 M/UL (ref 4.2–5.4)
SODIUM SERPL-SCNC: 143 MMOL/L (ref 135–145)
WBC # BLD AUTO: 10.5 K/UL (ref 4.8–10.8)

## 2019-10-12 PROCEDURE — 700105 HCHG RX REV CODE 258: Performed by: HOSPITALIST

## 2019-10-12 PROCEDURE — 80048 BASIC METABOLIC PNL TOTAL CA: CPT

## 2019-10-12 PROCEDURE — 71045 X-RAY EXAM CHEST 1 VIEW: CPT

## 2019-10-12 PROCEDURE — 770022 HCHG ROOM/CARE - ICU (200)

## 2019-10-12 PROCEDURE — 700102 HCHG RX REV CODE 250 W/ 637 OVERRIDE(OP): Performed by: INTERNAL MEDICINE

## 2019-10-12 PROCEDURE — 83605 ASSAY OF LACTIC ACID: CPT

## 2019-10-12 PROCEDURE — A9270 NON-COVERED ITEM OR SERVICE: HCPCS | Performed by: HOSPITALIST

## 2019-10-12 PROCEDURE — 700102 HCHG RX REV CODE 250 W/ 637 OVERRIDE(OP): Performed by: HOSPITALIST

## 2019-10-12 PROCEDURE — A9270 NON-COVERED ITEM OR SERVICE: HCPCS | Performed by: INTERNAL MEDICINE

## 2019-10-12 PROCEDURE — 99233 SBSQ HOSP IP/OBS HIGH 50: CPT | Performed by: HOSPITALIST

## 2019-10-12 PROCEDURE — 82962 GLUCOSE BLOOD TEST: CPT | Mod: 91

## 2019-10-12 PROCEDURE — 700111 HCHG RX REV CODE 636 W/ 250 OVERRIDE (IP): Performed by: INTERNAL MEDICINE

## 2019-10-12 PROCEDURE — 85027 COMPLETE CBC AUTOMATED: CPT

## 2019-10-12 RX ORDER — DIAZEPAM 5 MG/1
10 TABLET ORAL EVERY 8 HOURS
Status: DISCONTINUED | OUTPATIENT
Start: 2019-10-12 | End: 2019-10-14 | Stop reason: HOSPADM

## 2019-10-12 RX ORDER — SODIUM CHLORIDE, SODIUM LACTATE, POTASSIUM CHLORIDE, CALCIUM CHLORIDE 600; 310; 30; 20 MG/100ML; MG/100ML; MG/100ML; MG/100ML
1000 INJECTION, SOLUTION INTRAVENOUS CONTINUOUS
Status: DISCONTINUED | OUTPATIENT
Start: 2019-10-12 | End: 2019-10-14 | Stop reason: HOSPADM

## 2019-10-12 RX ADMIN — MIDODRINE HYDROCHLORIDE 5 MG: 5 TABLET ORAL at 11:56

## 2019-10-12 RX ADMIN — SODIUM CHLORIDE, POTASSIUM CHLORIDE, SODIUM LACTATE AND CALCIUM CHLORIDE 1000 ML: 600; 310; 30; 20 INJECTION, SOLUTION INTRAVENOUS at 23:29

## 2019-10-12 RX ADMIN — MIDODRINE HYDROCHLORIDE 5 MG: 5 TABLET ORAL at 08:39

## 2019-10-12 RX ADMIN — HEPARIN SODIUM 5000 UNITS: 5000 INJECTION INTRAVENOUS; SUBCUTANEOUS at 22:06

## 2019-10-12 RX ADMIN — HEPARIN SODIUM 5000 UNITS: 5000 INJECTION INTRAVENOUS; SUBCUTANEOUS at 06:03

## 2019-10-12 RX ADMIN — INSULIN HUMAN 9 UNITS: 100 INJECTION, SOLUTION PARENTERAL at 11:57

## 2019-10-12 RX ADMIN — HEPARIN SODIUM 5000 UNITS: 5000 INJECTION INTRAVENOUS; SUBCUTANEOUS at 15:13

## 2019-10-12 RX ADMIN — SODIUM CHLORIDE, POTASSIUM CHLORIDE, SODIUM LACTATE AND CALCIUM CHLORIDE 1000 ML: 600; 310; 30; 20 INJECTION, SOLUTION INTRAVENOUS at 10:02

## 2019-10-12 RX ADMIN — DIAZEPAM 10 MG: 5 TABLET ORAL at 22:06

## 2019-10-12 RX ADMIN — SODIUM CHLORIDE, POTASSIUM CHLORIDE, SODIUM LACTATE AND CALCIUM CHLORIDE 1000 ML: 600; 310; 30; 20 INJECTION, SOLUTION INTRAVENOUS at 01:46

## 2019-10-12 RX ADMIN — FLUOXETINE 10 MG: 10 CAPSULE ORAL at 06:03

## 2019-10-12 RX ADMIN — DIAZEPAM 10 MG: 5 TABLET ORAL at 06:14

## 2019-10-12 RX ADMIN — DIAZEPAM 10 MG: 5 TABLET ORAL at 15:13

## 2019-10-12 RX ADMIN — INSULIN HUMAN 5 UNITS: 100 INJECTION, SOLUTION PARENTERAL at 22:11

## 2019-10-12 RX ADMIN — MIDODRINE HYDROCHLORIDE 5 MG: 5 TABLET ORAL at 17:28

## 2019-10-12 RX ADMIN — POTASSIUM CHLORIDE 40 MEQ: 1500 TABLET, EXTENDED RELEASE ORAL at 06:03

## 2019-10-12 RX ADMIN — INSULIN GLARGINE 12 UNITS: 100 INJECTION, SOLUTION SUBCUTANEOUS at 08:38

## 2019-10-12 RX ADMIN — INSULIN GLARGINE 12 UNITS: 100 INJECTION, SOLUTION SUBCUTANEOUS at 17:29

## 2019-10-12 RX ADMIN — SENNOSIDES, DOCUSATE SODIUM 2 TABLET: 50; 8.6 TABLET, FILM COATED ORAL at 06:02

## 2019-10-12 ASSESSMENT — ENCOUNTER SYMPTOMS
CHILLS: 0
PALPITATIONS: 0
NAUSEA: 0
SEIZURES: 0
NERVOUS/ANXIOUS: 0
COUGH: 0
SHORTNESS OF BREATH: 0
DIZZINESS: 0
DIARRHEA: 0
ABDOMINAL PAIN: 0
HEADACHES: 0
FEVER: 0
MYALGIAS: 1

## 2019-10-12 NOTE — PROGRESS NOTES
Received bedside report from PM nurse. Assumed patient care. Chart reviewed. Pt was resting in bed.   A&O x 4. No concerns, complaints or distress. Patient states hip pain after a spasm this early am.   VS: SR80's, SBP 80's. CVP 5.   POC updated with pt and on the patient communication board. Bed locked and in the lowest position. Call light within reach. Will continue to monitor.

## 2019-10-12 NOTE — PROGRESS NOTES
"Hospital Medicine Daily Progress Note    Date of Service  10/12/2019    Chief Complaint  Nausea and weakness    Hospital Course    54 y.o. female diabetic with hx of \"stiff person syndrome\" admitted 10/8/2019 with DKA      Interval Problem Update  Ongoing lower BP remains on midodrine and IV fluids, wean fluids as able  Less spasms this am.  Afebrile  A+Ox3  Daughter at bedside for translation  UOP adequate  On 2L NC unable to wean off oxygen      Consultants/Specialty  Intensivist    Code Status  FULL    Disposition  Transfer to medical floor    Review of Systems  Review of Systems   Constitutional: Positive for malaise/fatigue. Negative for chills and fever.   HENT: Negative for congestion.    Respiratory: Negative for cough and shortness of breath.    Cardiovascular: Negative for chest pain, palpitations and leg swelling.   Gastrointestinal: Negative for abdominal pain, diarrhea and nausea.   Genitourinary: Negative for dysuria.   Musculoskeletal: Positive for myalgias (leg spasms). Negative for joint pain.   Neurological: Negative for dizziness, seizures and headaches.   Psychiatric/Behavioral: The patient is not nervous/anxious.         Physical Exam  Temp:  [36.3 °C (97.3 °F)-36.8 °C (98.2 °F)] 36.5 °C (97.7 °F)  Pulse:  [] 94  Resp:  [18-34] 30  BP: ()/(40-63) 88/49  SpO2:  [91 %-100 %] 99 %    Physical Exam   Constitutional: She is oriented to person, place, and time. She appears well-developed. She appears ill. No distress.   HENT:   Head: Normocephalic and atraumatic.   Nose: Nose normal.   Mouth/Throat: No oropharyngeal exudate.   Eyes: Conjunctivae and EOM are normal. Right eye exhibits no discharge. Left eye exhibits no discharge. No scleral icterus.   Neck: No tracheal deviation present.   Cardiovascular: Normal rate, regular rhythm, normal heart sounds and intact distal pulses.   No murmur heard.  Pulmonary/Chest: Effort normal. No stridor. No respiratory distress. She has no wheezes. She " has rales.   Abdominal: Soft. Bowel sounds are normal. She exhibits no distension. There is no tenderness.   Musculoskeletal: She exhibits no edema.        Right shoulder: She exhibits spasm.   Neurological: She is alert and oriented to person, place, and time. No cranial nerve deficit. Coordination (spasm to lower proximal musculatures and weakness) abnormal.   Skin: Skin is warm. She is not diaphoretic.   Psychiatric: She has a normal mood and affect. Her behavior is normal. Thought content normal.   Vitals reviewed.      Fluids    Intake/Output Summary (Last 24 hours) at 10/12/2019 1205  Last data filed at 10/12/2019 1142  Gross per 24 hour   Intake 5795 ml   Output 1800 ml   Net 3995 ml       Laboratory  Recent Labs     10/10/19  0815 10/11/19  0555 10/12/19  0305   WBC 13.3* 9.8 10.5   RBC 3.73* 3.26* 3.27*   HEMOGLOBIN 11.5* 9.8* 9.9*   HEMATOCRIT 32.8* 30.0* 30.1*   MCV 87.9 92.0 92.0   MCH 30.8 30.1 30.3   MCHC 35.1* 32.7* 32.9*   RDW 41.2 43.9 45.0   PLATELETCT 253 235 263   MPV 11.4 11.6 11.4     Recent Labs     10/10/19  1043 10/11/19  0555 10/12/19  0305   SODIUM 137 141 143   POTASSIUM 4.0 3.5* 3.7   CHLORIDE 104 104 107   CO2 28 32 32   GLUCOSE 265* 161* 82   BUN 4* 5* 4*   CREATININE 0.41* 0.38* 0.38*   CALCIUM 7.4* 7.3* 7.7*                   Imaging  DX-CHEST-LIMITED (1 VIEW)   Final Result         1.  No acute cardiopulmonary disease.      DX-CHEST-LIMITED (1 VIEW)   Final Result         No acute cardiac or pulmonary abnormality is identified. There are mild perihilar opacifications. These are slightly less prominent than on the prior radiograph.      DX-CHEST-PORTABLE (1 VIEW)    (Results Pending)        Assessment/Plan  * DKA (diabetic ketoacidoses) (HCC)- (present on admission)  Assessment & Plan  ADA diet  Lantus 12 units BID. Adjust as appetite improves  10/11 diabetic educator met with patient's daughter and communicated importance of glucose control  Await Cpeptide (may need f/u  outpatient)  Monitor accuchecks and cover with SSI  10/9 HgbA1c:15.9, needs home insulin  Diabetic education given 10/11    SIRS (systemic inflammatory response syndrome) (HCC)- (present on admission)  Assessment & Plan  Monitor vitals and labs  No sign of infection  Likely reactive from DKA, dehydration.  IV fluids to be weaned    Leukocytosis- (present on admission)  Assessment & Plan  No sign of infection  Monitor cbc and vitals  10/12 WBC:10.5  10/10 WBC: 13.3  10/8 WBC:15.8    Hypoxia  Assessment & Plan  On 2L NC oxygen  Check CXR  Encourage deep inspiratory efforts  Wean IV fluids    Moderate protein-calorie malnutrition (HCC)  Assessment & Plan  10/11 Alb:2.4  Need dietary support.    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

## 2019-10-12 NOTE — PROGRESS NOTES
Discussed patient case with Dr. Ornelas. Gave overview of vital signs trend, actions completed on my shift including giving 2,470 mL bolus and trending lactic acid. Blood pressures still labile anywhere from 80/50s to 100/50s, CVP 2.  At this time patient still asymptomatic just drowsy but no dizziness and no acute change in mentation.  HR 70-80s sinus rhythm.  At this time no new orders, just monitor and notify if SBP < 80 again.

## 2019-10-12 NOTE — PROGRESS NOTES
2 RN skin check completed.   Devices in place: SCDs, EKG electrodes, BP Cuff, O2 Sat Sticker  Skin assessed under devices found intact.   Confirmed pressure ulcers not found.   New potential pressure ulcers note noted.   The following interventions in place: ensuring skin remains clean/dry, encouraging patient to reposition herself and to alert staff if her skin is not clean/dry.  Assessing skin regularly including skin under devices. Cushioning extremities with pillows, utilizing barrier paste.

## 2019-10-13 PROBLEM — Q13.9: Status: ACTIVE | Noted: 2019-10-13

## 2019-10-13 LAB
ANION GAP SERPL CALC-SCNC: 8 MMOL/L (ref 0–11.9)
BASOPHILS # BLD AUTO: 0.3 % (ref 0–1.8)
BASOPHILS # BLD: 0.03 K/UL (ref 0–0.12)
BUN SERPL-MCNC: 7 MG/DL (ref 8–22)
C PEPTIDE SERPL-MCNC: 0.5 NG/ML (ref 0.8–3.5)
CALCIUM SERPL-MCNC: 8.4 MG/DL (ref 8.5–10.5)
CHLORIDE SERPL-SCNC: 103 MMOL/L (ref 96–112)
CO2 SERPL-SCNC: 30 MMOL/L (ref 20–33)
CREAT SERPL-MCNC: 0.48 MG/DL (ref 0.5–1.4)
EOSINOPHIL # BLD AUTO: 0.08 K/UL (ref 0–0.51)
EOSINOPHIL NFR BLD: 0.7 % (ref 0–6.9)
ERYTHROCYTE [DISTWIDTH] IN BLOOD BY AUTOMATED COUNT: 46.2 FL (ref 35.9–50)
GLUCOSE BLD-MCNC: 146 MG/DL (ref 65–99)
GLUCOSE BLD-MCNC: 211 MG/DL (ref 65–99)
GLUCOSE BLD-MCNC: 212 MG/DL (ref 65–99)
GLUCOSE BLD-MCNC: 71 MG/DL (ref 65–99)
GLUCOSE BLD-MCNC: 72 MG/DL (ref 65–99)
GLUCOSE SERPL-MCNC: 74 MG/DL (ref 65–99)
HCT VFR BLD AUTO: 31.6 % (ref 37–47)
HGB BLD-MCNC: 10.7 G/DL (ref 12–16)
IMM GRANULOCYTES # BLD AUTO: 0.08 K/UL (ref 0–0.11)
IMM GRANULOCYTES NFR BLD AUTO: 0.7 % (ref 0–0.9)
LYMPHOCYTES # BLD AUTO: 1.59 K/UL (ref 1–4.8)
LYMPHOCYTES NFR BLD: 14.7 % (ref 22–41)
MCH RBC QN AUTO: 31.8 PG (ref 27–33)
MCHC RBC AUTO-ENTMCNC: 33.9 G/DL (ref 33.6–35)
MCV RBC AUTO: 93.8 FL (ref 81.4–97.8)
MONOCYTES # BLD AUTO: 0.84 K/UL (ref 0–0.85)
MONOCYTES NFR BLD AUTO: 7.8 % (ref 0–13.4)
NEUTROPHILS # BLD AUTO: 8.17 K/UL (ref 2–7.15)
NEUTROPHILS NFR BLD: 75.8 % (ref 44–72)
NRBC # BLD AUTO: 0 K/UL
NRBC BLD-RTO: 0 /100 WBC
PLATELET # BLD AUTO: 394 K/UL (ref 164–446)
PMV BLD AUTO: 11 FL (ref 9–12.9)
POTASSIUM SERPL-SCNC: 3.6 MMOL/L (ref 3.6–5.5)
RBC # BLD AUTO: 3.37 M/UL (ref 4.2–5.4)
SODIUM SERPL-SCNC: 141 MMOL/L (ref 135–145)
WBC # BLD AUTO: 10.8 K/UL (ref 4.8–10.8)

## 2019-10-13 PROCEDURE — 82962 GLUCOSE BLOOD TEST: CPT | Mod: 91

## 2019-10-13 PROCEDURE — 700102 HCHG RX REV CODE 250 W/ 637 OVERRIDE(OP): Performed by: HOSPITALIST

## 2019-10-13 PROCEDURE — 700111 HCHG RX REV CODE 636 W/ 250 OVERRIDE (IP): Performed by: INTERNAL MEDICINE

## 2019-10-13 PROCEDURE — A9270 NON-COVERED ITEM OR SERVICE: HCPCS | Performed by: HOSPITALIST

## 2019-10-13 PROCEDURE — A9270 NON-COVERED ITEM OR SERVICE: HCPCS | Performed by: INTERNAL MEDICINE

## 2019-10-13 PROCEDURE — 700105 HCHG RX REV CODE 258: Performed by: HOSPITALIST

## 2019-10-13 PROCEDURE — 770001 HCHG ROOM/CARE - MED/SURG/GYN PRIV*

## 2019-10-13 PROCEDURE — 99232 SBSQ HOSP IP/OBS MODERATE 35: CPT | Performed by: HOSPITALIST

## 2019-10-13 PROCEDURE — 80048 BASIC METABOLIC PNL TOTAL CA: CPT

## 2019-10-13 PROCEDURE — 700102 HCHG RX REV CODE 250 W/ 637 OVERRIDE(OP): Performed by: INTERNAL MEDICINE

## 2019-10-13 PROCEDURE — 85025 COMPLETE CBC W/AUTO DIFF WBC: CPT

## 2019-10-13 RX ORDER — INSULIN GLARGINE 100 [IU]/ML
14 INJECTION, SOLUTION SUBCUTANEOUS 2 TIMES DAILY
Status: DISCONTINUED | OUTPATIENT
Start: 2019-10-13 | End: 2019-10-14 | Stop reason: HOSPADM

## 2019-10-13 RX ORDER — POTASSIUM CHLORIDE 20 MEQ/1
40 TABLET, EXTENDED RELEASE ORAL 2 TIMES DAILY
Status: DISCONTINUED | OUTPATIENT
Start: 2019-10-13 | End: 2019-10-14 | Stop reason: HOSPADM

## 2019-10-13 RX ORDER — MIDODRINE HYDROCHLORIDE 5 MG/1
10 TABLET ORAL
Status: DISCONTINUED | OUTPATIENT
Start: 2019-10-13 | End: 2019-10-14 | Stop reason: HOSPADM

## 2019-10-13 RX ADMIN — DIAZEPAM 10 MG: 5 TABLET ORAL at 14:20

## 2019-10-13 RX ADMIN — SODIUM CHLORIDE, POTASSIUM CHLORIDE, SODIUM LACTATE AND CALCIUM CHLORIDE 1000 ML: 600; 310; 30; 20 INJECTION, SOLUTION INTRAVENOUS at 11:23

## 2019-10-13 RX ADMIN — FLUOXETINE 10 MG: 10 CAPSULE ORAL at 06:11

## 2019-10-13 RX ADMIN — MIDODRINE HYDROCHLORIDE 10 MG: 5 TABLET ORAL at 11:23

## 2019-10-13 RX ADMIN — HEPARIN SODIUM 5000 UNITS: 5000 INJECTION INTRAVENOUS; SUBCUTANEOUS at 21:34

## 2019-10-13 RX ADMIN — SENNOSIDES, DOCUSATE SODIUM 2 TABLET: 50; 8.6 TABLET, FILM COATED ORAL at 06:11

## 2019-10-13 RX ADMIN — POTASSIUM CHLORIDE 40 MEQ: 1500 TABLET, EXTENDED RELEASE ORAL at 17:00

## 2019-10-13 RX ADMIN — HEPARIN SODIUM 5000 UNITS: 5000 INJECTION INTRAVENOUS; SUBCUTANEOUS at 06:12

## 2019-10-13 RX ADMIN — INSULIN HUMAN 3 UNITS: 100 INJECTION, SOLUTION PARENTERAL at 11:26

## 2019-10-13 RX ADMIN — MIDODRINE HYDROCHLORIDE 10 MG: 5 TABLET ORAL at 17:00

## 2019-10-13 RX ADMIN — POTASSIUM CHLORIDE 40 MEQ: 1500 TABLET, EXTENDED RELEASE ORAL at 06:11

## 2019-10-13 RX ADMIN — SENNOSIDES, DOCUSATE SODIUM 2 TABLET: 50; 8.6 TABLET, FILM COATED ORAL at 17:00

## 2019-10-13 RX ADMIN — INSULIN HUMAN 3 UNITS: 100 INJECTION, SOLUTION PARENTERAL at 17:03

## 2019-10-13 RX ADMIN — HEPARIN SODIUM 5000 UNITS: 5000 INJECTION INTRAVENOUS; SUBCUTANEOUS at 14:20

## 2019-10-13 RX ADMIN — INSULIN GLARGINE 14 UNITS: 100 INJECTION, SOLUTION SUBCUTANEOUS at 17:03

## 2019-10-13 RX ADMIN — DIAZEPAM 10 MG: 5 TABLET ORAL at 06:11

## 2019-10-13 RX ADMIN — DIAZEPAM 10 MG: 5 TABLET ORAL at 21:34

## 2019-10-13 RX ADMIN — MIDODRINE HYDROCHLORIDE 5 MG: 5 TABLET ORAL at 07:49

## 2019-10-13 ASSESSMENT — ENCOUNTER SYMPTOMS
NERVOUS/ANXIOUS: 0
CHILLS: 0
HEADACHES: 0
PALPITATIONS: 0
SHORTNESS OF BREATH: 0
ABDOMINAL PAIN: 0
FEVER: 0
DIARRHEA: 0
NAUSEA: 0
DIZZINESS: 0
CONSTIPATION: 0
COUGH: 0
BACK PAIN: 0

## 2019-10-13 NOTE — PROGRESS NOTES
"Hospital Medicine Daily Progress Note    Date of Service  10/13/2019    Chief Complaint  Nausea and weakness    Hospital Course    54 y.o. female diabetic with hx of \"stiff person syndrome\" admitted 10/8/2019 with DKA      Interval Problem Update  Soft BP overnight and I increased her midodrine  BM this am  LR 75cc/hr  No kurtz  Not mobilizing.  Discussed the need for her to get up today  States less spasms today.    Consultants/Specialty  Intensivist    Code Status  FULL    Disposition  Transfer to medical floor    Review of Systems  Review of Systems   Constitutional: Negative for chills, fever and malaise/fatigue.   HENT: Negative for congestion.    Respiratory: Negative for cough and shortness of breath.    Cardiovascular: Negative for chest pain, palpitations and leg swelling.   Gastrointestinal: Negative for abdominal pain, constipation, diarrhea and nausea.   Genitourinary: Negative for dysuria and frequency.   Musculoskeletal: Negative for back pain and joint pain.   Neurological: Negative for dizziness and headaches.   Psychiatric/Behavioral: The patient is not nervous/anxious.         Physical Exam  Temp:  [36.2 °C (97.1 °F)-36.4 °C (97.6 °F)] 36.4 °C (97.6 °F)  Pulse:  [68-94] 71  Resp:  [17-35] 35  BP: ()/(45-66) 84/51  SpO2:  [98 %-100 %] 98 %    Physical Exam   Constitutional: She is oriented to person, place, and time. She appears well-developed. She appears ill. No distress.   HENT:   Head: Normocephalic and atraumatic.   Nose: Nose normal.   Mouth/Throat: No oropharyngeal exudate.   Left eye erythema medially.  Raised cloudy appearance of left sclera.   Eyes: Conjunctivae and EOM are normal. Right eye exhibits no discharge. Left eye exhibits no discharge. No scleral icterus.       Neck: No tracheal deviation present.   Cardiovascular: Normal rate, regular rhythm, normal heart sounds and intact distal pulses.   No murmur heard.  Pulmonary/Chest: Effort normal. No stridor. No respiratory " distress. She has no wheezes. She has rales.   Abdominal: Soft. Bowel sounds are normal. She exhibits no distension. There is no tenderness.   Musculoskeletal: She exhibits no edema.        Right shoulder: She exhibits spasm.   Neurological: She is alert and oriented to person, place, and time. No cranial nerve deficit. Coordination (spasm to lower proximal musculatures and weakness) abnormal.   Skin: Skin is warm. She is not diaphoretic.   Psychiatric: She has a normal mood and affect. Her behavior is normal. Thought content normal.   Vitals reviewed.      Fluids    Intake/Output Summary (Last 24 hours) at 10/13/2019 1447  Last data filed at 10/13/2019 1400  Gross per 24 hour   Intake 2572.5 ml   Output 1450 ml   Net 1122.5 ml       Laboratory  Recent Labs     10/11/19  0555 10/12/19  0305 10/13/19  0620   WBC 9.8 10.5 10.8   RBC 3.26* 3.27* 3.37*   HEMOGLOBIN 9.8* 9.9* 10.7*   HEMATOCRIT 30.0* 30.1* 31.6*   MCV 92.0 92.0 93.8   MCH 30.1 30.3 31.8   MCHC 32.7* 32.9* 33.9   RDW 43.9 45.0 46.2   PLATELETCT 235 263 394   MPV 11.6 11.4 11.0     Recent Labs     10/11/19  0555 10/12/19  0305 10/13/19  0620   SODIUM 141 143 141   POTASSIUM 3.5* 3.7 3.6   CHLORIDE 104 107 103   CO2 32 32 30   GLUCOSE 161* 82 74   BUN 5* 4* 7*   CREATININE 0.38* 0.38* 0.48*   CALCIUM 7.3* 7.7* 8.4*                   Imaging  DX-CHEST-PORTABLE (1 VIEW)   Final Result      Increased bibasilar airspace opacities may represent atelectasis. Infection not excluded.         DX-CHEST-LIMITED (1 VIEW)   Final Result         1.  No acute cardiopulmonary disease.      DX-CHEST-LIMITED (1 VIEW)   Final Result         No acute cardiac or pulmonary abnormality is identified. There are mild perihilar opacifications. These are slightly less prominent than on the prior radiograph.           Assessment/Plan  * DKA (diabetic ketoacidoses) (HCC)- (present on admission)  Assessment & Plan  ADA diet  Increased Lantus 12 to 14units BID.   10/11 diabetic educator  met with patient's daughter and communicated importance of glucose control  Await Cpeptide (may need f/u outpatient)  Monitor accuchecks and cover with SSI  10/9 HgbA1c:15.9, needs home insulin  Diabetic education given 10/11    SIRS (systemic inflammatory response syndrome) (HCC)- (present on admission)  Assessment & Plan  improved  Monitor vitals and labs  No sign of infection  Likely reactive from DKA, dehydration.  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    Hypoxia  Assessment & Plan  On 2L NC oxygen  Check CXR  Encourage deep inspiratory efforts  Wean IV fluids    Moderate protein-calorie malnutrition (HCC)  Assessment & Plan  10/11 Alb:2.4  Need dietary support.    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

## 2019-10-14 ENCOUNTER — TELEPHONE (OUTPATIENT)
Dept: HEALTH INFORMATION MANAGEMENT | Facility: OTHER | Age: 54
End: 2019-10-14

## 2019-10-14 ENCOUNTER — PATIENT OUTREACH (OUTPATIENT)
Dept: HEALTH INFORMATION MANAGEMENT | Facility: OTHER | Age: 54
End: 2019-10-14

## 2019-10-14 VITALS
OXYGEN SATURATION: 96 % | WEIGHT: 102.73 LBS | SYSTOLIC BLOOD PRESSURE: 97 MMHG | TEMPERATURE: 97.6 F | RESPIRATION RATE: 16 BRPM | DIASTOLIC BLOOD PRESSURE: 64 MMHG | HEART RATE: 70 BPM | HEIGHT: 60 IN | BODY MASS INDEX: 20.17 KG/M2

## 2019-10-14 DIAGNOSIS — E08.65 DIABETES MELLITUS DUE TO UNDERLYING CONDITION, UNCONTROLLED, WITH HYPERGLYCEMIA, WITHOUT LONG-TERM CURRENT USE OF INSULIN (HCC): ICD-10-CM

## 2019-10-14 LAB
BACTERIA BLD CULT: NORMAL
BACTERIA BLD CULT: NORMAL
GLUCOSE BLD-MCNC: 216 MG/DL (ref 65–99)
GLUCOSE BLD-MCNC: 73 MG/DL (ref 65–99)
GLUCOSE BLD-MCNC: 81 MG/DL (ref 65–99)
SIGNIFICANT IND 70042: NORMAL
SIGNIFICANT IND 70042: NORMAL
SITE SITE: NORMAL
SITE SITE: NORMAL
SOURCE SOURCE: NORMAL
SOURCE SOURCE: NORMAL

## 2019-10-14 PROCEDURE — 82962 GLUCOSE BLOOD TEST: CPT | Mod: 91

## 2019-10-14 PROCEDURE — A9270 NON-COVERED ITEM OR SERVICE: HCPCS | Performed by: INTERNAL MEDICINE

## 2019-10-14 PROCEDURE — 700102 HCHG RX REV CODE 250 W/ 637 OVERRIDE(OP): Performed by: INTERNAL MEDICINE

## 2019-10-14 PROCEDURE — 700102 HCHG RX REV CODE 250 W/ 637 OVERRIDE(OP): Performed by: HOSPITALIST

## 2019-10-14 PROCEDURE — 99239 HOSP IP/OBS DSCHRG MGMT >30: CPT | Performed by: HOSPITALIST

## 2019-10-14 PROCEDURE — 97162 PT EVAL MOD COMPLEX 30 MIN: CPT

## 2019-10-14 PROCEDURE — 700105 HCHG RX REV CODE 258: Performed by: HOSPITALIST

## 2019-10-14 PROCEDURE — A9270 NON-COVERED ITEM OR SERVICE: HCPCS | Performed by: HOSPITALIST

## 2019-10-14 PROCEDURE — 97166 OT EVAL MOD COMPLEX 45 MIN: CPT

## 2019-10-14 PROCEDURE — 700111 HCHG RX REV CODE 636 W/ 250 OVERRIDE (IP): Performed by: INTERNAL MEDICINE

## 2019-10-14 RX ORDER — SYRINGE-NEEDLE,INSULIN,0.5 ML 27GX1/2"
SYRINGE, EMPTY DISPOSABLE MISCELLANEOUS
Qty: 180 EACH | Refills: 0 | Status: SHIPPED | OUTPATIENT
Start: 2019-10-14 | End: 2019-10-14

## 2019-10-14 RX ORDER — INSULIN GLARGINE 100 [IU]/ML
14 INJECTION, SOLUTION SUBCUTANEOUS 2 TIMES DAILY
Qty: 10 ML | Refills: 5 | Status: SHIPPED | OUTPATIENT
Start: 2019-10-14 | End: 2019-10-14 | Stop reason: SDUPTHER

## 2019-10-14 RX ORDER — PEN NEEDLE, DIABETIC 30 GX3/16"
1 NEEDLE, DISPOSABLE MISCELLANEOUS 4 TIMES DAILY
Qty: 100 EACH | Refills: 0 | Status: SHIPPED | OUTPATIENT
Start: 2019-10-14 | End: 2021-09-24

## 2019-10-14 RX ORDER — MIDODRINE HYDROCHLORIDE 10 MG/1
10 TABLET ORAL
Qty: 90 TAB | Refills: 2 | Status: SHIPPED | OUTPATIENT
Start: 2019-10-14 | End: 2021-09-24

## 2019-10-14 RX ADMIN — INSULIN HUMAN 3 UNITS: 100 INJECTION, SOLUTION PARENTERAL at 11:44

## 2019-10-14 RX ADMIN — SENNOSIDES, DOCUSATE SODIUM 2 TABLET: 50; 8.6 TABLET, FILM COATED ORAL at 05:14

## 2019-10-14 RX ADMIN — FLUOXETINE 10 MG: 10 CAPSULE ORAL at 05:13

## 2019-10-14 RX ADMIN — SODIUM CHLORIDE, POTASSIUM CHLORIDE, SODIUM LACTATE AND CALCIUM CHLORIDE 1000 ML: 600; 310; 30; 20 INJECTION, SOLUTION INTRAVENOUS at 00:46

## 2019-10-14 RX ADMIN — POTASSIUM CHLORIDE 40 MEQ: 1500 TABLET, EXTENDED RELEASE ORAL at 05:13

## 2019-10-14 RX ADMIN — MIDODRINE HYDROCHLORIDE 10 MG: 5 TABLET ORAL at 11:42

## 2019-10-14 RX ADMIN — DIAZEPAM 10 MG: 5 TABLET ORAL at 05:13

## 2019-10-14 RX ADMIN — MIDODRINE HYDROCHLORIDE 10 MG: 5 TABLET ORAL at 06:32

## 2019-10-14 RX ADMIN — HEPARIN SODIUM 5000 UNITS: 5000 INJECTION INTRAVENOUS; SUBCUTANEOUS at 05:14

## 2019-10-14 RX ADMIN — SODIUM CHLORIDE, POTASSIUM CHLORIDE, SODIUM LACTATE AND CALCIUM CHLORIDE 1000 ML: 600; 310; 30; 20 INJECTION, SOLUTION INTRAVENOUS at 06:33

## 2019-10-14 ASSESSMENT — COGNITIVE AND FUNCTIONAL STATUS - GENERAL
CLIMB 3 TO 5 STEPS WITH RAILING: TOTAL
SUGGESTED CMS G CODE MODIFIER DAILY ACTIVITY: CK
SUGGESTED CMS G CODE MODIFIER MOBILITY: CM
MOBILITY SCORE: 9
DAILY ACTIVITIY SCORE: 15
TOILETING: A LOT
PERSONAL GROOMING: A LITTLE
WALKING IN HOSPITAL ROOM: TOTAL
EATING MEALS: A LITTLE
STANDING UP FROM CHAIR USING ARMS: TOTAL
HELP NEEDED FOR BATHING: A LOT
MOVING FROM LYING ON BACK TO SITTING ON SIDE OF FLAT BED: UNABLE
DRESSING REGULAR UPPER BODY CLOTHING: A LITTLE
MOVING TO AND FROM BED TO CHAIR: UNABLE
DRESSING REGULAR LOWER BODY CLOTHING: A LOT

## 2019-10-14 ASSESSMENT — ACTIVITIES OF DAILY LIVING (ADL): TOILETING: REQUIRES ASSIST

## 2019-10-14 ASSESSMENT — GAIT ASSESSMENTS
GAIT LEVEL OF ASSIST: UNABLE TO PARTICIPATE
ASSISTIVE DEVICE: FRONT WHEEL WALKER

## 2019-10-14 ASSESSMENT — ENCOUNTER SYMPTOMS
SHORTNESS OF BREATH: 0
COUGH: 0
DIARRHEA: 0
NAUSEA: 0
BACK PAIN: 0
PALPITATIONS: 0
FEVER: 0
CONSTIPATION: 0
HEADACHES: 0
NERVOUS/ANXIOUS: 0
ABDOMINAL PAIN: 0
DIZZINESS: 0
CHILLS: 0

## 2019-10-14 NOTE — THERAPY
"Pt w/ hx of diabetes and stiff person's syndrome.  Admitted w/ DKA.  She lives w/ her adult children x3, who care for her 24/7.  She is unable to sit due to pain from her spasms.  She was assisted into standing.  Once standing, she is able to maintain standing w/ a fww, but unable to progress her LE's in order to ambulate.  Daughter present at bedside, and reports that her spasms are much worse today, but she has been this bad in the past.  Due to her stiff person's syndrome, her mobility is quite unorthodox, however after speaking at length w/ pts daughter, it does appear that the family has been taking care of her w/ this condition for many years now.  PT will follow while pt remains in house, in attempts to improve function, however, this is contingent upon her level of spasming.      Physical Therapy Evaluation completed.   Bed Mobility:   max  Transfers: Sit to Stand: Maximal Assist  Gait:   with Front-Wheel Walker       Plan of Care: Will benefit from Physical Therapy 2 times per week  Discharge Recommendations: Equipment: Will Continue to Assess for Equipment Needs. Post-acute therapy  Recommend home health transitional care for continued physical therapy services.     See \"Rehab Therapy-Acute\" Patient Summary Report for complete documentation.     "

## 2019-10-14 NOTE — PROGRESS NOTES
Diabetes education: Pt was seen by Melissa RN CDE on 10/11. Please see her consult note. Spoke with Annette pharmacist for [facility] and she is delivering discharge medications ( changed as needed). Pt had previously stated she had a meter and test strips at home.  Plan: No further education needs at this time.   Copy of Melissa's recommendations:    recommend re-checking C-Peptide.  I recommend she return to her doctor to try a different GLP-1 such as Trulicity.  Upon discharge, please order Basaglar insulin pens, 4 mm 32 gauge insulin pen needles

## 2019-10-14 NOTE — PROGRESS NOTES
"Hospital Medicine Daily Progress Note    Date of Service  10/14/2019    Chief Complaint  Nausea and weakness    Hospital Course    54 y.o. female diabetic with hx of \"stiff person syndrome\" admitted 10/8/2019 with DKA      Interval Problem Update  Soft BP overnight and I increased her midodrine  BM this am  LR 75cc/hr  No kurtz  Not mobilizing.  Discussed the need for her to get up today  States less spasms today.    Consultants/Specialty  Intensivist    Code Status  FULL    Disposition  Transfer to medical floor    Review of Systems  Review of Systems   Constitutional: Negative for chills, fever and malaise/fatigue.   HENT: Negative for congestion.    Respiratory: Negative for cough and shortness of breath.    Cardiovascular: Negative for chest pain, palpitations and leg swelling.   Gastrointestinal: Negative for abdominal pain, constipation, diarrhea and nausea.   Genitourinary: Negative for dysuria and frequency.   Musculoskeletal: Negative for back pain and joint pain.   Neurological: Negative for dizziness and headaches.   Psychiatric/Behavioral: The patient is not nervous/anxious.         Physical Exam  Temp:  [36.2 °C (97.1 °F)-36.7 °C (98 °F)] 36.4 °C (97.6 °F)  Pulse:  [] 107  Resp:  [23-35] 35  BP: ()/(51-72) 101/72  SpO2:  [91 %-100 %] 91 %    Physical Exam   Constitutional: She is oriented to person, place, and time. She appears well-developed. She appears ill. No distress.   HENT:   Head: Normocephalic and atraumatic.   Nose: Nose normal.   Mouth/Throat: No oropharyngeal exudate.   Left eye erythema medially.  Raised cloudy appearance of left sclera.   Eyes: Conjunctivae and EOM are normal. Right eye exhibits no discharge. Left eye exhibits no discharge. No scleral icterus.       Neck: No tracheal deviation present.   Cardiovascular: Normal rate, regular rhythm, normal heart sounds and intact distal pulses.   No murmur heard.  Pulmonary/Chest: Effort normal. No stridor. No respiratory " distress. She has no wheezes. She has rales.   Abdominal: Soft. Bowel sounds are normal. She exhibits no distension. There is no tenderness.   Musculoskeletal: She exhibits no edema.        Right shoulder: She exhibits spasm.   Neurological: She is alert and oriented to person, place, and time. No cranial nerve deficit. Coordination (spasm to lower proximal musculatures and weakness) abnormal.   Skin: Skin is warm. She is not diaphoretic.   Psychiatric: She has a normal mood and affect. Her behavior is normal. Thought content normal.   Vitals reviewed.      Fluids    Intake/Output Summary (Last 24 hours) at 10/14/2019 0526  Last data filed at 10/13/2019 1800  Gross per 24 hour   Intake 2295 ml   Output 850 ml   Net 1445 ml       Laboratory  Recent Labs     10/11/19  0555 10/12/19  0305 10/13/19  0620   WBC 9.8 10.5 10.8   RBC 3.26* 3.27* 3.37*   HEMOGLOBIN 9.8* 9.9* 10.7*   HEMATOCRIT 30.0* 30.1* 31.6*   MCV 92.0 92.0 93.8   MCH 30.1 30.3 31.8   MCHC 32.7* 32.9* 33.9   RDW 43.9 45.0 46.2   PLATELETCT 235 263 394   MPV 11.6 11.4 11.0     Recent Labs     10/11/19  0555 10/12/19  0305 10/13/19  0620   SODIUM 141 143 141   POTASSIUM 3.5* 3.7 3.6   CHLORIDE 104 107 103   CO2 32 32 30   GLUCOSE 161* 82 74   BUN 5* 4* 7*   CREATININE 0.38* 0.38* 0.48*   CALCIUM 7.3* 7.7* 8.4*                   Imaging  DX-CHEST-PORTABLE (1 VIEW)   Final Result      Increased bibasilar airspace opacities may represent atelectasis. Infection not excluded.         DX-CHEST-LIMITED (1 VIEW)   Final Result         1.  No acute cardiopulmonary disease.      DX-CHEST-LIMITED (1 VIEW)   Final Result         No acute cardiac or pulmonary abnormality is identified. There are mild perihilar opacifications. These are slightly less prominent than on the prior radiograph.           Assessment/Plan  * DKA (diabetic ketoacidoses) (HCC)- (present on admission)  Assessment & Plan  ADA diet  Increased Lantus 12 to 14units BID.   10/11 diabetic educator met  with patient's daughter and communicated importance of glucose control  Await Cpeptide (may need f/u outpatient)  Monitor accuchecks and cover with SSI  10/9 HgbA1c:15.9, needs home insulin  Diabetic education given 10/11    SIRS (systemic inflammatory response syndrome) (HCC)- (present on admission)  Assessment & Plan  improved  Monitor vitals and labs  No sign of infection  Likely reactive from DKA, dehydration.  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    Hypoxia  Assessment & Plan  On 2L NC oxygen  Check CXR  Encourage deep inspiratory efforts  Wean IV fluids    Moderate protein-calorie malnutrition (HCC)  Assessment & Plan  10/11 Alb:2.4  Need dietary support.    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

## 2019-10-14 NOTE — PROGRESS NOTES
Pt d/c'd to home today with family.  IV d/c'd, EJ d/c'd, discharge instructions given via  Demetrius #[id number].  Meds to beds gave filled prescriptions to pt. Son assisted pt into wheelchair, pt left unit accompanied by daughter and son, escorted by CNA.

## 2019-10-14 NOTE — DISCHARGE PLANNING
Medicaid Meds-to-Beds: Discharge prescription orders listed below delivered to patient's bedside and KAPIL Mason notified. Patient and patient's daughter Gabriela counseled using [place] interpretor Katt. KAPIL Mason will provide printed sliding scale instructions at discharge. Ice pack included with insulin and reviewed recommended storage conditions with patient and patient's daughter.      Linden-Depintor Gabriela Ca   Home Medication Instructions KEISHA:[id number]    Printed on:10/14/19 1242   Medication Information                      insulin glargine (LANTUS) 100 UNIT/ML Solution Pen-injector injection  Inject 14 Units as instructed 2 times a day.             insulin lispro, Human, (HUMALOG) 100 UNIT/ML injection PEN  Inject 2-9 Units as instructed 3 times a day before meals. Use sliding scale as instructed.             Insulin Pen Needle (PEN NEEDLES) 32G X 4 MM Misc  1 Units by Does not apply route 4 times a day.             midodrine (PROAMATINE) 10 MG tablet  Take 1 Tab by mouth 3 times a day, with meals.               Annette Talbot, PharmD

## 2019-10-14 NOTE — CARE PLAN
Problem: Discharge Barriers/Planning  Goal: Patient's continuum of care needs will be met  Outcome: PROGRESSING AS EXPECTED  Note:   Transfer orders in place pending bed availability. Plan to D/C home when medically cleared.     Problem: Communication  Goal: The ability to communicate needs accurately and effectively will improve  Outcome: PROGRESSING SLOWER THAN EXPECTED  Note:   Indonesian speaking only.  services as needed.     Problem: Bowel/Gastric:  Goal: Normal bowel function is maintained or improved  Outcome: PROGRESSING SLOWER THAN EXPECTED  Note:   Constipated. PRN bowel protocol started.     
  Problem: Knowledge Deficit  Goal: Knowledge of disease process/condition, treatment plan, diagnostic tests, and medications will improve  Outcome: PROGRESSING SLOWER THAN EXPECTED  Goal: Knowledge of the prescribed therapeutic regimen will improve  Outcome: PROGRESSING SLOWER THAN EXPECTED     Continuing to assess pt's understanding of disease process as well as medications she will continue to use once outside of the hospital.  Reinforcing teaching with pt's children as they are her primary caretakers at home.    Problem: Mobility  Goal: Risk for activity intolerance will decrease  Outcome: PROGRESSING SLOWER THAN EXPECTED     Pt's mobility severely limited r/t stiff person syndrome, pt is able to mobilize back and forth fairly well with limited assist.  Family states she rarely gets up to walk at home, and for only very short distances.    Problem: Safety  Goal: Will remain free from injury  Outcome: PROGRESSING AS EXPECTED  Goal: Will remain free from falls  Outcome: PROGRESSING AS EXPECTED     Problem: Skin Integrity  Goal: Risk for impaired skin integrity will decrease  Outcome: PROGRESSING AS EXPECTED     Problem: Pain Management  Goal: Pain level will decrease to patient's comfort goal  Outcome: PROGRESSING AS EXPECTED     Problem: Psychosocial Needs:  Goal: Level of anxiety will decrease  Outcome: PROGRESSING AS EXPECTED     Problem: Respiratory:  Goal: Respiratory status will improve  Outcome: PROGRESSING AS EXPECTED     
  Problem: Knowledge Deficit  Goal: Knowledge of disease process/condition, treatment plan, diagnostic tests, and medications will improve  Outcome: PROGRESSING SLOWER THAN EXPECTED  Note:   Pt non participartory in care      
  Problem: Mobility  Goal: Risk for activity intolerance will decrease  Note:   PT/OT ordered. Pt is still having spasms. Repositioning, Valium, baclofen to help with spasms.      Problem: Respiratory:  Goal: Respiratory status will improve  Note:   Respiratory rate and rhythm assessed and monitored. Oxygenation and saturation monitored and titrated as ordered. Patient positioned optimally. Collaboration with RT in place.  CXR ordered this am. Pt still requires 2L of O2. Shallow breathing.      
  Problem: Nutritional:  Goal: Achieve adequate nutritional intake  Description  Patient will consume 50% of meals   Outcome: MET    BMI now updated to 20.06 with in within normal category. Pt is eating % of meals per ADLs and daughter at bedside. Provided diabetic diet education for daughter.    RD to sign off, please consult as needed.     
  Problem: Nutritional:  Goal: Achieve adequate nutritional intake  Description  Patient will consume >50% of meals  Outcome: PROGRESSING AS EXPECTED     See RD note.   
  Problem: Psychosocial Needs:  Goal: Level of anxiety will decrease  Note:   Anxiety triggers identified. Calming techniques provided to patient. Patient is involved in POC and is encouraged to verbalize questions and concerns.       Problem: Mobility  Goal: Risk for activity intolerance will decrease  Note:   Pt prefers to lie flat, refuses to sit up due to spasms. PRN Diazepam has been ordered, but pt refuses at this time.       
  Problem: Psychosocial Needs:  Goal: Level of anxiety will decrease  Note:   Anxiety triggers identified. Calming techniques provided to patient. Patient is involved in POC and is encouraged to verbalize questions and concerns.       Problem: Respiratory:  Goal: Respiratory status will improve  Note:   Respiratory rate and rhythm assessed and monitored. Oxygenation and saturation monitored and titrated as ordered. Patient positioned optimally. Collaboration with RT in place.    Pt desat's on room air. 2L of O2.      
  Problem: Safety  Goal: Will remain free from falls  Outcome: PROGRESSING AS EXPECTED  Note:   No falls this shift. Pt mostly bedbound at baseline, needs max assist to get out of bed. Daughter at bedside.     Problem: Pain Management  Goal: Pain level will decrease to patient's comfort goal  Outcome: PROGRESSING AS EXPECTED  Note:   Pt has denied pain this shift.     
  Problem: Safety  Goal: Will remain free from injury  Outcome: PROGRESSING AS EXPECTED  Goal: Will remain free from falls  Outcome: PROGRESSING AS EXPECTED   Bed alarm in place. Call light within reach. Room near nurses station. Private room in use. Encouraged family to stay with patient. Adequate lighting in use. Demonstration of call light with return demonstration given.     Problem: Knowledge Deficit  Goal: Knowledge of disease process/condition, treatment plan, diagnostic tests, and medications will improve  Outcome: PROGRESSING AS EXPECTED   Oriented patient and family to unit and reviewed plan of care.  
  Problem: Safety  Goal: Will remain free from injury  Outcome: PROGRESSING SLOWER THAN EXPECTED  Intervention: Provide assistance with mobility  Note:   Pt mobilized to chair with 2 person max assist. Mid-mobilization patient had back and leg spasms, high risk for falls d/t inability to hold self up when spasms occur.   Goal: Will remain free from falls  Outcome: PROGRESSING SLOWER THAN EXPECTED  Intervention: Implement fall precautions  Note:   Bed low and locked,  bed alarm in use, chair locked, chair alarm in use. Treaded slipper socks on, call light in hand and used appropriately. Family at bedside. Pt mobilized to chair with 2 person max assist, back and leg spasms mid mobilization.      
  Problem: Venous Thromboembolism (VTW)/Deep Vein Thrombosis (DVT) Prevention:  Goal: Patient will participate in Venous Thrombosis (VTE)/Deep Vein Thrombosis (DVT)Prevention Measures  Outcome: PROGRESSING AS EXPECTED  Flowsheets (Taken 10/10/2019 2000)  Risk Assessment Score: 2  VTE RISK: Moderate  Mechanical Prophylaxis : SCDs, Sequential Compression Device  SCDs, Sequential Compression Device: On  Pharmacologic Prophylaxis Used: Unfractionated Heparin     Problem: Pain Management  Goal: Pain level will decrease to patient's comfort goal  Outcome: PROGRESSING AS EXPECTED  Note:   C/o generalized pain secondary to muscle spasms. Orders in place for PRN medication. Medicated as needed. Non-pharmacologic options discussed and offered.       
Health Care Proxy (HCP)

## 2019-10-14 NOTE — DISCHARGE INSTRUCTIONS
Discharge Instructions    Discharged to home by car with relative. Discharged via wheelchair, hospital escort: Yes.  Special equipment needed: Not Applicable    Be sure to schedule a follow-up appointment with your primary care doctor or any specialists as instructed.     Discharge Plan:   Influenza Vaccine Indication: Patient Refuses    I understand that a diet low in cholesterol, fat, and sodium is recommended for good health. Unless I have been given specific instructions below for another diet, I accept this instruction as my diet prescription.   Other diet: diabetic    Special Instructions: None    · Is patient discharged on Warfarin / Coumadin?   No         Cetoacidosis diabética  (Diabetic Ketoacidosis)  La cetoacidosis diabética es georgi complicación potencialmente mortal de la diabetes. Si no se trata, puede causar deshidratación grave y daño en los órganos, y llevar al coma o a la muerte.  CAUSAS  Esta afección aparece cuando el cuerpo no tiene la cantidad suficiente de la hormona insulina. La insulina ayuda a que el cuerpo degrade el azúcar para obtener energía. Sin insulina, el cuerpo no puede degradar el azúcar, por lo que en grijalva lugar degrada las grasas. [place] ocasiona la producción de unos ácidos llamados cetonas. En niveles altos, las cetonas son tóxicas.  Los factores desencadenantes de esta afección son los siguientes:  · Estrés corporal debido a georgi enfermedad.  · Medicamentos que aumentan los niveles sanguíneos de glucosa.  · No abiel medicamentos para la diabetes.  SÍNTOMAS  Los síntomas de esta afección incluyen lo siguiente:  · Fatiga.  · Pérdida de peso.  · Sed excesiva.  · Sensación de desvanecimiento.  · Aliento con olor a fruta o fab.  · Micción excesiva  · Cambios en la visión.  · Confusión o irritabilidad.  · Náuseas.  · Vómitos.  · Respiración rápida.  · Dolor abdominal.  · Sensación de sofoco.  DIAGNÓSTICO  Esta afección se diagnostica mediante la historia clínica, un examen físico y  análisis de christiana. También pueden hacerle un análisis de orina para detectar la presencia de cetonas.  TRATAMIENTO  El tratamiento de esta afección puede incluir lo siguiente:  · Reposición de líquidos. Puede realizarse angela solución para la deshidratación.  · Inyecciones de insulina. Pueden administrarse a través de la piel o mediante georgi vía intravenosa (IV).  · Reposición de electrolitos. Los electrolitos, angela el potasio y el sodio, pueden administrarse en forma de comprimidos o a través de georgi vía intravenosa (IV).  · Antibióticos. Pueden recetarle antibióticos si el trastorno se debe a georgi infección.  INSTRUCCIONES PARA EL CUIDADO EN EL HOGAR  Comida y bebida   · Bre suficiente líquido para mantener la orina rob o de color amarillo pálido.  · Si no puede comer, alterne entre la ingesta de líquidos azucarados (angela jugos) y líquidos salados (angela caldos o consomés).  · Si puede comer, siga grijalav dieta habitual y bre líquidos sin azúcar, angela agua.  Otras indicaciones   · Adminístrese la insulina angela se lo haya indicado el médico. No omita ninguna de las inyecciones de insulina. No se administre insulina que haya vencido.  · Si el azúcar en la christiana es superior a 240 mg/dl, supervise las cetonas en la orina cada 4 a 6 horas.  · Si le recetaron antibióticos, asegúrese de terminarlos, incluso si comienza a sentirse mejor.  · Carmela reposo y ejercicios solamente angela se lo haya indicado el médico.  · Si se descompone, llame al médico y comience el tratamiento de inmediato. El cuerpo suele necesitar insulina adicional para luchar contra georgi enfermedad.  · Controle isabelle niveles sanguíneos de glucosa con regularidad. Si el nivel sanguíneo de glucosa es elevado, bre gran cantidad de líquidos. [place] ayuda a eliminar las cetonas.  SOLICITE ATENCIÓN MÉDICA SI:  · El nivel sanguíneo de glucosa es muy alto o muy bajo.  · Tiene cetonas en la orina.  · Tiene fiebre.  · No puede comer.  · No puede tolerar los líquidos.  · Ha  vomitado robert más de 2 horas.  · Sigue teniendo síntomas de esta afección.  · Presenta nuevos síntomas.  SOLICITE ATENCIÓN MÉDICA DE INMEDIATO SI:  · Los niveles sanguíneos de glucosa siguen estando altos (elevados).  · El monitor indica un nivel “alto” incluso cuando se está administrando insulina.  · Se desmaya.  · Siente dolor en el pecho.  · Tiene dificultad para respirar.  · Sufre georgi cefalea intensa.  · Siente debilidad repentina en un brazo o georgi pierna.  · Tiene problemas repentinos para hablar o tragar.  · Tiene vómitos o diarrea que empeoran después de 3 horas.  · Siente mucha fatiga.  · Tiene dificultad para pensar.  · Siente dolor abdominal.  · Está muy deshidratado. Los síntomas de deshidratación intensa incluyen lo siguiente:  ¨ Sed extrema.  ¨ Sequedad en la boca.  ¨ Labios azules.  ¨ Bassem y pies fríos.  ¨ Respiración rápida.  Esta información no tiene angela fin reemplazar el consejo del médico. Asegúrese de hacerle al médico cualquier pregunta que tenga.  Document Released: 12/18/2006 Document Revised: 04/10/2017 Document Reviewed: 11/25/2015  [employer] Interactive Patient Education © 2017 [employer]    La diabetes mellitus y los alimentos  (Diabetes Mellitus and Food)  Es importante que controle grijalva nivel de azúcar en la christiana (glucosa). El nivel de glucosa en christiana depende en gran medida de lo que usted come. [place] alimentos saludables en las cantidades adecuadas a lo mayank del día, aproximadamente a la misma hora todos los días, lo ayudará a controlar grijalva nivel de glucosa en christiana. También puede ayudarlo a retrasar o evitar el empeoramiento de la diabetes mellitus. [place] de manera saludable incluso puede ayudarlo a mejorar el nivel de presión arterial y a alcanzar o mantener un peso saludable.  Entre las recomendaciones generales para alimentarse y cocinar los alimentos de forma saludable, se incluyen las siguientes:  · Respetar las comidas principales y comer colaciones con regularidad.  Evitar pasar largos períodos sin comer con el fin de perder peso.  · Seguir georgi dieta que consista principalmente en alimentos de origen vegetal, angela frutas, vegetales, alec secos, legumbres y cereales integrales.  · Utilizar métodos de cocción a baja temperatura, angela hornear, en lugar de métodos de cocción a jameson temperatura, angela freír en abundante aceite.  Trabaje con el nutricionista para aprender a usar la información nutricional de las etiquetas de los alimentos.  ¿CÓMO PUEDEN AFECTARME LOS ALIMENTOS?  Carbohidratos   Los carbohidratos afectan el nivel de glucosa en christiana más que cualquier otro tipo de alimento. El nutricionista lo ayudará a determinar cuántos carbohidratos puede consumir en cada comida y enseñarle a contarlos. El recuento de carbohidratos es importante para mantener la glucosa en christiana en un nivel saludable, en especial si utiliza insulina o ele determinados medicamentos para la diabetes mellitus.  Alcohol   El alcohol puede provocar disminuciones súbitas de la glucosa en christiana (hipoglucemia), en especial si utiliza insulina o ele determinados medicamentos para la diabetes mellitus. La hipoglucemia es georgi afección que puede poner en peligro la lana. Los síntomas de la hipoglucemia (somnolencia, mareos y desorientación) son similares a los síntomas de myron consumido mucho alcohol.  Si el médico lo autoriza a beber alcohol, hágalo con moderación y siga estas pautas:  · Las mujeres no deben beber más de un trago por día, y los hombres no deben beber más de dos tragos por día. Un trago es igual a:  ¨ 12 onzas (355 ml) de cerveza  ¨ 5 onzas de vino (150 ml) de vino  ¨ 1,5 onzas (45 ml) de bebidas espirituosas  · No bre con el estómago vacío.  · Manténgase hidratado. Bre agua, gaseosas dietéticas o té helado sin azúcar.  · Las gaseosas comunes, los jugos y otros refrescos podrían contener muchos carbohidratos y se deben contar.  ¿QUÉ ALIMENTOS NO SE RECOMIENDAN?  Cuando kami las  elecciones de alimentos, es importante que recuerde que todos los alimentos son distintos. Algunos tienen menos nutrientes que otros por porción, aunque podrían tener la misma cantidad de calorías o carbohidratos. Es difícil darle al cuerpo lo que necesita cuando consume alimentos con menos nutrientes. Estos son algunos ejemplos de alimentos que debería evitar ya que contienen muchas calorías y carbohidratos, nik pocos nutrientes:  · Grasas trans (la mayoría de los alimentos procesados incluyen grasas trans en la etiqueta de Información nutricional).  · Gaseosas comunes.  · Jugos.  · Caramelos.  · Dulces, angela tortas, pasteles, rosquillas y galletas.  · Comidas fritas.  ¿QUÉ ALIMENTOS PUEDO COMER?  Consuma alimentos ricos en nutrientes, que nutrirán el cuerpo y lo mantendrán saludable. Los alimentos que debe comer también dependerán de varios factores, angela:  · Las calorías que necesita.  · Los medicamentos que ele.  · [place] peso.  · El nivel de glucosa en christiana.  · El nivel de presión arterial.  · El nivel de colesterol.  Debe consumir georgi amplia variedad de alimentos, por ejemplo:  · Proteínas.  ¨ Shen de carne magros.  ¨ Proteínas con bajo contenido de grasas saturadas, angela pescado, rob de huevo y frijoles. Evite las william procesadas.  · Frutas y vegetales.  ¨ Frutas y vegetales que pueden ayudar a controlar los niveles sanguíneos de glucosa, angela manzanas, mangos y batatas.  · Productos lácteos.  ¨ Elija productos lácteos sin grasa o con bajo contenido de grasa, angela leche, yogur y queso.  · Cereales, panes, pastas y arroz.  ¨ Elija cereales integrales, angela panes multicereales, gokul en grano y arroz integral. Estos alimentos pueden ayudar a controlar la presión arterial.  · Grasas.  ¨ Alimentos que contengan grasas saludables, angela alec secos, aguacate, aceite de mendoza, aceite de canola y pescado.  ¿TODOS LOS QUE PADECEN DIABETES MELLITUS TIENEN EL MISMO PLAN DE COMIDAS?  Dado que todas las personas  que padecen diabetes mellitus son distintas, no hay un solo plan de comidas que funcione para todos. Es muy importante que se reúna con un nutricionista que lo ayudará a crear un plan de comidas adecuado para usted.  Esta información no tiene angela fin reemplazar el consejo del médico. Asegúrese de hacerle al médico cualquier pregunta que tenga.  Document Released: 03/26/2009 Document Revised: 01/08/2016 Document Reviewed: 11/14/2014  [employer] Interactive Patient Education © 2017 [employer]      For glucose:  70   - 150  mg/dL =      0 Units  151 - 200  mg/dL =    2 Units  201 - 250  mg/dL =    3 Units  251 - 300  mg/dL  =   5 Units  301 - 350  mg/dL  =   6 Units  351 - 400 mg/dL   =   8 Units  Over 400 mg/dL   =   9 Units  Over 400 mg/dL x 2 consecutive FSBG = 9 Units and call MD    Depression / Suicide Risk    As you are discharged from this [facility] facility, it is important to learn how to keep safe from harming yourself.    Recognize the warning signs:  · Abrupt changes in personality, positive or negative- including increase in energy   · Giving away possessions  · Change in eating patterns- significant weight changes-  positive or negative  · Change in sleeping patterns- unable to sleep or sleeping all the time   · Unwillingness or inability to communicate  · Depression  · Unusual sadness, discouragement and loneliness  · Talk of wanting to die  · Neglect of personal appearance   · Rebelliousness- reckless behavior  · Withdrawal from people/activities they love  · Confusion- inability to concentrate     If you or a loved one observes any of these behaviors or has concerns about self-harm, here's what you can do:  · Talk about it- your feelings and reasons for harming yourself  · Remove any means that you might use to hurt yourself (examples: pills, rope, extension cords, firearm)  · Get professional help from the community (Mental Health, Substance Abuse, psychological counseling)  · Do not be  alone:Call your Safe Contact- someone whom you trust who will be there for you.  · Call your local CRISIS HOTLINE 614-9243 or 671-022-8323  · Call your local Children's Mobile Crisis Response Team [place] (117) 407-4117 or www.DecisionDesk  · Call the toll free National Suicide Prevention Hotlines   · National Suicide Prevention Lifeline 019-281-HQZN (5679)  · National [employer] Network 800-SUICIDE (175-0429)

## 2019-10-14 NOTE — PROGRESS NOTES
2 RN Skin Check    EJ to Left Neck  IV to right AC  Dressing removed to right neck, area reddened with small approximated puncture wound  2 small scabs to right chest/neck region  Blanchable redness to buttock  Heels boggy but intact and blanchable   Dry skin on feet     No other wounds noted

## 2019-10-14 NOTE — PROGRESS NOTES
Paged Dr Gutierrez  Patient received to unit with BP of 79/52.   MD ordered to give 0730 scheduled midodrine. No new orders received.

## 2019-10-14 NOTE — THERAPY
"Occupational Therapy Evaluation completed.   Functional Status:  Pt is a 55yo female admitted for DKA. PMHx of depression, DMII, SIRS, and stiff person syndrome with JACKIE. Daughter preferred to translate and provide PLOF and home setup. Reports pt gets assistance with ADLs/IADLs with baseline and has 24/7 assistance from 3 children. Per dtr, pt unable to sit as exacerbates leg spasms. Reports uses bedpan at baseline; completed pericare with SPV/set-up. Txf from supine>stand where pt rolls onto stomach with head at foot of bed and backs legs off to floor to go from supine>standing w/o sitting. Demo'd intermittent BLE spasms, req extra time. Req max A to stand and min A once standing d/t weakness; dtr reports brother can lift/carry pt PRN. Spasms during standing with FWW; pt reported pain and returned to supine with max A. Currently limited by decreased functional mobility, activity tolerance, balance, BLE spasms, and pain which are currently affecting pt's ability to complete ADLs/IADLs at baseline. Currently recommend acute OT, and likely will be able to return home with 24/7 assist from family. Recommend home health transitional care for continued occupational therapy services.     Plan of Care: Will benefit from Occupational Therapy 3 times per week  Discharge Recommendations:  Equipment: Will Continue to Assess for Equipment Needs. Post-acute therapy: Recommend home health transitional care for continued occupational therapy services.       See \"Rehab Therapy-Acute\" Patient Summary Report for complete documentation.    "

## 2019-10-15 NOTE — DISCHARGE SUMMARY
"Hospital Medicine Discharge Note     Admit Date:  10/8/2019       Discharge Date:   10/14/2019    Attending Physician:  Sunday Gonzalez M.D.      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 female diabetic with hx of \"stiff person syndrome\" admitted 10/8/2019 with DKA  . She was treated with IVF resuscitation, IV insulin. Her DKA resolved and electrolytes adjusted. HgA1c 15.9, She was given diabetic education with her family.  Patient had hypotension , asymptomatic. She was prescribed midodrine . BP was stable in 90s prior to Dc. No signs for acute infection.  Her hypoxia resolved. No chest or shortness of breath.  Her weakness was at baseline.   Her DKA resolved and transferred to medical floor . She was recommended to fu with Dr. Larson her neurologist regarding her Stiffmans Syndrome.  Also advised to follow up with her Primary for treatment of DM. On day of discharge , I examined patient , discussed findings, plan of are and follow up with patient and daughter. She was discharged home in stable condition.     Consultants:        Dr. Baker, pulmonary     Imaging/ Testing:      DX-CHEST-PORTABLE (1 VIEW)   Final Result      Increased bibasilar airspace opacities may represent " atelectasis. Infection not excluded.         DX-CHEST-LIMITED (1 VIEW)   Final Result         1.  No acute cardiopulmonary disease.      DX-CHEST-LIMITED (1 VIEW)   Final Result         No acute cardiac or pulmonary abnormality is identified. There are mild perihilar opacifications. These are slightly less prominent than on the prior radiograph.            Procedures:          None     Discharge Medications:             Medication List      START taking these medications      Instructions   insulin glargine 100 UNIT/ML Sopn injection  Commonly known as:  LANTUS   Inject 14 Units as instructed 2 times a day.  Dose:  14 Units     insulin lispro (Human) 100 UNIT/ML injection PEN  Commonly known as:  HUMALOG   Inject 2-9 Units as instructed 3 times a day before meals. Use sliding scale as instructed.  Dose:  2-9 Units     midodrine 10 MG tablet  Commonly known as:  PROAMATINE   Take 1 Tab by mouth 3 times a day, with meals.  Dose:  10 mg     Pen Needles 32G X 4 MM Misc   1 Units by Does not apply route 4 times a day.  Dose:  1 Units        CONTINUE taking these medications      Instructions   diazePAM 5 MG Tabs  Commonly known as:  VALIUM   Take 10 mg by mouth every 8 hours.  Dose:  10 mg     FLUoxetine 10 MG Caps  Commonly known as:  PROZAC   TAKE 1 CAPSULE BY MOUTH EVERY DAY               Diet:       DIET ORDERS (From admission to next 24h)     Start     Ordered    10/09/19 1318  Diet Order Diabetic  ALL MEALS     Question:  Diet:  Answer:  Diabetic    10/09/19 1317                  Activity:   As tolerated.      Code status:   Full code    Primary Care Provider:    Chlao Whelan M.D.    Follow up appointment details :      .  Chalo Whelan M.D.  [address] [address]  [phone]    In 1 week      Future Appointments   Date Time Provider Department Center   11/20/2019  4:20 PM Alexander ePna M.D. [facility] None           Time spent on discharge day patient visit: 40  minutes    #################################################

## 2019-10-16 ENCOUNTER — PATIENT OUTREACH (OUTPATIENT)
Dept: HEALTH INFORMATION MANAGEMENT | Facility: OTHER | Age: 54
End: 2019-10-16

## 2019-10-18 NOTE — PROGRESS NOTES
Community Health Worker Intake completed over the phone.  • Social determinates of health intake completed.   • Identified transportation and food barriers.  • Contact information provided to Gabriela Cheatham-Depintor.  • Pt. Sees Dr. Whelan and needs assistance scheduling a hospital follow-up appointment.   • Referral to RN or SW declined at this time.     Pt. Reports receiving all her medications and having no questions or concerns relating to them, needs help scheduling with a Sami speaking counselor who can do home visits, lives in a mobil home with her 3 children, has a support system, and feels a bit insecure about managing her health.    Days that work with patient schedule and transportation inlcude Friday afternoon's.    Plan:  Make an appointment PCP and find a Sami speaking counselor come visit home.  CHARIES Brumfield will meet with patient at her residence to begin the RTC access application.

## 2019-10-18 NOTE — PROGRESS NOTES
SHANAE Brumfield outbound FRANCISCO to Dr. Whelan's MA Jemma and scheduled an appointment for Friday Nov. 1st at 1:30pm for a hospital follow-up visit and to complete RTC ACCESS application.    SHANAE Brumfield outbound FRANCISCO to Gabriela to inform her of her upcoming appointment with Dr. Whelan.

## 2019-10-21 NOTE — PROGRESS NOTES
CHW outbound FRANCISCO Ochoa to re-schedule home visit from 10/25/19 to 10/24/19 since [facility] office will be closed on the 25th for [place] day.    CHW outbound FRANCISCO Ochoa to see if pt. intrested in having an RN attend visit, patient request and confirmed upcoming home visit.     Plan:  CHW Brissa will send outlook invite to KAPIL Dudley for home visit details.

## 2019-10-24 NOTE — PROGRESS NOTES
SHANAE Brumfield and KAPIL Dudley completed an outpatient visit with Gabriela and her daughter.  CHW interpreted for KAPIL Dudley on diabetic education, please refer to her note for details.  SHANAE Brumfield provided Gabriela with a food-is-medicine prescription, a diabetic and food insecurity bag, and an RTC access application.    Plan:  SHANAE Brumfield will follow-up with Gabriela to see if she was able to schedule an RTC access interview on 11/4/19.  EVANGELINAW will collaborate with KAPIL Dudley to connect Gabriela with a counselor and physical therapist.

## 2019-10-24 NOTE — PROGRESS NOTES
"SBAR Documentation: Situation, Background, Assessment, Recommendation     Situation    10/24/19 Completed home visit with CHW Brissa who acted as . Patient's daughter, Gabriela, present at home as well. Patient resting on couch with right leg extended out.    Background       PMH: stiffman's syndrome, depression, DM2 with recent hospitalization for DKA (HbA1C 15.8), GERD   Assessment    Patient reports right leg/hip pain that is inhibiting her ability to care for herself. Patient's 18 year old daughter is her primary caretaker. Patient's sons also provide assistance. Patient's daughter performs accuchecks and administers insulin for patient. Patient states she is sleeping ok and eating \"normal.\" Due to pain and spasms, patient has difficulty sitting down in a car to get to appointments and needs to lie down in the backseat of the car. She uses a bedpan to void and empty bowels as she cannot comfortably sit on the toilet. Patient states she would like to see if physical therapy would improve her symptoms. Appointments scheduled already with PCP and neurology in November.     Briefly discussed last several HbA1C results.       Patient had lower results in January 2018. Asked patient if she can recall what was going well for her then that is not going well now. Patient states she misses her  (in [place]?) and that her pain from Stiffman's syndrome and inability to ambulate and care for herself has been very hard on her. Patient admits to intermittent thoughts of self harm/suicidal ideation. When asked if she has thought of how she would harm herself, patient motioned dragging her finger across her wrist.  Patient states having someone to talk to and not being left alone as ways she works through those feelings. Patient open to speaking with a psychologist or therapist.    Recommendation -Provided patient with [place] Suicide Prevention Hotline number. Made safety agreement with patient and daughter that " patient would not be left alone.     -CCM RN updated PCP upon return to office, specifically about patient's mental health status. CCM RN will plan to attend PCP appointment next week to follow up on patient concerns.    Escalation Protocol: Physician Office Visit Appointment

## 2019-10-31 ENCOUNTER — TELEPHONE (OUTPATIENT)
Dept: NEUROLOGY | Facility: MEDICAL CENTER | Age: 54
End: 2019-10-31

## 2019-10-31 NOTE — TELEPHONE ENCOUNTER
Had a call from [employer] for patient to continue IVIG they stated that they needed the patients weight Deysi and need to know if we wanted this patient to be pre-medicated for this the infusions will be done at her house.   They stated that they have been trying to call us for the past two weeks.  Phone calls missed 10/4/2019  10/14/2019  10/29/2019  They stated that they will fax us paper work for Dr. Pena to sign.

## 2019-10-31 NOTE — PROGRESS NOTES
SHANAE Brumfield outbound TC to Gabriela to remind her of PCP appointment tomorrow, to bring RTC Access application, and to confirm she has transportation.  Pt. [facility] stating her [facility] is at 3:40pm. Pt. Also reports her children providing her with transportation.    SHANAE outbound TC to [facility] schedulers to confirm appointment is at 3:40pm and not 1:30pm.     SHANAE outbound TC to Gabriela and confirmed appointment is at 3:40pm. CHW asked if pt. Would like to have KAPIL Dudley attend this PCP visit and pt. Would like for

## 2019-11-01 ENCOUNTER — PATIENT OUTREACH (OUTPATIENT)
Dept: HEALTH INFORMATION MANAGEMENT | Facility: OTHER | Age: 54
End: 2019-11-01

## 2019-11-01 ENCOUNTER — OFFICE VISIT (OUTPATIENT)
Dept: MEDICAL GROUP | Facility: MEDICAL CENTER | Age: 54
End: 2019-11-01
Attending: FAMILY MEDICINE
Payer: COMMERCIAL

## 2019-11-01 VITALS
BODY MASS INDEX: 22.38 KG/M2 | OXYGEN SATURATION: 95 % | DIASTOLIC BLOOD PRESSURE: 64 MMHG | SYSTOLIC BLOOD PRESSURE: 98 MMHG | WEIGHT: 111 LBS | RESPIRATION RATE: 16 BRPM | TEMPERATURE: 97.6 F | HEIGHT: 59 IN | HEART RATE: 100 BPM

## 2019-11-01 DIAGNOSIS — F33.41 RECURRENT MAJOR DEPRESSIVE DISORDER, IN PARTIAL REMISSION (HCC): ICD-10-CM

## 2019-11-01 DIAGNOSIS — Z79.4 UNCONTROLLED TYPE 2 DIABETES MELLITUS WITH HYPERGLYCEMIA, WITH LONG-TERM CURRENT USE OF INSULIN (HCC): ICD-10-CM

## 2019-11-01 DIAGNOSIS — E11.65 UNCONTROLLED TYPE 2 DIABETES MELLITUS WITH HYPERGLYCEMIA, WITH LONG-TERM CURRENT USE OF INSULIN (HCC): ICD-10-CM

## 2019-11-01 DIAGNOSIS — R76.0 STIFF PERSON SYNDROME WITH POSITIVE GLUTAMIC ACID DECARBOXYLASE (GAD) ANTIBODY: ICD-10-CM

## 2019-11-01 DIAGNOSIS — G25.82 STIFF PERSON SYNDROME WITH POSITIVE GLUTAMIC ACID DECARBOXYLASE (GAD) ANTIBODY: ICD-10-CM

## 2019-11-01 DIAGNOSIS — F32.2 CURRENT SEVERE EPISODE OF MAJOR DEPRESSIVE DISORDER WITHOUT PSYCHOTIC FEATURES, UNSPECIFIED WHETHER RECURRENT (HCC): ICD-10-CM

## 2019-11-01 PROBLEM — E08.65 DIABETES MELLITUS DUE TO UNDERLYING CONDITION, UNCONTROLLED, WITH HYPERGLYCEMIA, WITHOUT LONG-TERM CURRENT USE OF INSULIN (HCC): Status: RESOLVED | Noted: 2018-06-15 | Resolved: 2019-11-01

## 2019-11-01 PROCEDURE — 99214 OFFICE O/P EST MOD 30 MIN: CPT | Performed by: FAMILY MEDICINE

## 2019-11-01 PROCEDURE — 99213 OFFICE O/P EST LOW 20 MIN: CPT | Performed by: FAMILY MEDICINE

## 2019-11-01 RX ORDER — VENLAFAXINE HYDROCHLORIDE 37.5 MG/1
37.5 CAPSULE, EXTENDED RELEASE ORAL DAILY
Qty: 30 CAP | Refills: 3 | Status: SHIPPED | OUTPATIENT
Start: 2019-11-01 | End: 2019-12-02

## 2019-11-01 NOTE — ASSESSMENT & PLAN NOTE
Patient has made statements on several occasions in the past month indicating at least a consideration of suicide.  She does not have an actual plan.  She has been taking fluoxetine 10 mg for approximately 3 years.  She does occasionally get tearful.  She has not been confused.  She does experience significant stress over the loss of her  and mother in the same month February 1 year ago, and ongoing severe health issues.

## 2019-11-01 NOTE — PROGRESS NOTES
Attended office visit with Dr. Whelan with patient's permission. Also present was patient's daughter and son who were interpreting for patient. Per conversation, will not continue with RTC access application at this time as patient is unable to sit down, patient's family able to provide transportation to medical appointments with enough notice. See Office Visit encounter for additional details of visit.     Patient is planning to visit [place] in mid-December but will be back for a follow up with PCP in 2 months. Patient would like follow up phone call from South African-speaking pharmacist to review new medications prescribed during visit. CCM RN to follow up with patient regarding DM2 management and any questions on diet, medications or any other questions/concerns.

## 2019-11-01 NOTE — ASSESSMENT & PLAN NOTE
Patient reports that she will frequently experience severe painful hip spasms if she tries to sit.  Therefore she tends to stand or lay down with her hips in extension.  She is followed by neurology, Dr. Vicente.  So far her best medication has been to continue on the diazepam 10 mg 3 times a day.  Recent trial of tizanidine seem to actually make the problem worse.  She previously had also failed trial of baclofen.  She is not reporting any rash or swelling of either extremity.  She has a follow-up visit with Dr. Vicente on 11/20/2019

## 2019-11-02 NOTE — PROGRESS NOTES
Chief Complaint   Patient presents with   • Diabetes Mellitus   • Diabetic Ketoacidosis   • Hospital Follow-up       HISTORY OF PRESENT ILLNESS: Patient is a 54 y.o. female established patient who presents today to follow-up    Patient is seen today with her daughter who acts as , Dora from complex care management    Recurrent major depressive disorder, in partial remission ([facility])  Patient has made statements on several occasions in the past month indicating at least a consideration of suicide.  She does not have an actual plan.  She has been taking fluoxetine 10 mg for approximately 3 years.  She does occasionally get tearful.  She has not been confused.  She does experience significant stress over the loss of her  and mother in the same month February 1 year ago, and ongoing severe health issues.    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  Therefore she tends to stand or lay down with her hips in extension.  She is followed by neurology, Dr. Vicente.  So far her best medication has been to continue on the diazepam 10 mg 3 times a day.  Recent trial of tizanidine seem to actually make the problem worse.  She previously had also failed trial of baclofen.  She is not reporting any rash or swelling of either extremity.  She has a follow-up visit with Dr. Vicente on 11/20/2019    Uncontrolled type 2 diabetes mellitus with hyperglycemia, with long-term current use of insulin ([facility])  Patient admits that she had not been taking her diabetes medication for an undetermined period of time at least with any regularity, prior to her recent hospitalization for diabetic ketoacidosis.  A1c was noted to be elevated at 15.8.  After discharge she has been taking Lantus 14 units twice daily.  In the past she had taken Jardiance up to 25 mg that she believes had been helpful.  It  is unclear why that was stopped.  She does not recall an adverse event.  Daughter reports that morning sugars currently running is 72 about 130 before breakfast but then around 200-2 25 before dinner.  She avoids soda does have sweet bread/pastry once or twice per week in small quantities.  No symptomatic recent low blood sugars characterized by headache or diaphoresis    Social history-, not working  Patient Active Problem List    Diagnosis Date Noted   • DKA (diabetic ketoacidoses) ([facility]) 10/08/2019     Priority: High   • Recurrent major depressive disorder, in partial remission ([facility]) 01/23/2018     Priority: High   • Right leg pain 01/03/2018     Priority: High   • Uncontrolled type 2 diabetes mellitus with hyperglycemia, with long-term current use of insulin ([facility]) 11/01/2019     Priority: Medium   • SIRS (systemic inflammatory response syndrome) ([facility]) 10/08/2019     Priority: Medium   • 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 01/03/2018     Priority: Medium   • Leukocytosis 01/03/2018     Priority: Medium   • Pain in lower back 02/29/2016     Priority: Medium   • Lower extremity weakness 02/29/2016     Priority: Medium   • Thrombocytopenia ([facility]) 10/08/2019     Priority: Low   • Vitamin D deficiency 01/07/2018     Priority: Low   • Type 2 diabetes mellitus untreated, with ketoacidosis  02/29/2016     Priority: Low   • GERD (gastroesophageal reflux disease) 02/29/2016     Priority: Low   • Hypophosphatemia 02/09/2015     Priority: Low   • Anomaly of sclera 10/13/2019   • Hypoxia 10/12/2019   • Moderate protein-calorie malnutrition ([facility]) 10/11/2019   • Hypokalemia 10/09/2019   • Redness, eye 06/15/2018   • Tail bone pain 02/21/2018   • Ankle contracture, right 01/23/2018   • Bacteremia due to Escherichia coli 02/09/2015   • Sepsis secondary to UTI ([facility]) 02/06/2015       Allergies:Patient has no known allergies.    Current Outpatient Medications   Medication Sig Dispense  "Refill   • venlafaxine XR (EFFEXOR XR) 37.5 MG CAPSULE SR 24 HR Take 1 Cap by mouth every day. 30 Cap 3   • Empagliflozin (JARDIANCE) 25 MG Tab Take 25 mg by mouth every day. 30 Tab 11   • midodrine (PROAMATINE) 10 MG tablet Take 1 Tab by mouth 3 times a day, with meals. 90 Tab 2   • insulin glargine (LANTUS) 100 UNIT/ML Solution Pen-injector injection Inject 14 Units as instructed 2 times a day. 5 PEN 0   • Insulin Pen Needle (PEN NEEDLES) 32G X 4 MM Misc 1 Units by Does not apply route 4 times a day. 100 Each 0   • insulin lispro, Human, (HUMALOG) 100 UNIT/ML injection PEN Inject 2-9 Units as instructed 3 times a day before meals. Use sliding scale as instructed. 5 PEN 0   • diazePAM (VALIUM) 5 MG Tab Take 10 mg by mouth every 8 hours.     • FLUoxetine (PROZAC) 10 MG Cap TAKE 1 CAPSULE BY MOUTH EVERY DAY 30 Cap 5     No current facility-administered medications for this visit.        Social History     Tobacco Use   • Smoking status: Never Smoker   • Smokeless tobacco: Never Used   Substance Use Topics   • Alcohol use: No   • Drug use: No       Family History   Problem Relation Age of Onset   • Diabetes Father    • Stroke Brother    • Cancer Neg Hx    • Heart Disease Neg Hx        ROS:  Review of Systems   Constitutional: Negative for fever, chills, weight loss and malaise/fatigue.   Eyes: Negative for blurred vision.   Respiratory: Negative for cough, sputum production, shortness of breath and wheezing.    Cardiovascular: Negative for chest pain, palpitations, orthopnea and leg swelling.   Gastrointestinal: Negative for heartburn, nausea, vomiting and abdominal pain.   Musculoskeletal: Positive for myalgias, back pain and joint pain.   Endo/Heme/Allergies: Does not bruise/bleed easily.               Exam:  BP (!) 98/64 (BP Location: Left arm, Patient Position: Standing, BP Cuff Size: Adult)   Pulse 100   Temp 36.4 °C (97.6 °F) (Temporal)   Resp 16   Ht 1.51 m (4' 11.45\")   Wt 50.3 kg (111 lb)   SpO2 95% "   General:  Well nourished, well developed female in NAD.  Patient is using her front wheeled walker and stands for comfort through our exam  Head is grossly normal.  Neck: Supple without JVD or bruit. Thyroid is not enlarged. Trachea is midline.  Pulmonary: Clear to ausculation .  Normal effort. No rales, ronchi, or wheezing.  Cardiovascular: Regular rate and rhythm without murmur.  Abdomen-Abdomen is soft, normal bowel sounds, no masses, guarding, ororganomegaly, or tenderness.  Lower extremities- neg for pretibial edema, redness      Please note that this dictation was created using voice recognition software. I have made every reasonable attempt to correct obvious errors, but I expect that there are errors of grammar and possibly content that I did not discover before finalizing the note.    Assessment/Plan:  1. Uncontrolled type 2 diabetes mellitus with hyperglycemia, with long-term current use of insulin ([facility])     2. Current severe episode of major depressive disorder without psychotic features, unspecified whether recurrent ([facility])  REFERRAL TO PSYCHOLOGY   3. 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 Recurrent major depressive disorder, in partial remission ([facility])       Plan: 1.  Restart Jardiance 25 mg daily  2.  Discontinue fluoxetine and in 48 hours may start 37.5 mg of Effexor extended release  3.  Patient is asked to check alternating before breakfast and before dinner sugars with family forwarding me the averages of those values in 10 days  4.  Continue Lantus 14 units twice daily  5.  Due to a trip to [place] patient is scheduled for follow-up in 2 months

## 2019-11-02 NOTE — ASSESSMENT & PLAN NOTE
Patient admits that she had not been taking her diabetes medication for an undetermined period of time at least with any regularity, prior to her recent hospitalization for diabetic ketoacidosis.  A1c was noted to be elevated at 15.8.  After discharge she has been taking Lantus 14 units twice daily.  In the past she had taken Jardiance up to 25 mg that she believes had been helpful.  It is unclear why that was stopped.  She does not recall an adverse event.  Daughter reports that morning sugars currently running is 72 about 130 before breakfast but then around 200-2 25 before dinner.  She avoids soda does have sweet bread/pastry once or twice per week in small quantities.  No symptomatic recent low blood sugars characterized by headache or diaphoresis

## 2019-11-04 ENCOUNTER — HOME HEALTH ADMISSION (OUTPATIENT)
Dept: HOME HEALTH SERVICES | Facility: HOME HEALTHCARE | Age: 54
End: 2019-11-04
Payer: COMMERCIAL

## 2019-11-04 SDOH — ECONOMIC STABILITY: INCOME INSECURITY: HOW HARD IS IT FOR YOU TO PAY FOR THE VERY BASICS LIKE FOOD, HOUSING, MEDICAL CARE, AND HEATING?: NOT VERY HARD

## 2019-11-04 SDOH — ECONOMIC STABILITY: FOOD INSECURITY: WITHIN THE PAST 12 MONTHS, THE FOOD YOU BOUGHT JUST DIDN'T LAST AND YOU DIDN'T HAVE MONEY TO GET MORE.: NEVER TRUE

## 2019-11-04 SDOH — HEALTH STABILITY: MENTAL HEALTH: HOW OFTEN DO YOU HAVE A DRINK CONTAINING ALCOHOL?: NEVER

## 2019-11-04 SDOH — HEALTH STABILITY: MENTAL HEALTH: HOW OFTEN DO YOU HAVE 6 OR MORE DRINKS ON ONE OCCASION?: NEVER

## 2019-11-04 SDOH — SOCIAL STABILITY: SOCIAL INSECURITY
WITHIN THE LAST YEAR, HAVE YOU BEEN KICKED, HIT, SLAPPED, OR OTHERWISE PHYSICALLY HURT BY YOUR PARTNER OR EX-PARTNER?: NO

## 2019-11-04 SDOH — SOCIAL STABILITY: SOCIAL NETWORK
DO YOU BELONG TO ANY CLUBS OR ORGANIZATIONS SUCH AS CHURCH GROUPS UNIONS, FRATERNAL OR ATHLETIC GROUPS, OR SCHOOL GROUPS?: NO

## 2019-11-04 SDOH — ECONOMIC STABILITY: FOOD INSECURITY: WITHIN THE PAST 12 MONTHS, YOU WORRIED THAT YOUR FOOD WOULD RUN OUT BEFORE YOU GOT MONEY TO BUY MORE.: SOMETIMES TRUE

## 2019-11-04 SDOH — SOCIAL STABILITY: SOCIAL NETWORK: HOW OFTEN DO YOU ATTEND CHURCH OR RELIGIOUS SERVICES?: 1 TO 4 TIMES PER YEAR

## 2019-11-04 SDOH — ECONOMIC STABILITY: TRANSPORTATION INSECURITY
IN THE PAST 12 MONTHS, HAS LACK OF TRANSPORTATION KEPT YOU FROM MEETINGS, WORK, OR FROM GETTING THINGS NEEDED FOR DAILY LIVING?: YES

## 2019-11-04 SDOH — ECONOMIC STABILITY: TRANSPORTATION INSECURITY
IN THE PAST 12 MONTHS, HAS THE LACK OF TRANSPORTATION KEPT YOU FROM MEDICAL APPOINTMENTS OR FROM GETTING MEDICATIONS?: YES

## 2019-11-04 SDOH — SOCIAL STABILITY: SOCIAL INSECURITY
WITHIN THE LAST YEAR, HAVE TO BEEN RAPED OR FORCED TO HAVE ANY KIND OF SEXUAL ACTIVITY BY YOUR PARTNER OR EX-PARTNER?: NO

## 2019-11-04 SDOH — SOCIAL STABILITY: SOCIAL INSECURITY: WITHIN THE LAST YEAR, HAVE YOU BEEN HUMILIATED OR EMOTIONALLY ABUSED IN OTHER WAYS BY YOUR PARTNER OR EX-PARTNER?: NO

## 2019-11-04 SDOH — SOCIAL STABILITY: SOCIAL INSECURITY: WITHIN THE LAST YEAR, HAVE YOU BEEN AFRAID OF YOUR PARTNER OR EX-PARTNER?: NO

## 2019-11-04 SDOH — SOCIAL STABILITY: SOCIAL NETWORK: ARE YOU MARRIED, WIDOWED, DIVORCED, SEPARATED, NEVER MARRIED, OR LIVING WITH A PARTNER?: WIDOWED

## 2019-11-04 SDOH — HEALTH STABILITY: MENTAL HEALTH
STRESS IS WHEN SOMEONE FEELS TENSE, NERVOUS, ANXIOUS, OR CAN'T SLEEP AT NIGHT BECAUSE THEIR MIND IS TROUBLED. HOW STRESSED ARE YOU?: VERY MUCH

## 2019-11-04 SDOH — HEALTH STABILITY: PHYSICAL HEALTH: ON AVERAGE, HOW MANY MINUTES DO YOU ENGAGE IN EXERCISE AT THIS LEVEL?: 0 MIN

## 2019-11-04 SDOH — HEALTH STABILITY: PHYSICAL HEALTH: ON AVERAGE, HOW MANY DAYS PER WEEK DO YOU ENGAGE IN MODERATE TO STRENUOUS EXERCISE (LIKE A BRISK WALK)?: 0 DAYS

## 2019-11-04 SDOH — SOCIAL STABILITY: SOCIAL NETWORK
IN A TYPICAL WEEK, HOW MANY TIMES DO YOU TALK ON THE PHONE WITH FAMILY, FRIENDS, OR NEIGHBORS?: MORE THAN THREE TIMES A WEEK

## 2019-11-04 SDOH — SOCIAL STABILITY: SOCIAL NETWORK: HOW OFTEN DO YOU GET TOGETHER WITH FRIENDS OR RELATIVES?: MORE THAN THREE TIMES A WEEK

## 2019-11-04 SDOH — SOCIAL STABILITY: SOCIAL NETWORK: HOW OFTEN DO YOU ATTENT MEETINGS OF THE CLUB OR ORGANIZATION YOU BELONG TO?: NEVER

## 2019-11-04 ASSESSMENT — PATIENT HEALTH QUESTIONNAIRE - PHQ9
CLINICAL INTERPRETATION OF PHQ2 SCORE: 6
5. POOR APPETITE OR OVEREATING: 0 - NOT AT ALL
SUM OF ALL RESPONSES TO PHQ QUESTIONS 1-9: 14

## 2019-11-05 ENCOUNTER — TELEPHONE (OUTPATIENT)
Dept: HEALTH INFORMATION MANAGEMENT | Facility: OTHER | Age: 54
End: 2019-11-05

## 2019-11-05 NOTE — TELEPHONE ENCOUNTER
Referral from [facility] KAPIL John requesting a med review. Unable to reach patient. Based on A1c, patient may benefit from referral to pharmacotherapy for DM management by Robert Reyes PharmD.

## 2019-11-09 DIAGNOSIS — G25.82 STIFF PERSON SYNDROME WITH POSITIVE GLUTAMIC ACID DECARBOXYLASE (GAD) ANTIBODY: ICD-10-CM

## 2019-11-09 DIAGNOSIS — R76.0 STIFF PERSON SYNDROME WITH POSITIVE GLUTAMIC ACID DECARBOXYLASE (GAD) ANTIBODY: ICD-10-CM

## 2019-11-11 ENCOUNTER — TELEPHONE (OUTPATIENT)
Dept: HEALTH INFORMATION MANAGEMENT | Facility: OTHER | Age: 54
End: 2019-11-11

## 2019-11-11 RX ORDER — DIAZEPAM 10 MG/1
TABLET ORAL
Qty: 90 TAB | Refills: 3 | Status: SHIPPED | OUTPATIENT
Start: 2019-11-11 | End: 2019-11-14 | Stop reason: SDUPTHER

## 2019-11-11 NOTE — TELEPHONE ENCOUNTER
Hi Dr. Whelan,     We will be discharging Gabriela from [facility] at this time due to loss of contact-we have had 3 attempts to contact her since our last successful outreach with no return calls. When I spoke with [facility] last week, they had been unable to contact Gabriela but had been in contact or left a message with her son for her to reach out to them.     [facility] (psychology) was not able to confirm nor deny whether or not she was a patient of theirs due to confidentiality.     Should Gabriela or her family reach out to [facility] ([phone]) or need any further assistance we will be happy to continue working with her.     Thank you very much!    Dora Dudley R.N. Care Coordinator  [facility]  [phone]

## 2019-11-11 NOTE — PROGRESS NOTES
SHANAE Brumfield LVM with Gabriela.    SHANAE Brumfield will d/c Gabriela from [facility] services due to lack of contact after x3 TC attempts.

## 2019-11-14 DIAGNOSIS — R76.0 STIFF PERSON SYNDROME WITH POSITIVE GLUTAMIC ACID DECARBOXYLASE (GAD) ANTIBODY: ICD-10-CM

## 2019-11-14 DIAGNOSIS — G25.82 STIFF PERSON SYNDROME WITH POSITIVE GLUTAMIC ACID DECARBOXYLASE (GAD) ANTIBODY: ICD-10-CM

## 2019-11-15 ENCOUNTER — TELEPHONE (OUTPATIENT)
Dept: HEALTH INFORMATION MANAGEMENT | Facility: OTHER | Age: 54
End: 2019-11-15

## 2019-11-15 NOTE — TELEPHONE ENCOUNTER
Hi Dr. Whelan,    Patient is currently followed by community care management team. Patient may benefit from addition of GLP-1 receptor agonist. Her preferred formulary product is Victoza. Would you consider adding Victoza 0.6mg once daily for 1 week to determine if she tolerates?     Thanks for your consideration,  Jovi Suazo, PharmD   [facility] [id number]

## 2019-11-15 NOTE — TELEPHONE ENCOUNTER
Referral from SHANAE Leggett stating patient is unable to obtain her prescription for diazepam 10mg tabs from [facility] pharmacy. Outbound call to [facility]. Spoke with pharmacist Cindy who does not see a prescription in the [facility] computer system. She is able to confirm with the controlled substance database that the medication has not been filled at any other pharmacy in [place]. Message sent to Dr. Pena requesting a duplicate prescription to replace the lost Rx. No response from Dr. Pena. Requested order from Dr. Whelan's MA. States she will call in duplicate to [facility]. Patient notified.

## 2019-11-18 RX ORDER — DIAZEPAM 10 MG/1
TABLET ORAL
Qty: 90 TAB | Refills: 3 | Status: SHIPPED | OUTPATIENT
Start: 2019-11-18 | End: 2019-12-02 | Stop reason: SDUPTHER

## 2019-11-20 ENCOUNTER — TELEPHONE (OUTPATIENT)
Dept: NEUROLOGY | Facility: MEDICAL CENTER | Age: 54
End: 2019-11-20

## 2019-11-20 ENCOUNTER — APPOINTMENT (OUTPATIENT)
Dept: NEUROLOGY | Facility: MEDICAL CENTER | Age: 54
End: 2019-11-20
Payer: COMMERCIAL

## 2019-11-21 ENCOUNTER — PATIENT OUTREACH (OUTPATIENT)
Dept: HEALTH INFORMATION MANAGEMENT | Facility: OTHER | Age: 54
End: 2019-11-21

## 2019-11-21 NOTE — PROGRESS NOTES
Gabriela inbound TC from [phone] to SHANAE Brumfield 11/21/19.  Pt. Reports missing her appointment with her neurologist because she was late one minute.   Pt. requested help canceling today's appointment with [facility] due to much leg pain and tremors.  Pt. Also requested support finding a doctor or counselor who would be able to visit the home since travel/transportation is challenging with her stiff-person syndrome.     SHANAE Brumfield outbound TC to [facility] to cancel appointment.  explained that Gabriela was eligible for teletherapy and an assessment would need to be completed at the office.     Plan:  SHANAE Brumfield will connect with the RN Luis Enrique to see how Gabriela can be set-up with Home Health.

## 2019-11-25 ENCOUNTER — TELEPHONE (OUTPATIENT)
Dept: MEDICAL GROUP | Facility: MEDICAL CENTER | Age: 54
End: 2019-11-25

## 2019-11-25 DIAGNOSIS — E11.65 UNCONTROLLED TYPE 2 DIABETES MELLITUS WITH HYPERGLYCEMIA, WITH LONG-TERM CURRENT USE OF INSULIN (HCC): ICD-10-CM

## 2019-11-25 DIAGNOSIS — Z79.4 UNCONTROLLED TYPE 2 DIABETES MELLITUS WITH HYPERGLYCEMIA, WITH LONG-TERM CURRENT USE OF INSULIN (HCC): ICD-10-CM

## 2019-11-25 DIAGNOSIS — G25.82 STIFF PERSON SYNDROME WITH POSITIVE GLUTAMIC ACID DECARBOXYLASE (GAD) ANTIBODY: ICD-10-CM

## 2019-11-25 DIAGNOSIS — R76.0 STIFF PERSON SYNDROME WITH POSITIVE GLUTAMIC ACID DECARBOXYLASE (GAD) ANTIBODY: ICD-10-CM

## 2019-11-25 NOTE — PROGRESS NOTES
SHANAE Brumfield outbound TC to Gabriela 11/25. Gabriela reports not calling Dr. Whelan to inquire about a [facility] referral.    SHANAE Brumfield outbound TC Dr. Whelan's BLANCA Chavarria requesting a new Home health referral, Aura confirmed the request was made.     Plan:  SHANAE Brumfield will TC Gabriela 11/27/19.

## 2019-11-26 NOTE — TELEPHONE ENCOUNTER
1. Caller Name:                                        Call Back Number: [phone]      Patient approves a detailed voicemail message: yes    2. SPECIFIC Action To Be Taken: Please place a referral to Home Care, Pt daughter also wanting to know if it is possible to have her specialist provide in home visits. Her condition is making it harder for her to get to her appointments, she is having increased episodes of stiffness and inablility to move. Please advise    3. Diagnosis/Clinical Reason for Request: stiff person syndrome    4. Specialty & Provider Name/Lab/Imaging Location: [facility]    5. Is appointment scheduled for requested order/referral: no    Patient was not informed they will receive a return phone call from the office ONLY if there are any questions before processing their request. Advised to call back if they haven't received a call from the referral department in 5 days.

## 2019-11-26 NOTE — TELEPHONE ENCOUNTER
Referral for home health placed.  Regarding having her neurology specialist see her at her home, she should ask them directly, [facility]

## 2019-11-27 ENCOUNTER — HOME HEALTH ADMISSION (OUTPATIENT)
Dept: HOME HEALTH SERVICES | Facility: HOME HEALTHCARE | Age: 54
End: 2019-11-27
Payer: COMMERCIAL

## 2019-11-27 NOTE — PROGRESS NOTES
SHANAE Brumfield outbound TC to Gabriela 11/27/19. Gabriela reports receiving a call from Home Health and agreeing to two visits per month begining in May.  Gabriela has an appointment 12/11/2019 with her neurologist and CHW advised her to discuss medication concerns then.    SHANAE Brumfield connected with [facility] KAPIL Dudley to discuss Home health. KAPIL Dudley outbound TC to Home Health to clarify visit agreement with Gabriela. Home Health stated that they had not reached Gabriela and that they would TC her later today.    SHANAE Brumfield explained to Gabriela that Home Health would being calling her today.    Plan:  SHANAE Brumfield will TC Gabriela 12/2/19 for follow-up.

## 2019-11-28 ENCOUNTER — HOME CARE VISIT (OUTPATIENT)
Dept: HOME HEALTH SERVICES | Facility: HOME HEALTHCARE | Age: 54
End: 2019-11-28
Payer: COMMERCIAL

## 2019-11-28 PROCEDURE — G0493 RN CARE EA 15 MIN HH/HOSPICE: HCPCS

## 2019-11-28 PROCEDURE — 665001 SOC-HOME HEALTH

## 2019-11-29 ENCOUNTER — HOME CARE VISIT (OUTPATIENT)
Dept: HOME HEALTH SERVICES | Facility: HOME HEALTHCARE | Age: 54
End: 2019-11-29
Payer: COMMERCIAL

## 2019-11-29 VITALS
OXYGEN SATURATION: 97 % | RESPIRATION RATE: 18 BRPM | TEMPERATURE: 97.7 F | SYSTOLIC BLOOD PRESSURE: 98 MMHG | HEART RATE: 77 BPM | DIASTOLIC BLOOD PRESSURE: 61 MMHG

## 2019-11-29 PROCEDURE — G0151 HHCP-SERV OF PT,EA 15 MIN: HCPCS

## 2019-11-29 ASSESSMENT — ACTIVITIES OF DAILY LIVING (ADL)
AMBULATION_DISTANCE/DURATION_TOLERATED: 5 FEET
AMBULATION ASSISTANCE ON FLAT SURFACES: 1

## 2019-11-29 ASSESSMENT — ENCOUNTER SYMPTOMS
LIMITED RANGE OF MOTION: 1
MUSCLE WEAKNESS: 1
TREMORS: 1

## 2019-12-01 VITALS
HEART RATE: 74 BPM | TEMPERATURE: 97.2 F | SYSTOLIC BLOOD PRESSURE: 90 MMHG | RESPIRATION RATE: 18 BRPM | BODY MASS INDEX: 19.56 KG/M2 | HEIGHT: 60 IN | DIASTOLIC BLOOD PRESSURE: 62 MMHG | WEIGHT: 99.6 LBS | OXYGEN SATURATION: 97 %

## 2019-12-01 ASSESSMENT — PATIENT HEALTH QUESTIONNAIRE - PHQ9
CLINICAL INTERPRETATION OF PHQ2 SCORE: 0
1. LITTLE INTEREST OR PLEASURE IN DOING THINGS: 00
2. FEELING DOWN, DEPRESSED, IRRITABLE, OR HOPELESS: 00

## 2019-12-01 ASSESSMENT — ENCOUNTER SYMPTOMS: SHORTNESS OF BREATH: T

## 2019-12-02 ENCOUNTER — HOME CARE VISIT (OUTPATIENT)
Dept: HOME HEALTH SERVICES | Facility: HOME HEALTHCARE | Age: 54
End: 2019-12-02
Payer: COMMERCIAL

## 2019-12-02 ENCOUNTER — DOCUMENTATION (OUTPATIENT)
Dept: MEDICAL GROUP | Facility: PHYSICIAN GROUP | Age: 54
End: 2019-12-02

## 2019-12-02 DIAGNOSIS — G25.82 STIFF PERSON SYNDROME WITH POSITIVE GLUTAMIC ACID DECARBOXYLASE (GAD) ANTIBODY: ICD-10-CM

## 2019-12-02 DIAGNOSIS — R76.0 STIFF PERSON SYNDROME WITH POSITIVE GLUTAMIC ACID DECARBOXYLASE (GAD) ANTIBODY: ICD-10-CM

## 2019-12-02 PROCEDURE — G0299 HHS/HOSPICE OF RN EA 15 MIN: HCPCS

## 2019-12-02 RX ORDER — DIAZEPAM 10 MG/1
TABLET ORAL
Qty: 90 TAB | Refills: 3 | Status: CANCELLED | OUTPATIENT
Start: 2019-12-02 | End: 2020-04-01

## 2019-12-02 RX ORDER — DIAZEPAM 10 MG/1
10 TABLET ORAL EVERY 8 HOURS PRN
Qty: 90 TAB | Refills: 3 | Status: SHIPPED | OUTPATIENT
Start: 2019-12-02 | End: 2019-12-10 | Stop reason: SDUPTHER

## 2019-12-02 NOTE — TELEPHONE ENCOUNTER
Thank you! I was able to reach patient today through her home health RN. She will  and start medication today. If she tolerates it well for the first week did you want to titrate up to a goal dose?

## 2019-12-02 NOTE — PROGRESS NOTES
[facility]    Received referral from [facility] to review patient's medication list.    Med list reviewed and reconciled.  No clinically significant DDI identified.  Allergies reviewed.    Karen Watts, AsaD

## 2019-12-03 ENCOUNTER — HOME CARE VISIT (OUTPATIENT)
Dept: HOME HEALTH SERVICES | Facility: HOME HEALTHCARE | Age: 54
End: 2019-12-03
Payer: COMMERCIAL

## 2019-12-03 VITALS
HEART RATE: 70 BPM | OXYGEN SATURATION: 98 % | TEMPERATURE: 98.1 F | SYSTOLIC BLOOD PRESSURE: 95 MMHG | DIASTOLIC BLOOD PRESSURE: 62 MMHG | RESPIRATION RATE: 16 BRPM

## 2019-12-03 PROCEDURE — G0151 HHCP-SERV OF PT,EA 15 MIN: HCPCS

## 2019-12-03 ASSESSMENT — ENCOUNTER SYMPTOMS: DEBILITATING PAIN: 1

## 2019-12-03 NOTE — PROGRESS NOTES
SHANAE Brumfield outbound TC to Gabriela 12/3/2019. Gabriela reports establishing with Home Health. CHW encouraged Gabriela to express any future needs to Home Health.   CHW let Gabriela know that CCM will d/c her from CCM services and CHW encouraged her to TC CCM if things change.     Outcome:  SHANAE Brumfield will d/c Gabriela from CCM since all pt. Goals were met 12/3/2019.

## 2019-12-04 VITALS
RESPIRATION RATE: 16 BRPM | DIASTOLIC BLOOD PRESSURE: 62 MMHG | OXYGEN SATURATION: 98 % | TEMPERATURE: 97.9 F | HEART RATE: 78 BPM | SYSTOLIC BLOOD PRESSURE: 100 MMHG

## 2019-12-04 ASSESSMENT — ENCOUNTER SYMPTOMS: MUSCLE WEAKNESS: 1

## 2019-12-04 ASSESSMENT — ACTIVITIES OF DAILY LIVING (ADL)
CURRENT_FUNCTION: STAND BY ASSIST
AMBULATION ASSISTANCE: STAND BY ASSIST

## 2019-12-05 ENCOUNTER — HOME CARE VISIT (OUTPATIENT)
Dept: HOME HEALTH SERVICES | Facility: HOME HEALTHCARE | Age: 54
End: 2019-12-05
Payer: COMMERCIAL

## 2019-12-05 PROCEDURE — G0299 HHS/HOSPICE OF RN EA 15 MIN: HCPCS

## 2019-12-05 PROCEDURE — G0151 HHCP-SERV OF PT,EA 15 MIN: HCPCS

## 2019-12-09 ENCOUNTER — HOME CARE VISIT (OUTPATIENT)
Dept: HOME HEALTH SERVICES | Facility: HOME HEALTHCARE | Age: 54
End: 2019-12-09
Payer: COMMERCIAL

## 2019-12-09 VITALS
SYSTOLIC BLOOD PRESSURE: 100 MMHG | DIASTOLIC BLOOD PRESSURE: 60 MMHG | TEMPERATURE: 97.3 F | RESPIRATION RATE: 16 BRPM | OXYGEN SATURATION: 9 % | OXYGEN SATURATION: 98 % | RESPIRATION RATE: 16 BRPM | TEMPERATURE: 97 F | DIASTOLIC BLOOD PRESSURE: 65 MMHG | HEART RATE: 74 BPM | HEART RATE: 91 BPM | SYSTOLIC BLOOD PRESSURE: 110 MMHG

## 2019-12-09 VITALS — HEART RATE: 84 BPM

## 2019-12-09 PROCEDURE — G0299 HHS/HOSPICE OF RN EA 15 MIN: HCPCS

## 2019-12-09 PROCEDURE — G0151 HHCP-SERV OF PT,EA 15 MIN: HCPCS

## 2019-12-09 ASSESSMENT — ENCOUNTER SYMPTOMS
MUSCLE WEAKNESS: 1
DEBILITATING PAIN: 1

## 2019-12-09 ASSESSMENT — ACTIVITIES OF DAILY LIVING (ADL): TRANSPORTATION COMMENTS: REQUIRES ASSIST OF ANOTHER PERSON OUT OF HOME

## 2019-12-10 ENCOUNTER — TELEPHONE (OUTPATIENT)
Dept: MEDICAL GROUP | Facility: MEDICAL CENTER | Age: 54
End: 2019-12-10

## 2019-12-10 ENCOUNTER — OFFICE VISIT (OUTPATIENT)
Dept: NEUROLOGY | Facility: MEDICAL CENTER | Age: 54
End: 2019-12-10
Payer: COMMERCIAL

## 2019-12-10 VITALS — DIASTOLIC BLOOD PRESSURE: 70 MMHG | HEART RATE: 76 BPM | SYSTOLIC BLOOD PRESSURE: 110 MMHG

## 2019-12-10 DIAGNOSIS — G25.82 STIFF PERSON SYNDROME WITH POSITIVE GLUTAMIC ACID DECARBOXYLASE (GAD) ANTIBODY: ICD-10-CM

## 2019-12-10 DIAGNOSIS — R76.0 STIFF PERSON SYNDROME WITH POSITIVE GLUTAMIC ACID DECARBOXYLASE (GAD) ANTIBODY: ICD-10-CM

## 2019-12-10 PROCEDURE — 99214 OFFICE O/P EST MOD 30 MIN: CPT | Performed by: PSYCHIATRY & NEUROLOGY

## 2019-12-10 RX ORDER — HYDROCODONE BITARTRATE AND ACETAMINOPHEN 5; 325 MG/1; MG/1
1-2 TABLET ORAL EVERY 4 HOURS PRN
Qty: 20 TAB | Refills: 0 | Status: SHIPPED | OUTPATIENT
Start: 2019-12-10 | End: 2020-01-10

## 2019-12-10 RX ORDER — DIAZEPAM 10 MG/1
10 TABLET ORAL EVERY 6 HOURS PRN
Qty: 120 TAB | Refills: 3 | Status: SHIPPED | OUTPATIENT
Start: 2019-12-10 | End: 2020-04-09 | Stop reason: SDUPTHER

## 2019-12-10 ASSESSMENT — ENCOUNTER SYMPTOMS
MEMORY LOSS: 0
INSOMNIA: 1
TREMORS: 0
MYALGIAS: 1
LOSS OF CONSCIOUSNESS: 0
BACK PAIN: 1
FOCAL WEAKNESS: 1

## 2019-12-10 NOTE — TELEPHONE ENCOUNTER
DOCUMENTATION OF PAR STATUS:    1. Name of Medication & Dose: Fluoxetine 10 mg     2. Name of Prescription Coverage Company & phone #: [employer]    3. Date Prior Auth Submitted: 12/10/2019    4. What information was given to obtain insurance decision? Clinical notes    5. Prior Auth Status? Pending    6. Patient Notified: no

## 2019-12-10 NOTE — PROGRESS NOTES
Subjective:      Gabriela Cheatham-Depintor is a 54 y.o. female who presents with her daughter Gabriela and son Kashmir, for 6-month follow-up, with a history of partial stiff person syndrome.  Her children help with translation.    HPI    Since last seen, the symptoms have remained in the legs, mostly with the right.  Any type of activity, rolling over in bed, standing up quickly, etc., all elicit increasing spasms and tonic posturing with the right leg.  Pain is now extending into the hips as a result.  Walking is becoming ever more curtailed.  She denies any symptoms involving the upper extremities or up the spinal axis itself.  For the most part the left lower extremity is still minimally symptomatic, even with weightbearing.   The pain can interrupt her sleep cycle.    Unfortunately, she has not received her IVIG infusions in quite some time, they estimate her last infusion in March of this year; we were having some difficulty arranging it on an outpatient basis, coming in to the infusion center is problematic at best.  She has also refused a port placement which would allow home infusions given how difficult it is to find veins and peripheral access.  She is still using Valium 10 mg 3 times a day at about 7 AM, 2 PM and again at 8 PM.  Zanaflex 4 mg, 3 times daily, was tried and failed because of inefficacy.  She is asking now about pain medication and CBD products.    Medical, surgical and family histories are reviewed, there are no new drug allergies.  She is on Valium 10 mg, 3 times daily, ProAmatine, Victoza, Prozac, Jardiance, Lantus and Humalog insulins.    Review of Systems   Musculoskeletal: Positive for back pain and myalgias. Negative for joint pain.   Neurological: Positive for focal weakness. Negative for tremors and loss of consciousness.   Psychiatric/Behavioral: Negative for memory loss. The patient has insomnia.    All other systems reviewed and are negative.       Objective:     /70 (BP  Location: Left arm, Patient Position: Sitting, BP Cuff Size: Adult)   Pulse 76   LMP 01/31/2015      Physical Exam    She appears in notable distress.  She is lying in prone position, difficult to move because of increasing pain in the right leg.  She is cooperative.  Her vital signs are stable.  There is no malar rash.  The neck is supple.  Cardiac evaluation reveals a regular rhythm.  There is hypersensitivity over the lower back and skin of the right lower extremity, this does elicit some increasing spasm and pain into the entire leg.  There is some dystonic, extensor posturing with the right foot.  She moves the left lower extremity with relative ease.  There are no major problems with the upper extremities.  Sensory exam is intact.  There are no obvious cognitive deficits.     Assessment/Plan:     1. 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  Valium is probably the only medication from a symptomatic standpoint that we have available.  She is already failed baclofen and now Zanaflex.  CBD products can be attempted, though they were told about limitations absent available data describing use and dosing, chronicity, etc.  She was reassured that absent THC, this is not a hallucinogenic product.    She has been very unwilling to have a port put in, evidently her  had a real problem which ultimately became fatal.  I again encouraged her to consider.  It opens up a whole avenue of treatment availability including her IVIG as well as treatments like Rituxan if IVIG no longer is effective.  They will call to let me know.    In the interim, I am willing to give her a limited amount of Vicodin 5/325 to see if this helps with pain.  They were told to use a higher dose of Valium, we will increase to 10 mg, 4 times daily.  I am very reluctant  to use narcotics in this circumstance.  They understand the issues with dependency and long-term use.  There are also the issues of interactions with Valium, already a CNS depressant in terms of sleepiness, etc.    We will follow-up in 6 months, phone contact in the interim about making arrangements for port placement and then subsequent IVIG infusion reinitiation.    - diazepam (VALIUM) 10 MG tablet; Take 1 Tab by mouth every 6 hours as needed for Anxiety for up to 122 days.  Dispense: 120 Tab; Refill: 3  - REFERRAL TO PHYSICAL THERAPY Reason for Therapy: Eval/Treat/Report  - HYDROcodone-acetaminophen (NORCO) 5-325 MG Tab per tablet; Take 1-2 Tabs by mouth every four hours as needed for up to 31 days.  Dispense: 20 Tab; Refill: 0  - Consent for Opiate Prescription  - Controlled Substance Treatment Agreement    Time: 25 minutes spent face-to-face for exam, review, discussion, and education, of this over 70% of the time spent counseling and coordinating care.

## 2019-12-11 ENCOUNTER — HOME CARE VISIT (OUTPATIENT)
Dept: HOME HEALTH SERVICES | Facility: HOME HEALTHCARE | Age: 54
End: 2019-12-11
Payer: COMMERCIAL

## 2019-12-11 VITALS
OXYGEN SATURATION: 98 % | SYSTOLIC BLOOD PRESSURE: 118 MMHG | RESPIRATION RATE: 16 BRPM | HEART RATE: 89 BPM | TEMPERATURE: 97.5 F | DIASTOLIC BLOOD PRESSURE: 85 MMHG

## 2019-12-11 PROCEDURE — G0151 HHCP-SERV OF PT,EA 15 MIN: HCPCS

## 2019-12-11 ASSESSMENT — ACTIVITIES OF DAILY LIVING (ADL)
AMBULATION_DISTANCE/DURATION_TOLERATED: 15 FEET
OASIS_M1830: 05
AMBULATION ASSISTANCE ON FLAT SURFACES: 1

## 2019-12-11 ASSESSMENT — ENCOUNTER SYMPTOMS: DEBILITATING PAIN: 1

## 2019-12-12 ENCOUNTER — HOME CARE VISIT (OUTPATIENT)
Dept: HOME HEALTH SERVICES | Facility: HOME HEALTHCARE | Age: 54
End: 2019-12-12
Payer: COMMERCIAL

## 2019-12-12 PROCEDURE — G0299 HHS/HOSPICE OF RN EA 15 MIN: HCPCS

## 2019-12-12 ASSESSMENT — ENCOUNTER SYMPTOMS: MUSCLE WEAKNESS: 1

## 2019-12-12 ASSESSMENT — ACTIVITIES OF DAILY LIVING (ADL)
CURRENT_FUNCTION: ONE PERSON
AMBULATION ASSISTANCE: STAND BY ASSIST
TRANSPORTATION COMMENTS: SPASM

## 2019-12-13 PROCEDURE — G0180 MD CERTIFICATION HHA PATIENT: HCPCS | Performed by: FAMILY MEDICINE

## 2019-12-15 VITALS
SYSTOLIC BLOOD PRESSURE: 116 MMHG | HEART RATE: 66 BPM | TEMPERATURE: 96.6 F | RESPIRATION RATE: 17 BRPM | DIASTOLIC BLOOD PRESSURE: 87 MMHG | OXYGEN SATURATION: 96 %

## 2019-12-15 ASSESSMENT — ENCOUNTER SYMPTOMS: LIMITED RANGE OF MOTION: 1

## 2019-12-16 ENCOUNTER — HOME CARE VISIT (OUTPATIENT)
Dept: HOME HEALTH SERVICES | Facility: HOME HEALTHCARE | Age: 54
End: 2019-12-16
Payer: COMMERCIAL

## 2019-12-16 VITALS
SYSTOLIC BLOOD PRESSURE: 112 MMHG | TEMPERATURE: 97.6 F | RESPIRATION RATE: 16 BRPM | OXYGEN SATURATION: 98 % | DIASTOLIC BLOOD PRESSURE: 85 MMHG | HEART RATE: 88 BPM

## 2019-12-16 PROCEDURE — G0151 HHCP-SERV OF PT,EA 15 MIN: HCPCS

## 2019-12-17 ENCOUNTER — HOME CARE VISIT (OUTPATIENT)
Dept: HOME HEALTH SERVICES | Facility: HOME HEALTHCARE | Age: 54
End: 2019-12-17
Payer: COMMERCIAL

## 2019-12-18 ENCOUNTER — HOME CARE VISIT (OUTPATIENT)
Dept: HOME HEALTH SERVICES | Facility: HOME HEALTHCARE | Age: 54
End: 2019-12-18
Payer: COMMERCIAL

## 2019-12-18 VITALS
RESPIRATION RATE: 18 BRPM | DIASTOLIC BLOOD PRESSURE: 74 MMHG | OXYGEN SATURATION: 98 % | HEART RATE: 88 BPM | TEMPERATURE: 97.9 F | SYSTOLIC BLOOD PRESSURE: 104 MMHG

## 2019-12-18 PROCEDURE — G0151 HHCP-SERV OF PT,EA 15 MIN: HCPCS

## 2019-12-20 ENCOUNTER — HOME CARE VISIT (OUTPATIENT)
Dept: HOME HEALTH SERVICES | Facility: HOME HEALTHCARE | Age: 54
End: 2019-12-20
Payer: COMMERCIAL

## 2019-12-20 VITALS
RESPIRATION RATE: 16 BRPM | HEART RATE: 75 BPM | SYSTOLIC BLOOD PRESSURE: 112 MMHG | DIASTOLIC BLOOD PRESSURE: 65 MMHG | TEMPERATURE: 97.5 F | OXYGEN SATURATION: 99 %

## 2019-12-20 PROCEDURE — G0299 HHS/HOSPICE OF RN EA 15 MIN: HCPCS

## 2019-12-23 ENCOUNTER — HOME CARE VISIT (OUTPATIENT)
Dept: HOME HEALTH SERVICES | Facility: HOME HEALTHCARE | Age: 54
End: 2019-12-23
Payer: COMMERCIAL

## 2019-12-23 VITALS
SYSTOLIC BLOOD PRESSURE: 98 MMHG | DIASTOLIC BLOOD PRESSURE: 60 MMHG | RESPIRATION RATE: 16 BRPM | OXYGEN SATURATION: 97 % | HEART RATE: 81 BPM | TEMPERATURE: 98 F

## 2019-12-23 PROCEDURE — G0151 HHCP-SERV OF PT,EA 15 MIN: HCPCS

## 2019-12-24 ASSESSMENT — ACTIVITIES OF DAILY LIVING (ADL)
AMBULATION ASSISTANCE: STAND BY ASSIST
CURRENT_FUNCTION: STAND BY ASSIST

## 2019-12-24 ASSESSMENT — ENCOUNTER SYMPTOMS: MUSCLE WEAKNESS: 1

## 2019-12-27 ENCOUNTER — HOME CARE VISIT (OUTPATIENT)
Dept: HOME HEALTH SERVICES | Facility: HOME HEALTHCARE | Age: 54
End: 2019-12-27
Payer: COMMERCIAL

## 2019-12-27 PROCEDURE — G0299 HHS/HOSPICE OF RN EA 15 MIN: HCPCS

## 2019-12-30 ENCOUNTER — HOME CARE VISIT (OUTPATIENT)
Dept: HOME HEALTH SERVICES | Facility: HOME HEALTHCARE | Age: 54
End: 2019-12-30
Payer: COMMERCIAL

## 2019-12-30 VITALS
RESPIRATION RATE: 16 BRPM | HEART RATE: 76 BPM | SYSTOLIC BLOOD PRESSURE: 98 MMHG | TEMPERATURE: 97.5 F | OXYGEN SATURATION: 97 % | DIASTOLIC BLOOD PRESSURE: 62 MMHG

## 2019-12-30 VITALS
SYSTOLIC BLOOD PRESSURE: 95 MMHG | HEART RATE: 81 BPM | TEMPERATURE: 97.8 F | RESPIRATION RATE: 16 BRPM | DIASTOLIC BLOOD PRESSURE: 69 MMHG | OXYGEN SATURATION: 97 %

## 2019-12-30 PROCEDURE — G0151 HHCP-SERV OF PT,EA 15 MIN: HCPCS

## 2019-12-30 ASSESSMENT — ACTIVITIES OF DAILY LIVING (ADL)
AMBULATION ASSISTANCE: STAND BY ASSIST
TRANSPORTATION COMMENTS: REQUIRES ASSIST OF ANOTHER PERSON OUT OF HOME ON UNEVEN SURFACES

## 2019-12-30 ASSESSMENT — ENCOUNTER SYMPTOMS: MUSCLE WEAKNESS: 1

## 2019-12-31 ENCOUNTER — TELEPHONE (OUTPATIENT)
Dept: HEALTH INFORMATION MANAGEMENT | Facility: OTHER | Age: 54
End: 2019-12-31

## 2020-01-03 ENCOUNTER — HOME CARE VISIT (OUTPATIENT)
Dept: HOME HEALTH SERVICES | Facility: HOME HEALTHCARE | Age: 55
End: 2020-01-03
Payer: COMMERCIAL

## 2020-01-03 PROCEDURE — G0493 RN CARE EA 15 MIN HH/HOSPICE: HCPCS

## 2020-01-05 VITALS
HEART RATE: 87 BPM | OXYGEN SATURATION: 98 % | DIASTOLIC BLOOD PRESSURE: 73 MMHG | RESPIRATION RATE: 16 BRPM | TEMPERATURE: 97.3 F | SYSTOLIC BLOOD PRESSURE: 100 MMHG

## 2020-01-05 ASSESSMENT — PATIENT HEALTH QUESTIONNAIRE - PHQ9: CLINICAL INTERPRETATION OF PHQ2 SCORE: 0

## 2020-01-05 ASSESSMENT — ACTIVITIES OF DAILY LIVING (ADL)
HOME_HEALTH_OASIS: 01
TRANSPORTATION COMMENTS: SPASM

## 2020-01-07 ASSESSMENT — ACTIVITIES OF DAILY LIVING (ADL): OASIS_M1830: 02

## 2020-01-21 ENCOUNTER — TELEPHONE (OUTPATIENT)
Dept: MEDICAL GROUP | Facility: MEDICAL CENTER | Age: 55
End: 2020-01-21

## 2020-01-21 NOTE — LETTER
January 21, 2020        Gabriela Cheatham-Depintor  [address]  [facility] [address]            Dear Gabriela:    As one of your medical providers, we greatly value the trust you have  placed in [facility]. Our goal is to provide you  the best healthcare experience, and we appreciate having you as our  patient.    It has come to our attention that you have missed at least one scheduled  appointment with your provider during the past 12 months. We  understand that life gets busy at times, but please remember that we set  aside the provider's time to take care of you. We trust you understand  our concern, and we hope we can count on seeing you at your future  appointments. In the event that you cannot make it to one of your  scheduled appointments, please reschedule at least 24 hours in advance  by calling our scheduling line at [phone].    Thank you for trusting us with your healthcare, and we look forward to  seeing you at your next appointment.    If you have any questions or concerns, please don't hesitate to call.    Sincerely,      Jovi Resendiz  Practice Manager    Electronically Signed

## 2020-01-28 ENCOUNTER — OFFICE VISIT (OUTPATIENT)
Dept: MEDICAL GROUP | Facility: MEDICAL CENTER | Age: 55
End: 2020-01-28
Attending: FAMILY MEDICINE
Payer: COMMERCIAL

## 2020-01-28 VITALS
DIASTOLIC BLOOD PRESSURE: 62 MMHG | HEART RATE: 92 BPM | WEIGHT: 112 LBS | OXYGEN SATURATION: 95 % | HEIGHT: 59 IN | RESPIRATION RATE: 16 BRPM | SYSTOLIC BLOOD PRESSURE: 110 MMHG | BODY MASS INDEX: 22.58 KG/M2 | TEMPERATURE: 97.8 F

## 2020-01-28 DIAGNOSIS — F33.41 RECURRENT MAJOR DEPRESSIVE DISORDER, IN PARTIAL REMISSION (HCC): ICD-10-CM

## 2020-01-28 DIAGNOSIS — E11.65 UNCONTROLLED TYPE 2 DIABETES MELLITUS WITH HYPERGLYCEMIA, WITH LONG-TERM CURRENT USE OF INSULIN (HCC): ICD-10-CM

## 2020-01-28 DIAGNOSIS — R76.0 STIFF PERSON SYNDROME WITH POSITIVE GLUTAMIC ACID DECARBOXYLASE (GAD) ANTIBODY: ICD-10-CM

## 2020-01-28 DIAGNOSIS — M24.571 ANKLE CONTRACTURE, RIGHT: ICD-10-CM

## 2020-01-28 DIAGNOSIS — Z79.4 UNCONTROLLED TYPE 2 DIABETES MELLITUS WITH HYPERGLYCEMIA, WITH LONG-TERM CURRENT USE OF INSULIN (HCC): ICD-10-CM

## 2020-01-28 DIAGNOSIS — G25.82 STIFF PERSON SYNDROME WITH POSITIVE GLUTAMIC ACID DECARBOXYLASE (GAD) ANTIBODY: ICD-10-CM

## 2020-01-28 LAB
HBA1C MFR BLD: 9.2 % (ref 0–5.6)
INT CON NEG: NEGATIVE
INT CON POS: POSITIVE

## 2020-01-28 PROCEDURE — 99214 OFFICE O/P EST MOD 30 MIN: CPT | Performed by: FAMILY MEDICINE

## 2020-01-28 PROCEDURE — 83036 HEMOGLOBIN GLYCOSYLATED A1C: CPT | Performed by: FAMILY MEDICINE

## 2020-01-28 PROCEDURE — 99213 OFFICE O/P EST LOW 20 MIN: CPT | Performed by: FAMILY MEDICINE

## 2020-01-29 NOTE — PROGRESS NOTES
Subjective:      Gabriela Cheatham-Depintor is a 54 y.o. female who presents with Diabetes Mellitus            HPI 1.  Type 2 diabetes mellitus-patient has been compliant injecting 14 units of Lantus twice daily.  She also is taking Jardiance 25 mg daily.  She did not tolerate metformin which caused severe GI upset last year.  No recent low blood sugars.  Daughter reports morning sugars are running about 80 with before dinner sugars running 160-180  2.  Moderate depression-patient reports that she felt worse on a brief trial of venlafaxine about 6 weeks ago and returned back to 40 mg of fluoxetine.  She denies suicidal ideation.  She is saddened due to her ongoing difficulty with walking and standing.  She is afraid to get a long-term infusion catheter due to memories of her spouse experiencing complications associated with a long-term catheter.  She continues to live with her daughter.  3.  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 Vic[facility].  He details in his note that her main therapeutic tool would be IVIG infusions however without a port she is extremely hard stick and has not had an infusion since about March 2019.  Daughter reports she seemed to walk better through the summer uncertain if that was directly related to that infusion.  Otherwise her neurologist did advance her diazepam up to 10 mg 4 times daily.  Patient did not start any of the low-dose Norco that was transiently provided.  Patient is noticing plantarflexion and interning of her right ankle due to spasm and might benefit from a brace to hold her ankle in a more neutral position.  She apparently has just switched to Medicare from Medicaid in the past 1 month.  ROS positive for urinary urgency without dysuria, fever, hematuria  Social history-, disabled  Current medications-  Prior to Admission medications    Medication Sig Start Date End Date Taking? Authorizing Provider   Dulaglutide  "(TRULICITY) 0.75 MG/0.5ML Solution Pen-injector Inject 0.75 mg as instructed every 7 days. 1/28/20  Yes Chalo Whelan M.D.   diazepam (VALIUM) 10 MG tablet Take 1 Tab by mouth every 6 hours as needed for Anxiety for up to 122 days. 12/10/19 4/10/20  Alexander Pena M.D.   liraglutide (VICTOZA) 18 MG/3ML Solution Pen-injector Inject 1.2 mg as instructed every day. Indications: Type 2 Diabetes 12/3/19   Physician Outpatient   acetaminophen (TYLENOL) 500 MG Tab Take 500-1,000 mg by mouth every 6 hours as needed for Mild Pain or Moderate Pain.    Physician Outpatient   Empagliflozin (JARDIANCE) 25 MG Tab Take 25 mg by mouth every day. 11/1/19   Chalo Whelan M.D.   midodrine (PROAMATINE) 10 MG tablet Take 1 Tab by mouth 3 times a day, with meals. 10/14/19   Sunday Gonzalez M.D.   insulin glargine (LANTUS) 100 UNIT/ML Solution Pen-injector injection Inject 14 Units as instructed 2 times a day. 10/14/19   Sunday Gonzalez M.D.   Insulin Pen Needle (PEN NEEDLES) 32G X 4 MM Misc 1 Units by Does not apply route 4 times a day. 10/14/19   Sunday Gonzalez M.D.   insulin lispro, Human, (HUMALOG) 100 UNIT/ML injection PEN Inject 2-9 Units as instructed 3 times a day before meals. Use sliding scale as instructed. 10/14/19   Sunday Gonzalez M.D.   FLUoxetine (PROZAC) 10 MG Cap TAKE 1 CAPSULE BY MOUTH EVERY DAY 9/8/19   Chalo Whelan M.D.        Objective:     /62 (BP Location: Left arm, Patient Position: Sitting, BP Cuff Size: Adult)   Pulse 92   Temp 36.6 °C (97.8 °F) (Temporal)   Resp 16   Ht 1.51 m (4' 11.45\")   Wt 50.8 kg (112 lb)   LMP 01/31/2015   SpO2 95%   BMI 22.28 kg/m²      Physical Exam  Gen.- alert, cooperative, in no acute distress  Neck- midline trachea, thyroid not enlarged or tender,supple, no cervical adenopathy  Chest-clear to auscultation and percussion with normal breath sounds. No retractions. Chest wall nontender  Cardiac- regular rhythm and rate. No murmur, thrill, or heave  Right " lower extremity-moderate stiffness in extension at the hip and knee.  Right ankle shows moderate flexibility but with a tendency towards spontaneous inversion  Psych-sad affect with good eye contact. Normal grooming. Oriented x4.         A1c-9.2  Assessment/Plan:       1. 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 Uncontrolled type 2 diabetes mellitus with hyperglycemia, with long-term current use of insulin (HCC)    - POCT  A1C    3. Ankle contracture, right      4. Recurrent major depressive disorder, in partial remission ([facility])  Plan: 1.  Trial of Trulicity 0.75 mg weekly  2.  Continue to check home sugars with revisit in 1 month  3.  AFO brace ordered-Prem  4.  Patient encouraged to consider having a long-term infusion port placed

## 2020-02-27 ENCOUNTER — OFFICE VISIT (OUTPATIENT)
Dept: MEDICAL GROUP | Facility: MEDICAL CENTER | Age: 55
End: 2020-02-27
Attending: FAMILY MEDICINE
Payer: MEDICARE

## 2020-02-27 VITALS
HEART RATE: 92 BPM | OXYGEN SATURATION: 94 % | BODY MASS INDEX: 22.28 KG/M2 | DIASTOLIC BLOOD PRESSURE: 76 MMHG | HEIGHT: 59 IN | TEMPERATURE: 97.7 F | RESPIRATION RATE: 16 BRPM | SYSTOLIC BLOOD PRESSURE: 112 MMHG

## 2020-02-27 DIAGNOSIS — G25.82 STIFF PERSON SYNDROME WITH POSITIVE GLUTAMIC ACID DECARBOXYLASE (GAD) ANTIBODY: ICD-10-CM

## 2020-02-27 DIAGNOSIS — Z79.4 UNCONTROLLED TYPE 2 DIABETES MELLITUS WITH HYPERGLYCEMIA, WITH LONG-TERM CURRENT USE OF INSULIN (HCC): ICD-10-CM

## 2020-02-27 DIAGNOSIS — E11.65 UNCONTROLLED TYPE 2 DIABETES MELLITUS WITH HYPERGLYCEMIA, WITH LONG-TERM CURRENT USE OF INSULIN (HCC): ICD-10-CM

## 2020-02-27 DIAGNOSIS — R76.0 STIFF PERSON SYNDROME WITH POSITIVE GLUTAMIC ACID DECARBOXYLASE (GAD) ANTIBODY: ICD-10-CM

## 2020-02-27 DIAGNOSIS — L29.0 ANAL ITCHING: ICD-10-CM

## 2020-02-27 PROCEDURE — 99214 OFFICE O/P EST MOD 30 MIN: CPT | Performed by: FAMILY MEDICINE

## 2020-02-27 PROCEDURE — 99213 OFFICE O/P EST LOW 20 MIN: CPT | Performed by: FAMILY MEDICINE

## 2020-02-27 RX ORDER — DULAGLUTIDE 1.5 MG/.5ML
1.5 INJECTION, SOLUTION SUBCUTANEOUS
Qty: 4 PEN | Refills: 11 | Status: SHIPPED | OUTPATIENT
Start: 2020-02-27 | End: 2021-09-24

## 2020-02-27 RX ORDER — BENZOCAINE/MENTHOL 6 MG-10 MG
LOZENGE MUCOUS MEMBRANE
Qty: 1 TUBE | Refills: 0 | Status: SHIPPED | OUTPATIENT
Start: 2020-02-27 | End: 2021-09-24

## 2020-02-28 NOTE — PROGRESS NOTES
Subjective:      Gabriela Cheatham-Depintor is a 54 y.o. female who presents with Diabetes Mellitus            HPI 1.  Uncontrolled type 2 diabetes mellitus-patient's daughter who is acting as her  today reports that she tolerated Trulicity 0.75 mg without difficulty.  Perhaps once per week she will express transient abdominal discomfort lasting a few minutes after taking her morning dose of diazepam but not through the other 2 doses later in the day.  Morning sugars have dropped from the 80s to the 70s before breakfast and evening sugars before dinner are now 160-180 rather than 200.  She has not had any low blood sugars characterized by headache or diaphoresis  2.  Perianal itching-patient reports that she will have formed brown stools every 1 to 2 days.  She reports on occasion she will feel brief perianal itching after defecation.  The itching seems to subside over a few minutes.  She has not seen any blood in her stool, and has no diffuse pruritus.  3.  Stiff person syndrome-patient's daughter reports that her spasms involving her right lower extremity have been moderately improved recently perhaps paralleling a moderate decrease in her recent stress levels.  She is taking the fluoxetine 10 mg.  She has previously failed oral baclofen.  Currently is doing better on diazepam 10 mg 3 times daily.  She was curious about intrathecal baclofen.  Discussed that it would require implantation of a pump device and she is not in favor of any implanted device including a medication port.  Patient's family reports they had not heard anything from [facility] where we had ordered her AFO brace for her right ankle  ROS negative for current fever, urinary incontinence, fecal incontinence, confusion  Social history-, not working  Current medication-  Prior to Admission medications    Medication Sig Start Date End Date Taking? Authorizing Provider   hydrocortisone 1 % Cream 1 application to affected area daily  "as needed 2/27/20  Yes Chalo Whelan M.D.   Dulaglutide (TRULICITY) 1.5 MG/0.5ML Solution Pen-injector Inject 1.5 mg as instructed every 7 days. 2/27/20  Yes Chalo Whelan M.D.           diazepam (VALIUM) 10 MG tablet Take 1 Tab by mouth every 6 hours as needed for Anxiety for up to 122 days. 12/10/19 4/10/20  Aelxander Pena M.D.   acetaminophen (TYLENOL) 500 MG Tab Take 500-1,000 mg by mouth every 6 hours as needed for Mild Pain or Moderate Pain.    Physician Outpatient   Empagliflozin (JARDIANCE) 25 MG Tab Take 25 mg by mouth every day. 11/1/19   Chalo Whelan M.D.   midodrine (PROAMATINE) 10 MG tablet Take 1 Tab by mouth 3 times a day, with meals. 10/14/19   Sunday Gonzalez M.D.   Insulin Pen Needle (PEN NEEDLES) 32G X 4 MM Misc 1 Units by Does not apply route 4 times a day. 10/14/19   Sunday Gonzalez M.D.   insulin lispro, Human, (HUMALOG) 100 UNIT/ML injection PEN Inject 2-9 Units as instructed 3 times a day before meals. Use sliding scale as instructed. 10/14/19   Sunday Gonzalez M.D.   FLUoxetine (PROZAC) 10 MG Cap TAKE 1 CAPSULE BY MOUTH EVERY DAY 9/8/19   Chalo Whelan M.D.          Objective:     /76 (BP Location: Left arm, Patient Position: Sitting, BP Cuff Size: Adult)   Pulse 92   Temp 36.5 °C (97.7 °F) (Temporal)   Resp 16   Ht 1.51 m (4' 11.45\")   LMP 01/31/2015   SpO2 94%   BMI 22.28 kg/m²      Physical Exam  Gen.- alert, cooperative, in no acute distress  Neck- midline trachea, thyroid not enlarged or tender,supple, no cervical adenopathy  Chest-clear to auscultation and percussion with normal breath sounds. No retractions. Chest wall nontender  Cardiac- regular rhythm and rate. No murmur, thrill, or heave  Abdomen- normal bowel sounds, soft without guarding. Liver and spleen not enlarged, no palpable masses or tenderness.  Anus-anus and surrounding buttocks tissue is normal in appearance without masses fissures current redness   Lower extremities-marked stiffness of " her right knee and ankle.  Ankle is contracted in a inverted position       Assessment/Plan:       1. Uncontrolled type 2 diabetes mellitus with hyperglycemia, with long-term current use of insulin ([facility])      2. 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 Anal itching    Plan: 1.  Increase Trulicity to 1.5 mg weekly  2.  Patient will call [facility] regarding fitting for her AFO brace  3.  Revisit with me in 6 weeks  4.  1% hydrocortisone cream to be applied locally in the immediate perianal area once daily if needed

## 2020-04-09 ENCOUNTER — OFFICE VISIT (OUTPATIENT)
Dept: MEDICAL GROUP | Facility: MEDICAL CENTER | Age: 55
End: 2020-04-09
Attending: FAMILY MEDICINE
Payer: MEDICARE

## 2020-04-09 DIAGNOSIS — R76.0 STIFF PERSON SYNDROME WITH POSITIVE GLUTAMIC ACID DECARBOXYLASE (GAD) ANTIBODY: ICD-10-CM

## 2020-04-09 DIAGNOSIS — Z79.4 UNCONTROLLED TYPE 2 DIABETES MELLITUS WITH HYPERGLYCEMIA, WITH LONG-TERM CURRENT USE OF INSULIN (HCC): ICD-10-CM

## 2020-04-09 DIAGNOSIS — E11.65 UNCONTROLLED TYPE 2 DIABETES MELLITUS WITH HYPERGLYCEMIA, WITH LONG-TERM CURRENT USE OF INSULIN (HCC): ICD-10-CM

## 2020-04-09 DIAGNOSIS — G25.82 STIFF PERSON SYNDROME WITH POSITIVE GLUTAMIC ACID DECARBOXYLASE (GAD) ANTIBODY: ICD-10-CM

## 2020-04-09 DIAGNOSIS — K60.2 ANAL FISSURE: ICD-10-CM

## 2020-04-09 PROCEDURE — 99442 PR PHYSICIAN TELEPHONE EVALUATION 11-20 MIN: CPT | Mod: 95,CR | Performed by: FAMILY MEDICINE

## 2020-04-09 RX ORDER — NITROGLYCERIN 4 MG/G
1 OINTMENT RECTAL 2 TIMES DAILY PRN
Qty: 20 G | Refills: 1 | Status: SHIPPED | OUTPATIENT
Start: 2020-04-09 | End: 2021-09-24

## 2020-04-09 RX ORDER — DIAZEPAM 10 MG/1
10 TABLET ORAL EVERY 6 HOURS PRN
Qty: 120 TAB | Refills: 5 | Status: SHIPPED | OUTPATIENT
Start: 2020-04-09 | End: 2020-11-13 | Stop reason: SDUPTHER

## 2020-04-10 NOTE — PROGRESS NOTES
Telephone Appointment Visit   As a means of avoiding spread of COVID-19, this visit is being conducted by telephone. This telephone visit was initiated by the patient and they verbally consented.    Time at start of call: 4:45 PM    Reason for Call: Symptom follow-up    Patient Comments / History:   1.  Diabetes mellitus- spoke with dtr today, AM sugars about 80, PM running 160-180. No low sugar episodes characterized by diaphoresis or nausea.  2.  Anal fissure-daughter reports that patient continues to notice intermittent anal pruritus and that the daughter has seen a small fissure at about the 12 o'clock position at the anal verge recently.  There has been no bleeding or purulent drainage.  Use of the mild hydrocortisone cream provides only temporary relief.  3.  Right foot contracture-patient reports that there attempts to go get the AFO brace were thwarted by the coronavirus economic lockdown, and they will pursue that when conditions normalized.    Labs / Images Reviewed   None    Assessment and Plan:     1. 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  Dispense: 120 Tab; Refill: 5    2. Anal fissure  - Nitroglycerin 0.4 % Ointment; Insert 1 Application in rectum 2 times a day as needed.  Dispense: 20 g; Refill: 1    3. Uncontrolled type 2 diabetes mellitus with hyperglycemia, with long-term current use of insulin ([facility])      Follow-up: 1.  Follow-up in 1 month  2.  Rx 0.4% nitroglycerin ointment to be applied twice daily to site of anal fissure  Time at end of call: 4:59 PM  Total Time Spent: 11-20 minutes    Chalo Whelan M.D.

## 2020-05-06 ENCOUNTER — TELEPHONE (OUTPATIENT)
Dept: URGENT CARE | Facility: CLINIC | Age: 55
End: 2020-05-06

## 2020-05-06 DIAGNOSIS — H10.33 ACUTE BACTERIAL CONJUNCTIVITIS OF BOTH EYES: ICD-10-CM

## 2020-05-06 RX ORDER — POLYMYXIN B SULFATE AND TRIMETHOPRIM 1; 10000 MG/ML; [USP'U]/ML
SOLUTION OPHTHALMIC
Qty: 10 ML | Refills: 1 | Status: SHIPPED | OUTPATIENT
Start: 2020-05-06 | End: 2021-09-24

## 2020-05-07 NOTE — TELEPHONE ENCOUNTER
Spoke to daughter-in-law Mary Ann who is MA with [email or web address] and I work with her. Patient has been having red eyes, watering, pain, itching, and light sensitivity for few weeks, but got worse today. She prefers dark room. Some blurry vision. No floaters or flashers. Some headache. Tried usual remedies they do when she has had problem in the past, but has not worked. Discussed possible migraine, possible bacterial conjunctivitis, glaucoma, or other. Offered to prescribe her antibiotic eye drops to try as is 9:30 PM now but advised she should call [facility] to be seen, gave main number [phone] and their Emergency number [phone]. Recommended see [facility] if getting worse at anytime, or if patient is stable, can try the drops for up to 2 days, but if no better should see [facility]. She understands and agrees.

## 2020-06-18 ENCOUNTER — TELEMEDICINE (OUTPATIENT)
Dept: MEDICAL GROUP | Facility: MEDICAL CENTER | Age: 55
End: 2020-06-18
Attending: FAMILY MEDICINE
Payer: MEDICARE

## 2020-06-18 DIAGNOSIS — Z79.4 UNCONTROLLED TYPE 2 DIABETES MELLITUS WITH HYPERGLYCEMIA, WITH LONG-TERM CURRENT USE OF INSULIN (HCC): ICD-10-CM

## 2020-06-18 DIAGNOSIS — E11.65 UNCONTROLLED TYPE 2 DIABETES MELLITUS WITH HYPERGLYCEMIA, WITH LONG-TERM CURRENT USE OF INSULIN (HCC): ICD-10-CM

## 2020-06-18 DIAGNOSIS — R76.0 STIFF PERSON SYNDROME WITH POSITIVE GLUTAMIC ACID DECARBOXYLASE (GAD) ANTIBODY: ICD-10-CM

## 2020-06-18 DIAGNOSIS — G25.82 STIFF PERSON SYNDROME WITH POSITIVE GLUTAMIC ACID DECARBOXYLASE (GAD) ANTIBODY: ICD-10-CM

## 2020-06-18 DIAGNOSIS — M25.512 ACUTE PAIN OF LEFT SHOULDER: ICD-10-CM

## 2020-06-18 PROCEDURE — 99214 OFFICE O/P EST MOD 30 MIN: CPT | Mod: 95,CR | Performed by: FAMILY MEDICINE

## 2020-06-18 RX ORDER — METHOCARBAMOL 500 MG/1
500 TABLET, FILM COATED ORAL 2 TIMES DAILY PRN
Qty: 60 TAB | Refills: 0 | Status: SHIPPED | OUTPATIENT
Start: 2020-06-18 | End: 2020-07-20

## 2020-06-19 NOTE — PROGRESS NOTES
Telemedicine Video Visit: Established Patient   This Remote Face to Face encounter was conducted via Zoom. Given the importance of social distancing and other strategies recommended to reduce the risk of COVID-19 transmission, I am providing medical care to this patient via audio/video visit in place of an in person visit at the request of the patient. Verbal consent to telehealth, risks, benefits, and consequences were discussed. Patient retains the right to withdraw at any time. All existing confidentiality protections apply. The patient has access to all transmitted medical information. No dissemination of any patient images or information to other entities without further written consent.  Subjective:   No chief complaint on file.      Gabriela Cheatham-Depintor is a 54 y.o. female presenting for evaluation and management of:    1.  Diabetes mellitus-daughter reports that morning sugars are running 70-80 before breakfast and 160 and 165 before dinner.  Patient is compliant taking Trulicity 1.5 mg, and Jardiance 25 mg.  She did not tolerate metformin.  No symptomatic low blood sugars characterized by headache or diaphoresis.  2.  Anal fissures-patient reports that those areas of tenderness have resolved with the use of the nitroglycerin ointment several months ago.  3.  Left shoulder pain-patient reports that she will go out and walk using her walker about 15 minutes at least once a day in her yard.  She reports that over the past 2 weeks she has noted intermittent pain in the left shoulder typically brought on when she is walking.  Rarely she will awaken with pain.  There is been no fall or trauma.  She also reports some pain on the side of her neck as well.  Local heat or cold is helpful.  She is taking diazepam 10 mg 3 times daily to reduce her symptoms of stiff person syndrome.    ROS   Denies any recent fevers or chills. No nausea or vomiting. No chest pains or shortness of breath.     No Known  Allergies    Current medicines (including changes today)  Current Outpatient Medications   Medication Sig Dispense Refill   • methocarbamol (ROBAXIN) 500 MG Tab Take 1 Tab by mouth 2 times a day as needed. 60 Tab 0   • polymixin-trimethoprim (POLYTRIM) 91314-1.1 UNIT/ML-% Solution 1-2 DROPS TO BOTH EYES EVERY 4-6 HOURS WHILE AWAKE. USE UNTIL BETTER AND FOR ONE EXTRA DAY AFTER BETTER. 10 mL 1   • diazepam (VALIUM) 10 MG tablet Take 1 Tab by mouth every 6 hours as needed for Anxiety for up to 122 days. 120 Tab 5   • Nitroglycerin 0.4 % Ointment Insert 1 Application in rectum 2 times a day as needed. 20 g 1   • FLUoxetine (PROZAC) 10 MG Cap TAKE 1 CAPSULE BY MOUTH EVERY DAY 30 Cap 5   • hydrocortisone 1 % Cream 1 application to affected area daily as needed 1 Tube 0   • Dulaglutide (TRULICITY) 1.5 MG/0.5ML Solution Pen-injector Inject 1.5 mg as instructed every 7 days. 4 PEN 11   • acetaminophen (TYLENOL) 500 MG Tab Take 500-1,000 mg by mouth every 6 hours as needed for Mild Pain or Moderate Pain.     • Empagliflozin (JARDIANCE) 25 MG Tab Take 25 mg by mouth every day. 30 Tab 11   • midodrine (PROAMATINE) 10 MG tablet Take 1 Tab by mouth 3 times a day, with meals. 90 Tab 2   • Insulin Pen Needle (PEN NEEDLES) 32G X 4 MM Misc 1 Units by Does not apply route 4 times a day. 100 Each 0   • insulin lispro, Human, (HUMALOG) 100 UNIT/ML injection PEN Inject 2-9 Units as instructed 3 times a day before meals. Use sliding scale as instructed. 5 PEN 0     No current facility-administered medications for this visit.        Patient Active Problem List    Diagnosis Date Noted   • DKA (diabetic ketoacidoses) ([facility]) 10/08/2019     Priority: High   • Recurrent major depressive disorder, in partial remission ([facility]) 01/23/2018     Priority: High   • Right leg pain 01/03/2018     Priority: High   • Uncontrolled type 2 diabetes mellitus with hyperglycemia, with long-term current use of insulin ([facility]) 11/01/2019     Priority: Medium   • SIRS  (systemic inflammatory response syndrome) ([facility]) 10/08/2019     Priority: Medium   • 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     Priority: Medium   • Leukocytosis 2018     Priority: Medium   • Pain in lower back 2016     Priority: Medium   • Lower extremity weakness 2016     Priority: Medium   • Thrombocytopenia ([facility]) 10/08/2019     Priority: Low   • Vitamin D deficiency 2018     Priority: Low   • Type 2 diabetes mellitus untreated, with ketoacidosis  2016     Priority: Low   • GERD (gastroesophageal reflux disease) 2016     Priority: Low   • Hypophosphatemia 2015     Priority: Low   • 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  She  has a past surgical history that includes cholecystectomy; appendectomy; and pr  delivery only.       Objective:   Vitals obtained by patient:  LMP 2015     Physical Exam:  Constitutional: Alert, no distress, well-groomed.  Skin: No rashes in visible areas.  Eye: Round. Conjunctiva clear, lids normal. No icterus.   ENMT: Lips pink without lesions, good dentition, moist mucous membranes. Phonation normal.  Neck: No masses, no thyromegaly. Moves freely without pain.  CV: Pulse as reported by patient  Respiratory: Unlabored respiratory effort, no cough or audible wheeze  Psych: Alert and oriented x3, normal affect and mood.   Skin-clear without rashes/bumps    Assessment and  Plan:   The following treatment plan was discussed:     1. 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 Uncontrolled type 2 diabetes mellitus with hyperglycemia, with long-term current use of insulin (HCC)  - HEMOGLOBIN A1C; Future  - Comp Metabolic Panel; Future  - CBC WITH DIFFERENTIAL; Future  - Lipid Profile; Future  - MICROALBUMIN CREAT RATIO URINE; Future    3. Acute pain of left shoulder  - methocarbamol (ROBAXIN) 500 MG Tab; Take 1 Tab by mouth 2 times a day as needed.  Dispense: 60 Tab; Refill: 0        Follow-up: 1.  Trial of Robaxin 500 mg twice daily to reduce left shoulder pain  2.  Patient reports she is doing lab work for her eye doctor, will add A1c, CMP, CBC, lipids, urine microalbumin  3.  Follow-up with me in 1 month    Face to Face Video Visit:   I spent 20 minutes with patient/guardian and I conducted this visit with audio and video present.  Chalo Whelan M.D.

## 2020-07-29 ENCOUNTER — HOSPITAL ENCOUNTER (OUTPATIENT)
Dept: LAB | Facility: MEDICAL CENTER | Age: 55
End: 2020-07-29
Attending: OPHTHALMOLOGY
Payer: MEDICARE

## 2020-07-29 LAB
BASOPHILS # BLD AUTO: 0.5 % (ref 0–1.8)
BASOPHILS # BLD: 0.04 K/UL (ref 0–0.12)
EOSINOPHIL # BLD AUTO: 0.07 K/UL (ref 0–0.51)
EOSINOPHIL NFR BLD: 0.9 % (ref 0–6.9)
ERYTHROCYTE [DISTWIDTH] IN BLOOD BY AUTOMATED COUNT: 40.9 FL (ref 35.9–50)
HCT VFR BLD AUTO: 45.8 % (ref 37–47)
HGB BLD-MCNC: 15.4 G/DL (ref 12–16)
IMM GRANULOCYTES # BLD AUTO: 0.01 K/UL (ref 0–0.11)
IMM GRANULOCYTES NFR BLD AUTO: 0.1 % (ref 0–0.9)
LYMPHOCYTES # BLD AUTO: 2.19 K/UL (ref 1–4.8)
LYMPHOCYTES NFR BLD: 27.4 % (ref 22–41)
MCH RBC QN AUTO: 30.6 PG (ref 27–33)
MCHC RBC AUTO-ENTMCNC: 33.6 G/DL (ref 33.6–35)
MCV RBC AUTO: 90.9 FL (ref 81.4–97.8)
MONOCYTES # BLD AUTO: 0.32 K/UL (ref 0–0.85)
MONOCYTES NFR BLD AUTO: 4 % (ref 0–13.4)
NEUTROPHILS # BLD AUTO: 5.36 K/UL (ref 2–7.15)
NEUTROPHILS NFR BLD: 67.1 % (ref 44–72)
NRBC # BLD AUTO: 0 K/UL
NRBC BLD-RTO: 0 /100 WBC
PLATELET # BLD AUTO: 228 K/UL (ref 164–446)
PMV BLD AUTO: 13.5 FL (ref 9–12.9)
RBC # BLD AUTO: 5.04 M/UL (ref 4.2–5.4)
RHEUMATOID FACT SER IA-ACNC: <10 IU/ML (ref 0–14)
WBC # BLD AUTO: 8 K/UL (ref 4.8–10.8)

## 2020-07-29 PROCEDURE — 86235 NUCLEAR ANTIGEN ANTIBODY: CPT | Mod: 91

## 2020-07-29 PROCEDURE — 85025 COMPLETE CBC W/AUTO DIFF WBC: CPT

## 2020-07-29 PROCEDURE — 86431 RHEUMATOID FACTOR QUANT: CPT

## 2020-07-29 PROCEDURE — 85652 RBC SED RATE AUTOMATED: CPT

## 2020-07-29 PROCEDURE — 86038 ANTINUCLEAR ANTIBODIES: CPT

## 2020-07-29 PROCEDURE — 36415 COLL VENOUS BLD VENIPUNCTURE: CPT

## 2020-07-29 PROCEDURE — 83520 IMMUNOASSAY QUANT NOS NONAB: CPT

## 2020-07-30 LAB — ERYTHROCYTE [SEDIMENTATION RATE] IN BLOOD BY WESTERGREN METHOD: 35 MM/HOUR (ref 0–30)

## 2020-07-31 LAB
ENA SS-B IGG SER IA-ACNC: 0 AU/ML (ref 0–40)
NUCLEAR IGG SER QL IA: NORMAL
SSA52 R0ENA AB IGG Q0420: 2 AU/ML (ref 0–40)
SSA60 R0ENA AB IGG Q0419: 0 AU/ML (ref 0–40)

## 2020-08-07 DIAGNOSIS — F32.2 CURRENT SEVERE EPISODE OF MAJOR DEPRESSIVE DISORDER WITHOUT PSYCHOTIC FEATURES, UNSPECIFIED WHETHER RECURRENT (HCC): ICD-10-CM

## 2020-08-07 RX ORDER — FLUOXETINE 10 MG/1
CAPSULE ORAL
Qty: 90 CAP | Refills: 0 | Status: SHIPPED | OUTPATIENT
Start: 2020-08-07 | End: 2020-11-16

## 2020-08-18 LAB — MISCELLANEOUS LAB RESULT MISCLAB: NORMAL

## 2020-11-10 ENCOUNTER — PATIENT MESSAGE (OUTPATIENT)
Dept: MEDICAL GROUP | Facility: MEDICAL CENTER | Age: 55
End: 2020-11-10

## 2020-11-10 DIAGNOSIS — G25.82 STIFF PERSON SYNDROME WITH POSITIVE GLUTAMIC ACID DECARBOXYLASE (GAD) ANTIBODY: ICD-10-CM

## 2020-11-10 DIAGNOSIS — R76.0 STIFF PERSON SYNDROME WITH POSITIVE GLUTAMIC ACID DECARBOXYLASE (GAD) ANTIBODY: ICD-10-CM

## 2020-11-10 RX ORDER — DIAZEPAM 10 MG/1
10 TABLET ORAL EVERY 6 HOURS PRN
Qty: 120 TAB | Refills: 0 | Status: CANCELLED
Start: 2020-11-10 | End: 2021-03-12

## 2020-11-12 ENCOUNTER — PATIENT MESSAGE (OUTPATIENT)
Dept: MEDICAL GROUP | Facility: MEDICAL CENTER | Age: 55
End: 2020-11-12

## 2020-11-12 DIAGNOSIS — G25.82 STIFF PERSON SYNDROME WITH POSITIVE GLUTAMIC ACID DECARBOXYLASE (GAD) ANTIBODY: ICD-10-CM

## 2020-11-12 DIAGNOSIS — R76.0 STIFF PERSON SYNDROME WITH POSITIVE GLUTAMIC ACID DECARBOXYLASE (GAD) ANTIBODY: ICD-10-CM

## 2020-11-13 DIAGNOSIS — G25.82 STIFF PERSON SYNDROME WITH POSITIVE GLUTAMIC ACID DECARBOXYLASE (GAD) ANTIBODY: ICD-10-CM

## 2020-11-13 DIAGNOSIS — F32.2 CURRENT SEVERE EPISODE OF MAJOR DEPRESSIVE DISORDER WITHOUT PSYCHOTIC FEATURES, UNSPECIFIED WHETHER RECURRENT (HCC): ICD-10-CM

## 2020-11-13 DIAGNOSIS — R76.0 STIFF PERSON SYNDROME WITH POSITIVE GLUTAMIC ACID DECARBOXYLASE (GAD) ANTIBODY: ICD-10-CM

## 2020-11-13 RX ORDER — DIAZEPAM 10 MG/1
10 TABLET ORAL 3 TIMES DAILY
Qty: 21 TAB | Refills: 0 | Status: SHIPPED | OUTPATIENT
Start: 2020-11-13 | End: 2020-11-20

## 2020-11-13 NOTE — PROGRESS NOTES
Please call patient to find out which pharmacy she uses.  I have refilled the diazepam 10 mg 3 times daily for 1 week.  Her  only shows that she is getting 5 mg and her last fill was in May.  I am not sure why there is this discrepancy with the .  Dr. Jovel's notes do support what the patient is saying.  When he returns from vacation, he can continue her prescription as appropriate.    Ruby Pinedo M.D.

## 2020-11-14 RX ORDER — DIAZEPAM 10 MG/1
10 TABLET ORAL EVERY 6 HOURS PRN
Qty: 120 TAB | Refills: 0 | Status: SHIPPED | OUTPATIENT
Start: 2020-11-14 | End: 2020-12-29 | Stop reason: SDUPTHER

## 2020-11-16 RX ORDER — FLUOXETINE 10 MG/1
CAPSULE ORAL
Qty: 90 CAP | Refills: 0 | Status: SHIPPED | OUTPATIENT
Start: 2020-11-16 | End: 2021-02-22

## 2020-12-07 ENCOUNTER — TELEMEDICINE (OUTPATIENT)
Dept: MEDICAL GROUP | Facility: MEDICAL CENTER | Age: 55
End: 2020-12-07
Attending: FAMILY MEDICINE
Payer: MEDICARE

## 2020-12-07 DIAGNOSIS — R04.0 EPISTAXIS: ICD-10-CM

## 2020-12-07 DIAGNOSIS — K59.00 OBSTIPATION: ICD-10-CM

## 2020-12-07 DIAGNOSIS — G25.82 STIFF PERSON SYNDROME WITH POSITIVE GLUTAMIC ACID DECARBOXYLASE (GAD) ANTIBODY: ICD-10-CM

## 2020-12-07 DIAGNOSIS — R76.0 STIFF PERSON SYNDROME WITH POSITIVE GLUTAMIC ACID DECARBOXYLASE (GAD) ANTIBODY: ICD-10-CM

## 2020-12-07 PROCEDURE — 99213 OFFICE O/P EST LOW 20 MIN: CPT | Mod: 95,CR | Performed by: FAMILY MEDICINE

## 2020-12-08 NOTE — PROGRESS NOTES
Telemedicine Video Visit: Established Patient   This Remote Face to Face encounter was conducted via Zoom. Given the importance of social distancing and other strategies recommended to reduce the risk of COVID-19 transmission, I am providing medical care to this patient via audio/video visit in place of an in person visit at the request of the patient. Verbal consent to telehealth, risks, benefits, and consequences were discussed. Patient retains the right to withdraw at any time. All existing confidentiality protections apply. The patient has access to all transmitted medical information. No dissemination of any patient images or information to other entities without further written consent.  Subjective:   No chief complaint on file.      Gabriela Cheatham-Depintor is a 55 y.o. female presenting for evaluation and management of:    1.  Epistaxis-patient is interviewed today through her daughter acting as her  with the patient's permission.  They give a history of nosebleeds can be either side but not bilaterally concurrently lasting 5 to 10 minutes.  They are occurring about 2 times per week over the past 1 month.  There is been no accompanying increase in skin bruising, blood in the urine or blood in the stool.  CBC was collected in July which showed normal red blood cell white blood cell and platelet lines.  2.  Obstipation-patient reports she is defecating every day however on occasion she has difficulty and has to push and bear down.  This tends to get her anus irritated and trigger a cycle of pain and itching.  Stools are formed without blood  3.  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  She is able to walk slowly using her walker.  She feels that diet diazepam which she is taking 10 mg 3 times a day is causing thickened saliva and would like to decrease her use of diazepam if possible    ROS   Denies any recent fevers or  chills. No nausea or vomiting. No chest pains or shortness of breath.     No Known Allergies    Current medicines (including changes today)  Current Outpatient Medications   Medication Sig Dispense Refill   • FLUoxetine (PROZAC) 10 MG Cap TAKE 1 CAPSULE BY MOUTH EVERY DAY 90 Cap 0   • diazepam (VALIUM) 10 MG tablet Take 1 Tab by mouth every 6 hours as needed for Anxiety for up to 122 days. 120 Tab 0   • JARDIANCE 25 MG Tab TAKE 1 TABLET BY MOUTH EVERY DAY 90 Tab 3   • methocarbamol (ROBAXIN) 500 MG Tab TAKE 1 TABLET BY MOUTH 2 TIMES A DAY AS NEEDED 60 Tab 3   • polymixin-trimethoprim (POLYTRIM) 10140-6.1 UNIT/ML-% Solution 1-2 DROPS TO BOTH EYES EVERY 4-6 HOURS WHILE AWAKE. USE UNTIL BETTER AND FOR ONE EXTRA DAY AFTER BETTER. 10 mL 1   • Nitroglycerin 0.4 % Ointment Insert 1 Application in rectum 2 times a day as needed. 20 g 1   • hydrocortisone 1 % Cream 1 application to affected area daily as needed 1 Tube 0   • Dulaglutide (TRULICITY) 1.5 MG/0.5ML Solution Pen-injector Inject 1.5 mg as instructed every 7 days. 4 PEN 11   • acetaminophen (TYLENOL) 500 MG Tab Take 500-1,000 mg by mouth every 6 hours as needed for Mild Pain or Moderate Pain.     • midodrine (PROAMATINE) 10 MG tablet Take 1 Tab by mouth 3 times a day, with meals. 90 Tab 2   • Insulin Pen Needle (PEN NEEDLES) 32G X 4 MM Misc 1 Units by Does not apply route 4 times a day. 100 Each 0   • insulin lispro, Human, (HUMALOG) 100 UNIT/ML injection PEN Inject 2-9 Units as instructed 3 times a day before meals. Use sliding scale as instructed. 5 PEN 0     No current facility-administered medications for this visit.        Patient Active Problem List    Diagnosis Date Noted   • DKA (diabetic ketoacidoses) ([facility]) 10/08/2019     Priority: High   • Recurrent major depressive disorder, in partial remission ([facility]) 01/23/2018     Priority: High   • Right leg pain 01/03/2018     Priority: High   • Uncontrolled type 2 diabetes mellitus with hyperglycemia, with long-term  current use of insulin ([facility]) 2019     Priority: Medium   • SIRS (systemic inflammatory response syndrome) ([facility]) 10/08/2019     Priority: Medium   • 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     Priority: Medium   • Leukocytosis 2018     Priority: Medium   • Pain in lower back 2016     Priority: Medium   • Lower extremity weakness 2016     Priority: Medium   • Thrombocytopenia ([facility]) 10/08/2019     Priority: Low   • Vitamin D deficiency 2018     Priority: Low   • Type 2 diabetes mellitus untreated, with ketoacidosis  2016     Priority: Low   • GERD (gastroesophageal reflux disease) 2016     Priority: Low   • Hypophosphatemia 2015     Priority: Low   • 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  She  has a past surgical history that includes cholecystectomy; appendectomy; and pr  delivery only.       Objective:   Vitals obtained by patient:  LMP 2015     Physical Exam:  Constitutional: Alert, no distress, well-groomed.  Skin: No rashes in visible areas.  Eye: Round. Conjunctiva clear, lids normal. No icterus.   ENMT: Lips pink without lesions, good dentition, moist mucous membranes. Phonation normal.  Neck: No masses, no thyromegaly. Moves freely without pain.  CV: Pulse as reported by patient  Respiratory: Unlabored respiratory effort, no cough or audible wheeze  Psych: Alert and oriented x3, normal  affect and mood.       Assessment and Plan:   The following treatment plan was discussed:     1. Epistaxis    2. 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 Obstipation        Follow-up: 1.  May decrease a single dose of diazepam 10 mg down to 5 mg continuing 10 mg the other 2 doses each day.  If patient notes no worsening of her spasms and muscle stiffness in 1 week may decrease a second dose per day down to 5 mg  2.  Trial of GlycoLax 1 capful with 6 ounces of water daily or a stool softener such as Colace 1 tablet daily for softer stools  3.  Trial of small dab of Vaseline on the distal intranasal septum bilaterally once or twice daily  4.  Follow-up as needed    Face to Face Video Visit:   I spent 18 minutes with patient/guardian and I conducted this visit with audio and video present.  Chalo Whelan M.D.

## 2020-12-29 DIAGNOSIS — G25.82 STIFF PERSON SYNDROME WITH POSITIVE GLUTAMIC ACID DECARBOXYLASE (GAD) ANTIBODY: ICD-10-CM

## 2020-12-29 DIAGNOSIS — R76.0 STIFF PERSON SYNDROME WITH POSITIVE GLUTAMIC ACID DECARBOXYLASE (GAD) ANTIBODY: ICD-10-CM

## 2020-12-29 RX ORDER — DIAZEPAM 10 MG/1
10 TABLET ORAL EVERY 6 HOURS PRN
Qty: 120 TAB | Refills: 0 | Status: SHIPPED | OUTPATIENT
Start: 2020-12-29 | End: 2020-12-31 | Stop reason: SDUPTHER

## 2020-12-30 ENCOUNTER — PATIENT MESSAGE (OUTPATIENT)
Dept: MEDICAL GROUP | Facility: MEDICAL CENTER | Age: 55
End: 2020-12-30

## 2020-12-31 ENCOUNTER — TELEPHONE (OUTPATIENT)
Dept: MEDICAL GROUP | Facility: MEDICAL CENTER | Age: 55
End: 2020-12-31

## 2020-12-31 DIAGNOSIS — G25.82 STIFF PERSON SYNDROME WITH POSITIVE GLUTAMIC ACID DECARBOXYLASE (GAD) ANTIBODY: ICD-10-CM

## 2020-12-31 DIAGNOSIS — R76.0 STIFF PERSON SYNDROME WITH POSITIVE GLUTAMIC ACID DECARBOXYLASE (GAD) ANTIBODY: ICD-10-CM

## 2020-12-31 RX ORDER — DIAZEPAM 10 MG/1
TABLET ORAL
Qty: 120 TAB | Refills: 2 | Status: SHIPPED | OUTPATIENT
Start: 2020-12-31 | End: 2021-05-03 | Stop reason: SDUPTHER

## 2021-03-19 ENCOUNTER — TELEPHONE (OUTPATIENT)
Dept: MEDICAL GROUP | Facility: MEDICAL CENTER | Age: 56
End: 2021-03-19

## 2021-03-19 NOTE — TELEPHONE ENCOUNTER
VOICEMAIL  1. Caller Name: [facility]   ([place])                     Call Back Number: [phone]    2. Message:     Had called needing the day supply for a medication and if could give them a call back.    They did not state which medication.      Please advice.

## 2021-03-23 NOTE — TELEPHONE ENCOUNTER
Called [facility] pharmacy to inquire which medication that they needed clarification for. Pharmacist states that this was corrected yesterday and no further action is needed.

## 2021-05-03 DIAGNOSIS — R76.0 STIFF PERSON SYNDROME WITH POSITIVE GLUTAMIC ACID DECARBOXYLASE (GAD) ANTIBODY: ICD-10-CM

## 2021-05-03 DIAGNOSIS — G25.82 STIFF PERSON SYNDROME WITH POSITIVE GLUTAMIC ACID DECARBOXYLASE (GAD) ANTIBODY: ICD-10-CM

## 2021-05-03 RX ORDER — DIAZEPAM 10 MG/1
TABLET ORAL
Qty: 120 TABLET | Refills: 2 | Status: SHIPPED | OUTPATIENT
Start: 2021-05-03 | End: 2021-06-02

## 2021-06-08 NOTE — PROGRESS NOTES
SHANAE Brumfield outbound TC to Gabriela 11/22/19. SHANAE Brumfield confirmed appointment was canceled and informed the patient of Teletherapy option with [facility]. SHANAE also asked Gabriela to connect with [facility] to establish care and update contact information.    SHANAE Brumfield and Gabriela outbound conference call to [facility]. It was reported that Gabriela needs to have PCP make a new referral.    Gabriela explained that she would call her PCP once her daughter returned home.    Plan:  SHANAE Brumfield will TC Gabriela 11/25/19 to follow-up.   moderate assist (50% patients effort)

## 2021-09-24 ENCOUNTER — APPOINTMENT (OUTPATIENT)
Dept: RADIOLOGY | Facility: MEDICAL CENTER | Age: 56
End: 2021-09-24
Attending: EMERGENCY MEDICINE
Payer: COMMERCIAL

## 2021-09-24 ENCOUNTER — HOSPITAL ENCOUNTER (EMERGENCY)
Facility: MEDICAL CENTER | Age: 56
End: 2021-09-24
Attending: EMERGENCY MEDICINE
Payer: COMMERCIAL

## 2021-09-24 VITALS
SYSTOLIC BLOOD PRESSURE: 131 MMHG | HEART RATE: 90 BPM | DIASTOLIC BLOOD PRESSURE: 78 MMHG | TEMPERATURE: 97.2 F | WEIGHT: 115 LBS | RESPIRATION RATE: 18 BRPM | OXYGEN SATURATION: 96 % | BODY MASS INDEX: 22.88 KG/M2

## 2021-09-24 DIAGNOSIS — K59.00 CONSTIPATION, UNSPECIFIED CONSTIPATION TYPE: ICD-10-CM

## 2021-09-24 DIAGNOSIS — E83.51 HYPOCALCEMIA: ICD-10-CM

## 2021-09-24 DIAGNOSIS — N39.0 URINARY TRACT INFECTION WITHOUT HEMATURIA, SITE UNSPECIFIED: ICD-10-CM

## 2021-09-24 LAB
25(OH)D3 SERPL-MCNC: 10 NG/ML (ref 30–100)
ALBUMIN SERPL BCP-MCNC: 2.7 G/DL (ref 3.2–4.9)
ALBUMIN/GLOB SERPL: 1.4 G/DL
ALP SERPL-CCNC: 86 U/L (ref 30–99)
ALT SERPL-CCNC: 7 U/L (ref 2–50)
ANION GAP SERPL CALC-SCNC: 12 MMOL/L (ref 7–16)
APPEARANCE UR: CLEAR
AST SERPL-CCNC: 14 U/L (ref 12–45)
BACTERIA #/AREA URNS HPF: ABNORMAL /HPF
BASOPHILS # BLD AUTO: 0.5 % (ref 0–1.8)
BASOPHILS # BLD: 0.05 K/UL (ref 0–0.12)
BILIRUB SERPL-MCNC: <0.2 MG/DL (ref 0.1–1.5)
BILIRUB UR QL STRIP.AUTO: NEGATIVE
BUN SERPL-MCNC: 21 MG/DL (ref 8–22)
CALCIUM SERPL-MCNC: 6 MG/DL (ref 8.5–10.5)
CHLORIDE SERPL-SCNC: 111 MMOL/L (ref 96–112)
CO2 SERPL-SCNC: 14 MMOL/L (ref 20–33)
COLOR UR: YELLOW
CREAT SERPL-MCNC: 0.93 MG/DL (ref 0.5–1.4)
EOSINOPHIL # BLD AUTO: 0.05 K/UL (ref 0–0.51)
EOSINOPHIL NFR BLD: 0.5 % (ref 0–6.9)
EPI CELLS #/AREA URNS HPF: NEGATIVE /HPF
ERYTHROCYTE [DISTWIDTH] IN BLOOD BY AUTOMATED COUNT: 42.2 FL (ref 35.9–50)
GLOBULIN SER CALC-MCNC: 2 G/DL (ref 1.9–3.5)
GLUCOSE SERPL-MCNC: 317 MG/DL (ref 65–99)
GLUCOSE UR STRIP.AUTO-MCNC: >=1000 MG/DL
HCT VFR BLD AUTO: 30.2 % (ref 37–47)
HGB BLD-MCNC: 10.1 G/DL (ref 12–16)
HYALINE CASTS #/AREA URNS LPF: ABNORMAL /LPF
IMM GRANULOCYTES # BLD AUTO: 0.05 K/UL (ref 0–0.11)
IMM GRANULOCYTES NFR BLD AUTO: 0.5 % (ref 0–0.9)
KETONES UR STRIP.AUTO-MCNC: ABNORMAL MG/DL
LEUKOCYTE ESTERASE UR QL STRIP.AUTO: ABNORMAL
LIPASE SERPL-CCNC: 34 U/L (ref 11–82)
LYMPHOCYTES # BLD AUTO: 0.76 K/UL (ref 1–4.8)
LYMPHOCYTES NFR BLD: 6.9 % (ref 22–41)
MAGNESIUM SERPL-MCNC: 1.4 MG/DL (ref 1.5–2.5)
MCH RBC QN AUTO: 31.3 PG (ref 27–33)
MCHC RBC AUTO-ENTMCNC: 33.4 G/DL (ref 33.6–35)
MCV RBC AUTO: 93.5 FL (ref 81.4–97.8)
MICRO URNS: ABNORMAL
MONOCYTES # BLD AUTO: 0.38 K/UL (ref 0–0.85)
MONOCYTES NFR BLD AUTO: 3.4 % (ref 0–13.4)
NEUTROPHILS # BLD AUTO: 9.74 K/UL (ref 2–7.15)
NEUTROPHILS NFR BLD: 88.2 % (ref 44–72)
NITRITE UR QL STRIP.AUTO: POSITIVE
NRBC # BLD AUTO: 0 K/UL
NRBC BLD-RTO: 0 /100 WBC
PH UR STRIP.AUTO: 5.5 [PH] (ref 5–8)
PHOSPHATE SERPL-MCNC: 2.7 MG/DL (ref 2.5–4.5)
PLATELET # BLD AUTO: 190 K/UL (ref 164–446)
PMV BLD AUTO: 13.2 FL (ref 9–12.9)
POTASSIUM SERPL-SCNC: 3.9 MMOL/L (ref 3.6–5.5)
PROT SERPL-MCNC: 4.7 G/DL (ref 6–8.2)
PROT UR QL STRIP: 30 MG/DL
PTH-INTACT SERPL-MCNC: 55.7 PG/ML (ref 14–72)
RBC # BLD AUTO: 3.23 M/UL (ref 4.2–5.4)
RBC # URNS HPF: ABNORMAL /HPF
RBC UR QL AUTO: ABNORMAL
SODIUM SERPL-SCNC: 137 MMOL/L (ref 135–145)
SP GR UR STRIP.AUTO: 1.02
UROBILINOGEN UR STRIP.AUTO-MCNC: 0.2 MG/DL
WBC # BLD AUTO: 11 K/UL (ref 4.8–10.8)
WBC #/AREA URNS HPF: ABNORMAL /HPF

## 2021-09-24 PROCEDURE — 84100 ASSAY OF PHOSPHORUS: CPT

## 2021-09-24 PROCEDURE — 99285 EMERGENCY DEPT VISIT HI MDM: CPT

## 2021-09-24 PROCEDURE — 81001 URINALYSIS AUTO W/SCOPE: CPT

## 2021-09-24 PROCEDURE — 96374 THER/PROPH/DIAG INJ IV PUSH: CPT

## 2021-09-24 PROCEDURE — 83735 ASSAY OF MAGNESIUM: CPT

## 2021-09-24 PROCEDURE — 700117 HCHG RX CONTRAST REV CODE 255: Performed by: EMERGENCY MEDICINE

## 2021-09-24 PROCEDURE — 85025 COMPLETE CBC W/AUTO DIFF WBC: CPT

## 2021-09-24 PROCEDURE — 74177 CT ABD & PELVIS W/CONTRAST: CPT

## 2021-09-24 PROCEDURE — 82306 VITAMIN D 25 HYDROXY: CPT

## 2021-09-24 PROCEDURE — 700111 HCHG RX REV CODE 636 W/ 250 OVERRIDE (IP): Performed by: EMERGENCY MEDICINE

## 2021-09-24 PROCEDURE — A9270 NON-COVERED ITEM OR SERVICE: HCPCS | Performed by: EMERGENCY MEDICINE

## 2021-09-24 PROCEDURE — 80053 COMPREHEN METABOLIC PANEL: CPT

## 2021-09-24 PROCEDURE — 83690 ASSAY OF LIPASE: CPT

## 2021-09-24 PROCEDURE — 700102 HCHG RX REV CODE 250 W/ 637 OVERRIDE(OP): Performed by: EMERGENCY MEDICINE

## 2021-09-24 PROCEDURE — 83970 ASSAY OF PARATHORMONE: CPT

## 2021-09-24 PROCEDURE — 96375 TX/PRO/DX INJ NEW DRUG ADDON: CPT

## 2021-09-24 RX ORDER — ERGOCALCIFEROL 1.25 MG/1
50000 CAPSULE ORAL
Qty: 6 CAPSULE | Refills: 0 | Status: SHIPPED | OUTPATIENT
Start: 2021-09-24 | End: 2021-11-05

## 2021-09-24 RX ORDER — ROSUVASTATIN CALCIUM 5 MG/1
5 TABLET, COATED ORAL EVERY MORNING
Status: ON HOLD | COMMUNITY
End: 2021-10-10

## 2021-09-24 RX ORDER — DIAZEPAM 10 MG/1
10 TABLET ORAL 2 TIMES DAILY
COMMUNITY

## 2021-09-24 RX ORDER — CEFDINIR 300 MG/1
300 CAPSULE ORAL ONCE
Status: COMPLETED | OUTPATIENT
Start: 2021-09-24 | End: 2021-09-24

## 2021-09-24 RX ORDER — CEFDINIR 300 MG/1
300 CAPSULE ORAL 2 TIMES DAILY
Qty: 20 CAPSULE | Refills: 0 | Status: ON HOLD | OUTPATIENT
Start: 2021-09-24 | End: 2021-10-10

## 2021-09-24 RX ORDER — ZONISAMIDE 100 MG/1
100 CAPSULE ORAL
Status: SHIPPED | COMMUNITY
End: 2023-08-07

## 2021-09-24 RX ORDER — CALCIUM CHLORIDE 100 MG/ML
1 INJECTION INTRAVENOUS; INTRAVENTRICULAR ONCE
Status: COMPLETED | OUTPATIENT
Start: 2021-09-24 | End: 2021-09-24

## 2021-09-24 RX ORDER — ONDANSETRON 2 MG/ML
4 INJECTION INTRAMUSCULAR; INTRAVENOUS ONCE
Status: COMPLETED | OUTPATIENT
Start: 2021-09-24 | End: 2021-09-24

## 2021-09-24 RX ORDER — CARBAMAZEPINE 200 MG/1
200 TABLET ORAL 3 TIMES DAILY
COMMUNITY

## 2021-09-24 RX ORDER — AMOXICILLIN 250 MG
1 CAPSULE ORAL DAILY
Qty: 30 TABLET | Refills: 0 | Status: ON HOLD | OUTPATIENT
Start: 2021-09-24 | End: 2021-10-10

## 2021-09-24 RX ORDER — CALCIUM CARBONATE 500 MG/1
1500 TABLET, CHEWABLE ORAL DAILY
Qty: 30 TABLET | Refills: 0 | Status: SHIPPED | OUTPATIENT
Start: 2021-09-24

## 2021-09-24 RX ORDER — CALCIUM CARBONATE 500 MG/1
1000 TABLET, CHEWABLE ORAL ONCE
Status: COMPLETED | OUTPATIENT
Start: 2021-09-24 | End: 2021-09-24

## 2021-09-24 RX ADMIN — CALCIUM CHLORIDE 1 G: 100 INJECTION INTRAVENOUS; INTRAVENTRICULAR at 13:17

## 2021-09-24 RX ADMIN — IOHEXOL 100 ML: 350 INJECTION, SOLUTION INTRAVENOUS at 13:43

## 2021-09-24 RX ADMIN — CEFDINIR 300 MG: 300 CAPSULE ORAL at 15:16

## 2021-09-24 RX ADMIN — ONDANSETRON 4 MG: 2 INJECTION INTRAMUSCULAR; INTRAVENOUS at 13:30

## 2021-09-24 RX ADMIN — CALCIUM CARBONATE 1000 MG: 500 TABLET, CHEWABLE ORAL at 13:17

## 2021-09-24 ASSESSMENT — FIBROSIS 4 INDEX: FIB4 SCORE: 1.53

## 2021-09-24 NOTE — ED NOTES
Pt discharged home in stable condition. Explained discharge instructions. Questions and concerns addressed. Pt verbalized understanding of instructions specific to today's visit. Wristband removed. Pt advised to follow-up with Primary care provider or return to ED for any new or worsening of symptoms. Pt is ambulating well and steady on feet. VS stable. Pt in agreement that all belongings are with them at this time.  PIV removed and dressing applied  Pt verbalized understanding of medication instructions.

## 2021-09-24 NOTE — ED NOTES
Stand by assist while erp completed rectal exam  Assisted patient on and off bedpan. Urine sample sent to lab

## 2021-09-24 NOTE — DISCHARGE INSTRUCTIONS
It is absolutely necessary for you to follow-up with your primary care physician, please schedule appointment in the next week.

## 2021-09-24 NOTE — ED PROVIDER NOTES
ED Provider Note    CHIEF COMPLAINT  Chief Complaint   Patient presents with   • Abdominal Pain       HPI  Gabriela Crooks is a 56 y.o. female who presents with chief complaint of abdominal pain and stiff person syndrome.  Patient reports abdominal pain for the last 2 weeks.  She reports associated nausea and vomiting.  She reports she is very constipated. Still passing gas but has not had a meaningflul bowel movement for the last 4 days. She reports abdominal pain is diffuse and comes and goes but is more constant today.  Patient reports a few episodes of NBNB emesis. Still able to eat and drink.  Patient denies any fevers or chills.  Patient reports some mild associated dysuria.  Patient denies any blood per rectum.  Patient reports she feels weak.      REVIEW OF SYSTEMS  ROS    See HPI for further details. All other systems are negative.     PAST MEDICAL HISTORY   has a past medical history of Diabetes ([facility]) (2015) and Stiff person syndrome.     SOCIAL HISTORY  Social History     Tobacco Use   • Smoking status: Never Smoker   • Smokeless tobacco: Never Used   Vaping Use   • Vaping Use: Never used   Substance and Sexual Activity   • Alcohol use: No   • Drug use: No   • Sexual activity: Not Currently       SURGICAL HISTORY   has a past surgical history that includes cholecystectomy; appendectomy; and  delivery only.    CURRENT MEDICATIONS  Home Medications     Reviewed by Schuyler B Collett, R.N. (Registered Nurse) on 21 at 1159  Med List Status: <None>   Medication Last Dose Status   acetaminophen (TYLENOL) 500 MG Tab  Active   Dulaglutide (TRULICITY) 1.5 MG/0.5ML Solution Pen-injector  Active   FLUoxetine (PROZAC) 10 MG Cap  Active   hydrocortisone 1 % Cream  Active   insulin lispro, Human, (HUMALOG) 100 UNIT/ML injection PEN  Active   Insulin Pen Needle (PEN NEEDLES) 32G X 4 MM Misc  Active   JARDIANCE 25 MG Tab  Active   methocarbamol (ROBAXIN) 500 MG Tab  Active   midodrine  (PROAMATINE) 10 MG tablet  Active   Nitroglycerin 0.4 % Ointment  Active   polymixin-trimethoprim (POLYTRIM) 51754-5.1 UNIT/ML-% Solution  Active                ALLERGIES  No Known Allergies    PHYSICAL EXAM  Vitals:    09/24/21 1157   BP: 106/73   Pulse: 87   Resp: 18   Temp: 36.7 °C (98 °F)   SpO2: 96%       Physical Exam  Constitutional:       Appearance: She is well-developed.   HENT:      Head: Normocephalic and atraumatic.   Eyes:      Conjunctiva/sclera: Conjunctivae normal.   Cardiovascular:      Rate and Rhythm: Normal rate and regular rhythm.   Pulmonary:      Effort: Pulmonary effort is normal.      Breath sounds: Normal breath sounds.   Abdominal:      General: Bowel sounds are normal. There is no distension.      Palpations: Abdomen is soft.      Tenderness: There is abdominal tenderness in the suprapubic area. There is no rebound.   Musculoskeletal:      Cervical back: Normal range of motion and neck supple.   Skin:     General: Skin is warm and dry.      Findings: No rash.   Neurological:      Mental Status: She is alert and oriented to person, place, and time.   Psychiatric:         Behavior: Behavior normal.           DIAGNOSTIC STUDIES / PROCEDURES      LABS  Results for orders placed or performed during the hospital encounter of 09/24/21   CBC WITH DIFFERENTIAL   Result Value Ref Range    WBC 11.0 (H) 4.8 - 10.8 K/uL    RBC 3.23 (L) 4.20 - 5.40 M/uL    Hemoglobin 10.1 (L) 12.0 - 16.0 g/dL    Hematocrit 30.2 (L) 37.0 - 47.0 %    MCV 93.5 81.4 - 97.8 fL    MCH 31.3 27.0 - 33.0 pg    MCHC 33.4 (L) 33.6 - 35.0 g/dL    RDW 42.2 35.9 - 50.0 fL    Platelet Count 190 164 - 446 K/uL    MPV 13.2 (H) 9.0 - 12.9 fL    Neutrophils-Polys 88.20 (H) 44.00 - 72.00 %    Lymphocytes 6.90 (L) 22.00 - 41.00 %    Monocytes 3.40 0.00 - 13.40 %    Eosinophils 0.50 0.00 - 6.90 %    Basophils 0.50 0.00 - 1.80 %    Immature Granulocytes 0.50 0.00 - 0.90 %    Nucleated RBC 0.00 /100 WBC    Neutrophils (Absolute) 9.74 (H)  2.00 - 7.15 K/uL    Lymphs (Absolute) 0.76 (L) 1.00 - 4.80 K/uL    Monos (Absolute) 0.38 0.00 - 0.85 K/uL    Eos (Absolute) 0.05 0.00 - 0.51 K/uL    Baso (Absolute) 0.05 0.00 - 0.12 K/uL    Immature Granulocytes (abs) 0.05 0.00 - 0.11 K/uL    NRBC (Absolute) 0.00 K/uL   COMP METABOLIC PANEL   Result Value Ref Range    Sodium 137 135 - 145 mmol/L    Potassium 3.9 3.6 - 5.5 mmol/L    Chloride 111 96 - 112 mmol/L    Co2 14 (L) 20 - 33 mmol/L    Anion Gap 12.0 7.0 - 16.0    Glucose 317 (H) 65 - 99 mg/dL    Bun 21 8 - 22 mg/dL    Creatinine 0.93 0.50 - 1.40 mg/dL    Calcium 6.0 (LL) 8.5 - 10.5 mg/dL    AST(SGOT) 14 12 - 45 U/L    ALT(SGPT) 7 2 - 50 U/L    Alkaline Phosphatase 86 30 - 99 U/L    Total Bilirubin <0.2 0.1 - 1.5 mg/dL    Albumin 2.7 (L) 3.2 - 4.9 g/dL    Total Protein 4.7 (L) 6.0 - 8.2 g/dL    Globulin 2.0 1.9 - 3.5 g/dL    A-G Ratio 1.4 g/dL   LIPASE   Result Value Ref Range    Lipase 34 11 - 82 U/L   URINALYSIS    Specimen: Urine   Result Value Ref Range    Color Yellow     Character Clear     Specific Gravity 1.017 <1.035    Ph 5.5 5.0 - 8.0    Glucose >=1000 (A) Negative mg/dL    Ketones Trace (A) Negative mg/dL    Protein 30 (A) Negative mg/dL    Bilirubin Negative Negative    Urobilinogen, Urine 0.2 Negative    Nitrite Positive (A) Negative    Leukocyte Esterase Trace (A) Negative    Occult Blood Small (A) Negative    Micro Urine Req Microscopic    URINE MICROSCOPIC (W/UA)   Result Value Ref Range    WBC 10-20 (A) /hpf    RBC 5-10 (A) /hpf    Bacteria Many (A) None /hpf    Epithelial Cells Negative /hpf    Hyaline Cast 0-2 /lpf   ESTIMATED GFR   Result Value Ref Range    GFR If African American >60 >60 mL/min/1.73 m 2    GFR If Non African American >60 >60 mL/min/1.73 m 2   PTH INTACT (PTH ONLY)   Result Value Ref Range    Pth, Intact 55.7 14.0 - 72.0 pg/mL   VITAMIN D,25 HYDROXY   Result Value Ref Range    25-Hydroxy   Vitamin D 25 10 (L) 30 - 100 ng/mL   MAGNESIUM   Result Value Ref Range    Magnesium  1.4 (L) 1.5 - 2.5 mg/dL   PHOSPHORUS   Result Value Ref Range    Phosphorus 2.7 2.5 - 4.5 mg/dL         RADIOLOGY  CT-ABDOMEN-PELVIS WITH   Final Result      1.  No acute abnormality in the abdomen or pelvis. No bowel obstruction.   2.  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   3.  Prior cholecystectomy.              COURSE & MEDICAL DECISION MAKING  Pertinent Labs & Imaging studies reviewed. (See chart for details)    Patient here with constipation, she has some suprapubic abdominal tenderness, questionable cystitis versus developing bowel obstruction given her symptoms.  Constipation and functional bowel pain is also possible.  Will check basic labs for further risk stratification and electrolyte abnormalities.  Patient basic labs reveal significant hypocalcemia, given this patient was given IV calcium and p.o. calcium.  Patient constipation is likely exacerbated by her hypocalcemia.  Patient vitamin D level has been checked.  CT fails to reveal any evidence of bowel obstruction.  Urinalysis is consistent with cystitis.  Patient given cefdinir.  Patient feeling considerably improved, following Zofran here she is able to tolerate p.o.  Patient home with Zofran, cefdinir, vitamin D and calcium.  I stressed the importance of her following up with her primary care physician.  I priti return precautions with daughter.     The patient will return for worsening symptoms and is stable at the time of discharge. The patient verbalizes understanding and will comply.    FINAL IMPRESSION    1. Hypocalcemia    2. Constipation, unspecified constipation type    3. Urinary tract infection without hematuria, site unspecified            Electronically signed by: Ricky Rouse M.D., 9/24/2021 12:04 PM

## 2021-10-08 ENCOUNTER — HOSPITAL ENCOUNTER (OUTPATIENT)
Facility: MEDICAL CENTER | Age: 56
End: 2021-10-10
Attending: EMERGENCY MEDICINE | Admitting: INTERNAL MEDICINE
Payer: MEDICARE

## 2021-10-08 ENCOUNTER — APPOINTMENT (OUTPATIENT)
Dept: RADIOLOGY | Facility: MEDICAL CENTER | Age: 56
End: 2021-10-08
Attending: INTERNAL MEDICINE
Payer: MEDICARE

## 2021-10-08 DIAGNOSIS — E11.10 DIABETIC KETOACIDOSIS WITHOUT COMA ASSOCIATED WITH TYPE 2 DIABETES MELLITUS (HCC): ICD-10-CM

## 2021-10-08 DIAGNOSIS — K56.41 FECAL IMPACTION (HCC): ICD-10-CM

## 2021-10-08 DIAGNOSIS — R73.9 HYPERGLYCEMIA: ICD-10-CM

## 2021-10-08 DIAGNOSIS — E11.65 UNCONTROLLED TYPE 2 DIABETES MELLITUS WITH HYPERGLYCEMIA, WITH LONG-TERM CURRENT USE OF INSULIN (HCC): ICD-10-CM

## 2021-10-08 DIAGNOSIS — E03.9 HYPOTHYROIDISM, UNSPECIFIED TYPE: ICD-10-CM

## 2021-10-08 DIAGNOSIS — Z79.4 UNCONTROLLED TYPE 2 DIABETES MELLITUS WITH HYPERGLYCEMIA, WITH LONG-TERM CURRENT USE OF INSULIN (HCC): ICD-10-CM

## 2021-10-08 PROBLEM — R10.9 AP (ABDOMINAL PAIN): Status: ACTIVE | Noted: 2021-10-08

## 2021-10-08 PROBLEM — K59.09 CHRONIC CONSTIPATION: Status: ACTIVE | Noted: 2021-10-08

## 2021-10-08 PROBLEM — N17.9 ACUTE KIDNEY INJURY (HCC): Status: ACTIVE | Noted: 2021-10-08

## 2021-10-08 PROBLEM — R53.81 DEBILITY: Status: ACTIVE | Noted: 2021-10-08

## 2021-10-08 PROBLEM — N39.0 UTI (URINARY TRACT INFECTION): Status: ACTIVE | Noted: 2021-10-08

## 2021-10-08 LAB
ALBUMIN SERPL BCP-MCNC: 4 G/DL (ref 3.2–4.9)
ALBUMIN/GLOB SERPL: 1.3 G/DL
ALP SERPL-CCNC: 158 U/L (ref 30–99)
ALT SERPL-CCNC: 12 U/L (ref 2–50)
ANION GAP SERPL CALC-SCNC: 12 MMOL/L (ref 7–16)
ANION GAP SERPL CALC-SCNC: 16 MMOL/L (ref 7–16)
APPEARANCE UR: CLEAR
AST SERPL-CCNC: 13 U/L (ref 12–45)
B-OH-BUTYR SERPL-MCNC: 2.21 MMOL/L (ref 0.02–0.27)
BACTERIA #/AREA URNS HPF: NEGATIVE /HPF
BASE EXCESS BLDV CALC-SCNC: -5 MMOL/L
BASOPHILS # BLD AUTO: 0.6 % (ref 0–1.8)
BASOPHILS # BLD: 0.06 K/UL (ref 0–0.12)
BILIRUB SERPL-MCNC: 0.2 MG/DL (ref 0.1–1.5)
BILIRUB UR QL STRIP.AUTO: NEGATIVE
BODY TEMPERATURE: ABNORMAL CENTIGRADE
BUN SERPL-MCNC: 35 MG/DL (ref 8–22)
BUN SERPL-MCNC: 38 MG/DL (ref 8–22)
CALCIUM SERPL-MCNC: 7.9 MG/DL (ref 8.5–10.5)
CALCIUM SERPL-MCNC: 8.9 MG/DL (ref 8.5–10.5)
CHLORIDE SERPL-SCNC: 108 MMOL/L (ref 96–112)
CHLORIDE SERPL-SCNC: 95 MMOL/L (ref 96–112)
CK SERPL-CCNC: 31 U/L (ref 0–154)
CO2 SERPL-SCNC: 18 MMOL/L (ref 20–33)
CO2 SERPL-SCNC: 20 MMOL/L (ref 20–33)
COLOR UR: YELLOW
CREAT SERPL-MCNC: 1.21 MG/DL (ref 0.5–1.4)
CREAT SERPL-MCNC: 1.3 MG/DL (ref 0.5–1.4)
CREAT UR-MCNC: 22.61 MG/DL
EOSINOPHIL # BLD AUTO: 0.02 K/UL (ref 0–0.51)
EOSINOPHIL NFR BLD: 0.2 % (ref 0–6.9)
EPI CELLS #/AREA URNS HPF: NEGATIVE /HPF
ERYTHROCYTE [DISTWIDTH] IN BLOOD BY AUTOMATED COUNT: 39.9 FL (ref 35.9–50)
EST. AVERAGE GLUCOSE BLD GHB EST-MCNC: 364 MG/DL
GLOBULIN SER CALC-MCNC: 3.1 G/DL (ref 1.9–3.5)
GLUCOSE BLD-MCNC: 256 MG/DL (ref 65–99)
GLUCOSE BLD-MCNC: 337 MG/DL (ref 65–99)
GLUCOSE BLD-MCNC: 551 MG/DL (ref 65–99)
GLUCOSE BLD-MCNC: 57 MG/DL (ref 65–99)
GLUCOSE SERPL-MCNC: 234 MG/DL (ref 65–99)
GLUCOSE SERPL-MCNC: 579 MG/DL (ref 65–99)
GLUCOSE UR STRIP.AUTO-MCNC: >=1000 MG/DL
HBA1C MFR BLD: 14.3 % (ref 4–5.6)
HCO3 BLDV-SCNC: 22 MMOL/L (ref 24–28)
HCT VFR BLD AUTO: 36.2 % (ref 37–47)
HGB BLD-MCNC: 12 G/DL (ref 12–16)
HYALINE CASTS #/AREA URNS LPF: ABNORMAL /LPF
IMM GRANULOCYTES # BLD AUTO: 0.03 K/UL (ref 0–0.11)
IMM GRANULOCYTES NFR BLD AUTO: 0.3 % (ref 0–0.9)
KETONES UR STRIP.AUTO-MCNC: ABNORMAL MG/DL
LACTATE BLD-SCNC: 1.3 MMOL/L (ref 0.5–2)
LEUKOCYTE ESTERASE UR QL STRIP.AUTO: NEGATIVE
LIPASE SERPL-CCNC: 66 U/L (ref 11–82)
LYMPHOCYTES # BLD AUTO: 0.61 K/UL (ref 1–4.8)
LYMPHOCYTES NFR BLD: 6.6 % (ref 22–41)
MAGNESIUM SERPL-MCNC: 1.9 MG/DL (ref 1.5–2.5)
MCH RBC QN AUTO: 30.5 PG (ref 27–33)
MCHC RBC AUTO-ENTMCNC: 33.1 G/DL (ref 33.6–35)
MCV RBC AUTO: 91.9 FL (ref 81.4–97.8)
MICRO URNS: ABNORMAL
MONOCYTES # BLD AUTO: 0.26 K/UL (ref 0–0.85)
MONOCYTES NFR BLD AUTO: 2.8 % (ref 0–13.4)
NEUTROPHILS # BLD AUTO: 8.28 K/UL (ref 2–7.15)
NEUTROPHILS NFR BLD: 89.5 % (ref 44–72)
NITRITE UR QL STRIP.AUTO: NEGATIVE
NRBC # BLD AUTO: 0 K/UL
NRBC BLD-RTO: 0 /100 WBC
PCO2 BLDV: 45.1 MMHG (ref 41–51)
PH BLDV: 7.3 [PH] (ref 7.31–7.45)
PH UR STRIP.AUTO: 5 [PH] (ref 5–8)
PHOSPHATE SERPL-MCNC: 4.3 MG/DL (ref 2.5–4.5)
PLATELET # BLD AUTO: 256 K/UL (ref 164–446)
PMV BLD AUTO: 13.6 FL (ref 9–12.9)
PO2 BLDV: 23.9 MMHG (ref 25–40)
POTASSIUM SERPL-SCNC: 3.8 MMOL/L (ref 3.6–5.5)
POTASSIUM SERPL-SCNC: 4.9 MMOL/L (ref 3.6–5.5)
PROT SERPL-MCNC: 7.1 G/DL (ref 6–8.2)
PROT UR QL STRIP: 30 MG/DL
RBC # BLD AUTO: 3.94 M/UL (ref 4.2–5.4)
RBC # URNS HPF: ABNORMAL /HPF
RBC UR QL AUTO: ABNORMAL
SAO2 % BLDV: 39 %
SODIUM SERPL-SCNC: 129 MMOL/L (ref 135–145)
SODIUM SERPL-SCNC: 140 MMOL/L (ref 135–145)
SODIUM UR-SCNC: 33 MMOL/L
SP GR UR STRIP.AUTO: 1.03
T4 FREE SERPL-MCNC: 1.1 NG/DL (ref 0.93–1.7)
TSH SERPL DL<=0.005 MIU/L-ACNC: 14.5 UIU/ML (ref 0.38–5.33)
UROBILINOGEN UR STRIP.AUTO-MCNC: 0.2 MG/DL
WBC # BLD AUTO: 9.3 K/UL (ref 4.8–10.8)
WBC #/AREA URNS HPF: ABNORMAL /HPF

## 2021-10-08 PROCEDURE — 83690 ASSAY OF LIPASE: CPT

## 2021-10-08 PROCEDURE — 700102 HCHG RX REV CODE 250 W/ 637 OVERRIDE(OP): Performed by: EMERGENCY MEDICINE

## 2021-10-08 PROCEDURE — 83735 ASSAY OF MAGNESIUM: CPT

## 2021-10-08 PROCEDURE — 84443 ASSAY THYROID STIM HORMONE: CPT

## 2021-10-08 PROCEDURE — 82550 ASSAY OF CK (CPK): CPT

## 2021-10-08 PROCEDURE — 82010 KETONE BODYS QUAN: CPT

## 2021-10-08 PROCEDURE — 700105 HCHG RX REV CODE 258: Performed by: EMERGENCY MEDICINE

## 2021-10-08 PROCEDURE — G0378 HOSPITAL OBSERVATION PER HR: HCPCS

## 2021-10-08 PROCEDURE — 74176 CT ABD & PELVIS W/O CONTRAST: CPT

## 2021-10-08 PROCEDURE — 82570 ASSAY OF URINE CREATININE: CPT

## 2021-10-08 PROCEDURE — 96372 THER/PROPH/DIAG INJ SC/IM: CPT

## 2021-10-08 PROCEDURE — 85025 COMPLETE CBC W/AUTO DIFF WBC: CPT

## 2021-10-08 PROCEDURE — 83605 ASSAY OF LACTIC ACID: CPT

## 2021-10-08 PROCEDURE — 82962 GLUCOSE BLOOD TEST: CPT | Mod: 91

## 2021-10-08 PROCEDURE — 700101 HCHG RX REV CODE 250: Performed by: EMERGENCY MEDICINE

## 2021-10-08 PROCEDURE — 81001 URINALYSIS AUTO W/SCOPE: CPT

## 2021-10-08 PROCEDURE — 83036 HEMOGLOBIN GLYCOSYLATED A1C: CPT

## 2021-10-08 PROCEDURE — 99285 EMERGENCY DEPT VISIT HI MDM: CPT

## 2021-10-08 PROCEDURE — 80053 COMPREHEN METABOLIC PANEL: CPT

## 2021-10-08 PROCEDURE — 84100 ASSAY OF PHOSPHORUS: CPT

## 2021-10-08 PROCEDURE — 36415 COLL VENOUS BLD VENIPUNCTURE: CPT

## 2021-10-08 PROCEDURE — 84439 ASSAY OF FREE THYROXINE: CPT

## 2021-10-08 PROCEDURE — 84300 ASSAY OF URINE SODIUM: CPT

## 2021-10-08 PROCEDURE — 700105 HCHG RX REV CODE 258: Performed by: INTERNAL MEDICINE

## 2021-10-08 PROCEDURE — 99220 PR INITIAL OBSERVATION CARE,LEVL III: CPT | Performed by: INTERNAL MEDICINE

## 2021-10-08 PROCEDURE — 80048 BASIC METABOLIC PNL TOTAL CA: CPT

## 2021-10-08 PROCEDURE — A9270 NON-COVERED ITEM OR SERVICE: HCPCS | Performed by: INTERNAL MEDICINE

## 2021-10-08 PROCEDURE — 700102 HCHG RX REV CODE 250 W/ 637 OVERRIDE(OP): Performed by: INTERNAL MEDICINE

## 2021-10-08 PROCEDURE — 82803 BLOOD GASES ANY COMBINATION: CPT

## 2021-10-08 RX ORDER — ENALAPRILAT 1.25 MG/ML
1.25 INJECTION INTRAVENOUS EVERY 6 HOURS PRN
Status: DISCONTINUED | OUTPATIENT
Start: 2021-10-08 | End: 2021-10-10 | Stop reason: HOSPADM

## 2021-10-08 RX ORDER — AMOXICILLIN 250 MG
2 CAPSULE ORAL 2 TIMES DAILY
Status: DISCONTINUED | OUTPATIENT
Start: 2021-10-08 | End: 2021-10-10 | Stop reason: HOSPADM

## 2021-10-08 RX ORDER — POLYETHYLENE GLYCOL 3350 17 G/17G
1 POWDER, FOR SOLUTION ORAL
Status: DISCONTINUED | OUTPATIENT
Start: 2021-10-08 | End: 2021-10-10 | Stop reason: HOSPADM

## 2021-10-08 RX ORDER — ENEMA 19; 7 G/133ML; G/133ML
1 ENEMA RECTAL ONCE
Status: COMPLETED | OUTPATIENT
Start: 2021-10-08 | End: 2021-10-08

## 2021-10-08 RX ORDER — BISACODYL 10 MG
10 SUPPOSITORY, RECTAL RECTAL
Status: DISCONTINUED | OUTPATIENT
Start: 2021-10-08 | End: 2021-10-10 | Stop reason: HOSPADM

## 2021-10-08 RX ORDER — ONDANSETRON 2 MG/ML
4 INJECTION INTRAMUSCULAR; INTRAVENOUS EVERY 4 HOURS PRN
Status: DISCONTINUED | OUTPATIENT
Start: 2021-10-08 | End: 2021-10-10 | Stop reason: HOSPADM

## 2021-10-08 RX ORDER — LEVOTHYROXINE SODIUM 0.05 MG/1
50 TABLET ORAL
Status: DISCONTINUED | OUTPATIENT
Start: 2021-10-09 | End: 2021-10-10 | Stop reason: HOSPADM

## 2021-10-08 RX ORDER — HYDROMORPHONE HYDROCHLORIDE 1 MG/ML
0.25 INJECTION, SOLUTION INTRAMUSCULAR; INTRAVENOUS; SUBCUTANEOUS
Status: DISCONTINUED | OUTPATIENT
Start: 2021-10-08 | End: 2021-10-09

## 2021-10-08 RX ORDER — POLYETHYLENE GLYCOL 3350 17 G/17G
1 POWDER, FOR SOLUTION ORAL DAILY
Status: DISCONTINUED | OUTPATIENT
Start: 2021-10-08 | End: 2021-10-10 | Stop reason: HOSPADM

## 2021-10-08 RX ORDER — ONDANSETRON 4 MG/1
4 TABLET, ORALLY DISINTEGRATING ORAL EVERY 4 HOURS PRN
Status: DISCONTINUED | OUTPATIENT
Start: 2021-10-08 | End: 2021-10-10 | Stop reason: HOSPADM

## 2021-10-08 RX ORDER — PROCHLORPERAZINE EDISYLATE 5 MG/ML
5-10 INJECTION INTRAMUSCULAR; INTRAVENOUS EVERY 4 HOURS PRN
Status: DISCONTINUED | OUTPATIENT
Start: 2021-10-08 | End: 2021-10-10 | Stop reason: HOSPADM

## 2021-10-08 RX ORDER — GLIPIZIDE 2.5 MG/1
2.5 TABLET, EXTENDED RELEASE ORAL EVERY MORNING
Status: ON HOLD | COMMUNITY
End: 2021-10-10

## 2021-10-08 RX ORDER — SODIUM CHLORIDE 9 MG/ML
INJECTION, SOLUTION INTRAVENOUS CONTINUOUS
Status: DISCONTINUED | OUTPATIENT
Start: 2021-10-08 | End: 2021-10-09

## 2021-10-08 RX ORDER — CARBAMAZEPINE 200 MG/1
200 TABLET ORAL 2 TIMES DAILY
Status: DISCONTINUED | OUTPATIENT
Start: 2021-10-08 | End: 2021-10-10 | Stop reason: HOSPADM

## 2021-10-08 RX ORDER — PROMETHAZINE HYDROCHLORIDE 25 MG/1
12.5-25 SUPPOSITORY RECTAL EVERY 4 HOURS PRN
Status: DISCONTINUED | OUTPATIENT
Start: 2021-10-08 | End: 2021-10-10 | Stop reason: HOSPADM

## 2021-10-08 RX ORDER — SODIUM CHLORIDE 9 MG/ML
1000 INJECTION, SOLUTION INTRAVENOUS ONCE
Status: COMPLETED | OUTPATIENT
Start: 2021-10-08 | End: 2021-10-08

## 2021-10-08 RX ORDER — ACETAMINOPHEN 325 MG/1
650 TABLET ORAL EVERY 6 HOURS PRN
Status: DISCONTINUED | OUTPATIENT
Start: 2021-10-08 | End: 2021-10-10 | Stop reason: HOSPADM

## 2021-10-08 RX ORDER — DIAZEPAM 5 MG/1
10 TABLET ORAL 3 TIMES DAILY
Status: DISCONTINUED | OUTPATIENT
Start: 2021-10-08 | End: 2021-10-10 | Stop reason: HOSPADM

## 2021-10-08 RX ORDER — OXYCODONE HYDROCHLORIDE 5 MG/1
2.5 TABLET ORAL
Status: DISCONTINUED | OUTPATIENT
Start: 2021-10-08 | End: 2021-10-09

## 2021-10-08 RX ORDER — ZONISAMIDE 50 MG/1
100 CAPSULE ORAL
Status: DISCONTINUED | OUTPATIENT
Start: 2021-10-08 | End: 2021-10-08

## 2021-10-08 RX ORDER — ERGOCALCIFEROL 1.25 MG/1
50000 CAPSULE ORAL
Status: DISCONTINUED | OUTPATIENT
Start: 2021-10-14 | End: 2021-10-10 | Stop reason: HOSPADM

## 2021-10-08 RX ORDER — DEXTROSE MONOHYDRATE 25 G/50ML
50 INJECTION, SOLUTION INTRAVENOUS
Status: DISCONTINUED | OUTPATIENT
Start: 2021-10-08 | End: 2021-10-10

## 2021-10-08 RX ORDER — PROMETHAZINE HYDROCHLORIDE 25 MG/1
12.5-25 TABLET ORAL EVERY 4 HOURS PRN
Status: DISCONTINUED | OUTPATIENT
Start: 2021-10-08 | End: 2021-10-10 | Stop reason: HOSPADM

## 2021-10-08 RX ORDER — OMEPRAZOLE 20 MG/1
20 CAPSULE, DELAYED RELEASE ORAL DAILY
Status: DISCONTINUED | OUTPATIENT
Start: 2021-10-08 | End: 2021-10-10 | Stop reason: HOSPADM

## 2021-10-08 RX ORDER — ROSUVASTATIN CALCIUM 5 MG/1
5 TABLET, COATED ORAL EVERY MORNING
Status: DISCONTINUED | OUTPATIENT
Start: 2021-10-08 | End: 2021-10-09

## 2021-10-08 RX ORDER — FLUOXETINE HYDROCHLORIDE 20 MG/1
40 CAPSULE ORAL EVERY MORNING
COMMUNITY

## 2021-10-08 RX ORDER — CLONIDINE HYDROCHLORIDE 0.1 MG/1
0.1 TABLET ORAL EVERY 6 HOURS PRN
Status: DISCONTINUED | OUTPATIENT
Start: 2021-10-08 | End: 2021-10-10 | Stop reason: HOSPADM

## 2021-10-08 RX ORDER — INSULIN LISPRO 100 [IU]/ML
2-9 INJECTION, SOLUTION INTRAVENOUS; SUBCUTANEOUS EVERY 6 HOURS
Status: DISCONTINUED | OUTPATIENT
Start: 2021-10-08 | End: 2021-10-08

## 2021-10-08 RX ORDER — NAPROXEN SODIUM 220 MG
440 TABLET ORAL 2 TIMES DAILY PRN
COMMUNITY
End: 2022-04-10

## 2021-10-08 RX ORDER — FLUOXETINE HYDROCHLORIDE 20 MG/1
40 CAPSULE ORAL DAILY
Status: DISCONTINUED | OUTPATIENT
Start: 2021-10-09 | End: 2021-10-10 | Stop reason: HOSPADM

## 2021-10-08 RX ORDER — CALCIUM CARBONATE 500 MG/1
1500 TABLET, CHEWABLE ORAL DAILY
Status: DISCONTINUED | OUTPATIENT
Start: 2021-10-09 | End: 2021-10-10 | Stop reason: HOSPADM

## 2021-10-08 RX ORDER — LABETALOL HYDROCHLORIDE 5 MG/ML
10 INJECTION, SOLUTION INTRAVENOUS EVERY 4 HOURS PRN
Status: DISCONTINUED | OUTPATIENT
Start: 2021-10-08 | End: 2021-10-10 | Stop reason: HOSPADM

## 2021-10-08 RX ORDER — OXYCODONE HYDROCHLORIDE 5 MG/1
5 TABLET ORAL
Status: DISCONTINUED | OUTPATIENT
Start: 2021-10-08 | End: 2021-10-09

## 2021-10-08 RX ORDER — EMPAGLIFLOZIN 25 MG/1
25 TABLET, FILM COATED ORAL EVERY MORNING
Status: ON HOLD | COMMUNITY
End: 2021-10-10

## 2021-10-08 RX ADMIN — OMEPRAZOLE 20 MG: 20 CAPSULE, DELAYED RELEASE ORAL at 21:39

## 2021-10-08 RX ADMIN — DIAZEPAM 10 MG: 5 TABLET ORAL at 21:39

## 2021-10-08 RX ADMIN — SODIUM PHOSPHATE 133 ML: 7; 19 ENEMA RECTAL at 13:51

## 2021-10-08 RX ADMIN — CARBAMAZEPINE 200 MG: 200 TABLET ORAL at 21:40

## 2021-10-08 RX ADMIN — SODIUM CHLORIDE 1000 ML: 9 INJECTION, SOLUTION INTRAVENOUS at 13:51

## 2021-10-08 RX ADMIN — SODIUM CHLORIDE: 9 INJECTION, SOLUTION INTRAVENOUS at 21:49

## 2021-10-08 RX ADMIN — INSULIN HUMAN 3 UNITS: 100 INJECTION, SOLUTION PARENTERAL at 21:40

## 2021-10-08 RX ADMIN — INSULIN HUMAN 10 UNITS: 100 INJECTION, SOLUTION PARENTERAL at 13:49

## 2021-10-08 ASSESSMENT — COGNITIVE AND FUNCTIONAL STATUS - GENERAL
EATING MEALS: A LITTLE
MOVING FROM LYING ON BACK TO SITTING ON SIDE OF FLAT BED: A LOT
STANDING UP FROM CHAIR USING ARMS: A LITTLE
WALKING IN HOSPITAL ROOM: A LOT
MOVING TO AND FROM BED TO CHAIR: A LITTLE
PERSONAL GROOMING: A LITTLE
TURNING FROM BACK TO SIDE WHILE IN FLAT BAD: A LITTLE
DRESSING REGULAR LOWER BODY CLOTHING: A LITTLE
DRESSING REGULAR UPPER BODY CLOTHING: A LITTLE
MOBILITY SCORE: 15
HELP NEEDED FOR BATHING: A LITTLE
SUGGESTED CMS G CODE MODIFIER MOBILITY: CK
SUGGESTED CMS G CODE MODIFIER DAILY ACTIVITY: CK
TOILETING: A LITTLE
CLIMB 3 TO 5 STEPS WITH RAILING: A LOT
DAILY ACTIVITIY SCORE: 18

## 2021-10-08 ASSESSMENT — ENCOUNTER SYMPTOMS
NECK PAIN: 0
PALPITATIONS: 0
WEIGHT LOSS: 0
BRUISES/BLEEDS EASILY: 0
FOCAL WEAKNESS: 0
HEADACHES: 0
CONSTIPATION: 1
SPUTUM PRODUCTION: 0
HEMOPTYSIS: 0
COUGH: 0
ORTHOPNEA: 0
ABDOMINAL PAIN: 1
NAUSEA: 1
DOUBLE VISION: 0
BACK PAIN: 0
HEARTBURN: 0
NERVOUS/ANXIOUS: 0
POLYDIPSIA: 0
BLURRED VISION: 0
SPEECH CHANGE: 0
HALLUCINATIONS: 0
FEVER: 0
VOMITING: 1
FLANK PAIN: 0
CHILLS: 0
TREMORS: 0
PHOTOPHOBIA: 0

## 2021-10-08 ASSESSMENT — FIBROSIS 4 INDEX: FIB4 SCORE: 1.53

## 2021-10-08 ASSESSMENT — LIFESTYLE VARIABLES: SUBSTANCE_ABUSE: 0

## 2021-10-08 NOTE — ASSESSMENT & PLAN NOTE
Secondary to constipation.  Improved after disimpaction  CT imaging after disimpaction noted no evidence of small bowel obstruction or infection

## 2021-10-08 NOTE — H&P
Hospital Medicine History & Physical Note    Date of Service  10/8/2021    Primary Care Physician  Chalo hWelan M.D.    Consultants  none    Specialist Names: none    Code Status  Full Code    Chief Complaint  Chief Complaint   Patient presents with   • Abdominal Pain     No bowel movement in 4 days. Abdomen soft, but distended and increased pain with palpation. No dysuria. History of GI surgery 6 years ago.   • Hyperglycemia     BSFS 540 with EMS. History of DM but has been noncompliant with insulin       History of Presenting Illness  Vicky Cheatham-Depintor is a 56 y.o. female with past medical history of diabetes on glipizide, Metformin, Jardiance, stiff person syndrome, debility,  cholecystectomy, appendectomy, who presented 10/8/2021 with complaints of lower abdominal pain (dull, diffuse, without alleviating aggravating factors, without radiation), constipation for 4 days, nausea, one episode of vomiting after she ate oatmeal earlier this morning..  She has chronic constipation and has been taking Senokot.  In ER patient received digital disimpaction, Fleet enema, and had large bowel movement.  Abdominal pain improved.  Labs significant for blood sugar 579, creatinine 1.3, BUN 38, BHB 2.21.  UA positive for glucose 1000, WBC 5-10, negative for leukocyte esterase.  Patient did not take her diabetic meds this morning because of nausea, but did take it yesterday, and endorses compliance..  She received 1 L of normal saline, regular insulin subcutaneously 10 units.  Repeat blood sugar 337.  Patient feels hungry.    I discussed the plan of care with patient and family.    Review of Systems  Review of Systems   Constitutional: Negative for chills, fever and weight loss.   HENT: Negative for ear pain, hearing loss and tinnitus.    Eyes: Negative for blurred vision, double vision and photophobia.   Respiratory: Negative for cough, hemoptysis and sputum production.    Cardiovascular: Negative for chest pain,  palpitations and orthopnea.   Gastrointestinal: Positive for abdominal pain, constipation, nausea and vomiting. Negative for heartburn.   Genitourinary: Negative for dysuria, flank pain, frequency and hematuria.   Musculoskeletal: Negative for back pain, joint pain and neck pain.   Skin: Negative for itching and rash.   Neurological: Negative for tremors, speech change, focal weakness and headaches.   Endo/Heme/Allergies: Negative for environmental allergies and polydipsia. Does not bruise/bleed easily.   Psychiatric/Behavioral: Negative for hallucinations and substance abuse. The patient is not nervous/anxious.        Past Medical History   has a past medical history of Diabetes ([facility]) (2015) and Stiff person syndrome.    Surgical History   has a past surgical history that includes cholecystectomy; appendectomy; and pr  delivery only.     Family History  family history includes Diabetes in her father; Stroke in her brother.   Family history reviewed with patient. There is no family history that is pertinent to the chief complaint.     Social History   reports that she has never smoked. She has never used smokeless tobacco. She reports that she does not drink alcohol and does not use drugs.    Allergies  No Known Allergies    Medications  Prior to Admission Medications   Prescriptions Last Dose Informant Patient Reported? Taking?   FLUoxetine (PROZAC) 10 MG Cap   No No   Sig: TAKE 1 CAPSULE BY MOUTH EVERY DAY   Patient taking differently: Take 10 mg by mouth every day.   JARDIANCE 25 MG Tab   No No   Sig: TAKE 1 TABLET BY MOUTH EVERY DAY   Patient taking differently: Take 25 mg by mouth every day.   calcium carbonate (TUMS) 500 MG Chew Tab   No No   Sig: Chew 3 Tablets every day.   carBAMazepine (TEGRETOL) 200 MG Tab   Yes No   Sig: Take 200 mg by mouth 2 times a day.   cefdinir (OMNICEF) 300 MG Cap   No No   Sig: Take 1 Capsule by mouth 2 times a day.   diazepam (VALIUM) 10 MG tablet   Yes No   Sig: Take 10  mg by mouth in the morning, at noon, and at bedtime.   metFORMIN (GLUCOPHAGE) 500 MG Tab   Yes No   Sig: Take 500 mg by mouth 2 times a day with meals.   rosuvastatin (CRESTOR) 5 MG Tab   Yes No   Sig: Take 5 mg by mouth every morning.   senna-docusate (PERICOLACE OR SENOKOT S) 8.6-50 MG Tab   No No   Sig: Take 1 Tablet by mouth every day.   vitamin D2, Ergocalciferol, (DRISDOL) 1.25 MG (62072 UT) Cap capsule   No No   Sig: Take 1 Capsule by mouth every 7 days for 42 days.   zonisamide (ZONEGRAN) 100 MG Cap   Yes No   Sig: Take 100 mg by mouth at bedtime.      Facility-Administered Medications: None       Physical Exam  Temp:  [36.9 °C (98.4 °F)] 36.9 °C (98.4 °F)  Pulse:  [] 87  Resp:  [16-17] 16  BP: ()/(61-73) 97/61  SpO2:  [94 %-100 %] 94 %  Blood Pressure: (!) 97/61   Temperature: 36.9 °C (98.4 °F)   Pulse: 87   Respiration: 16   Pulse Oximetry: 94 %       Physical Exam  Vitals and nursing note reviewed.   Constitutional:       General: She is not in acute distress.     Appearance: Normal appearance.   HENT:      Head: Normocephalic and atraumatic.      Nose: Nose normal.      Mouth/Throat:      Mouth: Mucous membranes are dry.   Eyes:      Extraocular Movements: Extraocular movements intact.      Pupils: Pupils are equal, round, and reactive to light.   Cardiovascular:      Rate and Rhythm: Normal rate and regular rhythm.   Pulmonary:      Effort: Pulmonary effort is normal.      Breath sounds: Normal breath sounds.   Abdominal:      General: Abdomen is flat. There is no distension.      Tenderness: There is no abdominal tenderness.   Musculoskeletal:         General: No swelling or deformity. Normal range of motion.      Cervical back: Normal range of motion and neck supple.      Comments: Generalized muscle wasting   Skin:     General: Skin is warm and dry.   Neurological:      General: No focal deficit present.      Mental Status: She is alert and oriented to person, place, and time.    Psychiatric:         Mood and Affect: Mood normal.         Behavior: Behavior normal.         Laboratory:  Recent Labs     10/08/21  1131   WBC 9.3   RBC 3.94*   HEMOGLOBIN 12.0   HEMATOCRIT 36.2*   MCV 91.9   MCH 30.5   MCHC 33.1*   RDW 39.9   PLATELETCT 256   MPV 13.6*     Recent Labs     10/08/21  1131   SODIUM 129*   POTASSIUM 4.9   CHLORIDE 95*   CO2 18*   GLUCOSE 579*   BUN 38*   CREATININE 1.30   CALCIUM 8.9     Recent Labs     10/08/21  1131   ALTSGPT 12   ASTSGOT 13   ALKPHOSPHAT 158*   TBILIRUBIN 0.2   LIPASE 66   GLUCOSE 579*         No results for input(s): NTPROBNP in the last 72 hours.      No results for input(s): TROPONINT in the last 72 hours.    Imaging:  CT-ABDOMEN-PELVIS W/O    (Results Pending)       X-Ray:  I have personally reviewed the images and compared with prior images.    Assessment/Plan:  I anticipate this patient is appropriate for observation status at this time.    Chronic constipation  Assessment & Plan  Unclear etiology.?  Secondary to hypothyroid, carbamazepine  Follow-up with CT of the abdomen to rule out obstruction  Bowel protocol, scheduled MiraLAX  Follow-up with GI as outpatient for elective colonoscopy    Type 2 diabetes mellitus untreated, with ketoacidosis - (present on admission)  Assessment & Plan  Uncontrolled, with hyperglycemia and ketoacidosis  Patient felt to need to be started on insulin  Started on subcu Lantus 10 units at night, insulin sliding scale  Diabetic education  Hypoglycemia protocol    Hypothyroid  Assessment & Plan  TSH 14.50  Will start levothyroxine 50 mcg  F/u with pcp for repeat thyroid profile    UTI (urinary tract infection)  Assessment & Plan  Finished 10 days course of cefdinir yesterday  UA showed WBC 5-10, negative leukocyte esterase.  Denies dysuria  We will refrain from antibiotic at this time    Debility  Assessment & Plan  Secondary to stiff person syndrome, ankle contracture  Normally uses walker at home  Fall precautions    Acute  kidney injury (HCC)  Assessment & Plan  Probably prerenal secondary to hypovolemia and DKA  Follow-up with CT of the abdomen and bladder scan to rule out obstruction  IV fluids  Avoid nephrotoxins    AP (abdominal pain)  Assessment & Plan  ?  Secondary to constipation.  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  Will order omeprazole      VTE prophylaxis: enoxaparin ppx

## 2021-10-08 NOTE — ASSESSMENT & PLAN NOTE
Probably prerenal secondary to hypovolemia and DKA  Follow-up with CT of the abdomen and bladder scan to rule out obstruction  IV fluids  Avoid nephrotoxins

## 2021-10-08 NOTE — ASSESSMENT & PLAN NOTE
Unclear etiology.?  Secondary to hypothyroid, carbamazepine  CT imaging with no evidence of small bowel obstruction or infection  Bowel protocol, scheduled MiraLAX  Follow-up with GI as outpatient for elective colonoscopy

## 2021-10-08 NOTE — ED NOTES
Med rec completed per Pt and daughter at bedside, and Pt's pharmacy [facility] (Pt fills at [facility] which is apparently closed today due to an emergency, spoke to [facility] Yaa [phone]). Pt's daughter translated for Pt.  Allergies reviewed with Pt and daughter. No known drug allergies.  Per [facility], Pt is prescribed fluoxetine 20 mg 1 capsule once per day; Pt reports that she has been taking 2 capsules (40 mg) once per day.  Per [facility], Pt is prescribed carbamazepine 200 mg with directions to take 2 tablets (400 mg) 2 times per day; per Pt's daughter, Pt takes 1 tablet (200 mg) 2 times per day.  On 9/24/2021 Pt was prescribed a 10 day course of cefdinir 300 mg 1 capsule 2 times per day; per Pt she apparently finished this antibiotic yesterday 10/7/2021.   Per Pt's daughter, Pt has STOPPED taking her Zonegran.  Pt's pharmacy: [facility] on [facility].

## 2021-10-08 NOTE — ASSESSMENT & PLAN NOTE
Mild, can be treated to medical floor with subcu insulin and IV fluids  Repeat BMP, correct electrolytes as needed  Diabetic diet  We will start on long-acting and short-acting insulin and will plan to discharge on insulin  Diabetic education

## 2021-10-08 NOTE — ASSESSMENT & PLAN NOTE
Finished 10 days course of cefdinir yesterday  UA showed WBC 5-10, negative leukocyte esterase.  Denies dysuria  We will refrain from antibiotic at this time

## 2021-10-08 NOTE — ASSESSMENT & PLAN NOTE
Uncontrolled, with hyperglycemia and ketoacidosis  Hemoglobin A1c of 14.3  Started on subcu Lantus 10 units at night, insulin sliding scale  Diabetic education  Hypoglycemia protocol

## 2021-10-08 NOTE — ASSESSMENT & PLAN NOTE
Secondary to stiff person syndrome, ankle contracture  Normally uses walker at home  Fall precautions

## 2021-10-08 NOTE — ED PROVIDER NOTES
ED Provider Note    Scribed for Dr. Lance Bradford M.D. by Isidro Chisholm. 10/8/2021  12:31 PM    Primary care provider: Chalo Whelan M.D.  Means of arrival: EMS  History obtained from: Patient via interpretor/Daughter  History limited by: None    CHIEF COMPLAINT  Chief Complaint   Patient presents with   • Abdominal Pain     No bowel movement in 4 days. Abdomen soft, but distended and increased pain with palpation. No dysuria. History of GI surgery 6 years ago.   • Hyperglycemia     BSFS 540 with EMS. History of DM but has been noncompliant with insulin       HPI  Gabriela Cheatham-Depintor is a 56 y.o. female who presents to the Emergency Department via EMS for moderate worsening lower abdominal pain onset 4 days ago. She states she was previously seen in the ED two weeks ago for similar symptoms and notes her pain improved.  Patient states she has not had a bowel movement for 4 days, but notes she does have the urge to and is unable to. She states her pain has worsened over the past few days, which prompted her to visit the ED for further evaluation.  She endorses associated constipation, but denies diarrhea, fevers, chills, nausea, or vomiting. Patient's daughter reports she has history diabetes and is taking Metformin, Glipizide and Jardience, but notes she is not currently taking insulin. (The initial report was that she was on insulin and was not taking it but this is not the case)   es she has history of chronic constipation and states she uses a walker at baseline.   She has an underlying metabolic syndrome which causes her to be very stiff and is essentially mostly bedridden    REVIEW OF SYSTEMS  Pertinent positives include lower abdominal pain and constipation. Pertinent negatives include no diarrhea, fevers, chills, nausea, or vomiting. As above, all other systems reviewed and are negative.   See HPI for further details.     PAST MEDICAL HISTORY   has a past medical history of Diabetes (HCC) (2015)  "and Stiff person syndrome.    SURGICAL HISTORY   has a past surgical history that includes cholecystectomy; appendectomy; and  delivery only.    SOCIAL HISTORY  Social History     Tobacco Use   • Smoking status: Never Smoker   • Smokeless tobacco: Never Used   Vaping Use   • Vaping Use: Never used   Substance Use Topics   • Alcohol use: No   • Drug use: No      Social History     Substance and Sexual Activity   Drug Use No       FAMILY HISTORY  Family History   Problem Relation Age of Onset   • Diabetes Father    • Stroke Brother    • Cancer Neg Hx    • Heart Disease Neg Hx        CURRENT MEDICATIONS  Home Medications     Reviewed by Miguelito Langston (Pharmacy Tech) on 10/08/21 at 1618  Med List Status: Complete   Medication Last Dose Status   calcium carbonate (TUMS) 500 MG Chew Tab 10/7/2021 Active   carBAMazepine (TEGRETOL) 200 MG Tab 10/8/2021 Active   cefdinir (OMNICEF) 300 MG Cap 10/7/2021 Active   diazepam (VALIUM) 10 MG tablet 10/8/2021 Active   FLUoxetine (PROZAC) 20 MG Cap 10/7/2021 Active   glipiZIDE SR (GLUCOTROL) 2.5 MG TABLET SR 24 HR 10/7/2021 Active   JARDIANCE 25 MG Tab 10/7/2021 Active   metFORMIN (GLUCOPHAGE) 500 MG Tab 10/7/2021 Active   naproxen (ALEVE) 220 MG tablet 10/8/2021 Active   rosuvastatin (CRESTOR) 5 MG Tab 10/7/2021 Active   senna-docusate (PERICOLACE OR SENOKOT S) 8.6-50 MG Tab 10/8/2021 Active   vitamin D2, Ergocalciferol, (DRISDOL) 1.25 MG (20934 UT) Cap capsule 10/7/2021 Active   zonisamide (ZONEGRAN) 100 MG Cap 10/4/2021 Active                ALLERGIES  No Known Allergies    PHYSICAL EXAM  VITAL SIGNS: /73   Pulse (!) 104   Temp 36.9 °C (98.4 °F) (Temporal)   Resp 17   Ht 1.499 m (4' 11\")   Wt 52.2 kg (115 lb)   LMP 2015   SpO2 100%   BMI 23.23 kg/m²     Constitutional: Well developed, Thin and frail appearing. no acute distress  HENT: Normocephalic, Atraumatic, Bilateral external ears normal, Oropharynx moist, No oral exudates.   Eyes: PERRLA, EOMI, " Conjunctiva normal, No discharge.   Neck: No tenderness, Supple, No stridor.   Lymphatic: No lymphadenopathy noted.   Cardiovascular: Normal heart rate, Normal rhythm.   Thorax & Lungs: Clear to auscultation bilaterally, No respiratory distress, No wheezing, No crackles.   Abdomen: Soft, Lower abdominal tenderness, no distention, No masses, No pulsatile masses.   Rectal: Hard stool in rectum, broken up with finger.  Skin: Warm, Dry, No erythema, No rash.   Extremities:, Extremities are very stiff with difficulty of both movement  Musculoskeletal: No tenderness to palpation or major deformities noted.  Intact distal pulses  Neurologic: Awake, alert. Moves all extremities spontaneously.  Psychiatric: Affect normal, Judgment normal, Mood normal.       LABS  Results for orders placed or performed during the hospital encounter of 10/08/21   CBC WITH DIFFERENTIAL   Result Value Ref Range    WBC 9.3 4.8 - 10.8 K/uL    RBC 3.94 (L) 4.20 - 5.40 M/uL    Hemoglobin 12.0 12.0 - 16.0 g/dL    Hematocrit 36.2 (L) 37.0 - 47.0 %    MCV 91.9 81.4 - 97.8 fL    MCH 30.5 27.0 - 33.0 pg    MCHC 33.1 (L) 33.6 - 35.0 g/dL    RDW 39.9 35.9 - 50.0 fL    Platelet Count 256 164 - 446 K/uL    MPV 13.6 (H) 9.0 - 12.9 fL    Neutrophils-Polys 89.50 (H) 44.00 - 72.00 %    Lymphocytes 6.60 (L) 22.00 - 41.00 %    Monocytes 2.80 0.00 - 13.40 %    Eosinophils 0.20 0.00 - 6.90 %    Basophils 0.60 0.00 - 1.80 %    Immature Granulocytes 0.30 0.00 - 0.90 %    Nucleated RBC 0.00 /100 WBC    Neutrophils (Absolute) 8.28 (H) 2.00 - 7.15 K/uL    Lymphs (Absolute) 0.61 (L) 1.00 - 4.80 K/uL    Monos (Absolute) 0.26 0.00 - 0.85 K/uL    Eos (Absolute) 0.02 0.00 - 0.51 K/uL    Baso (Absolute) 0.06 0.00 - 0.12 K/uL    Immature Granulocytes (abs) 0.03 0.00 - 0.11 K/uL    NRBC (Absolute) 0.00 K/uL   COMP METABOLIC PANEL   Result Value Ref Range    Sodium 129 (L) 135 - 145 mmol/L    Potassium 4.9 3.6 - 5.5 mmol/L    Chloride 95 (L) 96 - 112 mmol/L    Co2 18 (L) 20 - 33  mmol/L    Anion Gap 16.0 7.0 - 16.0    Glucose 579 (HH) 65 - 99 mg/dL    Bun 38 (H) 8 - 22 mg/dL    Creatinine 1.30 0.50 - 1.40 mg/dL    Calcium 8.9 8.5 - 10.5 mg/dL    AST(SGOT) 13 12 - 45 U/L    ALT(SGPT) 12 2 - 50 U/L    Alkaline Phosphatase 158 (H) 30 - 99 U/L    Total Bilirubin 0.2 0.1 - 1.5 mg/dL    Albumin 4.0 3.2 - 4.9 g/dL    Total Protein 7.1 6.0 - 8.2 g/dL    Globulin 3.1 1.9 - 3.5 g/dL    A-G Ratio 1.3 g/dL   LIPASE   Result Value Ref Range    Lipase 66 11 - 82 U/L   URINALYSIS    Specimen: Urine   Result Value Ref Range    Color Yellow     Character Clear     Specific Gravity 1.027 <1.035    Ph 5.0 5.0 - 8.0    Glucose >=1000 (A) Negative mg/dL    Ketones Trace (A) Negative mg/dL    Protein 30 (A) Negative mg/dL    Bilirubin Negative Negative    Urobilinogen, Urine 0.2 Negative    Nitrite Negative Negative    Leukocyte Esterase Negative Negative    Occult Blood Small (A) Negative    Micro Urine Req Microscopic    ESTIMATED GFR   Result Value Ref Range    GFR If  51 (A) >60 mL/min/1.73 m 2    GFR If Non  42 (A) >60 mL/min/1.73 m 2   LACTIC ACID   Result Value Ref Range    Lactic Acid 1.3 0.5 - 2.0 mmol/L   URINE MICROSCOPIC (W/UA)   Result Value Ref Range    WBC 5-10 (A) /hpf    RBC 2-5 (A) /hpf    Bacteria Negative None /hpf    Epithelial Cells Negative /hpf    Hyaline Cast 0-2 /lpf   VENOUS BLOOD GAS   Result Value Ref Range    Venous Bg Ph 7.30 (L) 7.31 - 7.45    Venous Bg Pco2 45.1 41.0 - 51.0 mmHg    Venous Bg Po2 23.9 (L) 25.0 - 40.0 mmHg    Venous Bg O2 Saturation 39.0 %    Venous Bg Hco3 22 (L) 24 - 28 mmol/L    Venous Bg Base Excess -5 mmol/L    Body Temp see below Centigrade   BETA-HYDROXYBUTYRIC ACID   Result Value Ref Range    beta-Hydroxybutyric Acid 2.21 (H) 0.02 - 0.27 mmol/L   Basic Metabolic Panel   Result Value Ref Range    Sodium 140 135 - 145 mmol/L    Potassium 3.8 3.6 - 5.5 mmol/L    Chloride 108 96 - 112 mmol/L    Co2 20 20 - 33 mmol/L    Glucose 234  (H) 65 - 99 mg/dL    Bun 35 (H) 8 - 22 mg/dL    Creatinine 1.21 0.50 - 1.40 mg/dL    Calcium 7.9 (L) 8.5 - 10.5 mg/dL    Anion Gap 12.0 7.0 - 16.0   Urine Creatinine Random   Result Value Ref Range    Creatinine, Random Urine 22.61 mg/dL   HEMOGLOBIN A1C   Result Value Ref Range    Glycohemoglobin 14.3 (H) 4.0 - 5.6 %    Est Avg Glucose 364 mg/dL   MAGNESIUM   Result Value Ref Range    Magnesium 1.9 1.5 - 2.5 mg/dL   PHOSPHORUS   Result Value Ref Range    Phosphorus 4.3 2.5 - 4.5 mg/dL   CREATINE KINASE   Result Value Ref Range    CPK Total 31 0 - 154 U/L   ESTIMATED GFR   Result Value Ref Range    GFR If  56 (A) >60 mL/min/1.73 m 2    GFR If Non  46 (A) >60 mL/min/1.73 m 2   POCT glucose device results   Result Value Ref Range    Glucose - Accu-Ck 551 (HH) 65 - 99 mg/dL   POCT glucose device results   Result Value Ref Range    Glucose - Accu-Ck 337 (H) 65 - 99 mg/dL      All labs reviewed by me.    COURSE & MEDICAL DECISION MAKING  Pertinent Labs & Imaging studies reviewed. (See chart for details)    12:31 PM - Patient seen and examined at bedside. Rectum exam was performed by me. Patient will be treated with NS, HumuLIN R, NovoLIN R, and Fleet. Ordered Lactic acid, CBC w/ diff, CMP, Lipase, and Urinalysis to evaluate her symptoms. The differential diagnoses include but are not limited to: Constipation and/or Hyperglycemia.    1:58 PM - Patient was reevaluated at bedside. Patient has not yet had a bowel movement.    3:00 PM - Paged Hospitalist    3:05 PM I discussed the patient's case and the above findings with Dr. Nguyen (Hospitalist) who recommends to call CDU.    3:12 PM - Paged CDU    3:17 PM - I discussed the patient's case and the above findings with Dr. Brooks ([facility] Hospitalist) who agrees to evaluate the patient for hospitalization.    3:24 PM - Patient was reevaluated at bedside. The plan for admission was discussed. Patient was given the opportunity to ask any questions.  Patient verbalizes understanding and agreement to this plan of care.          HYDRATION: Based on the patient's presentation of Dehydration the patient was given IV fluids. IV Hydration was used because oral hydration was not adequate alone. Upon recheck following hydration, the patient was improved.           Decision Making:  This is a patient presenting with really 2 problems the first abdominal pain I think is related to constipation and rectal fecal impaction.  With digital removal of some stool and breaking up of the hard stool she was able to have a bowel movement after administration of fleets enema this was quite large and did resolve her symptoms of abdominal pain.  Patient also has significant hyperglycemia with a elevated blood sugar this is been a problem recently apparently although worsening, she will need to be treated at this point with insulin as her oral agents are not adequate.  This is already been apparently discussed with her by her primary, but the problem is worsening.  The patient does appear to have some mild DKA starting.  Here in the emergency room treated with IV fluids and IV insulin.  Case discussed with hospitalist as above    DISPOSITION:  Patient will be hospitalized by Dr. Brooks in guarded condition.     FINAL IMPRESSION  1. Hyperglycemia    2. Diabetic ketoacidosis without coma associated with type 2 diabetes mellitus (HCC)    3. Fecal impaction (HCC)          Isidro BECERRA (Siobhan), am scribing for, and in the presence of, Lance Bradford M.D..    Electronically signed by: Isidro Chisholm (Siobhan), 10/8/2021    Lance BECERRA M.D. personally performed the services described in this documentation, as scribed by Isidro Chisholm in my presence, and it is both accurate and complete.    The note accurately reflects work and decisions made by me.  Lance Bradford M.D.  10/8/2021  5:09 PM

## 2021-10-08 NOTE — ED TRIAGE NOTES
Chief Complaint   Patient presents with   • Abdominal Pain     No bowel movement in 4 days. Abdomen soft, but distended and increased pain with palpation. No dysuria. History of GI surgery 6 years ago.   • Hyperglycemia     BSFS 540 with EMS. History of DM but has been noncompliant with insulin     Patient BIBA for above complaint. Patient currently lives with family and is nonambulatory due to a history of Stiff Person Syndrome. Patient is currently GCS 15, VSS. Patient was given 250mL NS, and a total of 125mcg Fentanyl PTA.    Vitals:    10/08/21 1109   BP: 108/73   Pulse: (!) 104   Resp: 17   Temp: 36.9 °C (98.4 °F)   SpO2: 100%

## 2021-10-09 LAB
ALBUMIN SERPL BCP-MCNC: 3.7 G/DL (ref 3.2–4.9)
ALBUMIN/GLOB SERPL: 1.3 G/DL
ALP SERPL-CCNC: 132 U/L (ref 30–99)
ALT SERPL-CCNC: 9 U/L (ref 2–50)
ANION GAP SERPL CALC-SCNC: 11 MMOL/L (ref 7–16)
AST SERPL-CCNC: 12 U/L (ref 12–45)
BASOPHILS # BLD AUTO: 0.5 % (ref 0–1.8)
BASOPHILS # BLD: 0.04 K/UL (ref 0–0.12)
BILIRUB SERPL-MCNC: <0.2 MG/DL (ref 0.1–1.5)
BUN SERPL-MCNC: 30 MG/DL (ref 8–22)
CALCIUM SERPL-MCNC: 8.7 MG/DL (ref 8.5–10.5)
CHLORIDE SERPL-SCNC: 109 MMOL/L (ref 96–112)
CHOLEST SERPL-MCNC: 250 MG/DL (ref 100–199)
CO2 SERPL-SCNC: 21 MMOL/L (ref 20–33)
CREAT SERPL-MCNC: 1.23 MG/DL (ref 0.5–1.4)
EOSINOPHIL # BLD AUTO: 0.12 K/UL (ref 0–0.51)
EOSINOPHIL NFR BLD: 1.5 % (ref 0–6.9)
ERYTHROCYTE [DISTWIDTH] IN BLOOD BY AUTOMATED COUNT: 42.5 FL (ref 35.9–50)
GLOBULIN SER CALC-MCNC: 2.8 G/DL (ref 1.9–3.5)
GLUCOSE BLD-MCNC: 160 MG/DL (ref 65–99)
GLUCOSE BLD-MCNC: 171 MG/DL (ref 65–99)
GLUCOSE BLD-MCNC: 176 MG/DL (ref 65–99)
GLUCOSE BLD-MCNC: 396 MG/DL (ref 65–99)
GLUCOSE SERPL-MCNC: 117 MG/DL (ref 65–99)
HCT VFR BLD AUTO: 35.1 % (ref 37–47)
HDLC SERPL-MCNC: 58 MG/DL
HGB BLD-MCNC: 11.5 G/DL (ref 12–16)
IMM GRANULOCYTES # BLD AUTO: 0.05 K/UL (ref 0–0.11)
IMM GRANULOCYTES NFR BLD AUTO: 0.6 % (ref 0–0.9)
LDLC SERPL CALC-MCNC: 140 MG/DL
LYMPHOCYTES # BLD AUTO: 1.58 K/UL (ref 1–4.8)
LYMPHOCYTES NFR BLD: 19.8 % (ref 22–41)
MAGNESIUM SERPL-MCNC: 2.1 MG/DL (ref 1.5–2.5)
MCH RBC QN AUTO: 31.1 PG (ref 27–33)
MCHC RBC AUTO-ENTMCNC: 32.8 G/DL (ref 33.6–35)
MCV RBC AUTO: 94.9 FL (ref 81.4–97.8)
MONOCYTES # BLD AUTO: 0.33 K/UL (ref 0–0.85)
MONOCYTES NFR BLD AUTO: 4.1 % (ref 0–13.4)
NEUTROPHILS # BLD AUTO: 5.86 K/UL (ref 2–7.15)
NEUTROPHILS NFR BLD: 73.5 % (ref 44–72)
NRBC # BLD AUTO: 0 K/UL
NRBC BLD-RTO: 0 /100 WBC
PHOSPHATE SERPL-MCNC: 3.6 MG/DL (ref 2.5–4.5)
PLATELET # BLD AUTO: 246 K/UL (ref 164–446)
PMV BLD AUTO: 13.5 FL (ref 9–12.9)
POTASSIUM SERPL-SCNC: 3.9 MMOL/L (ref 3.6–5.5)
PROT SERPL-MCNC: 6.5 G/DL (ref 6–8.2)
RBC # BLD AUTO: 3.7 M/UL (ref 4.2–5.4)
SODIUM SERPL-SCNC: 141 MMOL/L (ref 135–145)
TRIGL SERPL-MCNC: 259 MG/DL (ref 0–149)
WBC # BLD AUTO: 8 K/UL (ref 4.8–10.8)

## 2021-10-09 PROCEDURE — 700101 HCHG RX REV CODE 250: Performed by: GENERAL PRACTICE

## 2021-10-09 PROCEDURE — 83735 ASSAY OF MAGNESIUM: CPT

## 2021-10-09 PROCEDURE — 700102 HCHG RX REV CODE 250 W/ 637 OVERRIDE(OP): Performed by: GENERAL PRACTICE

## 2021-10-09 PROCEDURE — 36415 COLL VENOUS BLD VENIPUNCTURE: CPT

## 2021-10-09 PROCEDURE — 700111 HCHG RX REV CODE 636 W/ 250 OVERRIDE (IP): Performed by: INTERNAL MEDICINE

## 2021-10-09 PROCEDURE — 85025 COMPLETE CBC W/AUTO DIFF WBC: CPT

## 2021-10-09 PROCEDURE — 80061 LIPID PANEL: CPT

## 2021-10-09 PROCEDURE — 82962 GLUCOSE BLOOD TEST: CPT

## 2021-10-09 PROCEDURE — 99225 PR SUBSEQUENT OBSERVATION CARE,LEVEL II: CPT | Performed by: GENERAL PRACTICE

## 2021-10-09 PROCEDURE — 84100 ASSAY OF PHOSPHORUS: CPT

## 2021-10-09 PROCEDURE — A9270 NON-COVERED ITEM OR SERVICE: HCPCS | Performed by: GENERAL PRACTICE

## 2021-10-09 PROCEDURE — 96372 THER/PROPH/DIAG INJ SC/IM: CPT

## 2021-10-09 PROCEDURE — 80053 COMPREHEN METABOLIC PANEL: CPT

## 2021-10-09 PROCEDURE — 700102 HCHG RX REV CODE 250 W/ 637 OVERRIDE(OP): Performed by: INTERNAL MEDICINE

## 2021-10-09 PROCEDURE — A9270 NON-COVERED ITEM OR SERVICE: HCPCS | Performed by: INTERNAL MEDICINE

## 2021-10-09 PROCEDURE — G0378 HOSPITAL OBSERVATION PER HR: HCPCS

## 2021-10-09 RX ORDER — SODIUM CHLORIDE AND POTASSIUM CHLORIDE 150; 900 MG/100ML; MG/100ML
INJECTION, SOLUTION INTRAVENOUS ONCE
Status: COMPLETED | OUTPATIENT
Start: 2021-10-09 | End: 2021-10-09

## 2021-10-09 RX ORDER — LISINOPRIL 5 MG/1
2.5 TABLET ORAL
Status: DISCONTINUED | OUTPATIENT
Start: 2021-10-09 | End: 2021-10-10 | Stop reason: HOSPADM

## 2021-10-09 RX ORDER — ATORVASTATIN CALCIUM 20 MG/1
20 TABLET, FILM COATED ORAL EVERY EVENING
Status: DISCONTINUED | OUTPATIENT
Start: 2021-10-09 | End: 2021-10-10 | Stop reason: HOSPADM

## 2021-10-09 RX ADMIN — ASPIRIN 81 MG: 81 TABLET, COATED ORAL at 07:58

## 2021-10-09 RX ADMIN — OMEPRAZOLE 20 MG: 20 CAPSULE, DELAYED RELEASE ORAL at 05:43

## 2021-10-09 RX ADMIN — ATORVASTATIN CALCIUM 20 MG: 20 TABLET, FILM COATED ORAL at 17:00

## 2021-10-09 RX ADMIN — DIAZEPAM 10 MG: 5 TABLET ORAL at 15:20

## 2021-10-09 RX ADMIN — POTASSIUM CHLORIDE AND SODIUM CHLORIDE: 900; 150 INJECTION, SOLUTION INTRAVENOUS at 08:05

## 2021-10-09 RX ADMIN — INSULIN GLARGINE 10 UNITS: 100 INJECTION, SOLUTION SUBCUTANEOUS at 16:54

## 2021-10-09 RX ADMIN — INSULIN HUMAN 1 UNITS: 100 INJECTION, SOLUTION PARENTERAL at 06:28

## 2021-10-09 RX ADMIN — LEVOTHYROXINE SODIUM 50 MCG: 0.05 TABLET ORAL at 05:44

## 2021-10-09 RX ADMIN — DIAZEPAM 10 MG: 5 TABLET ORAL at 21:37

## 2021-10-09 RX ADMIN — ROSUVASTATIN CALCIUM 5 MG: 5 TABLET, COATED ORAL at 05:45

## 2021-10-09 RX ADMIN — INSULIN HUMAN 1 UNITS: 100 INJECTION, SOLUTION PARENTERAL at 21:37

## 2021-10-09 RX ADMIN — INSULIN HUMAN 1 UNITS: 100 INJECTION, SOLUTION PARENTERAL at 16:54

## 2021-10-09 RX ADMIN — CARBAMAZEPINE 200 MG: 200 TABLET ORAL at 16:50

## 2021-10-09 RX ADMIN — CARBAMAZEPINE 200 MG: 200 TABLET ORAL at 05:43

## 2021-10-09 RX ADMIN — INSULIN HUMAN 5 UNITS: 100 INJECTION, SOLUTION PARENTERAL at 11:52

## 2021-10-09 RX ADMIN — FLUOXETINE 40 MG: 20 CAPSULE ORAL at 05:43

## 2021-10-09 RX ADMIN — CALCIUM CARBONATE 1500 MG: 500 TABLET, CHEWABLE ORAL at 05:42

## 2021-10-09 RX ADMIN — ENOXAPARIN SODIUM 40 MG: 40 INJECTION SUBCUTANEOUS at 05:42

## 2021-10-09 NOTE — PROGRESS NOTES
Hospital Medicine Daily Progress Note    Date of Service  10/9/2021    Chief Complaint  Vicky Cheatham-Depintor is a 56 y.o. female admitted 10/8/2021 with abdominal pain    Hospital Course  This is a 56 year old female with PMHx of uncontrolled type 2 diabetes with A1c of 14.3, GERD who was admitted on 10/8/2021 due to severe abdominal pain and mild ketoacidosis.    CT imaging of the abdomen  after disimpaction in ER noted constipation, no evidence of bowel obstruction or infection.      Labs significant for new onset hypothyroidism, patient was started on Synthroid.    Interval Problem Update  Patient to remain in the hospital overnight, to develop a new insulin regimen for the patient.  We will send meds to beds for her new medications.    Patient reports that she was recently diagnosed with type 2 diabetes, she is very tearful and afraid with this diagnosis.  We talked at great lengths in terms of adhering to her medication regimen, diet control, and more importantly following up with her primary care physician to continue management of her diabetes.  Patient feels better in terms of being provided with all the tools and resources that she needs.    Patient to remain in the hospital overnight, to develop a new insulin regimen for the patient.  We will send meds to beds for her new medications.    I have personally seen and examined the patient at bedside. I discussed the plan of care with patient and bedside RN.    Consultants/Specialty  None    Code Status  Full Code    Disposition  Patient is not medically cleared.   Anticipate discharge to to home with close outpatient follow-up.  I have placed the appropriate orders for post-discharge needs.    Review of Systems  Review of Systems   All other systems reviewed and are negative.       Physical Exam  Temp:  [36.1 °C (97 °F)-36.9 °C (98.4 °F)] 36.7 °C (98 °F)  Pulse:  [] 78  Resp:  [16-18] 17  BP: ()/(61-73) 93/62  SpO2:  [94 %-100 %] 99  %    Physical Exam  Vitals and nursing note reviewed.   Constitutional:       General: She is not in acute distress.     Appearance: Normal appearance.   HENT:      Head: Normocephalic and atraumatic.      Mouth/Throat:      Mouth: Mucous membranes are moist.      Pharynx: No oropharyngeal exudate.   Eyes:      Extraocular Movements: Extraocular movements intact.      Pupils: Pupils are equal, round, and reactive to light.   Cardiovascular:      Rate and Rhythm: Normal rate and regular rhythm.      Pulses: Normal pulses.      Heart sounds: No murmur heard.   No friction rub. No gallop.    Pulmonary:      Effort: Pulmonary effort is normal. No respiratory distress.      Breath sounds: No wheezing, rhonchi or rales.   Abdominal:      General: Bowel sounds are normal. There is no distension.      Palpations: Abdomen is soft. There is no mass.      Tenderness: There is no abdominal tenderness.   Musculoskeletal:         General: No swelling or tenderness. Normal range of motion.      Cervical back: Normal range of motion. No rigidity. No muscular tenderness.      Right lower leg: No edema.      Left lower leg: No edema.   Skin:     General: Skin is warm and dry.      Capillary Refill: Capillary refill takes less than 2 seconds.      Findings: No erythema or rash.   Neurological:      General: No focal deficit present.      Mental Status: She is alert and oriented to person, place, and time.      Motor: No weakness.      Gait: Gait normal.         Fluids    Intake/Output Summary (Last 24 hours) at 10/9/2021 0928  Last data filed at 10/9/2021 0900  Gross per 24 hour   Intake 240 ml   Output --   Net 240 ml       Laboratory  Recent Labs     10/08/21  1131 10/09/21  0420   WBC 9.3 8.0   RBC 3.94* 3.70*   HEMOGLOBIN 12.0 11.5*   HEMATOCRIT 36.2* 35.1*   MCV 91.9 94.9   MCH 30.5 31.1   MCHC 33.1* 32.8*   RDW 39.9 42.5   PLATELETCT 256 246   MPV 13.6* 13.5*     Recent Labs     10/08/21  1131 10/08/21  1611 10/09/21  0420    SODIUM 129* 140 141   POTASSIUM 4.9 3.8 3.9   CHLORIDE 95* 108 109   CO2 18* 20 21   GLUCOSE 579* 234* 117*   BUN 38* 35* 30*   CREATININE 1.30 1.21 1.23   CALCIUM 8.9 7.9* 8.7             Recent Labs     10/09/21  0420   TRIGLYCERIDE 259*   HDL 58   *       Imaging  CT-ABDOMEN-PELVIS W/O   Final Result      1.  No evidence of bowel obstruction or focal inflammatory change.      2.  Constipation.      3.  Prior cholecystectomy.           Assessment/Plan  * Type 2 diabetes mellitus untreated, with ketoacidosis - (present on admission)  Assessment & Plan  Uncontrolled, with hyperglycemia and ketoacidosis  Hemoglobin A1c of 14.3  Started on subcu Lantus 10 units at night, insulin sliding scale  Diabetic education  Hypoglycemia protocol    Hypothyroid  Assessment & Plan  TSH 14.50  Will start levothyroxine 50 mcg  F/u with pcp for repeat thyroid profile    UTI (urinary tract infection)  Assessment & Plan  Finished 10 days course of cefdinir yesterday  UA showed WBC 5-10, negative leukocyte esterase.  Denies dysuria  We will refrain from antibiotic at this time    Debility  Assessment & Plan  Secondary to stiff person syndrome, ankle contracture  Normally uses walker at home  Fall precautions    Acute kidney injury (HCC)  Assessment & Plan  Probably prerenal secondary to hypovolemia and DKA  Follow-up with CT of the abdomen and bladder scan to rule out obstruction  IV fluids  Avoid nephrotoxins    Chronic constipation  Assessment & Plan  Unclear etiology.?  Secondary to hypothyroid, carbamazepine  CT imaging with no evidence of small bowel obstruction or infection  Bowel protocol, scheduled MiraLAX  Follow-up with GI as outpatient for elective colonoscopy    AP (abdominal pain)  Assessment & Plan  Secondary to constipation.  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  Will order omeprazole       VTE prophylaxis: SCDs/TEDs and enoxaparin ppx    I have performed a physical exam and reviewed and updated ROS and Plan today (10/9/2021). In review of yesterday's note (10/8/2021), there are no changes except as documented above.

## 2021-10-09 NOTE — PROGRESS NOTES
Received bedside report from night shift RN.   Assumed care of patient at change of shift.   Assessment complete and POC discussed.   Patient is A&Ox4, BP soft this am (93/62).  Lisinopril and Diazepam held due to low blood pressure.  Denies pain, no apparent signs of distress or discomfort.   Patient is sitting up in bed for breakfast.   In house  used ([place]).  Bed is in lowest/locked position.   Call light and belongings are within reach.   No further needs at this time.    1100: Daughter is at bedside. Daughter is wondering why patient cannot discharge today. Explained the POC in depth with daughter the and the need to develop a new insulin regimen and that patient's insulin levels need to be controlled prior to discharging. Daughter translated this information to patient. Attempted to reach out to diabetic educator, both are offline today.    Covid-19 surge in effect.

## 2021-10-09 NOTE — PROGRESS NOTES
Pt arrived on floor via gurney.  Pt A&O X4, on RA.  Pt denies having any pain at present time.  Pt is unable to ambulate.  Bilateral feet are contracted inward.  Pt is [place] speaking, daughter at bedside to interpret..

## 2021-10-09 NOTE — PROGRESS NOTES
4 Eyes Skin Assessment Completed by KAPIL García and KAPIL Jackson.    Head WDL  Ears WDL  Nose WDL  Mouth WDL  Neck WDL  Breast/Chest WDL  Shoulder Blades WDL  Spine WDL  (R) Arm/Elbow/Hand WDL  (L) Arm/Elbow/Hand WDL  Abdomen WDL  Groin WDL  Scrotum/Coccyx/Buttocks Blanching, small fissure between cheeks  (R) Leg WDL  (L) Leg WDL  (R) Heel/Foot/Toe Blanching, foot contracted inward  (L) Heel/Foot/Toe Blanching, foot contracted inward          Devices In Places  PIV      Interventions In Place Pillows and Pressure Redistribution Mattress    Possible Skin Injury No    Pictures Uploaded Into Epic N/A  Wound Consult Placed N/A  RN Wound Prevention Protocol Ordered No

## 2021-10-09 NOTE — PROGRESS NOTES
Assumed care of patient at bedside report from RN. Updated on POC. Patient currently A & O x 4; on room air; patient is very unsteady while up, at least 2 person assist due to foot contractures; Patient has no complaints of acute pain at this time. Assessment complete. Call light within reach. Whiteboard updated. Fall precautions in place. Bed locked and in lowest position. All questions answered. No other needs indicated at this time.  COVID-19 surge in effect

## 2021-10-09 NOTE — HOSPITAL COURSE
This is a 56 year old female with PMHx of uncontrolled type 2 diabetes with A1c of 14.3, GERD who was admitted on 10/8/2021 due to severe abdominal pain and mild ketoacidosis.    CT imaging of the abdomen  after disimpaction in ER noted constipation, no evidence of bowel obstruction or infection.      Labs significant for new onset hypothyroidism, patient was started on Synthroid.    Patient started on a new regimen for her type 2 diabetes, formally she was on oral agents only.  With a hemoglobin of 14.3, patient was started on long-acting insulin at night and will be provided with a short acting insulin with a sliding scale during the day.  Patient admits she does not eat very much, she is encouraged to have 5 small portions throughout the day and to adjust her insulin as needed, with a sliding scale.     Patient provided with meds to beds, encouraged to keep a journal of her sugars so her primary care can further adjust her medications

## 2021-10-10 VITALS
TEMPERATURE: 97.3 F | DIASTOLIC BLOOD PRESSURE: 62 MMHG | OXYGEN SATURATION: 99 % | WEIGHT: 115 LBS | BODY MASS INDEX: 23.18 KG/M2 | HEIGHT: 59 IN | HEART RATE: 91 BPM | RESPIRATION RATE: 18 BRPM | SYSTOLIC BLOOD PRESSURE: 112 MMHG

## 2021-10-10 LAB
ANION GAP SERPL CALC-SCNC: 12 MMOL/L (ref 7–16)
BUN SERPL-MCNC: 22 MG/DL (ref 8–22)
CALCIUM SERPL-MCNC: 8.8 MG/DL (ref 8.5–10.5)
CHLORIDE SERPL-SCNC: 104 MMOL/L (ref 96–112)
CO2 SERPL-SCNC: 21 MMOL/L (ref 20–33)
CREAT SERPL-MCNC: 1.18 MG/DL (ref 0.5–1.4)
GLUCOSE BLD-MCNC: 117 MG/DL (ref 65–99)
GLUCOSE BLD-MCNC: 277 MG/DL (ref 65–99)
GLUCOSE SERPL-MCNC: 60 MG/DL (ref 65–99)
MAGNESIUM SERPL-MCNC: 2 MG/DL (ref 1.5–2.5)
POTASSIUM SERPL-SCNC: 3.6 MMOL/L (ref 3.6–5.5)
SODIUM SERPL-SCNC: 137 MMOL/L (ref 135–145)

## 2021-10-10 PROCEDURE — 700111 HCHG RX REV CODE 636 W/ 250 OVERRIDE (IP): Performed by: INTERNAL MEDICINE

## 2021-10-10 PROCEDURE — 700102 HCHG RX REV CODE 250 W/ 637 OVERRIDE(OP): Performed by: GENERAL PRACTICE

## 2021-10-10 PROCEDURE — 82962 GLUCOSE BLOOD TEST: CPT

## 2021-10-10 PROCEDURE — 83735 ASSAY OF MAGNESIUM: CPT

## 2021-10-10 PROCEDURE — 99217 PR OBSERVATION CARE DISCHARGE: CPT | Performed by: GENERAL PRACTICE

## 2021-10-10 PROCEDURE — A9270 NON-COVERED ITEM OR SERVICE: HCPCS | Performed by: INTERNAL MEDICINE

## 2021-10-10 PROCEDURE — G0378 HOSPITAL OBSERVATION PER HR: HCPCS

## 2021-10-10 PROCEDURE — 96372 THER/PROPH/DIAG INJ SC/IM: CPT | Mod: XU

## 2021-10-10 PROCEDURE — 80048 BASIC METABOLIC PNL TOTAL CA: CPT

## 2021-10-10 PROCEDURE — 700102 HCHG RX REV CODE 250 W/ 637 OVERRIDE(OP): Performed by: INTERNAL MEDICINE

## 2021-10-10 PROCEDURE — 36415 COLL VENOUS BLD VENIPUNCTURE: CPT

## 2021-10-10 PROCEDURE — A9270 NON-COVERED ITEM OR SERVICE: HCPCS | Performed by: GENERAL PRACTICE

## 2021-10-10 RX ORDER — ONDANSETRON 4 MG/1
4 TABLET, FILM COATED ORAL EVERY 4 HOURS PRN
Qty: 84 TABLET | Refills: 0 | Status: SHIPPED | OUTPATIENT
Start: 2021-10-10 | End: 2021-10-24

## 2021-10-10 RX ORDER — OMEPRAZOLE 20 MG/1
20 CAPSULE, DELAYED RELEASE ORAL DAILY
Qty: 90 CAPSULE | Refills: 0 | Status: SHIPPED | OUTPATIENT
Start: 2021-10-11 | End: 2022-01-05

## 2021-10-10 RX ORDER — LISINOPRIL 2.5 MG/1
2.5 TABLET ORAL DAILY
Qty: 90 TABLET | Refills: 0 | Status: SHIPPED | OUTPATIENT
Start: 2021-10-11 | End: 2021-10-10 | Stop reason: SDUPTHER

## 2021-10-10 RX ORDER — ATORVASTATIN CALCIUM 20 MG/1
20 TABLET, FILM COATED ORAL EVERY EVENING
Qty: 90 TABLET | Refills: 0 | Status: SHIPPED | OUTPATIENT
Start: 2021-10-10 | End: 2021-12-30

## 2021-10-10 RX ORDER — ASPIRIN 81 MG/1
81 TABLET ORAL DAILY
Qty: 90 TABLET | Refills: 0 | Status: SHIPPED | OUTPATIENT
Start: 2021-10-11 | End: 2022-01-05

## 2021-10-10 RX ORDER — ATORVASTATIN CALCIUM 20 MG/1
20 TABLET, FILM COATED ORAL EVERY EVENING
Qty: 90 TABLET | Refills: 0 | Status: SHIPPED | OUTPATIENT
Start: 2021-10-10 | End: 2021-10-10 | Stop reason: SDUPTHER

## 2021-10-10 RX ORDER — LEVOTHYROXINE SODIUM 0.05 MG/1
50 TABLET ORAL
Qty: 90 TABLET | Refills: 0 | Status: SHIPPED | OUTPATIENT
Start: 2021-10-11 | End: 2022-01-05

## 2021-10-10 RX ORDER — DEXTROSE MONOHYDRATE 25 G/50ML
50 INJECTION, SOLUTION INTRAVENOUS
Status: DISCONTINUED | OUTPATIENT
Start: 2021-10-10 | End: 2021-10-10 | Stop reason: HOSPADM

## 2021-10-10 RX ORDER — INSULIN GLARGINE 100 [IU]/ML
6 INJECTION, SOLUTION SUBCUTANEOUS EVERY EVENING
Qty: 6 ML | Refills: 0 | Status: SHIPPED | OUTPATIENT
Start: 2021-10-10 | End: 2021-10-10 | Stop reason: SDUPTHER

## 2021-10-10 RX ORDER — OMEPRAZOLE 20 MG/1
20 CAPSULE, DELAYED RELEASE ORAL DAILY
Qty: 90 CAPSULE | Refills: 0 | Status: SHIPPED | OUTPATIENT
Start: 2021-10-11 | End: 2021-10-10 | Stop reason: SDUPTHER

## 2021-10-10 RX ORDER — LEVOTHYROXINE SODIUM 0.05 MG/1
50 TABLET ORAL
Qty: 90 TABLET | Refills: 0 | Status: SHIPPED | OUTPATIENT
Start: 2021-10-11 | End: 2021-10-10 | Stop reason: SDUPTHER

## 2021-10-10 RX ORDER — ASPIRIN 81 MG/1
81 TABLET ORAL DAILY
Qty: 90 TABLET | Refills: 0 | Status: SHIPPED | OUTPATIENT
Start: 2021-10-11 | End: 2021-10-10 | Stop reason: SDUPTHER

## 2021-10-10 RX ORDER — INSULIN GLARGINE 100 [IU]/ML
6 INJECTION, SOLUTION SUBCUTANEOUS EVERY EVENING
Qty: 6 ML | Refills: 0 | Status: SHIPPED | OUTPATIENT
Start: 2021-10-10 | End: 2022-01-08

## 2021-10-10 RX ORDER — LISINOPRIL 2.5 MG/1
2.5 TABLET ORAL DAILY
Qty: 90 TABLET | Refills: 0 | Status: SHIPPED | OUTPATIENT
Start: 2021-10-11 | End: 2022-01-05

## 2021-10-10 RX ADMIN — CALCIUM CARBONATE 1500 MG: 500 TABLET, CHEWABLE ORAL at 05:25

## 2021-10-10 RX ADMIN — INSULIN HUMAN 3 UNITS: 100 INJECTION, SOLUTION PARENTERAL at 12:15

## 2021-10-10 RX ADMIN — DIAZEPAM 10 MG: 5 TABLET ORAL at 09:33

## 2021-10-10 RX ADMIN — MAGNESIUM HYDROXIDE 30 ML: 400 SUSPENSION ORAL at 11:17

## 2021-10-10 RX ADMIN — LISINOPRIL 2.5 MG: 5 TABLET ORAL at 05:25

## 2021-10-10 RX ADMIN — LEVOTHYROXINE SODIUM 50 MCG: 0.05 TABLET ORAL at 05:26

## 2021-10-10 RX ADMIN — OMEPRAZOLE 20 MG: 20 CAPSULE, DELAYED RELEASE ORAL at 05:24

## 2021-10-10 RX ADMIN — CARBAMAZEPINE 200 MG: 200 TABLET ORAL at 05:26

## 2021-10-10 RX ADMIN — DIAZEPAM 10 MG: 5 TABLET ORAL at 12:52

## 2021-10-10 RX ADMIN — ACETAMINOPHEN 650 MG: 325 TABLET, FILM COATED ORAL at 04:00

## 2021-10-10 RX ADMIN — FLUOXETINE 40 MG: 20 CAPSULE ORAL at 05:26

## 2021-10-10 RX ADMIN — ASPIRIN 81 MG: 81 TABLET, COATED ORAL at 05:25

## 2021-10-10 RX ADMIN — DOCUSATE SODIUM 50 MG AND SENNOSIDES 8.6 MG 2 TABLET: 8.6; 5 TABLET, FILM COATED ORAL at 05:24

## 2021-10-10 ASSESSMENT — PAIN DESCRIPTION - PAIN TYPE: TYPE: ACUTE PAIN

## 2021-10-10 NOTE — PROGRESS NOTES
Received bedside report from night shift RN.   Assumed care of patient at change of shift.   Assessment complete and POC discussed.   Denies pain, no apparent signs of distress or discomfort.   Patient is sitting up in bed for breakfast.   Family at bedside to assist with translation.  Bed is in lowest/locked position.   Call light and belongings are within reach.   No further needs at this time.    1115: Patient requesting stool softener, states she feels constipated and has abdominal discomfort. Administered milk of magnesia.    1140: Patient c/o worsening abdominal pain, states she feels like she has to have a BM but is unable to. This RN performed dig stimulation. Patient was able to have a large BM. States she feels much better. Educated patient she may have some diarrhea from MOM that was given earlier.    1245: Patient requesting one time dose of diazepam due to increased spasms. Spoke with MD. OK to give 1500 Diazepam early at 1252 per MD. Patient to be discharged home.    1400: Discussed discharge instructions with patient and daughter at bedside. Daughter is comfortable administering the insulin using the pen and insuline syringes using sliding scale. Daughter knows how to check BG levels using glucometer as well. Told patient to call pharmacist or PCP if she has any questions or concerns. IV removed. Belongings with patient at time of discharge.

## 2021-10-10 NOTE — DISCHARGE SUMMARY
Discharge Summary    CHIEF COMPLAINT ON ADMISSION  Chief Complaint   Patient presents with   • Abdominal Pain     No bowel movement in 4 days. Abdomen soft, but distended and increased pain with palpation. No dysuria. History of GI surgery 6 years ago.   • Hyperglycemia     BSFS 540 with EMS. History of DM but has been noncompliant with insulin       Reason for Admission  ABD PAIN     Admission Date  10/8/2021    CODE STATUS  Full Code    HPI & HOSPITAL COURSE  This is a 56 year old female with PMHx of uncontrolled type 2 diabetes with A1c of 14.3, GERD who was admitted on 10/8/2021 due to severe abdominal pain and mild ketoacidosis.    CT imaging of the abdomen  after disimpaction in ER noted constipation, no evidence of bowel obstruction or infection.      Labs significant for new onset hypothyroidism, patient was started on Synthroid.    Patient started on a new regimen for her type 2 diabetes, formally she was on oral agents only.  With a hemoglobin of 14.3, patient was started on long-acting insulin at night and will be provided with a short acting insulin with a sliding scale during the day.  Patient admits she does not eat very much, she is encouraged to have 5 small portions throughout the day and to adjust her insulin as needed, with a sliding scale.     Patient provided with meds to beds, encouraged to keep a journal of her sugars so her primary care can further adjust her medications      Therefore, she is discharged in good and stable condition to home with close outpatient follow-up.    The patient recovered much more quickly than anticipated on admission.    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 *      FOLLOW UP  Chalo Whelan M.D.  [address] [address]  [phone]    In 2 weeks        MEDICATIONS ON DISCHARGE     Medication List      START taking these medications      Instructions   aspirin 81 MG EC tablet  Start taking on: October 11, 2021   Take 1 Tablet by mouth every day for 90 days.  Dose: 81 mg     atorvastatin 20 MG Tabs  Commonly known as: LIPITOR   Take 1 Tablet by mouth every evening for 90 days.  Dose: 20 mg     insulin glargine 100 UNIT/ML Sopn injection  Generic drug: insulin glargine   Inject 6 Units under the skin every evening for 90 days.  Dose: 6 Units     insulin regular 100 Unit/mL Soln  Commonly known as: HumuLIN R   Inject 1-6 Units under the skin 3 times a day before meals for 90 days. 70   - 150  mg/dL =      0 Units  151 - 200  mg/dL =     1 Units  201 - 250  mg/dL =    2 Units  251 - 300  mg/dL  =   3 Units  301 - 350  mg/dL  =   4 Units  351 - 400 mg/dL   =   5 Units  Over 400 mg/dL   =   6 Units  Dose: 1-6 Units     levothyroxine 50 MCG Tabs  Start taking on: October 11, 2021  Commonly known as: SYNTHROID   Take 1 Tablet by mouth every morning on an empty stomach for 90 days.  Dose: 50 mcg     lisinopril 2.5 MG Tabs  Start taking on: October 11, 2021  Commonly known as: PRINIVIL   Take 1 Tablet by mouth every day for 90 days.  Dose: 2.5 mg     omeprazole 20 MG delayed-release capsule  Start taking on: October 11, 2021  Commonly known as: PRILOSEC   Take 1 Capsule by mouth every day for 90 days.  Dose: 20 mg     ondansetron 4 MG Tabs tablet  Commonly known as: Zofran   Take 1 Tablet by mouth every four hours as needed for Nausea/Vomiting for up to 14 days.  Dose: 4 mg        CONTINUE  taking these medications      Instructions   Aleve 220 MG tablet  Generic drug: naproxen   Take 440 mg by mouth 2 times a day as needed (Mild Pain or Headache). 2 tablets = 440 mg.  Dose: 440 mg     calcium carbonate 500 MG Chew  Commonly known as: TUMS   Chew 3 Tablets every day.  Dose: 1,500 mg     carBAMazepine 200 MG Tabs  Commonly known as: TEGRETOL   Take 200 mg by mouth 2 times a day.  Dose: 200 mg     FLUoxetine 20 MG Caps  Commonly known as: PROZAC   Take 40 mg by mouth every morning. 2 capsules = 40 mg.  Dose: 40 mg     metFORMIN 500 MG Tabs  Commonly known as: GLUCOPHAGE   Take 1 Tablet by mouth 2 times a day with meals for 90 days.  Dose: 500 mg     Valium 10 MG tablet  Generic drug: diazepam   Take 10 mg by mouth 3 times a day.  Dose: 10 mg     vitamin D2 (Ergocalciferol) 1.25 MG (94087 UT) Caps capsule  Commonly known as: Drisdol   Take 1 Capsule by mouth every 7 days for 42 days.  Dose: 50,000 Units     zonisamide 100 MG Caps  Commonly known as: ZONEGRAN   Take 100 mg by mouth at bedtime.  Dose: 100 mg        STOP taking these medications    cefdinir 300 MG Caps  Commonly known as: OMNICEF     glipiZIDE SR 2.5 MG Tb24  Commonly known as: GLUCOTROL     Jardiance 25 MG Tabs  Generic drug: Empagliflozin     rosuvastatin 5 MG Tabs  Commonly known as: CRESTOR     senna-docusate 8.6-50 MG Tabs  Commonly known as: PERICOLACE or SENOKOT S            Allergies  No Known Allergies    DIET  Orders Placed This Encounter   Procedures   • Diet Order Diet: Consistent CHO (Diabetic)     Standing Status:   Standing     Number of Occurrences:   1     Order Specific Question:   Diet:     Answer:   Consistent CHO (Diabetic) [4]       ACTIVITY  As tolerated.  Weight bearing as tolerated    CONSULTATIONS  None    PROCEDURES  None    LABORATORY  Lab Results   Component Value Date    SODIUM 137 10/10/2021    POTASSIUM 3.6 10/10/2021    CHLORIDE 104 10/10/2021    CO2 21 10/10/2021    GLUCOSE 60 (L) 10/10/2021    BUN 22  10/10/2021    CREATININE 1.18 10/10/2021        Lab Results   Component Value Date    WBC 8.0 10/09/2021    HEMOGLOBIN 11.5 (L) 10/09/2021    HEMATOCRIT 35.1 (L) 10/09/2021    PLATELETCT 246 10/09/2021      CT-ABDOMEN-PELVIS W/O   Final Result      1.  No evidence of bowel obstruction or focal inflammatory change.      2.  Constipation.      3.  Prior cholecystectomy.          Total time of the discharge process exceeds 45 minutes.

## 2021-10-10 NOTE — DISCHARGE INSTRUCTIONS
Please continue taking all your medications as you were before with the addition of:  Aspirin 81 mg daily, lisinopril 2.5 mg daily, omeprazole 20 mg daily, Metformin 500 mg twice a day with breakfast and dinner, Lipitor 20 mg every evening, your long-acting insulin pen glargine 6 units every evening, your short acting insulin you will check your sugars before you eat breakfast lunch and dinner, based off of the number that you get you will determine how many units to give.  I have also prescribed for you Zofran, this is a antinausea medication in case you get nauseous.  If you are not going to eat any food do not take your insulin as the insulin will lower your sugars to a very deadly number.    For glucose:  70   - 150  mg/dL =      0 Units  151 - 200  mg/dL =     1 Units  201 - 250  mg/dL =    2 Units  251 - 300  mg/dL  =   3 Units  301 - 350  mg/dL  =   4 Units  351 - 400 mg/dL   =   5 Units  Over 400 mg/dL   =   6 Units    Every day when you check your sugars before breakfast, before lunch, before dinner and before you go to bed, please keep a journal and bring this to your next primary care visit, see your doctor can further adjust your diabetes medication.  Every 3 months your hemoglobin A1c will need to be repeated, currently it is at 14.3, with the goal of it being below 7.  Once you are around or below 7, this will help reduce the risk of stroke, heart attack, renal failure, recurrent infections such as pneumonia and and urinary tract infections, reduce the risk of developing vision loss, and neuropathy.    Please make sure if you are unable to eat 3 meals a day, to stick to 5 small meals per day, as long as you are are going to take insulin, you have to make sure you are eating foods.  Please avoid foods that are very starchy such as bread, rice and and grains, if you are going to have some of these you have to have very small portions.     Please follow-up with your primary care physician within 2  weeks.     Please take care and be well!    --    Continúe tomando todos isabelle medicamentos angela lo hacía antes con la adición de:  Aspirina 81 mg al día, lisinopril 2,5 mg al día, omeprazol 20 mg al día, metformina 500 mg dos veces al día con el desayuno y la israel, Lipitor 20 mg todas las noches, grijalva pluma de insulina de acción prolongada glargina 6 unidades cada noche, grijalva insulina de acción corta Verifique isabelle azúcares antes de desayunar, almorzar y cenar, basándose en el número que obtenga, determinará cuántas unidades debe administrar. También te he recetado Zofran, hiral es un medicamento contra las náuseas en bhavna de que tengas náuseas. Si no va a ingerir ningún alimento, no se administre insulina, ya que la insulina reducirá isabelle azúcares a un número muy letal.    Para glucosa:   mg / dL = 0 Unidades  151-200 mg / dL = 1 Unidades  201-250 mg / dL = 2 Unidades  251 - 300 mg / dL = 3 Unidades  301-350 mg / dL = 4 unidades  351 - 400 mg / dL = 5 Unidades  Más de 400 mg / dL = 6 unidades    Todos los días, cuando controle isabelle azúcares antes del desayuno, antes del almuerzo, antes de la israel y antes de irse a la cama, lleve un diario y llévelo a grijalva próxima visita de atención primaria; consulte a grijalva médico para que pueda ajustar aún más grijalva medicación para la diabetes. Cada 3 meses será necesario repetir grijalva hemoglobina A1c, actualmente está en 14.3, con el objetivo de estar por debajo de 7. Ailyn vez que esté alrededor o por debajo de 7, esto ayudará a reducir el riesgo de accidente cerebrovascular, ataque cardíaco, insuficiencia renal, Las infecciones recurrentes, angela la neumonía y las infecciones del tracto urinario, reducen el riesgo de desarrollar pérdida de visión y neuropatía.    Asegúrese de que si no puede comer 3 comidas al día, de ceñirse a 5 comidas pequeñas por día, siempre que vaya a abiel insulina, debe asegurarse de comer alimentos. Por favor, evite los alimentos que contengan mucha almidón, angela el  pan, el arroz y los cereales. Si va a consumir algunos de estos, deberá consumir porciones muy pequeñas.    Carmela un seguimiento con grijalva médico de atención primaria en un plazo de 2 semanas.    ¡Cuídate y rupal kami!      Discharge Instructions    Discharged to home by car with relative. Discharged via wheelchair, hospital escort: Yes.  Special equipment needed: Not Applicable    Be sure to schedule a follow-up appointment with your primary care doctor or any specialists as instructed.     Discharge Plan:   Diet Plan: Discussed  Activity Level: Discussed  Confirmed Follow up Appointment: Patient to Call and Schedule Appointment  Confirmed Symptoms Management: Discussed  Medication Reconciliation Updated: Yes  Influenza Vaccine Indication: Patient Refuses    I understand that a diet low in cholesterol, fat, and sodium is recommended for good health. Unless I have been given specific instructions below for another diet, I accept this instruction as my diet prescription.   Other diet: Diabetic    Special Instructions: None    · Is patient discharged on Warfarin / Coumadin?   No     Depression / Suicide Risk    As you are discharged from this [facility] facility, it is important to learn how to keep safe from harming yourself.    Recognize the warning signs:  · Abrupt changes in personality, positive or negative- including increase in energy   · Giving away possessions  · Change in eating patterns- significant weight changes-  positive or negative  · Change in sleeping patterns- unable to sleep or sleeping all the time   · Unwillingness or inability to communicate  · Depression  · Unusual sadness, discouragement and loneliness  · Talk of wanting to die  · Neglect of personal appearance   · Rebelliousness- reckless behavior  · Withdrawal from people/activities they love  · Confusion- inability to concentrate     If you or a loved one observes any of these behaviors or has concerns about self-harm, here's what you can  do:  · Talk about it- your feelings and reasons for harming yourself  · Remove any means that you might use to hurt yourself (examples: pills, rope, extension cords, firearm)  · Get professional help from the community (Mental Health, Substance Abuse, psychological counseling)  · Do not be alone:Call your Safe Contact- someone whom you trust who will be there for you.  · Call your local CRISIS HOTLINE 840-6888 or 178-766-6131  · Call your local Children's Mobile Crisis Response Team [place] (037) 019-6870 or www.[employer]  · Call the toll free National Suicide Prevention Hotlines   · National Suicide Prevention Lifeline 440-320-ODLP (8352)  · National Hope Line Network 800-SUICIDE (677-5280)

## 2021-10-10 NOTE — PROGRESS NOTES
Assumed care of patient at bedside report from RN. Updated on POC. Patient currently A & O x 4; on room air; up with assistance of at least two people due to foot contractures, bed bound mostly; she has no complaints of acute pain at this time. Assessment complete. Call light within reach. Whiteboard updated. Fall precautions in place. Bed locked and in lowest position. All questions answered. No other needs indicated at this time.  COVID-19 surge in effect

## 2021-12-30 DIAGNOSIS — E11.65 UNCONTROLLED TYPE 2 DIABETES MELLITUS WITH HYPERGLYCEMIA, WITH LONG-TERM CURRENT USE OF INSULIN (HCC): ICD-10-CM

## 2021-12-30 DIAGNOSIS — Z79.4 UNCONTROLLED TYPE 2 DIABETES MELLITUS WITH HYPERGLYCEMIA, WITH LONG-TERM CURRENT USE OF INSULIN (HCC): ICD-10-CM

## 2021-12-30 RX ORDER — ATORVASTATIN CALCIUM 20 MG/1
TABLET, FILM COATED ORAL
Qty: 90 TABLET | Refills: 0 | Status: SHIPPED | OUTPATIENT
Start: 2021-12-30 | End: 2022-03-28

## 2021-12-30 NOTE — TELEPHONE ENCOUNTER
Received request via: Pharmacy    Was the patient seen in the last year in this department? No     Does the patient have an active prescription (recently filled or refills available) for medication(s) requested? No     Last seen 12/7/20 via telemedicine

## 2022-03-27 DIAGNOSIS — E11.65 UNCONTROLLED TYPE 2 DIABETES MELLITUS WITH HYPERGLYCEMIA, WITH LONG-TERM CURRENT USE OF INSULIN (HCC): ICD-10-CM

## 2022-03-27 DIAGNOSIS — Z79.4 UNCONTROLLED TYPE 2 DIABETES MELLITUS WITH HYPERGLYCEMIA, WITH LONG-TERM CURRENT USE OF INSULIN (HCC): ICD-10-CM

## 2022-03-28 RX ORDER — ATORVASTATIN CALCIUM 20 MG/1
TABLET, FILM COATED ORAL
Qty: 90 TABLET | Refills: 0 | Status: SHIPPED | OUTPATIENT
Start: 2022-03-28 | End: 2022-07-18

## 2022-03-31 DIAGNOSIS — Z79.4 UNCONTROLLED TYPE 2 DIABETES MELLITUS WITH HYPERGLYCEMIA, WITH LONG-TERM CURRENT USE OF INSULIN (HCC): ICD-10-CM

## 2022-03-31 DIAGNOSIS — E11.65 UNCONTROLLED TYPE 2 DIABETES MELLITUS WITH HYPERGLYCEMIA, WITH LONG-TERM CURRENT USE OF INSULIN (HCC): ICD-10-CM

## 2022-03-31 RX ORDER — LISINOPRIL 2.5 MG/1
TABLET ORAL
Qty: 90 TABLET | Refills: 0 | Status: SHIPPED | OUTPATIENT
Start: 2022-03-31 | End: 2022-06-10

## 2022-04-04 DIAGNOSIS — E11.65 UNCONTROLLED TYPE 2 DIABETES MELLITUS WITH HYPERGLYCEMIA, WITH LONG-TERM CURRENT USE OF INSULIN (HCC): ICD-10-CM

## 2022-04-04 DIAGNOSIS — Z79.4 UNCONTROLLED TYPE 2 DIABETES MELLITUS WITH HYPERGLYCEMIA, WITH LONG-TERM CURRENT USE OF INSULIN (HCC): ICD-10-CM

## 2022-04-04 RX ORDER — ASPIRIN 81 MG/1
TABLET, COATED ORAL
Qty: 90 TABLET | Refills: 0 | Status: SHIPPED | OUTPATIENT
Start: 2022-04-04 | End: 2023-08-07

## 2022-04-04 RX ORDER — OMEPRAZOLE 20 MG/1
CAPSULE, DELAYED RELEASE ORAL
Qty: 90 CAPSULE | Refills: 0 | Status: SHIPPED | OUTPATIENT
Start: 2022-04-04 | End: 2022-07-12

## 2022-04-10 ENCOUNTER — OFFICE VISIT (OUTPATIENT)
Dept: URGENT CARE | Facility: CLINIC | Age: 57
End: 2022-04-10
Payer: MEDICARE

## 2022-04-10 VITALS
DIASTOLIC BLOOD PRESSURE: 62 MMHG | SYSTOLIC BLOOD PRESSURE: 104 MMHG | TEMPERATURE: 97.6 F | OXYGEN SATURATION: 97 % | HEIGHT: 60 IN | WEIGHT: 103.8 LBS | HEART RATE: 110 BPM | RESPIRATION RATE: 14 BRPM | BODY MASS INDEX: 20.38 KG/M2

## 2022-04-10 DIAGNOSIS — K04.7 DENTAL ABSCESS: ICD-10-CM

## 2022-04-10 DIAGNOSIS — K02.9 ACTIVE DENTAL CARIES: ICD-10-CM

## 2022-04-10 PROCEDURE — 99213 OFFICE O/P EST LOW 20 MIN: CPT | Performed by: PHYSICIAN ASSISTANT

## 2022-04-10 RX ORDER — AMOXICILLIN AND CLAVULANATE POTASSIUM 875; 125 MG/1; MG/1
1 TABLET, FILM COATED ORAL 2 TIMES DAILY
Qty: 14 TABLET | Refills: 0 | Status: SHIPPED | OUTPATIENT
Start: 2022-04-10 | End: 2022-04-17

## 2022-04-10 ASSESSMENT — FIBROSIS 4 INDEX: FIB4 SCORE: 0.89

## 2022-04-10 NOTE — PROGRESS NOTES
Subjective     Gabriela Cheatham-Depintor is a 56 y.o. female who presents with Dental Pain (X 2 days on L side top and bottom with chills.  Denies fever. )            Patient presents with:  Dental Pain: X 2 days on L side top and bottom. Pt denies fever but has had headache with occasional chills. Pt endorses some cavities and some missing fillings but never had this kind of pain before.  No other complaint.     Pt is [place] citizen speaking, was offered ipad  service.  Pt daughter will interpret at pt request.       Dental Pain   This is a new problem. The current episode started yesterday. The problem occurs constantly. The problem has been gradually worsening. The pain is at a severity of 7/10. The pain is moderate. Associated symptoms include facial pain and thermal sensitivity. Pertinent negatives include no difficulty swallowing, fever or oral bleeding. She has tried NSAIDs and heat for the symptoms. The treatment provided no relief.       Review of Systems   Constitutional: Negative for fever.   HENT: Negative for sinus pain.    Gastrointestinal: Negative for diarrhea, nausea and vomiting.   All other systems reviewed and are negative.             Objective     /62   Pulse (!) 110   Temp 36.4 °C (97.6 °F) (Temporal)   Resp 14   Ht 1.524 m (5')   Wt 47.1 kg (103 lb 12.8 oz)   LMP 01/31/2015   SpO2 97%   BMI 20.27 kg/m²      Physical Exam  Vitals and nursing note reviewed.   Constitutional:       General: She is not in acute distress.     Appearance: Normal appearance. She is well-developed and normal weight. She is not ill-appearing or toxic-appearing.   HENT:      Head: Normocephalic and atraumatic.      Right Ear: Tympanic membrane normal.      Left Ear: Tympanic membrane normal.      Nose: Nose normal.      Mouth/Throat:      Lips: Pink.      Mouth: Mucous membranes are moist.      Dentition: Gingival swelling, dental caries and dental abscesses present.      Pharynx: Oropharynx is  clear. Uvula midline.     Eyes:      Extraocular Movements: Extraocular movements intact.      Conjunctiva/sclera: Conjunctivae normal.      Pupils: Pupils are equal, round, and reactive to light.   Cardiovascular:      Rate and Rhythm: Normal rate and regular rhythm.      Pulses: Normal pulses.      Heart sounds: Normal heart sounds.   Pulmonary:      Effort: Pulmonary effort is normal.      Breath sounds: Normal breath sounds.   Abdominal:      General: Bowel sounds are normal.      Palpations: Abdomen is soft.   Musculoskeletal:         General: Normal range of motion.      Cervical back: Normal range of motion and neck supple.   Skin:     General: Skin is warm and dry.      Capillary Refill: Capillary refill takes less than 2 seconds.   Neurological:      General: No focal deficit present.      Mental Status: She is alert and oriented to person, place, and time.      Cranial Nerves: No cranial nerve deficit.      Motor: Motor function is intact.      Coordination: Coordination is intact.      Gait: Gait normal.   Psychiatric:         Mood and Affect: Mood normal.                   Assessment & Plan                1. Dental abscess  amoxicillin-clavulanate (AUGMENTIN) 875-125 MG Tab   2. Active dental caries  amoxicillin-clavulanate (AUGMENTIN) 875-125 MG Tab     Patient was evaluated in clinic today while wearing appropriate personal protective equipment.      PT to begin prescription medications today as discussed for dental abscess and dental pain.    PT advised saltwater gargles/swishes  3-4 times daily until symptoms improve.     Motrin/Advil/Ibuprophen 600 mg every 6 hours as needed for pain or fever.    PT should follow up with Dentist in 1-2 days for re-evaluation if symptoms have not improved.      Discussed red flags and reasons to return to UC or ED.      Pt and/or family verbalized understanding of diagnosis and follow up instructions and was offered informational handout on diagnosis.  PT discharged.

## 2022-04-12 ASSESSMENT — ENCOUNTER SYMPTOMS
DIARRHEA: 0
SINUS PAIN: 0
NAUSEA: 0
VOMITING: 0
FEVER: 0

## 2022-06-10 DIAGNOSIS — Z79.4 UNCONTROLLED TYPE 2 DIABETES MELLITUS WITH HYPERGLYCEMIA, WITH LONG-TERM CURRENT USE OF INSULIN (HCC): ICD-10-CM

## 2022-06-10 DIAGNOSIS — E11.65 UNCONTROLLED TYPE 2 DIABETES MELLITUS WITH HYPERGLYCEMIA, WITH LONG-TERM CURRENT USE OF INSULIN (HCC): ICD-10-CM

## 2022-06-13 RX ORDER — LISINOPRIL 2.5 MG/1
TABLET ORAL
Qty: 90 TABLET | Refills: 0 | Status: SHIPPED | OUTPATIENT
Start: 2022-06-13 | End: 2023-08-07

## 2022-06-23 NOTE — PROGRESS NOTES
Transport at bedside for xray; Pt crying c/o RLE pain, Pt refusing to move to wheelchair; Pt medicated per MAR; Pt transferred to [facility] and to xray with transport. Pt did not tolerate    yes

## 2022-07-17 DIAGNOSIS — Z79.4 UNCONTROLLED TYPE 2 DIABETES MELLITUS WITH HYPERGLYCEMIA, WITH LONG-TERM CURRENT USE OF INSULIN (HCC): ICD-10-CM

## 2022-07-17 DIAGNOSIS — E11.65 UNCONTROLLED TYPE 2 DIABETES MELLITUS WITH HYPERGLYCEMIA, WITH LONG-TERM CURRENT USE OF INSULIN (HCC): ICD-10-CM

## 2022-07-18 RX ORDER — ATORVASTATIN CALCIUM 20 MG/1
TABLET, FILM COATED ORAL
Qty: 90 TABLET | Refills: 0 | Status: SHIPPED | OUTPATIENT
Start: 2022-07-18 | End: 2022-11-07 | Stop reason: SDUPTHER

## 2022-10-19 DIAGNOSIS — E11.65 UNCONTROLLED TYPE 2 DIABETES MELLITUS WITH HYPERGLYCEMIA, WITH LONG-TERM CURRENT USE OF INSULIN (HCC): ICD-10-CM

## 2022-10-19 DIAGNOSIS — Z79.4 UNCONTROLLED TYPE 2 DIABETES MELLITUS WITH HYPERGLYCEMIA, WITH LONG-TERM CURRENT USE OF INSULIN (HCC): ICD-10-CM

## 2022-10-20 RX ORDER — OMEPRAZOLE 20 MG/1
20 CAPSULE, DELAYED RELEASE ORAL
Qty: 90 CAPSULE | Refills: 0 | OUTPATIENT
Start: 2022-10-20

## 2022-10-20 RX ORDER — ATORVASTATIN CALCIUM 20 MG/1
20 TABLET, FILM COATED ORAL EVERY EVENING
Qty: 90 TABLET | Refills: 0 | OUTPATIENT
Start: 2022-10-20

## 2022-11-03 ENCOUNTER — PATIENT MESSAGE (OUTPATIENT)
Dept: HEALTH INFORMATION MANAGEMENT | Facility: OTHER | Age: 57
End: 2022-11-03

## 2022-11-07 DIAGNOSIS — E11.65 UNCONTROLLED TYPE 2 DIABETES MELLITUS WITH HYPERGLYCEMIA, WITH LONG-TERM CURRENT USE OF INSULIN (HCC): ICD-10-CM

## 2022-11-07 DIAGNOSIS — Z79.4 UNCONTROLLED TYPE 2 DIABETES MELLITUS WITH HYPERGLYCEMIA, WITH LONG-TERM CURRENT USE OF INSULIN (HCC): ICD-10-CM

## 2022-11-07 RX ORDER — ATORVASTATIN CALCIUM 20 MG/1
20 TABLET, FILM COATED ORAL EVERY EVENING
Qty: 90 TABLET | Refills: 0 | Status: SHIPPED | OUTPATIENT
Start: 2022-11-07

## 2023-04-27 NOTE — CONSULTS
"Diabetes education: Met with pt and family ( daughter and daughter in law) regarding diabetes management. Pt is known from previous admits.  Pt was admitted with blood sugar of 492 and Hg a1c of 15.9%. Pt was to be on Soliqua 100-33 as well as Actos.  Per med rec she is not on these and in speaking with patient she stopped the \"insulin\" because it may her dizzy and she felt better when blood sugars were in the 300's.  Pt is currently on Lantus 12 units BID with  Regular insulin sliding scale  Coverage ac and hs. Current blood sugars are 149, 287 ( 5 units) and 186 (2 units).  Discussed with pt and daughters the need to take insulin, why she felt better when blood sugars were high ( and the danger in that), and importance of speaking with PCP (or at least the pharmacy team working with her) before stopping medications.  Pt was given a \"living well with diabetes\" in Gu[place]ian.   Plan: Daughter in law states she will check the name of the insulin that is currently in the refrigerator and let MD and or nursing know. Pt states she has a meter and strips at home. May need to review insulin at home as well as what is covered by [facility] Medicaid before discharge. Pharmacist can help as she has Meds to Beds. All education and handouts given in Guamanian.    Previous education:     Tessa Sidhu R.N.   Diabetes Health Educator      Consults   Addendum   Date of Service:  3/1/2016  4:25 PM               Addendum           Diabetes education: Order for diabetes education received . Pt currently in ICU and when RD met with family, requested education at a different time.  CDE will follow up tomorrow. Pt is [place]ian speaking, and family may need to be present for education to assist in her care.  Pt is currently on sliding scale coverage only with Humalog. Blood sugars have been: 139, 121, 103,  And 243 ( at 2239 2/29/16). Please call [phone] if needs change.                    Marycruz Yuan R.N.   Registered Nurse    "   Consults   Signed   Date of Service:  3/3/2016 11:43 AM                  Diabetes Education via interpretor phone.     Pt with Type 2 diabetes and A1c of 18.6%. New diagnosis.  Given an Accucheck Mary meter and instructed in its use. Pt unwilling to perform fingerstick so done by CDE. Pt's .  Instructed to check 3 times daily at her 3 meals and record in the log and take the log to all doctor appointments.  She was instructed in how to load and use the Accucheck FastClix autolancet.  She verbalized understanding of how to do this but states her daughter will do her fingersticks at home.     Also instructed in how to draw and inject a single dose of insulin.  She was able to draw an accurate dose and inject into a pillow.  Discussed care of insulin, site selection and rotation and sharps disposal.  The nurse came in with her before lunch dose and pt gave it into her abdomen after refusing at first.       Explained that with her blood sugars running so high she definitely has diabetes and will need to go home on insulin.  She will need to take a long acting dose at bedtime and then fast acting insulin with her meals.       She was given written materials in Belgian with pictures of how to draw an insulin dose and for site selection and rotation.  She verbalized understanding of the material presented.  She states she is tired now and she has 3 people visiting her. Education will continue tomorrow.     Time spent 1 hour 1050-1150am             Tessa Sidhu R.N.   Diabetes Health Educator      Progress Notes   Addendum   Date of Service:  3/4/2016  7:37 PM               Addendum           Diabetes education: Met with patient and SO at bedside. Discussed hyperglycemia, hypoglycemia, what effects blood sugars, need to eat three meals and hs snack with carbohydrates and proteins. Questions answered. All education given in Belgian.  Plan: At discharge patient will need prescriptions for Smart view test strips,  fastclix lancets, 3/10 cc syringes ( 8 mm 31G) vials for Humalog (with sliding scale written out and for ac only) and Lantus.                                       Has Your Skin Lesion Been Treated?: not been treated Is This A New Presentation, Or A Follow-Up?: Skin Lesion 133.2

## 2023-05-03 NOTE — PROGRESS NOTES
1951 - Made second page to Dr. Breaux, at this time pt's BP 70/40s was told by dayshift RN that MDs ok with it being 80s systolic during the day time.  Midodrine started during the day time as well.  Reiterated all of this to Dr. Breaux.  At this time pt fairly asymptomatic, denies dizziness but does seem fatigued.  HR sinus rhythm 70s. No fever.  MD gave order for lactic acid STAT as well as CVP monitoring to be setup. Pt already with central line.    2020 - Discussed new results with MD Breaux, CVP ranging from 1-3. Lactic acid 2.1.  MD ordered LR bolus at this time as well as PRN LR bolus to follow.   Call with outcome of fluids.    I called patient let him know Valium has been sent to pharmacy. He will discuss hearing loss with PCP.

## 2023-08-07 ENCOUNTER — APPOINTMENT (OUTPATIENT)
Dept: RADIOLOGY | Facility: MEDICAL CENTER | Age: 58
End: 2023-08-07
Attending: STUDENT IN AN ORGANIZED HEALTH CARE EDUCATION/TRAINING PROGRAM
Payer: MEDICARE

## 2023-08-07 ENCOUNTER — HOSPITAL ENCOUNTER (EMERGENCY)
Facility: MEDICAL CENTER | Age: 58
End: 2023-08-07
Attending: STUDENT IN AN ORGANIZED HEALTH CARE EDUCATION/TRAINING PROGRAM
Payer: MEDICARE

## 2023-08-07 VITALS
HEIGHT: 59 IN | WEIGHT: 115 LBS | SYSTOLIC BLOOD PRESSURE: 118 MMHG | BODY MASS INDEX: 23.18 KG/M2 | RESPIRATION RATE: 17 BRPM | HEART RATE: 98 BPM | OXYGEN SATURATION: 95 % | DIASTOLIC BLOOD PRESSURE: 87 MMHG | TEMPERATURE: 97 F

## 2023-08-07 DIAGNOSIS — R53.1 GENERALIZED WEAKNESS: ICD-10-CM

## 2023-08-07 DIAGNOSIS — E86.0 DEHYDRATION: ICD-10-CM

## 2023-08-07 DIAGNOSIS — S09.90XA CLOSED HEAD INJURY, INITIAL ENCOUNTER: ICD-10-CM

## 2023-08-07 DIAGNOSIS — R73.9 HYPERGLYCEMIA: ICD-10-CM

## 2023-08-07 DIAGNOSIS — N17.9 ACUTE KIDNEY INJURY (HCC): ICD-10-CM
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
## 2023-08-07 DIAGNOSIS — D72.829 LEUKOCYTOSIS, UNSPECIFIED TYPE: ICD-10-CM

## 2023-08-07 DIAGNOSIS — R11.2 NAUSEA AND VOMITING, UNSPECIFIED VOMITING TYPE: ICD-10-CM

## 2023-08-07 LAB
ALBUMIN SERPL BCP-MCNC: 4.2 G/DL (ref 3.2–4.9)
ALBUMIN/GLOB SERPL: 1.4 G/DL
ALP SERPL-CCNC: 101 U/L (ref 30–99)
ALT SERPL-CCNC: 13 U/L (ref 2–50)
ANION GAP SERPL CALC-SCNC: 16 MMOL/L (ref 7–16)
APPEARANCE UR: CLEAR
AST SERPL-CCNC: 19 U/L (ref 12–45)
B-OH-BUTYR SERPL-MCNC: 1.73 MMOL/L (ref 0.02–0.27)
BASOPHILS # BLD AUTO: 0.3 % (ref 0–1.8)
BASOPHILS # BLD: 0.04 K/UL (ref 0–0.12)
BILIRUB SERPL-MCNC: <0.2 MG/DL (ref 0.1–1.5)
BILIRUB UR QL STRIP.AUTO: NEGATIVE
BUN SERPL-MCNC: 37 MG/DL (ref 8–22)
CALCIUM ALBUM COR SERPL-MCNC: 9.3 MG/DL (ref 8.5–10.5)
CALCIUM SERPL-MCNC: 9.5 MG/DL (ref 8.5–10.5)
CHLORIDE SERPL-SCNC: 99 MMOL/L (ref 96–112)
CO2 SERPL-SCNC: 21 MMOL/L (ref 20–33)
COLOR UR: YELLOW
CREAT SERPL-MCNC: 1.47 MG/DL (ref 0.5–1.4)
EKG IMPRESSION: NORMAL
EOSINOPHIL # BLD AUTO: 0.02 K/UL (ref 0–0.51)
EOSINOPHIL NFR BLD: 0.1 % (ref 0–6.9)
ERYTHROCYTE [DISTWIDTH] IN BLOOD BY AUTOMATED COUNT: 44.9 FL (ref 35.9–50)
GFR SERPLBLD CREATININE-BSD FMLA CKD-EPI: 41 ML/MIN/1.73 M 2
GLOBULIN SER CALC-MCNC: 3.1 G/DL (ref 1.9–3.5)
GLUCOSE SERPL-MCNC: 243 MG/DL (ref 65–99)
GLUCOSE UR STRIP.AUTO-MCNC: 500 MG/DL
HCT VFR BLD AUTO: 45.5 % (ref 37–47)
HGB BLD-MCNC: 15 G/DL (ref 12–16)
IMM GRANULOCYTES # BLD AUTO: 0.12 K/UL (ref 0–0.11)
IMM GRANULOCYTES NFR BLD AUTO: 0.8 % (ref 0–0.9)
KETONES UR STRIP.AUTO-MCNC: 15 MG/DL
LACTATE SERPL-SCNC: 1.5 MMOL/L (ref 0.5–2)
LACTATE SERPL-SCNC: 2.1 MMOL/L (ref 0.5–2)
LEUKOCYTE ESTERASE UR QL STRIP.AUTO: NEGATIVE
LIPASE SERPL-CCNC: 17 U/L (ref 11–82)
LYMPHOCYTES # BLD AUTO: 1.57 K/UL (ref 1–4.8)
LYMPHOCYTES NFR BLD: 10 % (ref 22–41)
MCH RBC QN AUTO: 32.4 PG (ref 27–33)
MCHC RBC AUTO-ENTMCNC: 33 G/DL (ref 32.2–35.5)
MCV RBC AUTO: 98.3 FL (ref 81.4–97.8)
MICRO URNS: ABNORMAL
MONOCYTES # BLD AUTO: 0.74 K/UL (ref 0–0.85)
MONOCYTES NFR BLD AUTO: 4.7 % (ref 0–13.4)
NEUTROPHILS # BLD AUTO: 13.19 K/UL (ref 1.82–7.42)
NEUTROPHILS NFR BLD: 84.1 % (ref 44–72)
NITRITE UR QL STRIP.AUTO: NEGATIVE
NRBC # BLD AUTO: 0 K/UL
NRBC BLD-RTO: 0 /100 WBC (ref 0–0.2)
PH UR STRIP.AUTO: 5.5 [PH] (ref 5–8)
PLATELET # BLD AUTO: 200 K/UL (ref 164–446)
PMV BLD AUTO: 12.8 FL (ref 9–12.9)
POTASSIUM SERPL-SCNC: 5 MMOL/L (ref 3.6–5.5)
PROT SERPL-MCNC: 7.3 G/DL (ref 6–8.2)
PROT UR QL STRIP: NEGATIVE MG/DL
RBC # BLD AUTO: 4.63 M/UL (ref 4.2–5.4)
RBC UR QL AUTO: NEGATIVE
SODIUM SERPL-SCNC: 136 MMOL/L (ref 135–145)
SP GR UR STRIP.AUTO: 1.03
UROBILINOGEN UR STRIP.AUTO-MCNC: 0.2 MG/DL
WBC # BLD AUTO: 15.7 K/UL (ref 4.8–10.8)

## 2023-08-07 PROCEDURE — 700111 HCHG RX REV CODE 636 W/ 250 OVERRIDE (IP): Mod: JZ,UD | Performed by: STUDENT IN AN ORGANIZED HEALTH CARE EDUCATION/TRAINING PROGRAM

## 2023-08-07 PROCEDURE — 74177 CT ABD & PELVIS W/CONTRAST: CPT

## 2023-08-07 PROCEDURE — 700105 HCHG RX REV CODE 258: Mod: UD | Performed by: STUDENT IN AN ORGANIZED HEALTH CARE EDUCATION/TRAINING PROGRAM

## 2023-08-07 PROCEDURE — 82010 KETONE BODYS QUAN: CPT

## 2023-08-07 PROCEDURE — 80053 COMPREHEN METABOLIC PANEL: CPT

## 2023-08-07 PROCEDURE — 81003 URINALYSIS AUTO W/O SCOPE: CPT

## 2023-08-07 PROCEDURE — 83690 ASSAY OF LIPASE: CPT

## 2023-08-07 PROCEDURE — 85025 COMPLETE CBC W/AUTO DIFF WBC: CPT

## 2023-08-07 PROCEDURE — 72125 CT NECK SPINE W/O DYE: CPT

## 2023-08-07 PROCEDURE — 700117 HCHG RX CONTRAST REV CODE 255: Mod: UD | Performed by: STUDENT IN AN ORGANIZED HEALTH CARE EDUCATION/TRAINING PROGRAM

## 2023-08-07 PROCEDURE — 36415 COLL VENOUS BLD VENIPUNCTURE: CPT

## 2023-08-07 PROCEDURE — 83605 ASSAY OF LACTIC ACID: CPT | Mod: 91

## 2023-08-07 PROCEDURE — 96374 THER/PROPH/DIAG INJ IV PUSH: CPT

## 2023-08-07 PROCEDURE — 93005 ELECTROCARDIOGRAM TRACING: CPT | Performed by: STUDENT IN AN ORGANIZED HEALTH CARE EDUCATION/TRAINING PROGRAM

## 2023-08-07 PROCEDURE — 99285 EMERGENCY DEPT VISIT HI MDM: CPT

## 2023-08-07 PROCEDURE — 87040 BLOOD CULTURE FOR BACTERIA: CPT | Mod: 91

## 2023-08-07 PROCEDURE — 70450 CT HEAD/BRAIN W/O DYE: CPT

## 2023-08-07 RX ORDER — OMEPRAZOLE 20 MG/1
20 CAPSULE, DELAYED RELEASE ORAL DAILY
COMMUNITY

## 2023-08-07 RX ORDER — LACOSAMIDE 100 MG/1
100 TABLET ORAL 3 TIMES DAILY
COMMUNITY

## 2023-08-07 RX ORDER — LEVOTHYROXINE SODIUM 0.05 MG/1
50 TABLET ORAL
COMMUNITY

## 2023-08-07 RX ORDER — SODIUM CHLORIDE 9 MG/ML
1000 INJECTION, SOLUTION INTRAVENOUS ONCE
Status: COMPLETED | OUTPATIENT
Start: 2023-08-07 | End: 2023-08-07

## 2023-08-07 RX ORDER — INSULIN LISPRO 100 [IU]/ML
2-12 INJECTION, SOLUTION INTRAVENOUS; SUBCUTANEOUS
COMMUNITY

## 2023-08-07 RX ORDER — ONDANSETRON 2 MG/ML
4 INJECTION INTRAMUSCULAR; INTRAVENOUS ONCE
Status: COMPLETED | OUTPATIENT
Start: 2023-08-07 | End: 2023-08-07

## 2023-08-07 RX ORDER — ONDANSETRON 4 MG/1
4 TABLET, ORALLY DISINTEGRATING ORAL EVERY 6 HOURS PRN
Qty: 10 TABLET | Refills: 0 | Status: SHIPPED | OUTPATIENT
Start: 2023-08-07 | End: 2023-11-14

## 2023-08-07 RX ORDER — INSULIN GLARGINE 300 U/ML
20 INJECTION, SOLUTION SUBCUTANEOUS EVERY EVENING
COMMUNITY

## 2023-08-07 RX ADMIN — ONDANSETRON 4 MG: 2 INJECTION INTRAMUSCULAR; INTRAVENOUS at 06:06

## 2023-08-07 RX ADMIN — IOHEXOL 85 ML: 350 INJECTION, SOLUTION INTRAVENOUS at 07:21

## 2023-08-07 RX ADMIN — SODIUM CHLORIDE 1000 ML: 9 INJECTION, SOLUTION INTRAVENOUS at 06:02

## 2023-08-07 ASSESSMENT — FIBROSIS 4 INDEX: FIB4 SCORE: 0.93

## 2023-08-07 NOTE — ED PROVIDER NOTES
ED Provider Note    CHIEF COMPLAINT  Chief Complaint   Patient presents with    N/V    Weakness           Fall       EXTERNAL RECORDS REVIEWED  Inpatient Notes patient was admitted in 2021 for abdominal pain with mild ketosis.    HPI/ROS  LIMITATION TO HISTORY   She is Argentine speaking but patient prefers family members at bedside to translate  OUTSIDE HISTORIAN(S):  Family Daughter at bedside, history included below    Vicky Cheatham-Depintor is a 58 y.o. female who presents with nausea and vomiting.  History mostly obtained via her daughter.  She says that she sustained a fall on Friday.  This was unwitnessed.  She reportedly was ambulating with her walker, was leaving her bedroom, and fell backwards hitting her head.   She did not trip or slip but she also denies any dizziness or lightheadedness prior to the fall.  She has been feeling generally weak. She was having a headache initially after the fall but none currently.  She was doing well on Saturday however yesterday started to develop vomiting.  She was denying any associated abdominal pain. No chest pain or shortness of breath.  She is complaining of neck pain.  She denies any fevers or chills.  No diarrhea or black or bloody stool.  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  She also has a history of type 2 diabetes. No sick contacts at home    PAST MEDICAL HISTORY   has a past medical history of Diabetes ([facility]) (2015) and Stiff person syndrome.    SURGICAL HISTORY   has a past surgical history that includes cholecystectomy; appendectomy; and  delivery only.    FAMILY HISTORY  Family History   Problem Relation Age of Onset    Diabetes Father     Stroke Brother     Cancer Neg Hx     Heart Disease Neg Hx        SOCIAL HISTORY  Social History     Tobacco Use    Smoking status: Never    Smokeless tobacco: Never   Vaping Use    Vaping Use:  "Never used   Substance and Sexual Activity    Alcohol use: No    Drug use: No    Sexual activity: Not Currently       CURRENT MEDICATIONS  Home Medications       Reviewed by Miguelito Knapp (Pharmacy Tech) on 08/07/23 at 0829  Med List Status: Partial     Medication Last Dose Status   atorvastatin (LIPITOR) 20 MG Tab 8/6/2023 Active   calcium carbonate (TUMS) 500 MG Chew Tab 8/6/2023 Active   carBAMazepine (TEGRETOL) 200 MG Tab 8/6/2023 Active   diazepam (VALIUM) 10 MG tablet 8/6/2023 Active   FLUoxetine (PROZAC) 20 MG Cap 8/6/2023 Active   Insulin Glargine, 1 Unit Dial, (TOUJEO SOLOSTAR) 300 UNIT/ML Solution Pen-injector 8/5/2023 Active   insulin lispro 100 UNIT/ML INJ unk Active   LACOSAMIDE PO 8/6/2023 Active   levothyroxine (SYNTHROID) 50 MCG Tab 8/6/2023 Active   omeprazole (PRILOSEC) 20 MG delayed-release capsule 8/6/2023 Active                    ALLERGIES  No Known Allergies    PHYSICAL EXAM  VITAL SIGNS: /87   Pulse 98   Temp 36.1 °C (97 °F) (Temporal)   Resp 17   Ht 1.499 m (4' 11\")   Wt 52.2 kg (115 lb)   LMP 01/31/2015   SpO2 95%   BMI 23.23 kg/m²    Constitutional: Awake and alert . Chronically ill appearing, non toxic  HENT: Normocephalic atraumatic. Dry mucous membranes  Eyes: Normal inspection  Neck: She has C spine tenderness  Cardiovascular: Tachycardic heart rate, Normal rhythm.  Symmetric peripheral pulses.   Thorax & Lungs: No respiratory distress, No wheezing, No rales, No rhonchi, No chest tenderness.   Abdomen: Soft, non-distended, mild diffuse tenderness to palpation, no rebound or guarding, no mass  Skin: No obvious rash.  Extremities: Warm, well perfused. No clubbing, cyanosis, edema   Neurologic: A&O x 4, no focal deficit  Psychiatric: Normal for situation      DIAGNOSTIC STUDIES / PROCEDURES  EKG  I have independently interpreted this EKG  Results for orders placed or performed during the hospital encounter of 08/07/23   EKG   Result Value Ref Range    Report       " [facility] Emergency Dept.    Test Date:  2023  Pt Name:    BLANCA SALMON DEPINTOR             Department: ER  MRN:        [id number]                      Room:       RD 03  Gender:     F                            Technician: [id number]  :        1965                   Requested By:LILA DELUNA  Order #:    [id number]                    Reading MD: Lila Deluna    Measurements  Intervals                                Axis  Rate:       88                           P:          63  IN:         169                          QRS:        87  QRSD:       83                           T:          65  QT:         378  QTc:        458    Interpretive Statements  Sinus rhythm  Low voltage, extremity leads  Unable to appreciate gross ST changes. There is baseline wander in inferior  leads  Electronically Signed On 2023 15:38:12 PDT by Lila Deluna           LABS  Results for orders placed or performed during the hospital encounter of 23   CBC WITH DIFFERENTIAL   Result Value Ref Range    WBC 15.7 (H) 4.8 - 10.8 K/uL    RBC 4.63 4.20 - 5.40 M/uL    Hemoglobin 15.0 12.0 - 16.0 g/dL    Hematocrit 45.5 37.0 - 47.0 %    MCV 98.3 (H) 81.4 - 97.8 fL    MCH 32.4 27.0 - 33.0 pg    MCHC 33.0 32.2 - 35.5 g/dL    RDW 44.9 35.9 - 50.0 fL    Platelet Count 200 164 - 446 K/uL    MPV 12.8 9.0 - 12.9 fL    Neutrophils-Polys 84.10 (H) 44.00 - 72.00 %    Lymphocytes 10.00 (L) 22.00 - 41.00 %    Monocytes 4.70 0.00 - 13.40 %    Eosinophils 0.10 0.00 - 6.90 %    Basophils 0.30 0.00 - 1.80 %    Immature Granulocytes 0.80 0.00 - 0.90 %    Nucleated RBC 0.00 0.00 - 0.20 /100 WBC    Neutrophils (Absolute) 13.19 (H) 1.82 - 7.42 K/uL    Lymphs (Absolute) 1.57 1.00 - 4.80 K/uL    Monos (Absolute) 0.74 0.00 - 0.85 K/uL    Eos (Absolute) 0.02 0.00 - 0.51 K/uL    Baso (Absolute) 0.04 0.00 - 0.12 K/uL    Immature Granulocytes (abs) 0.12 (H) 0.00 - 0.11 K/uL    NRBC (Absolute) 0.00 K/uL   COMP METABOLIC PANEL   Result Value Ref  Range    Sodium 136 135 - 145 mmol/L    Potassium 5.0 3.6 - 5.5 mmol/L    Chloride 99 96 - 112 mmol/L    Co2 21 20 - 33 mmol/L    Anion Gap 16.0 7.0 - 16.0    Glucose 243 (H) 65 - 99 mg/dL    Bun 37 (H) 8 - 22 mg/dL    Creatinine 1.47 (H) 0.50 - 1.40 mg/dL    Calcium 9.5 8.5 - 10.5 mg/dL    Correct Calcium 9.3 8.5 - 10.5 mg/dL    AST(SGOT) 19 12 - 45 U/L    ALT(SGPT) 13 2 - 50 U/L    Alkaline Phosphatase 101 (H) 30 - 99 U/L    Total Bilirubin <0.2 0.1 - 1.5 mg/dL    Albumin 4.2 3.2 - 4.9 g/dL    Total Protein 7.3 6.0 - 8.2 g/dL    Globulin 3.1 1.9 - 3.5 g/dL    A-G Ratio 1.4 g/dL   LIPASE   Result Value Ref Range    Lipase 17 11 - 82 U/L   LACTIC ACID   Result Value Ref Range    Lactic Acid 2.1 (H) 0.5 - 2.0 mmol/L   URINALYSIS CULTURE, IF INDICATED    Specimen: Urine, Clean Catch   Result Value Ref Range    Color Yellow     Character Clear     Specific Gravity 1.026 <1.035    Ph 5.5 5.0 - 8.0    Glucose 500 (A) Negative mg/dL    Ketones 15 (A) Negative mg/dL    Protein Negative Negative mg/dL    Bilirubin Negative Negative    Urobilinogen, Urine 0.2 Negative    Nitrite Negative Negative    Leukocyte Esterase Negative Negative    Occult Blood Negative Negative    Micro Urine Req see below    BETA-HYDROXYBUTYRIC ACID   Result Value Ref Range    beta-Hydroxybutyric Acid 1.73 (H) 0.02 - 0.27 mmol/L   ESTIMATED GFR   Result Value Ref Range    GFR (CKD-EPI) 41 (A) >60 mL/min/1.73 m 2   LACTIC ACID   Result Value Ref Range    Lactic Acid 1.5 0.5 - 2.0 mmol/L   EKG   Result Value Ref Range    Report       [facility] Emergency Dept.    Test Date:  2023  Pt Name:    BLANCA SALMON DEPINTOR             Department: ER  MRN:        [id number]                      Room:        03  Gender:     F                            Technician: [id number]  :        1965                   Requested By:LILA DELUNA  Order #:    [id number]                    Reading MD: Lila Deluna    Measurements  Intervals                                 Axis  Rate:       88                           P:          63  MN:         169                          QRS:        87  QRSD:       83                           T:          65  QT:         378  QTc:        458    Interpretive Statements  Sinus rhythm  Low voltage, extremity leads  Unable to appreciate gross ST changes. There is baseline wander in inferior  leads  Electronically Signed On 08- 15:38:12 PDT by Lila Thompson           RADIOLOGY  I have independently interpreted the diagnostic imaging associated with this visit and am waiting the final reading from the radiologist.   My preliminary interpretation is as follows: CT no obvious bleed  Radiologist interpretation:   CT-ABDOMEN-PELVIS WITH   Final Result         1. No acute process seen.   2. Simple 2 cm left renal cyst, which does not require follow-up.      CT-CSPINE WITHOUT PLUS RECONS   Final Result      No acute process seen.      CT-HEAD W/O   Final Result      No acute process.               COURSE & MEDICAL DECISION MAKING    ED Observation Status? Yes; I am placing the patient in to an observation status due to a diagnostic uncertainty as well as therapeutic intensity. Patient placed in observation status at 5:46 AM, 8/7/2023.     Observation plan is as follows: IV, Labs, CT, Serial Exams    Upon Reevaluation, the patient's condition has: Improved; and will be discharged.    Patient discharged from ED Observation status at 8.45 AM 8/7/2023    INITIAL ASSESSMENT, COURSE AND PLAN  Care Narrative: This is a pleasant female with type 2 diabetes who presents  for evaluation after a ground level fall  She is now also having nausea and vomiting.  She arrives mildly  tachycardic, is afebrile and not systemically ill appearing.  She does appear to be dehydrated.  Her lab results are notable for leukocytosis, TIFFANY.  She is hyperglycemic but labs are not consistent with DKA or HHS.  Initial lactate is mildly elevated at 2.1. I doubt ischemic  process as no pain out of proportion and benign exam. Her lipase is normal and she does not have pancreatitis.  Urine without signs infection but she does have ketones.  CT head fortunately without evidence of intracranial bleed.  CT C-spine no acute fracture or subluxation.  CT without any evidence of acute intra-abdominal process such as an obstruction, colitis, perforation or intra-abdominal infection.  Presentation would be very atypical for ACS, she has no chest pain or short breath and EKG not grossly ischemic.  She was treated in the ER with IV fluids and Zofran.  Her TIFFANY is most likely in the setting of dehydration as her additional labs are consistent with this.  I treated her in the ER with fluids and zofran.  I considered possible infection especially in the setting of leukocytosis however unable to identify any bacterial source at this time. She is not septic or systemically ill appearing.  Blood cultures have been obtained in abundance of caution, and I do not see an indication to start prophylactic antibiotics.  It is quite likely that she has an elevated white blood cell count in the setting of demargination and vomiting.  After treatment in the ER she feels significantly improved and is able to tolerate p.o.  I had a discussion with patient's family members at bedside about her work-up in the ER and they feel comfortable going home and continuing with management as an outpatient.  Repeat abdominal exam is benign. She has not had any further vomiting in the ER.  I counseled patient and family on hyperglycemia and importance of glucose control and adequate hydration.  They express understanding.  She will return to the ER for any worsening symptoms and she was discharged home in improved condition.    HYDRATION: Based on the patient's presentation of Acute Vomiting the patient was given IV fluids. IV Hydration was used because oral hydration was not adequate alone. Upon recheck following hydration, the  patient was improved.        DISPOSITION AND DISCUSSIONS  I have discussed management of the patient with the following physicians and JONY's:  None    Discussion of management with other [facility] or appropriate source(s): None     Escalation of care considered, and ultimately not performed:acute inpatient care management, however at this time, the patient is most appropriate for outpatient management    Barriers to care at this time, including but not limited to:  None .     Decision tools and prescription drugs considered including, but not limited to: Antibiotics No clear signs to suggest infection .    FINAL DIAGNOSIS  1. Dehydration Acute   2. Hyperglycemia Acute   3. Nausea and vomiting, unspecified vomiting type Acute   4. Generalized weakness Acute   5. Closed head injury, initial encounter Acute   6. Ground-level fall Acute   7. Acute kidney injury (HCC) Acute   8. Leukocytosis, unspecified type Acute          Electronically signed by: Lila Thompson M.D., 8/7/2023 5:43 AM

## 2023-08-07 NOTE — ED TRIAGE NOTES
Vicky Cheatham-Depintor  58 y.o. female  Chief Complaint   Patient presents with    N/V    Weakness           Fall       Pt on wheelchair to triage with family for above complaint. Daughter translating for patient. GLF on Friday. Fell backward. Nausea, vomiting started Saturday. Looks pale and weak. Denies headache, dizziness now.     Pt is GCS 15, speaking in full sentences, follows commands and responds appropriately to questions. Resp are even and unlabored. Patient denies pain.     ED Charge RN notified. Roomed to red 3.    Vitals:    08/07/23 0506   BP: (!) 143/91   Pulse: (!) 103   Resp: 16   Temp: 36.1 °C (96.9 °F)   SpO2: 100%

## 2023-08-07 NOTE — ED NOTES
Bedside report received from KAPIL Kennedy. Pt resting comfortably and aware of POC with call light available and in reach. Pt on RA. Fall precautions in place and appropriate for pt. Pt reports no needs at this time. Appropriate equipment in room.

## 2023-08-07 NOTE — DISCHARGE INSTRUCTIONS
Please return to the ER immediately if you have persistent worsening pain, fever, vomiting, inability to eat or drink, blood or black/tarry stools or any new or acute concern.     Please call your PCP to schedule follow-up within 2-3 days to ensure your symptoms are improving.

## 2023-08-07 NOTE — ED NOTES
Patient discharged home per ERP.  Discharge teaching and education discussed with patient. POC discussed.   Patient verbalized understanding of discharge teaching and education. No other questions at this time.     RX x 2 given to patient.   PIV removed.     VSS. Patient alert and oriented. Pts daughter to take pt home. Pt assisted to car by wheelchair due to her using walker at home.

## 2023-08-07 NOTE — ED NOTES
Med rec partially complete per Pt with Family interpreting at bedside. Family had partial list in phone.   Interviewed family at bedside with permission from pt  Allergies reviewed and updated.

## 2023-08-12 LAB
BACTERIA BLD CULT: NORMAL
BACTERIA BLD CULT: NORMAL
SIGNIFICANT IND 70042: NORMAL
SIGNIFICANT IND 70042: NORMAL
SITE SITE: NORMAL
SITE SITE: NORMAL
SOURCE SOURCE: NORMAL
SOURCE SOURCE: NORMAL

## 2023-11-14 ENCOUNTER — APPOINTMENT (OUTPATIENT)
Dept: RADIOLOGY | Facility: MEDICAL CENTER | Age: 58
DRG: 086 | End: 2023-11-14
Attending: EMERGENCY MEDICINE
Payer: MEDICARE

## 2023-11-14 ENCOUNTER — APPOINTMENT (OUTPATIENT)
Dept: RADIOLOGY | Facility: MEDICAL CENTER | Age: 58
DRG: 086 | End: 2023-11-14
Attending: SURGERY
Payer: MEDICARE

## 2023-11-14 ENCOUNTER — HOSPITAL ENCOUNTER (INPATIENT)
Facility: MEDICAL CENTER | Age: 58
LOS: 1 days | DRG: 086 | End: 2023-11-15
Attending: EMERGENCY MEDICINE | Admitting: SURGERY
Payer: MEDICARE

## 2023-11-14 ENCOUNTER — APPOINTMENT (OUTPATIENT)
Dept: RADIOLOGY | Facility: MEDICAL CENTER | Age: 58
DRG: 086 | End: 2023-11-14
Attending: NURSE PRACTITIONER
Payer: MEDICARE

## 2023-11-14 DIAGNOSIS — S06.6X0A SUBARACHNOID HEMORRHAGE FOLLOWING INJURY, NO LOSS OF CONSCIOUSNESS, INITIAL ENCOUNTER (HCC): ICD-10-CM

## 2023-11-14 DIAGNOSIS — S06.6XAA TRAUMATIC SUBARACHNOID HEMORRHAGE WITH UNKNOWN LOSS OF CONSCIOUSNESS STATUS, INITIAL ENCOUNTER (HCC): ICD-10-CM

## 2023-11-14 DIAGNOSIS — T14.90XA TRAUMA: ICD-10-CM

## 2023-11-14 DIAGNOSIS — R53.81 DEBILITY: ICD-10-CM

## 2023-11-14 PROBLEM — Z75.8 DISCHARGE PLANNING ISSUES: Status: ACTIVE | Noted: 2023-11-14

## 2023-11-14 PROBLEM — Z02.9 DISCHARGE PLANNING ISSUES: Status: ACTIVE | Noted: 2023-11-14

## 2023-11-14 PROBLEM — R54 FRAILTY: Status: ACTIVE | Noted: 2023-11-14

## 2023-11-14 PROBLEM — E78.5 HYPERLIPIDEMIA: Status: ACTIVE | Noted: 2023-11-14

## 2023-11-14 PROBLEM — Z53.09 CONTRAINDICATION TO DEEP VEIN THROMBOSIS (DVT) PROPHYLAXIS: Status: ACTIVE | Noted: 2023-11-14

## 2023-11-14 LAB
ALBUMIN SERPL BCP-MCNC: 4.3 G/DL (ref 3.2–4.9)
ALBUMIN/GLOB SERPL: 1.4 G/DL
ALP SERPL-CCNC: 92 U/L (ref 30–99)
ALT SERPL-CCNC: 22 U/L (ref 2–50)
ANION GAP SERPL CALC-SCNC: 9 MMOL/L (ref 7–16)
APTT PPP: 25.9 SEC (ref 24.7–36)
AST SERPL-CCNC: 71 U/L (ref 12–45)
BASOPHILS # BLD AUTO: 0.5 % (ref 0–1.8)
BASOPHILS # BLD: 0.03 K/UL (ref 0–0.12)
BILIRUB SERPL-MCNC: 0.2 MG/DL (ref 0.1–1.5)
BUN SERPL-MCNC: 32 MG/DL (ref 8–22)
CALCIUM ALBUM COR SERPL-MCNC: 9.2 MG/DL (ref 8.5–10.5)
CALCIUM SERPL-MCNC: 9.4 MG/DL (ref 8.5–10.5)
CFT BLD TEG: 3.2 MIN (ref 4.6–9.1)
CFT P HPASE BLD TEG: 3.3 MIN (ref 4.3–8.3)
CHLORIDE SERPL-SCNC: 101 MMOL/L (ref 96–112)
CLOT ANGLE BLD TEG: 73.6 DEGREES (ref 63–78)
CLOT LYSIS 30M P MA LENFR BLD TEG: 0.2 % (ref 0–2.6)
CO2 SERPL-SCNC: 29 MMOL/L (ref 20–33)
CREAT SERPL-MCNC: 1.45 MG/DL (ref 0.5–1.4)
CT.EXTRINSIC BLD ROTEM: 1.3 MIN (ref 0.8–2.1)
EOSINOPHIL # BLD AUTO: 0.08 K/UL (ref 0–0.51)
EOSINOPHIL NFR BLD: 1.3 % (ref 0–6.9)
ERYTHROCYTE [DISTWIDTH] IN BLOOD BY AUTOMATED COUNT: 42.5 FL (ref 35.9–50)
GFR SERPLBLD CREATININE-BSD FMLA CKD-EPI: 42 ML/MIN/1.73 M 2
GLOBULIN SER CALC-MCNC: 3.1 G/DL (ref 1.9–3.5)
GLUCOSE BLD STRIP.AUTO-MCNC: 145 MG/DL (ref 65–99)
GLUCOSE BLD STRIP.AUTO-MCNC: 173 MG/DL (ref 65–99)
GLUCOSE SERPL-MCNC: 246 MG/DL (ref 65–99)
HCT VFR BLD AUTO: 41.3 % (ref 37–47)
HGB BLD-MCNC: 14.5 G/DL (ref 12–16)
IMM GRANULOCYTES # BLD AUTO: 0.05 K/UL (ref 0–0.11)
IMM GRANULOCYTES NFR BLD AUTO: 0.8 % (ref 0–0.9)
INR PPP: 0.89 (ref 0.87–1.13)
LYMPHOCYTES # BLD AUTO: 1.23 K/UL (ref 1–4.8)
LYMPHOCYTES NFR BLD: 19.3 % (ref 22–41)
MCF BLD TEG: 63 MM (ref 52–69)
MCF.PLATELET INHIB BLD ROTEM: 23.9 MM (ref 15–32)
MCH RBC QN AUTO: 33.1 PG (ref 27–33)
MCHC RBC AUTO-ENTMCNC: 35.1 G/DL (ref 32.2–35.5)
MCV RBC AUTO: 94.3 FL (ref 81.4–97.8)
MONOCYTES # BLD AUTO: 0.36 K/UL (ref 0–0.85)
MONOCYTES NFR BLD AUTO: 5.7 % (ref 0–13.4)
NEUTROPHILS # BLD AUTO: 4.62 K/UL (ref 1.82–7.42)
NEUTROPHILS NFR BLD: 72.4 % (ref 44–72)
NRBC # BLD AUTO: 0 K/UL
NRBC BLD-RTO: 0 /100 WBC (ref 0–0.2)
PA AA BLD-ACNC: 2 % (ref 0–11)
PA ADP BLD-ACNC: 0 % (ref 0–17)
PLATELET # BLD AUTO: 168 K/UL (ref 164–446)
PMV BLD AUTO: 13.1 FL (ref 9–12.9)
POTASSIUM SERPL-SCNC: 5.2 MMOL/L (ref 3.6–5.5)
PROT SERPL-MCNC: 7.4 G/DL (ref 6–8.2)
PROTHROMBIN TIME: 12.2 SEC (ref 12–14.6)
RBC # BLD AUTO: 4.38 M/UL (ref 4.2–5.4)
SODIUM SERPL-SCNC: 139 MMOL/L (ref 135–145)
TEG ALGORITHM TGALG: ABNORMAL
WBC # BLD AUTO: 6.4 K/UL (ref 4.8–10.8)

## 2023-11-14 PROCEDURE — 70450 CT HEAD/BRAIN W/O DYE: CPT

## 2023-11-14 PROCEDURE — 72128 CT CHEST SPINE W/O DYE: CPT

## 2023-11-14 PROCEDURE — 96372 THER/PROPH/DIAG INJ SC/IM: CPT

## 2023-11-14 PROCEDURE — 36415 COLL VENOUS BLD VENIPUNCTURE: CPT

## 2023-11-14 PROCEDURE — 72125 CT NECK SPINE W/O DYE: CPT

## 2023-11-14 PROCEDURE — 73030 X-RAY EXAM OF SHOULDER: CPT | Mod: LT

## 2023-11-14 PROCEDURE — A9270 NON-COVERED ITEM OR SERVICE: HCPCS | Performed by: NURSE PRACTITIONER

## 2023-11-14 PROCEDURE — 99223 1ST HOSP IP/OBS HIGH 75: CPT | Mod: AI | Performed by: SURGERY

## 2023-11-14 PROCEDURE — 700111 HCHG RX REV CODE 636 W/ 250 OVERRIDE (IP): Mod: UD | Performed by: EMERGENCY MEDICINE

## 2023-11-14 PROCEDURE — 85025 COMPLETE CBC W/AUTO DIFF WBC: CPT

## 2023-11-14 PROCEDURE — 85347 COAGULATION TIME ACTIVATED: CPT

## 2023-11-14 PROCEDURE — 700102 HCHG RX REV CODE 250 W/ 637 OVERRIDE(OP): Performed by: NURSE PRACTITIONER

## 2023-11-14 PROCEDURE — 85576 BLOOD PLATELET AGGREGATION: CPT

## 2023-11-14 PROCEDURE — 82962 GLUCOSE BLOOD TEST: CPT | Mod: 91

## 2023-11-14 PROCEDURE — 73030 X-RAY EXAM OF SHOULDER: CPT | Mod: RT

## 2023-11-14 PROCEDURE — 72131 CT LUMBAR SPINE W/O DYE: CPT

## 2023-11-14 PROCEDURE — 85610 PROTHROMBIN TIME: CPT

## 2023-11-14 PROCEDURE — 72170 X-RAY EXAM OF PELVIS: CPT

## 2023-11-14 PROCEDURE — 85384 FIBRINOGEN ACTIVITY: CPT

## 2023-11-14 PROCEDURE — 85730 THROMBOPLASTIN TIME PARTIAL: CPT

## 2023-11-14 PROCEDURE — 770001 HCHG ROOM/CARE - MED/SURG/GYN PRIV*

## 2023-11-14 PROCEDURE — 99285 EMERGENCY DEPT VISIT HI MDM: CPT

## 2023-11-14 PROCEDURE — 80053 COMPREHEN METABOLIC PANEL: CPT

## 2023-11-14 RX ORDER — ONDANSETRON 4 MG/1
4 TABLET, ORALLY DISINTEGRATING ORAL ONCE
Status: COMPLETED | OUTPATIENT
Start: 2023-11-14 | End: 2023-11-14

## 2023-11-14 RX ORDER — HYDROMORPHONE HYDROCHLORIDE 1 MG/ML
0.5 INJECTION, SOLUTION INTRAMUSCULAR; INTRAVENOUS; SUBCUTANEOUS
Status: DISCONTINUED | OUTPATIENT
Start: 2023-11-14 | End: 2023-11-15 | Stop reason: HOSPADM

## 2023-11-14 RX ORDER — POLYETHYLENE GLYCOL 3350 17 G/17G
1 POWDER, FOR SOLUTION ORAL 2 TIMES DAILY
Status: DISCONTINUED | OUTPATIENT
Start: 2023-11-14 | End: 2023-11-15 | Stop reason: HOSPADM

## 2023-11-14 RX ORDER — ONDANSETRON 4 MG/1
4 TABLET, ORALLY DISINTEGRATING ORAL EVERY 4 HOURS PRN
Status: DISCONTINUED | OUTPATIENT
Start: 2023-11-14 | End: 2023-11-15 | Stop reason: HOSPADM

## 2023-11-14 RX ORDER — ONDANSETRON 2 MG/ML
4 INJECTION INTRAMUSCULAR; INTRAVENOUS EVERY 4 HOURS PRN
Status: DISCONTINUED | OUTPATIENT
Start: 2023-11-14 | End: 2023-11-15 | Stop reason: HOSPADM

## 2023-11-14 RX ORDER — DOCUSATE SODIUM 100 MG/1
100 CAPSULE, LIQUID FILLED ORAL 2 TIMES DAILY
Status: DISCONTINUED | OUTPATIENT
Start: 2023-11-14 | End: 2023-11-15 | Stop reason: HOSPADM

## 2023-11-14 RX ORDER — FLUOXETINE HYDROCHLORIDE 20 MG/1
40 CAPSULE ORAL EVERY MORNING
Status: DISCONTINUED | OUTPATIENT
Start: 2023-11-15 | End: 2023-11-15 | Stop reason: HOSPADM

## 2023-11-14 RX ORDER — MORPHINE SULFATE 4 MG/ML
4 INJECTION INTRAVENOUS ONCE
Status: COMPLETED | OUTPATIENT
Start: 2023-11-14 | End: 2023-11-14

## 2023-11-14 RX ORDER — BISACODYL 10 MG
10 SUPPOSITORY, RECTAL RECTAL
Status: DISCONTINUED | OUTPATIENT
Start: 2023-11-14 | End: 2023-11-15 | Stop reason: HOSPADM

## 2023-11-14 RX ORDER — OXYCODONE HYDROCHLORIDE 10 MG/1
10 TABLET ORAL
Status: DISCONTINUED | OUTPATIENT
Start: 2023-11-14 | End: 2023-11-15 | Stop reason: HOSPADM

## 2023-11-14 RX ORDER — AMOXICILLIN 250 MG
1 CAPSULE ORAL NIGHTLY
Status: DISCONTINUED | OUTPATIENT
Start: 2023-11-14 | End: 2023-11-15 | Stop reason: HOSPADM

## 2023-11-14 RX ORDER — ACETAMINOPHEN 325 MG/1
650 TABLET ORAL EVERY 4 HOURS PRN
Status: DISCONTINUED | OUTPATIENT
Start: 2023-11-14 | End: 2023-11-15 | Stop reason: HOSPADM

## 2023-11-14 RX ORDER — CARBAMAZEPINE 200 MG/1
200 TABLET ORAL 3 TIMES DAILY
Status: DISCONTINUED | OUTPATIENT
Start: 2023-11-14 | End: 2023-11-15 | Stop reason: HOSPADM

## 2023-11-14 RX ORDER — LEVOTHYROXINE SODIUM 0.05 MG/1
50 TABLET ORAL
Status: DISCONTINUED | OUTPATIENT
Start: 2023-11-15 | End: 2023-11-15 | Stop reason: HOSPADM

## 2023-11-14 RX ORDER — DIAZEPAM 5 MG/1
10 TABLET ORAL EVERY 12 HOURS
Status: DISCONTINUED | OUTPATIENT
Start: 2023-11-14 | End: 2023-11-15 | Stop reason: HOSPADM

## 2023-11-14 RX ORDER — OXYCODONE HYDROCHLORIDE 5 MG/1
5 TABLET ORAL
Status: DISCONTINUED | OUTPATIENT
Start: 2023-11-14 | End: 2023-11-15 | Stop reason: HOSPADM

## 2023-11-14 RX ORDER — HYDRALAZINE HYDROCHLORIDE 20 MG/ML
10 INJECTION INTRAMUSCULAR; INTRAVENOUS EVERY 4 HOURS PRN
Status: DISCONTINUED | OUTPATIENT
Start: 2023-11-14 | End: 2023-11-15 | Stop reason: HOSPADM

## 2023-11-14 RX ORDER — AMOXICILLIN 250 MG
1 CAPSULE ORAL
Status: DISCONTINUED | OUTPATIENT
Start: 2023-11-14 | End: 2023-11-15 | Stop reason: HOSPADM

## 2023-11-14 RX ORDER — ENEMA 19; 7 G/133ML; G/133ML
1 ENEMA RECTAL
Status: DISCONTINUED | OUTPATIENT
Start: 2023-11-14 | End: 2023-11-15 | Stop reason: HOSPADM

## 2023-11-14 RX ORDER — FAMOTIDINE 20 MG/1
20 TABLET, FILM COATED ORAL 2 TIMES DAILY
Status: DISCONTINUED | OUTPATIENT
Start: 2023-11-14 | End: 2023-11-15 | Stop reason: HOSPADM

## 2023-11-14 RX ORDER — LEVETIRACETAM 500 MG/5ML
500 INJECTION, SOLUTION, CONCENTRATE INTRAVENOUS EVERY 12 HOURS
Status: DISCONTINUED | OUTPATIENT
Start: 2023-11-14 | End: 2023-11-15 | Stop reason: HOSPADM

## 2023-11-14 RX ORDER — ATORVASTATIN CALCIUM 20 MG/1
20 TABLET, FILM COATED ORAL EVERY EVENING
Status: DISCONTINUED | OUTPATIENT
Start: 2023-11-14 | End: 2023-11-15 | Stop reason: HOSPADM

## 2023-11-14 RX ORDER — ACETAMINOPHEN 650 MG/1
650 SUPPOSITORY RECTAL EVERY 4 HOURS PRN
Status: DISCONTINUED | OUTPATIENT
Start: 2023-11-14 | End: 2023-11-15 | Stop reason: HOSPADM

## 2023-11-14 RX ORDER — LEVETIRACETAM 500 MG/1
500 TABLET ORAL EVERY 12 HOURS
Status: DISCONTINUED | OUTPATIENT
Start: 2023-11-14 | End: 2023-11-15 | Stop reason: HOSPADM

## 2023-11-14 RX ADMIN — ATORVASTATIN CALCIUM 20 MG: 20 TABLET, FILM COATED ORAL at 17:59

## 2023-11-14 RX ADMIN — MAGNESIUM HYDROXIDE 30 ML: 1200 LIQUID ORAL at 17:59

## 2023-11-14 RX ADMIN — POLYETHYLENE GLYCOL 3350 1 PACKET: 17 POWDER, FOR SOLUTION ORAL at 17:59

## 2023-11-14 RX ADMIN — DOCUSATE SODIUM 100 MG: 100 CAPSULE, LIQUID FILLED ORAL at 17:59

## 2023-11-14 RX ADMIN — ONDANSETRON 4 MG: 4 TABLET, ORALLY DISINTEGRATING ORAL at 13:20

## 2023-11-14 RX ADMIN — CARBAMAZEPINE 200 MG: 200 TABLET ORAL at 17:59

## 2023-11-14 RX ADMIN — MORPHINE SULFATE 4 MG: 4 INJECTION, SOLUTION INTRAMUSCULAR; INTRAVENOUS at 13:20

## 2023-11-14 RX ADMIN — DIAZEPAM 10 MG: 5 TABLET ORAL at 17:59

## 2023-11-14 RX ADMIN — LEVETIRACETAM 500 MG: 500 TABLET, FILM COATED ORAL at 17:58

## 2023-11-14 RX ADMIN — FAMOTIDINE 20 MG: 20 TABLET, FILM COATED ORAL at 17:58

## 2023-11-14 RX ADMIN — INSULIN GLARGINE-YFGN 16 UNITS: 100 INJECTION, SOLUTION SUBCUTANEOUS at 18:05

## 2023-11-14 RX ADMIN — INSULIN HUMAN 1 UNITS: 100 INJECTION, SOLUTION PARENTERAL at 18:05

## 2023-11-14 RX ADMIN — OXYCODONE 5 MG: 5 TABLET ORAL at 20:58

## 2023-11-14 ASSESSMENT — FIBROSIS 4 INDEX: FIB4 SCORE: 1.58

## 2023-11-14 ASSESSMENT — PAIN DESCRIPTION - PAIN TYPE
TYPE: ACUTE PAIN

## 2023-11-14 NOTE — H&P
TRAUMA HISTORY AND PHYSICAL    DATE OF SERVICE: 2023    ACTIVATION LEVEL: T-5000.     HISTORY OF PRESENT ILLNESS: The patient is a 58 year-old woman who was injured when she fell and hit her head. She has a history of stiff person syndrome and fell in the bathroom today.  At baseline she uses a walker daily at home and takes several medications to control her stiff limbs. She complains of hitting her head.  She also complains of bilateral shoulder pain and hip pain.     The patient was triaged to [facility] in accordance with established pre hospital protocols. An expeditious primary and secondary survey with required adjuncts was conducted. See Trauma Narrator for full details.    PAST MEDICAL HISTORY:   Past Medical History:   Diagnosis Date    Diabetes (HCC) 2015    Hyperlipidemia     Hypothyroidism     Stiff person syndrome          PAST SURGICAL HISTORY:   Past Surgical History:   Procedure Laterality Date    APPENDECTOMY      CHOLECYSTECTOMY      [place]  DELIVERY ONLY            ALLERGIES: Patient has no known allergies.       CURRENT MEDICATIONS:   No outpatient medications have been marked as taking for the 23 encounter (Hospital Encounter).   Prozac, valium, lipitor, synthroid, insulin, tegretol    FAMILY HISTORY: family history includes Diabetes in her father; Stroke in her brother.  Reviewed and found to be non-contributory in regards to the above presentation    SOCIAL HISTORY:  reports that she has never smoked. She has never used smokeless tobacco. She reports that she does not drink alcohol and does not use drugs.  Patient denies habitual use of alcohol, tobacco, and illicit drugs  Lives at home with two sons.  Daughter at bedside.  Patient is Italian speaking.     REVIEW OF SYSTEMS:   Comprehensive review of systems is negative with the exception of the aforementioned HPI, PMH, and PSH bullets in accordance with CMS guidelines.    PHYSICAL EXAMINATION:   Vital  "Signs: /80   Pulse 73   Temp 36.6 °C (97.9 °F) (Temporal)   Resp 18   Ht 1.499 m (4' 11\")   Wt 50.8 kg (112 lb)   SpO2 97%   Physical Exam  Vitals and nursing note reviewed. Exam conducted with a chaperone present.         LABORATORY VALUES:   Recent Labs     11/14/23  1430   WBC 6.4   RBC 4.38   HEMOGLOBIN 14.5   HEMATOCRIT 41.3   MCV 94.3   MCH 33.1*   MCHC 35.1   RDW 42.5   PLATELETCT 168   MPV 13.1*     Recent Labs     11/14/23  1430   SODIUM 139   POTASSIUM 5.2   CHLORIDE 101   CO2 29   GLUCOSE 246*   BUN 32*   CREATININE 1.45*   CALCIUM 9.4     Recent Labs     11/14/23  1430   ASTSGOT 71*   ALTSGPT 22   TBILIRUBIN 0.2   ALKPHOSPHAT 92   GLOBULIN 3.1   INR 0.89     Recent Labs     11/14/23  1430   APTT 25.9   INR 0.89        IMAGING:   CT-LSPINE W/O PLUS RECONS   Final Result      CT of the lumbar spine without contrast within normal limits.      CT-TSPINE W/O PLUS RECONS   Final Result      No acute post traumatic imaging findings in the thoracic spine.      CT-CSPINE WITHOUT PLUS RECONS   Final Result      1.  No evidence for acute cervical spine fracture and/or subluxation.      2.  Moderate osteoarthritic changes at C6-7 level.      CT-HEAD W/O   Final Result      1.  Minimal amount of bifrontal subarachnoid hemorrhage.      Based solely on CT findings, the brain injury guideline category is mBIG 1.      SDH < 4mm   IPH < 4mm   SAH < 3 sulci and < 1mm      The original BIG retrospective analysis found radiographic progression in 0% of BIG 1 patients and 2.6% BIG 2.      CT-HEAD W/O    (Results Pending)   DX-SHOULDER 2+ LEFT    (Results Pending)   DX-SHOULDER 2+ RIGHT    (Results Pending)   DX-PELVIS-1 OR 2 VIEWS    (Results Pending)       IMPRESSION AND PLAN:  * Trauma- (present on admission)  Assessment & Plan  Tripped and fell.  T-5000 Activation.  Cristin Lau MD. Trauma Surgery.      Subarachnoid hemorrhage following injury (HCC)- (present on admission)  Assessment & Plan  Minimal " amount of bifrontal subarachnoid hemorrhage   Repeat definitive head CT pending.  Ok for admit to ruff.  Non-operative management.  Post traumatic pharmacologic seizure prophylaxis for 1 week.  Speech Language Pathology cognitive evaluation.  Caden Batista MD. Neurosurgeon. [facility].    Contraindication to deep vein thrombosis (DVT) prophylaxis- (present on admission)  Assessment & Plan  VTE prophylaxis initially contraindicated secondary to elevated bleeding risk.  11/6 Trauma surveillance venous duplex ultrasonography ordered.      Frailty- (present on admission)  Assessment & Plan  Patient is frail with stiff person syndrome at baseline.  Uses a walker daily for mobility.    PM&R consult for potential rehab needs     Discharge planning issues- (present on admission)  Assessment & Plan  Rehab consult placed on admit    Hyperlipidemia- (present on admission)  Assessment & Plan  Chronic condition treated with Lipitor.  Resumed maintenance medication on admission.      Hypothyroidism- (present on admission)  Assessment & Plan  Chronic condition treated with Synthroid.  Resumed maintenance medication on admission.    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  Resumed maintenance medication on admission.      Diabetes (HCC)- (present on admission)  Assessment & Plan  Chronic condition treated with Insulin.  Resumed maintenance medication on admission.  Insulin sliding scale.           Aggregated care time spent evaluating, reviewing documentation, providing care, and managing this patient exclusive of procedures: 65 minutes  ____________________________________   Cristin Lau M.D.     ALEXANDR / CHIRAG     DD: 11/14/2023   DT: 2:44 PM       Normal for race

## 2023-11-14 NOTE — ED TRIAGE NOTES
Vicky Cheatham Depintor  58 y.o.  Chief Complaint   Patient presents with    T-5000 Head Injury     MGLF onto carpet  + head strike to posterior head  - LOC  - ASA/thinners     BIB EMS from home for above. A & O x 4, GCS 15. Azeri-speaking only.    Patient was walking to the bathroom when she tripped and fell striking her posterior head on the carpet. Currently complains of pain 7/10 to site. Denies dizziness, lightheadedness, nausea, visual changes.    Patient lying in supine position of comfort on [facility].  Hx. Stiff person syndrome - takes PO Tegretol and Diazepam, last doses this am prior to fall.    Vitals:    11/14/23 1300   BP: 133/77   Pulse: 79   Resp: 18   Temp: 36.6 °C (97.9 °F)   SpO2: 97%

## 2023-11-14 NOTE — ED NOTES
IV access placed and blood drawn per orders and sent to lab. Patient tolerated well with no complaints of discomfort.

## 2023-11-14 NOTE — ASSESSMENT & PLAN NOTE
Chronic condition treated with Insulin.  Resumed maintenance medication on admission.  Insulin sliding scale.

## 2023-11-14 NOTE — ED PROVIDER NOTES
ED Provider Note    CHIEF COMPLAINT  Chief Complaint   Patient presents with    T-5000 Head Injury     MGLF onto carpet  + head strike to posterior head  - LOC  - ASA/thinners     HPI/ROS    OUTSIDE HISTORIAN(S):  Family is at bedside adding saying that patient fell and hit the back of her head against the ground.  She had no loss of sensation.  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     Vicky Cheatham Depintor is a 58 y.o. female who presents she is in the bathroom, she is walking with a walker, she still fell backwards hit her head on the ground.  She had no loss of conscious but felt very nauseous, she has a profound headache, she states she is having difficulty moving her arms and legs secondary to the stiffness in her extremities and her stiff syndrome.  She is complaining of neck, upper and lower back pain.  Denies saddle anesthesia.  Denies urinary or bowel incontinence or retention and was brought here by ambulance.   has a past medical history of Diabetes (HCC) (), Hyperlipidemia, Hypothyroidism, and Stiff person syndrome.    SURGICAL HISTORY   has a past surgical history that includes cholecystectomy; appendectomy; and  delivery only.    FAMILY HISTORY  Family History   Problem Relation Age of Onset    Diabetes Father     Stroke Brother     Cancer Neg Hx     Heart Disease Neg Hx        SOCIAL HISTORY  Social History     Tobacco Use    Smoking status: Never    Smokeless tobacco: Never   Vaping Use    Vaping Use: Never used   Substance and Sexual Activity    Alcohol use: No    Drug use: No    Sexual activity: Not Currently       CURRENT MEDICATIONS  Home Medications       Reviewed by Verito Barros R.N. (Registered Nurse) on 23 at 1302  Med List Status: Partial     Medication Last Dose Status   atorvastatin (LIPITOR) 20 MG Tab  Active   calcium carbonate (TUMS) 500 MG Chew Tab  Active   carBAMazepine (TEGRETOL) 200 MG Tab  Active  "  diazepam (VALIUM) 10 MG tablet  Active   FLUoxetine (PROZAC) 20 MG Cap  Active   Insulin Glargine, 1 Unit Dial, (TOUJEO SOLOSTAR) 300 UNIT/ML Solution Pen-injector  Active   insulin lispro 100 UNIT/ML INJ  Active   LACOSAMIDE PO  Active   levothyroxine (SYNTHROID) 50 MCG Tab  Active   omeprazole (PRILOSEC) 20 MG delayed-release capsule  Active   ondansetron (ZOFRAN ODT) 4 MG TABLET DISPERSIBLE  Active                    ALLERGIES  No Known Allergies    PHYSICAL EXAM  VITAL SIGNS: /77   Pulse 82   Temp 36.6 °C (97.9 °F) (Temporal)   Resp 18   Ht 1.499 m (4' 11\")   Wt 50.8 kg (112 lb)   LMP 01/31/2015   SpO2 97%   BMI 22.62 kg/m²      Nursing notes and vitals reviewed.  Constitutional: Well developed, Well nourished, No acute distress, Non-toxic appearance.   Eyes: PERRLA, EOMI, Conjunctiva normal, No discharge.   Neck: Slight cervical spine tenderness, no step-off deformity  HENT: Normocephalic, Atraumatic, moist mucous membranes, no facial edema   Cardiovascular: Normal heart rate, Normal rhythm, No murmurs, No rubs, No gallops.   Thorax & Lungs: No respiratory distress, No rales, No rhonchi, No wheezing, No chest tenderness.   Abdomen: Bowel sounds normal, Soft, No tenderness, No guarding, No rebound, No masses, No pulsatile masses.   Skin: Warm, Dry, No erythema, No rash.   Extremities: No deformity, no edema, good range of motion range of motion upper lower extremes bilaterally  Neurologic: Alert & oriented x 3, diminished strength throughout secondary to stiff body but leg with 5/5 bilaterally, no saddle anesthesia, arms 5/5 bilaterally DTRs are 2/4 lower EXTR is bilaterally  Psychiatric: Affect normal for clinical presentation.    DIAGNOSTIC STUDIES / PROCEDURES  EKG  I have independently interpreted this EKG  Sinus rhythm on monitor    LABS  Results for orders placed or performed during the hospital encounter of 11/14/23   CBC WITH DIFFERENTIAL   Result Value Ref Range    WBC 6.4 4.8 - 10.8 " K/uL    RBC 4.38 4.20 - 5.40 M/uL    Hemoglobin 14.5 12.0 - 16.0 g/dL    Hematocrit 41.3 37.0 - 47.0 %    MCV 94.3 81.4 - 97.8 fL    MCH 33.1 (H) 27.0 - 33.0 pg    MCHC 35.1 32.2 - 35.5 g/dL    RDW 42.5 35.9 - 50.0 fL    Platelet Count 168 164 - 446 K/uL    MPV 13.1 (H) 9.0 - 12.9 fL    Neutrophils-Polys 72.40 (H) 44.00 - 72.00 %    Lymphocytes 19.30 (L) 22.00 - 41.00 %    Monocytes 5.70 0.00 - 13.40 %    Eosinophils 1.30 0.00 - 6.90 %    Basophils 0.50 0.00 - 1.80 %    Immature Granulocytes 0.80 0.00 - 0.90 %    Nucleated RBC 0.00 0.00 - 0.20 /100 WBC    Neutrophils (Absolute) 4.62 1.82 - 7.42 K/uL    Lymphs (Absolute) 1.23 1.00 - 4.80 K/uL    Monos (Absolute) 0.36 0.00 - 0.85 K/uL    Eos (Absolute) 0.08 0.00 - 0.51 K/uL    Baso (Absolute) 0.03 0.00 - 0.12 K/uL    Immature Granulocytes (abs) 0.05 0.00 - 0.11 K/uL    NRBC (Absolute) 0.00 K/uL   COMP METABOLIC PANEL   Result Value Ref Range    Sodium 139 135 - 145 mmol/L    Potassium 5.2 3.6 - 5.5 mmol/L    Chloride 101 96 - 112 mmol/L    Co2 29 20 - 33 mmol/L    Anion Gap 9.0 7.0 - 16.0    Glucose 246 (H) 65 - 99 mg/dL    Bun 32 (H) 8 - 22 mg/dL    Creatinine 1.45 (H) 0.50 - 1.40 mg/dL    Calcium 9.4 8.5 - 10.5 mg/dL    Correct Calcium 9.2 8.5 - 10.5 mg/dL    AST(SGOT) 71 (H) 12 - 45 U/L    ALT(SGPT) 22 2 - 50 U/L    Alkaline Phosphatase 92 30 - 99 U/L    Total Bilirubin 0.2 0.1 - 1.5 mg/dL    Albumin 4.3 3.2 - 4.9 g/dL    Total Protein 7.4 6.0 - 8.2 g/dL    Globulin 3.1 1.9 - 3.5 g/dL    A-G Ratio 1.4 g/dL   APTT   Result Value Ref Range    APTT 25.9 24.7 - 36.0 sec   PROTHROMBIN TIME   Result Value Ref Range    PT 12.2 12.0 - 14.6 sec    INR 0.89 0.87 - 1.13   PLATELET MAPPING WITH BASIC TEG   Result Value Ref Range    Reaction Time Initial-R 3.2 (L) 4.6 - 9.1 min    React Time Initial Hep 3.3 (L) 4.3 - 8.3 min    Clot Kinetics-K 1.3 0.8 - 2.1 min    Clot Angle-Angle 73.6 63.0 - 78.0 degrees    Maximum Clot Strength-MA 63.0 52.0 - 69.0 mm    TEG Functional  Fibrinogen(MA) 23.9 15.0 - 32.0 mm    Lysis 30 minutes-LY30 0.2 0.0 - 2.6 %    % Inhibition ADP 0.0 0.0 - 17.0 %    % Inhibition AA 2.0 0.0 - 11.0 %    TEG Algorithm Link Algorithm    ESTIMATED GFR   Result Value Ref Range    GFR (CKD-EPI) 42 (A) >60 mL/min/1.73 m 2         RADIOLOGY  I have independently interpreted the diagnostic imaging associated with this visit and am waiting the final reading from the radiologist.   My preliminary interpretation is as follows: Subtle subarachnoid hemorrhage bilaterally  Radiologist interpretation:  DX-SHOULDER 2+ RIGHT   Final Result      1.  Normal shoulder series.      DX-SHOULDER 2+ LEFT   Final Result      1.  Normal shoulder series.      DX-PELVIS-1 OR 2 VIEWS   Final Result      1.  Normal AP view of the pelvis.      CT-LSPINE W/O PLUS RECONS   Final Result      CT of the lumbar spine without contrast within normal limits.      CT-TSPINE W/O PLUS RECONS   Final Result      No acute post traumatic imaging findings in the thoracic spine.      CT-CSPINE WITHOUT PLUS RECONS   Final Result      1.  No evidence for acute cervical spine fracture and/or subluxation.      2.  Moderate osteoarthritic changes at C6-7 level.      CT-HEAD W/O   Final Result      1.  Minimal amount of bifrontal subarachnoid hemorrhage.      Based solely on CT findings, the brain injury guideline category is mBIG 1.      SDH < 4mm   IPH < 4mm   SAH < 3 sulci and < 1mm      The original BIG retrospective analysis found radiographic progression in 0% of BIG 1 patients and 2.6% BIG 2.      CT-HEAD W/O    (Results Pending)         COURSE & MEDICAL DECISION MAKING    ED Observation Status? No; Patient does not meet criteria for ED Observation.     INITIAL ASSESSMENT, COURSE AND PLAN  Care Narrative: This is a pleasant 58-year-old female that presents with fall and closed head injury.  CT scan of the brain was completed showed evidence of by hemispheric subarachnoid hemorrhages which makes her a bit 3.  The  patient has no other focal neurological deficits and does have stiff body syndrome resulting in her presentation today.  The patient is not taking anticoagulation or aspirin.  I discussed the patient with Dr. Batista who consult on the patient and discussed patient with Dr. Lau for hospitalization.  Dr. Batista believes the patient does not require ICU and he will consult on patient for intervention if needed.  In addition, cervical, thoracic lumbar spine CTs were completed that were negative for acute fracture, dislocation the patient has no focal neurological deficit suspicious for cauda equina syndrome or other life-threatening conditions.  CRITICAL CARE  The very real possibilty of a deterioration of this patient's condition required the highest level of my preparedness for sudden, emergent intervention.  I provided critical care services, which included medication orders, frequent reevaluations of the patient's condition and response to treatment, ordering and reviewing test results, and discussing the case with various consultants.  The critical care time associated with the care of the patient was 35 minutes. Review chart for interventions. This time is exclusive of any other billable procedures.         ADDITIONAL PROBLEM LIST  Stiff body prior resulting in her fall today.  DISPOSITION AND DISCUSSIONS  I have discussed management of the patient with the following physicians and JONY's: Dr. Batista who believes the patient is adequate for the floor and will consult.  Dr. Lau for traumatic service hospitalization.    FINAL DIAGNOSIS  Subarachnoid hemorrhage bilateral  Fall  Cervical strain  Lumbar spine strain  Thoracic spine sprain        Electronically signed by: Beka Redding D.O., 11/14/2023 1:18 PM

## 2023-11-14 NOTE — ASSESSMENT & PLAN NOTE
Minimal amount of bifrontal subarachnoid hemorrhage   Stable repeat head CT.   Ok for admit to ruff.  Non-operative management.  Post traumatic pharmacologic seizure prophylaxis for 1 week.  Speech Language Pathology cognitive evaluation.  Caden Batista MD. Neurosurgeon. [facility].   Dry/Warm

## 2023-11-14 NOTE — ASSESSMENT & PLAN NOTE
VTE prophylaxis initially contraindicated secondary to elevated bleeding risk.  11/6 Trauma surveillance venous duplex ultrasonography ordered.

## 2023-11-14 NOTE — ED NOTES
Medication history reviewed with PT and her Daughter at bedside. Daughter translated.    Med rec is complete per PT reporting.    Allergies reviewed.     Patient denies any outpatient antibiotics in the last 30 days.     Patient is not taking anticoagulants.    Preferred pharmacy for this visit - [facility] in [place] ([phone]).

## 2023-11-14 NOTE — ASSESSMENT & PLAN NOTE
Patient is frail with stiff person syndrome at baseline.  Uses a walker daily for mobility.    PM&R consult for potential rehab needs

## 2023-11-15 ENCOUNTER — PHARMACY VISIT (OUTPATIENT)
Dept: PHARMACY | Facility: MEDICAL CENTER | Age: 58
End: 2023-11-15
Payer: COMMERCIAL

## 2023-11-15 ENCOUNTER — HOSPITAL ENCOUNTER (INPATIENT)
Facility: REHABILITATION | Age: 58
End: 2023-11-15
Attending: PHYSICAL MEDICINE & REHABILITATION | Admitting: PHYSICAL MEDICINE & REHABILITATION
Payer: MEDICARE

## 2023-11-15 VITALS
HEIGHT: 59 IN | RESPIRATION RATE: 16 BRPM | TEMPERATURE: 97.7 F | SYSTOLIC BLOOD PRESSURE: 96 MMHG | OXYGEN SATURATION: 96 % | WEIGHT: 112 LBS | BODY MASS INDEX: 22.58 KG/M2 | DIASTOLIC BLOOD PRESSURE: 61 MMHG | HEART RATE: 64 BPM

## 2023-11-15 PROBLEM — Z53.09 CONTRAINDICATION TO DEEP VEIN THROMBOSIS (DVT) PROPHYLAXIS: Status: RESOLVED | Noted: 2023-11-14 | Resolved: 2023-11-15

## 2023-11-15 PROBLEM — T14.90XA TRAUMA: Status: RESOLVED | Noted: 2023-11-14 | Resolved: 2023-11-15

## 2023-11-15 PROBLEM — Z02.9 DISCHARGE PLANNING ISSUES: Status: RESOLVED | Noted: 2023-11-14 | Resolved: 2023-11-15

## 2023-11-15 PROBLEM — Z75.8 DISCHARGE PLANNING ISSUES: Status: RESOLVED | Noted: 2023-11-14 | Resolved: 2023-11-15

## 2023-11-15 LAB
ANION GAP SERPL CALC-SCNC: 8 MMOL/L (ref 7–16)
BASOPHILS # BLD AUTO: 0.6 % (ref 0–1.8)
BASOPHILS # BLD: 0.04 K/UL (ref 0–0.12)
BUN SERPL-MCNC: 32 MG/DL (ref 8–22)
CALCIUM SERPL-MCNC: 8.7 MG/DL (ref 8.5–10.5)
CHLORIDE SERPL-SCNC: 105 MMOL/L (ref 96–112)
CO2 SERPL-SCNC: 27 MMOL/L (ref 20–33)
CREAT SERPL-MCNC: 1.27 MG/DL (ref 0.5–1.4)
EOSINOPHIL # BLD AUTO: 0.1 K/UL (ref 0–0.51)
EOSINOPHIL NFR BLD: 1.6 % (ref 0–6.9)
ERYTHROCYTE [DISTWIDTH] IN BLOOD BY AUTOMATED COUNT: 42.8 FL (ref 35.9–50)
GFR SERPLBLD CREATININE-BSD FMLA CKD-EPI: 49 ML/MIN/1.73 M 2
GLUCOSE BLD STRIP.AUTO-MCNC: 119 MG/DL (ref 65–99)
GLUCOSE BLD STRIP.AUTO-MCNC: 277 MG/DL (ref 65–99)
GLUCOSE SERPL-MCNC: 172 MG/DL (ref 65–99)
HCT VFR BLD AUTO: 39.4 % (ref 37–47)
HGB BLD-MCNC: 13.3 G/DL (ref 12–16)
IMM GRANULOCYTES # BLD AUTO: 0.02 K/UL (ref 0–0.11)
IMM GRANULOCYTES NFR BLD AUTO: 0.3 % (ref 0–0.9)
LYMPHOCYTES # BLD AUTO: 1.37 K/UL (ref 1–4.8)
LYMPHOCYTES NFR BLD: 22.1 % (ref 22–41)
MCH RBC QN AUTO: 32.4 PG (ref 27–33)
MCHC RBC AUTO-ENTMCNC: 33.8 G/DL (ref 32.2–35.5)
MCV RBC AUTO: 96.1 FL (ref 81.4–97.8)
MONOCYTES # BLD AUTO: 0.46 K/UL (ref 0–0.85)
MONOCYTES NFR BLD AUTO: 7.4 % (ref 0–13.4)
NEUTROPHILS # BLD AUTO: 4.21 K/UL (ref 1.82–7.42)
NEUTROPHILS NFR BLD: 68 % (ref 44–72)
NRBC # BLD AUTO: 0 K/UL
NRBC BLD-RTO: 0 /100 WBC (ref 0–0.2)
PLATELET # BLD AUTO: 147 K/UL (ref 164–446)
PMV BLD AUTO: 13 FL (ref 9–12.9)
POTASSIUM SERPL-SCNC: 4.3 MMOL/L (ref 3.6–5.5)
RBC # BLD AUTO: 4.1 M/UL (ref 4.2–5.4)
SODIUM SERPL-SCNC: 140 MMOL/L (ref 135–145)
WBC # BLD AUTO: 6.2 K/UL (ref 4.8–10.8)

## 2023-11-15 PROCEDURE — 97165 OT EVAL LOW COMPLEX 30 MIN: CPT

## 2023-11-15 PROCEDURE — 80048 BASIC METABOLIC PNL TOTAL CA: CPT

## 2023-11-15 PROCEDURE — A9270 NON-COVERED ITEM OR SERVICE: HCPCS | Performed by: NURSE PRACTITIONER

## 2023-11-15 PROCEDURE — 99239 HOSP IP/OBS DSCHRG MGMT >30: CPT | Performed by: NURSE PRACTITIONER

## 2023-11-15 PROCEDURE — 36415 COLL VENOUS BLD VENIPUNCTURE: CPT

## 2023-11-15 PROCEDURE — 85025 COMPLETE CBC W/AUTO DIFF WBC: CPT

## 2023-11-15 PROCEDURE — 97163 PT EVAL HIGH COMPLEX 45 MIN: CPT

## 2023-11-15 PROCEDURE — 82962 GLUCOSE BLOOD TEST: CPT | Mod: 91

## 2023-11-15 PROCEDURE — RXMED WILLOW AMBULATORY MEDICATION CHARGE: Performed by: NURSE PRACTITIONER

## 2023-11-15 PROCEDURE — 700102 HCHG RX REV CODE 250 W/ 637 OVERRIDE(OP): Performed by: NURSE PRACTITIONER

## 2023-11-15 RX ORDER — LEVETIRACETAM 500 MG/1
500 TABLET ORAL EVERY 12 HOURS
Qty: 12 TABLET | Refills: 0 | Status: SHIPPED | OUTPATIENT
Start: 2023-11-15 | End: 2023-11-21

## 2023-11-15 RX ORDER — ACETAMINOPHEN 325 MG/1
650 TABLET ORAL EVERY 4 HOURS PRN
Qty: 30 TABLET | Refills: 0 | COMMUNITY
Start: 2023-11-15

## 2023-11-15 RX ADMIN — LEVETIRACETAM 500 MG: 500 TABLET, FILM COATED ORAL at 05:13

## 2023-11-15 RX ADMIN — CARBAMAZEPINE 200 MG: 200 TABLET ORAL at 11:33

## 2023-11-15 RX ADMIN — FAMOTIDINE 20 MG: 20 TABLET, FILM COATED ORAL at 05:12

## 2023-11-15 RX ADMIN — OXYCODONE HYDROCHLORIDE 10 MG: 10 TABLET ORAL at 08:31

## 2023-11-15 RX ADMIN — LEVOTHYROXINE SODIUM 50 MCG: 0.05 TABLET ORAL at 05:12

## 2023-11-15 RX ADMIN — FLUOXETINE 40 MG: 20 CAPSULE ORAL at 05:13

## 2023-11-15 RX ADMIN — DIAZEPAM 10 MG: 5 TABLET ORAL at 05:11

## 2023-11-15 RX ADMIN — CARBAMAZEPINE 200 MG: 200 TABLET ORAL at 05:13

## 2023-11-15 RX ADMIN — INSULIN HUMAN 3 UNITS: 100 INJECTION, SOLUTION PARENTERAL at 11:35

## 2023-11-15 ASSESSMENT — ENCOUNTER SYMPTOMS
FEVER: 0
DIZZINESS: 0
DOUBLE VISION: 1
NAUSEA: 0
HEADACHES: 1
FOCAL WEAKNESS: 0
VOMITING: 0
MYALGIAS: 1
SHORTNESS OF BREATH: 0
CHILLS: 0
BLURRED VISION: 1
ABDOMINAL PAIN: 0
BACK PAIN: 0
NECK PAIN: 1
SENSORY CHANGE: 0
SPEECH CHANGE: 0
TINGLING: 0

## 2023-11-15 ASSESSMENT — COGNITIVE AND FUNCTIONAL STATUS - GENERAL
MOBILITY SCORE: 23
DAILY ACTIVITIY SCORE: 18
SUGGESTED CMS G CODE MODIFIER MOBILITY: CI
TOILETING: A LITTLE
SUGGESTED CMS G CODE MODIFIER DAILY ACTIVITY: CK
DRESSING REGULAR UPPER BODY CLOTHING: A LITTLE
CLIMB 3 TO 5 STEPS WITH RAILING: A LITTLE
DRESSING REGULAR LOWER BODY CLOTHING: TOTAL
HELP NEEDED FOR BATHING: A LITTLE

## 2023-11-15 ASSESSMENT — GAIT ASSESSMENTS
DISTANCE (FEET): 100
GAIT LEVEL OF ASSIST: SUPERVISED
ASSISTIVE DEVICE: FRONT WHEEL WALKER

## 2023-11-15 ASSESSMENT — ACTIVITIES OF DAILY LIVING (ADL): TOILETING: REQUIRES ASSIST

## 2023-11-15 ASSESSMENT — COPD QUESTIONNAIRES
DO YOU EVER COUGH UP ANY MUCUS OR PHLEGM?: NO/ONLY WITH OCCASIONAL COLDS OR INFECTIONS
DURING THE PAST 4 WEEKS HOW MUCH DID YOU FEEL SHORT OF BREATH: NONE/LITTLE OF THE TIME
COPD SCREENING SCORE: 1
HAVE YOU SMOKED AT LEAST 100 CIGARETTES IN YOUR ENTIRE LIFE: NO/DON'T KNOW

## 2023-11-15 ASSESSMENT — PAIN DESCRIPTION - PAIN TYPE
TYPE: ACUTE PAIN
TYPE: ACUTE PAIN

## 2023-11-15 ASSESSMENT — LIFESTYLE VARIABLES: SUBSTANCE_ABUSE: 0

## 2023-11-15 NOTE — DISCHARGE PLANNING
[facility] Transitional Care Coordination    Referral from: AUNDREA Haridn    Insurance Provider on Facesheet: [employer]UR[employer] FFS    Potential Rehab Diagnosis: TBI    Chart review indicates patient may have on going medical management and may have therapy needs to possibly meet inpatient rehab facility criteria with the goal of returning to community.    D/C support will need to be verified: Son    Physiatry consultation pended per protocol.  TX pending.     Thank you for the referral.

## 2023-11-15 NOTE — PROGRESS NOTES
4 Eyes Skin Assessment Completed by KAPIL zaragoza and KAPIL cui.    Head WDL  Ears WDL  Nose WDL  Mouth WDL  Neck WDL  Breast/Chest WDL  Shoulder Blades WDL  Spine WDL  (R) Arm/Elbow/Hand WDL  (L) Arm/Elbow/Hand WDL  Abdomen WDL  Groin WDL  Scrotum/Coccyx/Buttocks WDL  (R) Leg WDL  (L) Leg WDL  (R) Heel/Foot/Toe WDL  (L) Heel/Foot/Toe WDL          Devices In Places Blood Pressure Cuff, Pulse Ox, and SCD's      Interventions In Place Pillows    Possible Skin Injury No    Pictures Uploaded Into Epic N/A  Wound Consult Placed N/A  RN Wound Prevention Protocol Ordered No

## 2023-11-15 NOTE — CARE PLAN
The patient is Stable - Low risk of patient condition declining or worsening    Shift Goals  Clinical Goals: Monitor neuro status, safety  Patient Goals: Sleep  Family Goals: AMANDA    Progress made toward(s) clinical / shift goals:    Problem: Knowledge Deficit - Standard  Goal: Patient and family/care givers will demonstrate understanding of plan of care, disease process/condition, diagnostic tests and medications  Outcome: Progressing   The patient received education reinforcement regarding plan of care, condition, and medications.    Problem: Pain - Standard  Goal: Alleviation of pain or a reduction in pain to the patient’s comfort goal  Outcome: Progressing   Pt's pain assessed throughout shift. Patient offered pharmacological and non-pharmacological interventions.    Problem: Fall Risk  Goal: Patient will remain free from falls  Outcome: Progressing   Appropriate fall precautions are in place. Bed alarm is on at all times. Staff present for bathroom needs.     Patient is not progressing towards the following goals:

## 2023-11-15 NOTE — DISCHARGE INSTRUCTIONS
- Call or seek medical attention for questions or concerns  - Follow up with the [facility] as needed  - Follow up with Dr. Caden Batista as needed, avoid all blood thinners including aspirin or NSAIDs (ibuprophen, Advil, Aleve, Motrin) for at least two weeks  - Follow up with primary care provider within one weeks time  - Resume regular diet  - May take over the counter acetaminophen as needed for pain  - No contact sports, strenuous activities, or heavy lifting until cleared by outpatient provider  - If respiratory decompensation, persistent or worsening pain, changes in mentation, or signs or symptoms of infection occur seek medical attention     Discharge Instructions    Discharged to home by car with relative. Discharged via wheelchair, hospital escort: Yes.  Special equipment needed: Walker, patient and family states they have a walker at home.    Be sure to schedule a follow-up appointment with your primary care doctor or any specialists as instructed.     Discharge Plan:   Diet Plan: (P) Discussed  Activity Level: (P) Discussed  Confirmed Follow up Appointment: (P) Patient to Call and Schedule Appointment  Confirmed Symptoms Management: (P) Discussed  Medication Reconciliation Updated: (P) Yes    I understand that a diet low in cholesterol, fat, and sodium is recommended for good health. Unless I have been given specific instructions below for another diet, I accept this instruction as my diet prescription.   Other diet: Diabetic diet    Special Instructions:     -Is this patient being discharged with medication to prevent blood clots?  No    Is patient discharged on Warfarin / Coumadin?   No

## 2023-11-15 NOTE — PROGRESS NOTES
Pt and family given d/c instructions and education. IV and lines removed, pt has all of her belongings including her front wheel walker and [id number]. Pt escorted to son's car via wheelchair by nursing staff.

## 2023-11-15 NOTE — THERAPY
"Occupational Therapy   Initial Evaluation     Patient Name: Vicky Cheatham Depintor  Age:  58 y.o., Sex:  female  Medical Record #: [id number]  Today's Date: 11/15/2023          Assessment  Patient is 58 y.o. female admitted after she fell and injured her head. CT of head /o contrast revealed decreased trace R frontal SAH hemorrhage. Previously, demonstrated curvillinear hyperattenuation in the L parafalcine frontal cortex is no longer visualized likely secondary to redistribution with no new acute intracranial hemorrhage. Per neurosurgery, non op management. PMHx of stiff person syndrome, type 2 diabetes, GERD, and a host of other comorbidities.    Pt greeted and agreeable to OT evaluation. Pt's DIL and son present upon OT arrival and explained that at baseline pt utilizes FWW for all mobility and completes grooming and bathing independently. However, pt requires intermittent assistance with toileting and dressing specifically LBD secondary to stiff person syndrome symptoms. DIL also mentioned that all IADLs are completed by 2 sons and herself. During OT evaluation, pt presented at baseline as she was able to stand sinkside to complete grooming w/FWW w/o any signs of LOB w/SBA and demo a toilet txf w/SBA. At this time, pt will not be actively followed acute IP occupational therapy services. However, may be seen if requested by physician for 1 more visit within 30 days to address any discharge or DME/AE needs.      Plan  Occupational Therapy Initial Treatment Plan   Duration: Discharge Needs Only     Discharge Recommendations: Anticipate that the patient will have no further occupational therapy needs after discharge from the hospital     Subjective  Pt expressed \"No I'm not in any pain\" in Portuguese.      Objective   11/15/23 0948    Services   Is patient using  services for this encounter? Yes   Language Interpreted Slovak   Refusal signed by patient? No   Content of Interpretation (select " all) Patient Education;Consent;History/Visit information  (Pt's family present and interpreted all information w/pt consent)   Prior Living Situation   Housing / Facility 1 Story House   Steps Into Home 0   Steps In Home 0   Bathroom Set up Walk In Shower   Equipment Owned Front-Wheel Walker;Ramp   Lives with - Patient's Self Care Capacity Adult Children;Unrelated Adult  (Pt reports that she lives with her 2 sons and DIL. Pt's family present and acknowledged that they are av)   Comments Pt's DIL expressed concerns about recent falls at home while pt is alone. DIL requested to have a caregiver present as she explained that both sons and she work during the day causing the pt to be left alone during the day. This therapist alerted  to address DIL's concerns.   Prior Level of ADL Function   Self Feeding Independent   Grooming / Hygiene Independent   Bathing Independent   Dressing Requires Assist  (Per DIL, pt requires intermittent assistance w/LBD when she experiences stiff person syndrome symptoms.)   Toileting Requires Assist  (Pt's dtr expressed that due to pt's stiff person syndrome she periodically utilizes bedpan as it is too difficult to get to the bathroom when she gets stiff.)   Prior Level of IADL Function   Comments Pt's DIL expressed that both sons and she complete all home management tasks.   History of Falls   History of Falls Yes   Date of Last Fall   (reason for admit)   Vitals   O2 Delivery Device None - Room Air   Pain 0 - 10 Group   Therapist Pain Assessment Post Activity Pain Same as Prior to Activity  (Pt did not report any pain during OT evaluation)   Cognition    Cognition / Consciousness WDL   Level of Consciousness Alert   Comments Pt is pleasant and cooperative, Pt has great familial support.   Active ROM Upper Body   Active ROM Upper Body  WDL   Strength Upper Body   Upper Body Strength  X   Comments BUE generalized weakness   Coordination Upper Body   Coordination WDL   Balance  Assessment   Sitting Balance (Static) Fair +   Sitting Balance (Dynamic) Fair +   Standing Balance (Static) Fair   Standing Balance (Dynamic) Fair   Weight Shift Sitting Good   Weight Shift Standing Good   Comments w/FWW   Bed Mobility    Supine to Sit Supervised  (HOB elevated; pt utilized bedrails to assist with truncal elevation)   Sit to Supine Supervised   Scooting Supervised   Rolling Supervised   ADL Assessment   Grooming Standby Assist;Standing  (washed her face and brushed her teeth while standing sinkside w/FWW)   Lower Body Dressing Total Assist  (DIL donned underwear and B shoes while pt was in supine position)   Toileting   (Pt did not have urge to go as she recently used the bathroom prior to OT arrival. However, pt was able to demo toileting by managing underwear up and down and completing partial squat over toilet.)   Comments Pt's DIL expressed that pt does not sit down on a toilet as it is too difficult due to stiff person syndrome. Per DIL, pt completes a partial squat while at toilet for urinating and bowel movements. Pt utilizes nearby vanity countertop and/or FWW for stability while completing partial squat for toileting.   How much help from another person does the patient currently need...   Putting on and taking off regular lower body clothing? 1   Bathing (including washing, rinsing, and drying)? 3   Toileting, which includes using a toilet, bedpan, or urinal? 3   Putting on and taking off regular upper body clothing? 3   Taking care of personal grooming such as brushing teeth? 4   Eating meals? 4   6 Clicks Daily Activity Score 18   Functional Mobility   Sit to Stand Supervised   Bed, Chair, Wheelchair Transfer Supervised   Toilet Transfers Supervised   Transfer Method Stand Step   Mobility semi fowlers>EOB>standing sinkside>standing at toilet>EOB>semi fowlers  (w/FWW)   Activity Tolerance   Standing ~7  mins   Education Group   Education Provided Role of Occupational Therapist;Activities  of Daily Living   Role of Occupational Therapist Patient Response Patient;Family;Acceptance;Explanation;Verbal Demonstration   ADL Patient Response Patient;Acceptance;Explanation;Action Demonstration   Occupational Therapy Initial Treatment Plan    Duration Discharge Needs Only   Anticipated Discharge Equipment and Recommendations   Discharge Recommendations Anticipate that the patient will have no further occupational therapy needs after discharge from the hospital   Interdisciplinary Plan of Care Collaboration   IDT Collaboration with  Nursing;Family / Caregiver   Patient Position at End of Therapy In Bed;Call Light within Reach;Tray Table within Reach;Phone within Reach;Family / Friend in Room;Bed Alarm On   Collaboration Comments RN aware   Session Information   Date / Session Number  11/15 (d/c needs only)

## 2023-11-15 NOTE — DISCHARGE SUMMARY
Trauma Discharge Summary    DATE OF ADMISSION: 11/14/2023    DATE OF DISCHARGE: 11/15/2023    LENGTH OF STAY: 1 day    ATTENDING PHYSICIAN: Cristin Lau M.D.    CONSULTING PHYSICIAN: None    DISCHARGE DIAGNOSIS:  Principal Problem (Resolved):    Trauma  Active Problems:    Subarachnoid hemorrhage following injury (HCC)    Frailty    Diabetes (HCC)    Stiff person syndrome    Hypothyroidism    Hyperlipidemia  Resolved Problems:    Contraindication to deep vein thrombosis (DVT) prophylaxis    Discharge planning issues      PROCEDURES: None    HISTORY OF PRESENT ILLNESS: The patient is a 58 y.o. female who was reportedly injured in a ground-level fall.  3 was transferred to [facility] in [place], [place].    HOSPITAL COURSE: The patient was triaged as a consult activation.  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  Admission imaging showed a minimal amount of bifrontal subarachnoid hemorrhage.  This was discussed by the ERP with Dr. Caden Batista, neurosurgery will provide a formal consult if patient's mentation declines during hospitalization.  The patient was transported to the neurosciences ruff.  She was initiated on a 7-day course of posttraumatic pharmacologic seizure prophylaxis.  Repeat head CT was improved.  A tertiary survey was completed with no further findings.  She did work with physical therapy and is at her baseline mobility.  She is currently tolerating room air and a regular diet.  She is reporting a headache that is manageable.  She does report some muscular soreness to her neck and shoulders and takes Valium at home routinely.  It was discussed with the patient she may take over-the-counter Tylenol for headache and continue her home medication regimen.  She is to avoid all NSAIDs and aspirin for 2 weeks.    HOSPITAL PROBLEM LIST:  * Trauma-resolved as of 11/15/2023, (present on admission)  Assessment & Plan  Tripped and  fell.  T-5000 Activation.  Cristin Lua MD. Trauma Surgery.      Subarachnoid hemorrhage following injury (HCC)- (present on admission)  Assessment & Plan  Minimal amount of bifrontal subarachnoid hemorrhage   Stable repeat head CT.   Ok for admit to ruff.  Non-operative management.  Post traumatic pharmacologic seizure prophylaxis for 1 week.  Speech Language Pathology cognitive evaluation.  Caden Batista MD. Neurosurgeon. [facility].    Contraindication to deep vein thrombosis (DVT) prophylaxis-resolved as of 11/15/2023, (present on admission)  Assessment & Plan  VTE prophylaxis initially contraindicated secondary to elevated bleeding risk.  11/6 Trauma surveillance venous duplex ultrasonography ordered.      Frailty- (present on admission)  Assessment & Plan  Patient is frail with stiff person syndrome at baseline.  Uses a walker daily for mobility.    PM&R consult for potential rehab needs     Hyperlipidemia- (present on admission)  Assessment & Plan  Chronic condition treated with Lipitor.  Resumed maintenance medication on admission.      Hypothyroidism- (present on admission)  Assessment & Plan  Chronic condition treated with Synthroid.  Resumed maintenance medication on admission.    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  Resumed maintenance medication on admission.      Diabetes (HCC)- (present on admission)  Assessment & Plan  Chronic condition treated with Insulin.  Resumed maintenance medication on admission.  Insulin sliding scale.       Discharge planning issues-resolved as of 11/15/2023, (present on admission)  Assessment & Plan  Rehab consult placed on admit      DISPOSITION: Discharged home on 11/15/2023.  A Kyrgyz telephone  was utilized and the patient was counseled and questions were answered. Specifically, signs and symptoms of infection, respiratory decompensation, changes in mentation  and persistent or worsening pain were discussed and the patient agrees to seek medical attention if any of these develop.    DISCHARGE MEDICATIONS:     Medication List        START taking these medications        Instructions   acetaminophen 325 MG Tabs  Commonly known as: Tylenol   Take 2 Tablets by mouth every four hours as needed for Moderate Pain.  Dose: 650 mg     levETIRAcetam 500 MG Tabs  Commonly known as: Keppra   Take 1 Tablet by mouth every 12 hours for 6 days.  Dose: 500 mg            CONTINUE taking these medications        Instructions   atorvastatin 20 MG Tabs  Commonly known as: Lipitor   Doctor's comments: Patient needs appointment prior to additional refills  Take 1 Tablet by mouth every evening.  Dose: 20 mg     calcium carbonate 500 MG Chew  Commonly known as: Tums   Chew 3 Tablets every day.  Dose: 1,500 mg     carBAMazepine 200 MG Tabs  Commonly known as: TEGretol   Take 200 mg by mouth 3 times a day.  Dose: 200 mg     diazepam 10 MG tablet  Commonly known as: Valium   Take 10 mg by mouth 2 times a day.  Dose: 10 mg     FLUoxetine 20 MG Caps  Commonly known as: PROzac   Take 40 mg by mouth every morning. 2 capsules = 40 mg.  Dose: 40 mg     insulin lispro 100 UNIT/ML  Commonly known as: HumaLOG   Inject 2-12 Units under the skin 3 times a day before meals. If -200=2 units  201-250=4 units  251-300=6 units  301-350=8 units   351-400=10 units  Over 401=12 units  Dose: 2-12 Units     lacosamide 100 MG Tabs tablet  Commonly known as: Vimpat   Take 100 mg by mouth in the morning, at noon, and at bedtime.  Dose: 100 mg     levothyroxine 50 MCG Tabs  Commonly known as: Synthroid   Take 50 mcg by mouth every morning on an empty stomach.  Dose: 50 mcg     omeprazole 20 MG delayed-release capsule  Commonly known as: PriLOSEC   Take 20 mg by mouth every day.  Dose: 20 mg     Toujeo SoloStar 300 UNIT/ML Sopn  Generic drug: Insulin Glargine (1 Unit Dial)   Inject 20 Units under the skin every  evening.  Dose: 20 Units              ACTIVITY:  Activity as tolerated.    WOUND CARE:  None.    DIET:  Orders Placed This Encounter   Procedures    Diet Order Diet: Consistent CHO (Diabetic)     Standing Status:   Standing     Number of Occurrences:   1     Order Specific Question:   Diet:     Answer:   Consistent CHO (Diabetic) [4]       FOLLOW UP:  [facility]  [id number]  [place] [address]  [phone]    Follow up  As needed    Caden Batista M.D.  [address] [facility]  [place] [id number] [phone]    Follow up  As needed    Chalo Whelan M.D.  [address]  [place] [address] [phone]    Schedule an appointment as soon as possible for a visit        TIME SPENT ON DISCHARGE: 35 minutes    __________________________________________  SURESH Gray    DD: 11/15/2023 9:57 AM

## 2023-11-15 NOTE — PROGRESS NOTES
Trauma / Surgical Daily Progress Note    Date of Service  11/15/2023    Chief Complaint  58 y.o. female admitted 11/14/2023 with minimal bifrontal subarachnoid hemorrhages after a ground level fall.    Interval Events  Tunisian telephone  utilized.  Repeat head CT improved.  Tertiary survey completed, no further findings.    - Therapies and likely discharge home today.    Review of Systems  Review of Systems   Constitutional:  Positive for malaise/fatigue. Negative for chills and fever.   HENT:  Positive for tinnitus. Negative for hearing loss.    Eyes:  Positive for blurred vision and double vision.   Respiratory:  Negative for shortness of breath.    Cardiovascular:  Negative for chest pain.   Gastrointestinal:  Negative for abdominal pain, nausea and vomiting.   Genitourinary:  Negative for dysuria.   Musculoskeletal:  Positive for joint pain (bilateral shoulders), myalgias and neck pain (muscle soreness). Negative for back pain.   Skin:  Negative for rash.   Neurological:  Positive for headaches. Negative for dizziness, tingling, sensory change, speech change and focal weakness.   Psychiatric/Behavioral:  Negative for substance abuse.         Vital Signs  Temp:  [36.5 °C (97.7 °F)-36.7 °C (98.1 °F)] 36.5 °C (97.7 °F)  Pulse:  [64-82] 64  Resp:  [15-18] 16  BP: ()/(61-91) 96/61  SpO2:  [93 %-98 %] 96 %    Physical Exam  Physical Exam  Vitals and nursing note reviewed.   Constitutional:       General: She is sleeping. She is not in acute distress.     Appearance: She is well-developed. She is not ill-appearing.   HENT:      Head: Normocephalic and atraumatic.      Right Ear: External ear normal.      Left Ear: External ear normal.      Nose: Nose normal.      Mouth/Throat:      Mouth: Mucous membranes are moist.      Pharynx: Oropharynx is clear.   Eyes:      Extraocular Movements: Extraocular movements intact.      Pupils: Pupils are equal, round, and reactive to light.   Pulmonary:      Effort:  Pulmonary effort is normal. No respiratory distress.   Chest:      Chest wall: No tenderness.   Abdominal:      General: There is no distension.      Tenderness: There is no abdominal tenderness. There is no guarding.   Musculoskeletal:         General: Tenderness (bilateral shoulders, no deformity, edema or ecchymosis) present. No swelling.      Cervical back: Normal range of motion and neck supple. Tenderness (muscular) present. No rigidity.   Skin:     General: Skin is warm and dry.      Capillary Refill: Capillary refill takes less than 2 seconds.   Neurological:      Mental Status: She is easily aroused.      GCS: GCS eye subscore is 4. GCS verbal subscore is 5. GCS motor subscore is 6.   Psychiatric:         Mood and Affect: Mood normal.         Behavior: Behavior normal. Behavior is cooperative.         Laboratory  Recent Results (from the past 24 hour(s))   CBC WITH DIFFERENTIAL    Collection Time: 11/14/23  2:30 PM   Result Value Ref Range    WBC 6.4 4.8 - 10.8 K/uL    RBC 4.38 4.20 - 5.40 M/uL    Hemoglobin 14.5 12.0 - 16.0 g/dL    Hematocrit 41.3 37.0 - 47.0 %    MCV 94.3 81.4 - 97.8 fL    MCH 33.1 (H) 27.0 - 33.0 pg    MCHC 35.1 32.2 - 35.5 g/dL    RDW 42.5 35.9 - 50.0 fL    Platelet Count 168 164 - 446 K/uL    MPV 13.1 (H) 9.0 - 12.9 fL    Neutrophils-Polys 72.40 (H) 44.00 - 72.00 %    Lymphocytes 19.30 (L) 22.00 - 41.00 %    Monocytes 5.70 0.00 - 13.40 %    Eosinophils 1.30 0.00 - 6.90 %    Basophils 0.50 0.00 - 1.80 %    Immature Granulocytes 0.80 0.00 - 0.90 %    Nucleated RBC 0.00 0.00 - 0.20 /100 WBC    Neutrophils (Absolute) 4.62 1.82 - 7.42 K/uL    Lymphs (Absolute) 1.23 1.00 - 4.80 K/uL    Monos (Absolute) 0.36 0.00 - 0.85 K/uL    Eos (Absolute) 0.08 0.00 - 0.51 K/uL    Baso (Absolute) 0.03 0.00 - 0.12 K/uL    Immature Granulocytes (abs) 0.05 0.00 - 0.11 K/uL    NRBC (Absolute) 0.00 K/uL   COMP METABOLIC PANEL    Collection Time: 11/14/23  2:30 PM   Result Value Ref Range    Sodium 139 135 - 145  mmol/L    Potassium 5.2 3.6 - 5.5 mmol/L    Chloride 101 96 - 112 mmol/L    Co2 29 20 - 33 mmol/L    Anion Gap 9.0 7.0 - 16.0    Glucose 246 (H) 65 - 99 mg/dL    Bun 32 (H) 8 - 22 mg/dL    Creatinine 1.45 (H) 0.50 - 1.40 mg/dL    Calcium 9.4 8.5 - 10.5 mg/dL    Correct Calcium 9.2 8.5 - 10.5 mg/dL    AST(SGOT) 71 (H) 12 - 45 U/L    ALT(SGPT) 22 2 - 50 U/L    Alkaline Phosphatase 92 30 - 99 U/L    Total Bilirubin 0.2 0.1 - 1.5 mg/dL    Albumin 4.3 3.2 - 4.9 g/dL    Total Protein 7.4 6.0 - 8.2 g/dL    Globulin 3.1 1.9 - 3.5 g/dL    A-G Ratio 1.4 g/dL   APTT    Collection Time: 11/14/23  2:30 PM   Result Value Ref Range    APTT 25.9 24.7 - 36.0 sec   PROTHROMBIN TIME    Collection Time: 11/14/23  2:30 PM   Result Value Ref Range    PT 12.2 12.0 - 14.6 sec    INR 0.89 0.87 - 1.13   PLATELET MAPPING WITH BASIC TEG    Collection Time: 11/14/23  2:30 PM   Result Value Ref Range    Reaction Time Initial-R 3.2 (L) 4.6 - 9.1 min    React Time Initial Hep 3.3 (L) 4.3 - 8.3 min    Clot Kinetics-K 1.3 0.8 - 2.1 min    Clot Angle-Angle 73.6 63.0 - 78.0 degrees    Maximum Clot Strength-MA 63.0 52.0 - 69.0 mm    TEG Functional Fibrinogen(MA) 23.9 15.0 - 32.0 mm    Lysis 30 minutes-LY30 0.2 0.0 - 2.6 %    % Inhibition ADP 0.0 0.0 - 17.0 %    % Inhibition AA 2.0 0.0 - 11.0 %    TEG Algorithm Link Algorithm    ESTIMATED GFR    Collection Time: 11/14/23  2:30 PM   Result Value Ref Range    GFR (CKD-EPI) 42 (A) >60 mL/min/1.73 m 2   POCT glucose device results    Collection Time: 11/14/23  5:58 PM   Result Value Ref Range    POC Glucose, Blood 173 (H) 65 - 99 mg/dL   POCT glucose device results    Collection Time: 11/14/23  9:00 PM   Result Value Ref Range    POC Glucose, Blood 145 (H) 65 - 99 mg/dL   CBC with Differential: Tomorrow AM    Collection Time: 11/15/23  1:26 AM   Result Value Ref Range    WBC 6.2 4.8 - 10.8 K/uL    RBC 4.10 (L) 4.20 - 5.40 M/uL    Hemoglobin 13.3 12.0 - 16.0 g/dL    Hematocrit 39.4 37.0 - 47.0 %    MCV 96.1  81.4 - 97.8 fL    MCH 32.4 27.0 - 33.0 pg    MCHC 33.8 32.2 - 35.5 g/dL    RDW 42.8 35.9 - 50.0 fL    Platelet Count 147 (L) 164 - 446 K/uL    MPV 13.0 (H) 9.0 - 12.9 fL    Neutrophils-Polys 68.00 44.00 - 72.00 %    Lymphocytes 22.10 22.00 - 41.00 %    Monocytes 7.40 0.00 - 13.40 %    Eosinophils 1.60 0.00 - 6.90 %    Basophils 0.60 0.00 - 1.80 %    Immature Granulocytes 0.30 0.00 - 0.90 %    Nucleated RBC 0.00 0.00 - 0.20 /100 WBC    Neutrophils (Absolute) 4.21 1.82 - 7.42 K/uL    Lymphs (Absolute) 1.37 1.00 - 4.80 K/uL    Monos (Absolute) 0.46 0.00 - 0.85 K/uL    Eos (Absolute) 0.10 0.00 - 0.51 K/uL    Baso (Absolute) 0.04 0.00 - 0.12 K/uL    Immature Granulocytes (abs) 0.02 0.00 - 0.11 K/uL    NRBC (Absolute) 0.00 K/uL   Basic Metabolic Panel (BMP): Tomorrow AM    Collection Time: 11/15/23  1:26 AM   Result Value Ref Range    Sodium 140 135 - 145 mmol/L    Potassium 4.3 3.6 - 5.5 mmol/L    Chloride 105 96 - 112 mmol/L    Co2 27 20 - 33 mmol/L    Glucose 172 (H) 65 - 99 mg/dL    Bun 32 (H) 8 - 22 mg/dL    Creatinine 1.27 0.50 - 1.40 mg/dL    Calcium 8.7 8.5 - 10.5 mg/dL    Anion Gap 8.0 7.0 - 16.0   ESTIMATED GFR    Collection Time: 11/15/23  1:26 AM   Result Value Ref Range    GFR (CKD-EPI) 49 (A) >60 mL/min/1.73 m 2   POCT glucose device results    Collection Time: 11/15/23  8:29 AM   Result Value Ref Range    POC Glucose, Blood 119 (H) 65 - 99 mg/dL       Fluids  No intake or output data in the 24 hours ending 11/15/23 1006    Core Measures & Quality Metrics  Labs reviewed, Medications reviewed and Radiology images reviewed  Olguin catheter: No Olguin      DVT Prophylaxis: Not indicated at this time, ambulatory and Contraindicated - High bleeding risk  DVT prophylaxis - mechanical: SCDs  Ulcer prophylaxis: Yes    Assessed for rehab: Patient returned to prior level of function, rehabilitation not indicated at this time    RAP Score Total: 5    CAGE Results: not completed Blood Alcohol>0.08: not completed   Denies  alcohol, tobacco or illicit drug use.    Assessment/Plan  Subarachnoid hemorrhage following injury (HCC)- (present on admission)  Assessment & Plan  Minimal amount of bifrontal subarachnoid hemorrhage   Stable repeat head CT.   Ok for admit to ruff.  Non-operative management.  Post traumatic pharmacologic seizure prophylaxis for 1 week.  Speech Language Pathology cognitive evaluation.  Caden Batista MD. Neurosurgeon. [facility].    Frailty- (present on admission)  Assessment & Plan  Patient is frail with stiff person syndrome at baseline.  Uses a walker daily for mobility.    PM&R consult for potential rehab needs     Hyperlipidemia- (present on admission)  Assessment & Plan  Chronic condition treated with Lipitor.  Resumed maintenance medication on admission.      Hypothyroidism- (present on admission)  Assessment & Plan  Chronic condition treated with Synthroid.  Resumed maintenance medication on admission.    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  Resumed maintenance medication on admission.      Diabetes (HCC)- (present on admission)  Assessment & Plan  Chronic condition treated with Insulin.  Resumed maintenance medication on admission.  Insulin sliding scale.       Mental status adequate for full examination?: Yes    Spine cleared (radiologically and/or clinically): Yes    All current laboratory studies/radiology exams reviewed: Yes    Medications reconciliation has been reviewed: Yes    Completed Consultations:  None     Pending Consultations:  None    Newly identified diagnoses, injuries and/or co-morbidities:  None    Discussed patient condition with RN, , , Patient, and trauma surgery. Dr. Lau

## 2023-11-15 NOTE — ED NOTES
Patient to floor via gurney in stable condition accompanied by transport and daughter. All belongings accounted for.

## 2023-11-15 NOTE — THERAPY
Physical Therapy   Initial Evaluation     Patient Name: Vicky Cheatham Depintor  Age:  58 y.o., Sex:  female  Medical Record #: [id number]  Today's Date: 11/15/2023          Assessment  Patient is 58 y.o. female admitted s/p fall at home, hitting her head.  SAH, non op.  Hx of stiff person syndrome, DM, HLD.  She lives w/ adult children, as well as her daughter in law who is home 24/7.  Pt was indep PTA w/ a fww.  She lives in a single story house w/ ramp access.  Today, she is able to mobilize at what appears to be her PLOF.  Excellent family support.  Children at bedside.  No acute PT needs.  Plan    Physical Therapy Initial Treatment Plan   Duration: Evaluation only    DC Equipment Recommendations: None  Discharge Recommendations: Anticipate that the patient will have no further physical therapy needs after discharge from the hospital       Objective       11/15/23 0801   Prior Living Situation   Housing / Facility 1 Story House   Steps Into Home 0   Steps In Home 0   Equipment Owned Front-Wheel Walker;Ramp   Lives with - Patient's Self Care Capacity Adult Children   Comments daughter in law available 24/7   Prior Level of Functional Mobility   Bed Mobility Independent   Transfer Status Independent   Ambulation Independent   Assistive Devices Used Front-Wheel Walker   Cognition    Level of Consciousness Alert   Strength Lower Body   Comments grossly 4/5   Balance Assessment   Sitting Balance (Static) Fair +   Sitting Balance (Dynamic) Fair +   Standing Balance (Static) Fair   Standing Balance (Dynamic) Fair   Weight Shift Sitting Good   Weight Shift Standing Good   Comments w/ fww   Bed Mobility    Supine to Sit Supervised   Sit to Supine Supervised   Gait Analysis   Gait Level Of Assist Supervised   Assistive Device Front Wheel Walker   Distance (Feet) 100   Functional Mobility   Sit to Stand Supervised   Bed, Chair, Wheelchair Transfer Supervised   Physical Therapy Initial Treatment Plan    Duration Evaluation  only   Anticipated Discharge Equipment and Recommendations   DC Equipment Recommendations None   Discharge Recommendations Anticipate that the patient will have no further physical therapy needs after discharge from the hospital